# Patient Record
Sex: MALE | Race: WHITE | NOT HISPANIC OR LATINO
[De-identification: names, ages, dates, MRNs, and addresses within clinical notes are randomized per-mention and may not be internally consistent; named-entity substitution may affect disease eponyms.]

---

## 2017-08-11 ENCOUNTER — APPOINTMENT (OUTPATIENT)
Dept: UROLOGY | Facility: CLINIC | Age: 82
End: 2017-08-11
Payer: MEDICARE

## 2017-08-11 VITALS
WEIGHT: 174 LBS | TEMPERATURE: 97.9 F | DIASTOLIC BLOOD PRESSURE: 83 MMHG | BODY MASS INDEX: 25.77 KG/M2 | SYSTOLIC BLOOD PRESSURE: 148 MMHG | HEART RATE: 106 BPM | HEIGHT: 69 IN | RESPIRATION RATE: 17 BRPM

## 2017-08-11 DIAGNOSIS — Z87.448 PERSONAL HISTORY OF OTHER DISEASES OF URINARY SYSTEM: ICD-10-CM

## 2017-08-11 DIAGNOSIS — M86.9 OSTEOMYELITIS, UNSPECIFIED: ICD-10-CM

## 2017-08-11 PROCEDURE — 99203 OFFICE O/P NEW LOW 30 MIN: CPT

## 2017-08-11 RX ORDER — DICLOFENAC SODIUM 10 MG/G
1 GEL TOPICAL
Qty: 1 | Refills: 0 | Status: ACTIVE | COMMUNITY
Start: 2017-08-11 | End: 1900-01-01

## 2017-08-21 ENCOUNTER — MEDICATION RENEWAL (OUTPATIENT)
Age: 82
End: 2017-08-21

## 2017-08-21 RX ORDER — METHYLPREDNISOLONE 4 MG/1
4 TABLET ORAL
Qty: 1 | Refills: 0 | Status: ACTIVE | COMMUNITY
Start: 2017-08-21 | End: 1900-01-01

## 2017-09-05 ENCOUNTER — INPATIENT (INPATIENT)
Facility: HOSPITAL | Age: 82
LOS: 7 days | Discharge: HOME HEALTH | End: 2017-09-13
Attending: HOSPITALIST | Admitting: HOSPITALIST
Payer: MEDICARE

## 2017-09-05 VITALS
DIASTOLIC BLOOD PRESSURE: 74 MMHG | HEART RATE: 101 BPM | WEIGHT: 169.98 LBS | OXYGEN SATURATION: 100 % | SYSTOLIC BLOOD PRESSURE: 128 MMHG | TEMPERATURE: 98 F | HEIGHT: 68 IN | RESPIRATION RATE: 16 BRPM

## 2017-09-05 DIAGNOSIS — R10.30 LOWER ABDOMINAL PAIN, UNSPECIFIED: ICD-10-CM

## 2017-09-05 DIAGNOSIS — K31.89 OTHER DISEASES OF STOMACH AND DUODENUM: Chronic | ICD-10-CM

## 2017-09-05 DIAGNOSIS — R63.8 OTHER SYMPTOMS AND SIGNS CONCERNING FOOD AND FLUID INTAKE: ICD-10-CM

## 2017-09-05 DIAGNOSIS — Z29.9 ENCOUNTER FOR PROPHYLACTIC MEASURES, UNSPECIFIED: ICD-10-CM

## 2017-09-05 DIAGNOSIS — N18.3 CHRONIC KIDNEY DISEASE, STAGE 3 (MODERATE): ICD-10-CM

## 2017-09-05 DIAGNOSIS — N39.0 URINARY TRACT INFECTION, SITE NOT SPECIFIED: ICD-10-CM

## 2017-09-05 DIAGNOSIS — I10 ESSENTIAL (PRIMARY) HYPERTENSION: ICD-10-CM

## 2017-09-05 DIAGNOSIS — K44.9 DIAPHRAGMATIC HERNIA WITHOUT OBSTRUCTION OR GANGRENE: Chronic | ICD-10-CM

## 2017-09-05 LAB
ALBUMIN SERPL ELPH-MCNC: 2.9 G/DL — LOW (ref 3.3–5)
ALP SERPL-CCNC: 73 U/L — SIGNIFICANT CHANGE UP (ref 40–120)
ALT FLD-CCNC: 7 U/L — SIGNIFICANT CHANGE UP (ref 4–41)
APPEARANCE UR: SIGNIFICANT CHANGE UP
AST SERPL-CCNC: 12 U/L — SIGNIFICANT CHANGE UP (ref 4–40)
BACTERIA # UR AUTO: HIGH
BASOPHILS # BLD AUTO: 0.03 K/UL — SIGNIFICANT CHANGE UP (ref 0–0.2)
BASOPHILS NFR BLD AUTO: 0.3 % — SIGNIFICANT CHANGE UP (ref 0–2)
BILIRUB SERPL-MCNC: 0.2 MG/DL — SIGNIFICANT CHANGE UP (ref 0.2–1.2)
BILIRUB UR-MCNC: NEGATIVE — SIGNIFICANT CHANGE UP
BLOOD UR QL VISUAL: NEGATIVE — SIGNIFICANT CHANGE UP
BUN SERPL-MCNC: 27 MG/DL — HIGH (ref 7–23)
CALCIUM SERPL-MCNC: 8.5 MG/DL — SIGNIFICANT CHANGE UP (ref 8.4–10.5)
CHLORIDE SERPL-SCNC: 111 MMOL/L — HIGH (ref 98–107)
CO2 SERPL-SCNC: 18 MMOL/L — LOW (ref 22–31)
COLOR SPEC: YELLOW — SIGNIFICANT CHANGE UP
CREAT SERPL-MCNC: 1.67 MG/DL — HIGH (ref 0.5–1.3)
EOSINOPHIL # BLD AUTO: 0.08 K/UL — SIGNIFICANT CHANGE UP (ref 0–0.5)
EOSINOPHIL NFR BLD AUTO: 0.9 % — SIGNIFICANT CHANGE UP (ref 0–6)
GLUCOSE SERPL-MCNC: 104 MG/DL — HIGH (ref 70–99)
GLUCOSE UR-MCNC: NEGATIVE — SIGNIFICANT CHANGE UP
HCT VFR BLD CALC: 28.6 % — LOW (ref 39–50)
HGB BLD-MCNC: 8.6 G/DL — LOW (ref 13–17)
IMM GRANULOCYTES # BLD AUTO: 0.09 # — SIGNIFICANT CHANGE UP
IMM GRANULOCYTES NFR BLD AUTO: 1 % — SIGNIFICANT CHANGE UP (ref 0–1.5)
KETONES UR-MCNC: NEGATIVE — SIGNIFICANT CHANGE UP
LEUKOCYTE ESTERASE UR-ACNC: HIGH
LYMPHOCYTES # BLD AUTO: 1.8 K/UL — SIGNIFICANT CHANGE UP (ref 1–3.3)
LYMPHOCYTES # BLD AUTO: 20.3 % — SIGNIFICANT CHANGE UP (ref 13–44)
MCHC RBC-ENTMCNC: 24.5 PG — LOW (ref 27–34)
MCHC RBC-ENTMCNC: 30.1 % — LOW (ref 32–36)
MCV RBC AUTO: 81.5 FL — SIGNIFICANT CHANGE UP (ref 80–100)
MONOCYTES # BLD AUTO: 0.37 K/UL — SIGNIFICANT CHANGE UP (ref 0–0.9)
MONOCYTES NFR BLD AUTO: 4.2 % — SIGNIFICANT CHANGE UP (ref 2–14)
MUCOUS THREADS # UR AUTO: SIGNIFICANT CHANGE UP
NEUTROPHILS # BLD AUTO: 6.48 K/UL — SIGNIFICANT CHANGE UP (ref 1.8–7.4)
NEUTROPHILS NFR BLD AUTO: 73.3 % — SIGNIFICANT CHANGE UP (ref 43–77)
NITRITE UR-MCNC: NEGATIVE — SIGNIFICANT CHANGE UP
NRBC # FLD: 0 — SIGNIFICANT CHANGE UP
PH UR: 6 — SIGNIFICANT CHANGE UP (ref 4.6–8)
PLATELET # BLD AUTO: 358 K/UL — SIGNIFICANT CHANGE UP (ref 150–400)
PMV BLD: 9 FL — SIGNIFICANT CHANGE UP (ref 7–13)
POTASSIUM SERPL-MCNC: 4.3 MMOL/L — SIGNIFICANT CHANGE UP (ref 3.5–5.3)
POTASSIUM SERPL-SCNC: 4.3 MMOL/L — SIGNIFICANT CHANGE UP (ref 3.5–5.3)
PROT SERPL-MCNC: 6.5 G/DL — SIGNIFICANT CHANGE UP (ref 6–8.3)
PROT UR-MCNC: 100 — SIGNIFICANT CHANGE UP
RBC # BLD: 3.51 M/UL — LOW (ref 4.2–5.8)
RBC # FLD: 17.9 % — HIGH (ref 10.3–14.5)
RBC CASTS # UR COMP ASSIST: HIGH (ref 0–?)
SODIUM SERPL-SCNC: 143 MMOL/L — SIGNIFICANT CHANGE UP (ref 135–145)
SP GR SPEC: 1.01 — SIGNIFICANT CHANGE UP (ref 1–1.03)
SQUAMOUS # UR AUTO: SIGNIFICANT CHANGE UP
UROBILINOGEN FLD QL: NORMAL E.U. — SIGNIFICANT CHANGE UP (ref 0.1–0.2)
WBC # BLD: 8.85 K/UL — SIGNIFICANT CHANGE UP (ref 3.8–10.5)
WBC # FLD AUTO: 8.85 K/UL — SIGNIFICANT CHANGE UP (ref 3.8–10.5)
WBC CLUMPS #/AREA URNS HPF: PRESENT — HIGH (ref 0–?)
WBC UR QL: >50 — HIGH (ref 0–?)

## 2017-09-05 PROCEDURE — 99223 1ST HOSP IP/OBS HIGH 75: CPT | Mod: GC

## 2017-09-05 RX ORDER — HEPARIN SODIUM 5000 [USP'U]/ML
5000 INJECTION INTRAVENOUS; SUBCUTANEOUS EVERY 8 HOURS
Qty: 0 | Refills: 0 | Status: DISCONTINUED | OUTPATIENT
Start: 2017-09-05 | End: 2017-09-09

## 2017-09-05 RX ORDER — METOPROLOL TARTRATE 50 MG
50 TABLET ORAL DAILY
Qty: 0 | Refills: 0 | Status: DISCONTINUED | OUTPATIENT
Start: 2017-09-05 | End: 2017-09-13

## 2017-09-05 RX ORDER — ACETAMINOPHEN 500 MG
650 TABLET ORAL EVERY 6 HOURS
Qty: 0 | Refills: 0 | Status: DISCONTINUED | OUTPATIENT
Start: 2017-09-05 | End: 2017-09-09

## 2017-09-05 RX ORDER — FINASTERIDE 5 MG/1
5 TABLET, FILM COATED ORAL DAILY
Qty: 0 | Refills: 0 | Status: DISCONTINUED | OUTPATIENT
Start: 2017-09-05 | End: 2017-09-13

## 2017-09-05 RX ORDER — CEFTRIAXONE 500 MG/1
1 INJECTION, POWDER, FOR SOLUTION INTRAMUSCULAR; INTRAVENOUS EVERY 24 HOURS
Qty: 0 | Refills: 0 | Status: DISCONTINUED | OUTPATIENT
Start: 2017-09-06 | End: 2017-09-07

## 2017-09-05 RX ORDER — PANTOPRAZOLE SODIUM 20 MG/1
40 TABLET, DELAYED RELEASE ORAL
Qty: 0 | Refills: 0 | Status: DISCONTINUED | OUTPATIENT
Start: 2017-09-05 | End: 2017-09-07

## 2017-09-05 RX ORDER — CEFTRIAXONE 500 MG/1
1 INJECTION, POWDER, FOR SOLUTION INTRAMUSCULAR; INTRAVENOUS ONCE
Qty: 0 | Refills: 0 | Status: COMPLETED | OUTPATIENT
Start: 2017-09-05 | End: 2017-09-05

## 2017-09-05 RX ORDER — NIFEDIPINE 30 MG
60 TABLET, EXTENDED RELEASE 24 HR ORAL DAILY
Qty: 0 | Refills: 0 | Status: DISCONTINUED | OUTPATIENT
Start: 2017-09-05 | End: 2017-09-13

## 2017-09-05 RX ORDER — ACETAMINOPHEN 500 MG
1000 TABLET ORAL ONCE
Qty: 0 | Refills: 0 | Status: COMPLETED | OUTPATIENT
Start: 2017-09-05 | End: 2017-09-05

## 2017-09-05 RX ORDER — MORPHINE SULFATE 50 MG/1
2 CAPSULE, EXTENDED RELEASE ORAL ONCE
Qty: 0 | Refills: 0 | Status: DISCONTINUED | OUTPATIENT
Start: 2017-09-05 | End: 2017-09-05

## 2017-09-05 RX ADMIN — Medication 650 MILLIGRAM(S): at 20:30

## 2017-09-05 RX ADMIN — HEPARIN SODIUM 5000 UNIT(S): 5000 INJECTION INTRAVENOUS; SUBCUTANEOUS at 22:49

## 2017-09-05 RX ADMIN — Medication 650 MILLIGRAM(S): at 19:45

## 2017-09-05 RX ADMIN — CEFTRIAXONE 100 GRAM(S): 500 INJECTION, POWDER, FOR SOLUTION INTRAMUSCULAR; INTRAVENOUS at 13:57

## 2017-09-05 RX ADMIN — Medication 400 MILLIGRAM(S): at 11:42

## 2017-09-05 RX ADMIN — Medication 1000 MILLIGRAM(S): at 13:42

## 2017-09-05 RX ADMIN — MORPHINE SULFATE 2 MILLIGRAM(S): 50 CAPSULE, EXTENDED RELEASE ORAL at 14:19

## 2017-09-05 NOTE — ED ADULT NURSE NOTE - OBJECTIVE STATEMENT
A+OX3, amb, self care, skin intact.  Pt had cystoscopy for strictures and perforated bladder with recurrent UTI's in June and has been having intermittent zhou groin pain since then unrelieved by tylenol, motrin, and oxycodone.  Pain occurs when sitting or walking.  Instructed to collect urine specimen when able.  Pt is incontinent.  Urinal placed to obtain.  MD informed of pt status

## 2017-09-05 NOTE — H&P ADULT - ATTENDING COMMENTS
Pt seen and examined  d/w Dr. Hdez  chronic groin pain, was diagnosed w/ osteitis pubis, s/p steroid injection and medrol dose pack, without relief of pain.   Pt pain not well controlled w/ Tylenol, but does not want anything stronger.   Ortho (Dr. Lucas) unable to be reached, ?on vacation - reattempt in AM, or call house Ortho.  MRI pelvis ordered

## 2017-09-05 NOTE — H&P ADULT - PMH
CKD (chronic kidney disease)    GERD (gastroesophageal reflux disease)    Hiatal hernia    HTN - Hypertension

## 2017-09-05 NOTE — H&P ADULT - NSHPLABSRESULTS_GEN_ALL_CORE
8.6    8.85  )-----------( 358      ( 05 Sep 2017 10:57 )             28.6           143  |  111<H>  |  27<H>  ----------------------------<  104<H>  4.3   |  18<L>  |  1.67<H>    Ca    8.5      05 Sep 2017 10:57    TPro  6.5  /  Alb  2.9<L>  /  TBili  0.2  /  DBili  x   /  AST  12  /  ALT  7   /  AlkPhos  73                Urinalysis Basic - ( 05 Sep 2017 10:57 )    Color: YELLOW / Appearance: HAZY / S.015 / pH: 6.0  Gluc: NEGATIVE / Ketone: NEGATIVE  / Bili: NEGATIVE / Urobili: NORMAL E.U.   Blood: NEGATIVE / Protein: 100 / Nitrite: NEGATIVE   Leuk Esterase: LARGE / RBC: 5-10 / WBC >50   Sq Epi: OCC / Non Sq Epi: x / Bacteria: MANY 8.6    8.85  )-----------( 358      ( 05 Sep 2017 10:57 )             28.6           143  |  111<H>  |  27<H>  ----------------------------<  104<H>  4.3   |  18<L>  |  1.67<H>    Ca    8.5      05 Sep 2017 10:57    TPro  6.5  /  Alb  2.9<L>  /  TBili  0.2  /  DBili  x   /  AST  12  /  ALT  7   /  AlkPhos  73            Urinalysis Basic - ( 05 Sep 2017 10:57 )    Color: YELLOW / Appearance: HAZY / S.015 / pH: 6.0  Gluc: NEGATIVE / Ketone: NEGATIVE  / Bili: NEGATIVE / Urobili: NORMAL E.U.   Blood: NEGATIVE / Protein: 100 / Nitrite: NEGATIVE   Leuk Esterase: LARGE / RBC: 5-10 / WBC >50   Sq Epi: OCC / Non Sq Epi: x / Bacteria: MANY

## 2017-09-05 NOTE — H&P ADULT - NSHPREVIEWOFSYSTEMS_GEN_ALL_CORE
Review of Systems:   CONSTITUTIONAL: No fever, weight changes, fatigue, appetite changes  EYES: No eye pain, visual disturbances, or discharge  ENMT:  No difficulty hearing, tinnitus, vertigo; No sinus or throat pain  NECK: No pain or stiffness  RESPIRATORY: No cough, wheezing, chills or hemoptysis; No shortness of breath  CARDIOVASCULAR: No chest pain, palpitations, dizziness, or leg swelling  GASTROINTESTINAL: No abdominal or epigastric pain. No nausea, vomiting, or hematemesis; No diarrhea or constipation. No melena or hematochezia.  GENITOURINARY: No dysuria, frequency, hematuria, + incontinence and bilateral groin pain   NEUROLOGICAL: No headaches, memory loss, loss of strength, numbness, or tremors  SKIN: No itching, burning, rashes, or lesions   ENDOCRINE: No heat or cold intolerance; No hair loss  MUSCULOSKELETAL: No joint pain or swelling; No muscle, back, or extremity pain  PSYCHIATRIC: No depression, anxiety, mood swings, or difficulty sleeping  HEME/LYMPH: No easy bruising, or bleeding gums  ALLERY AND IMMUNOLOGIC: No hives or eczema

## 2017-09-05 NOTE — ED ADULT TRIAGE NOTE - CHIEF COMPLAINT QUOTE
Pt. c/o b/l groin pain with radiation down left leg x 2 months. Cytoscopy performed in June 2017. No relief with motrin. Appears comfortable in triage. Denies any dysuria, hematuria, fever/chills.

## 2017-09-05 NOTE — H&P ADULT - PROBLEM SELECTOR PLAN 2
- hx of recurrent UTIs   - has positive UA, suprapubic tenderness and the worsening groin pain  - plan for 7 days of abx for complicated UTI

## 2017-09-05 NOTE — PATIENT PROFILE ADULT. - ABILITY TO HEAR (WITH HEARING AID OR HEARING APPLIANCE IF NORMALLY USED):
Mildly to Moderately Impaired: difficulty hearing in some environments or speaker may need to increase volume or speak distinctly/[place]

## 2017-09-05 NOTE — ED PROVIDER NOTE - PHYSICAL EXAMINATION
gu- no erythema or swelling of tesicles, no tenderness or erythema in testicles or penis gu- no erythema or swelling of tesicles, no tenderness or erythema in testicles or penis    Attending/Mackenzie: Well-appearing, NAD; PERRL/EOMI, non-icterus, supple, no STEFANI, no JVD, RRR, CTAB; Abd-soft, NT/ND, +PT bialteral femoral canals, no hernias noted, no HSM; no LE edema, A&Ox3, nonfocal; Skin-warm/dry

## 2017-09-05 NOTE — H&P ADULT - PSH
Acute gastric volvulus  s/p laparoscopic reduction, PEG  Hernia, paraesophageal    Prostate  Brachytherapy 30y ago at [facility]  S/P appendectomy  performed at [facility] 10 y ago

## 2017-09-05 NOTE — ED PROVIDER NOTE - MEDICAL DECISION MAKING DETAILS
84 yo male with bilateral groin pain; chronic issue has had extensive work up with ctap and filling studies; patient has bone scan and mri pending; will check cbc cmp ua culture pain control --> re eval

## 2017-09-05 NOTE — H&P ADULT - ASSESSMENT
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

## 2017-09-05 NOTE — PATIENT PROFILE ADULT. - VISION (WITH CORRECTIVE LENSES IF THE PATIENT USUALLY WEARS THEM):
Partially impaired: cannot see medication labels or newsprint, but can see obstacles in path, and the surrounding layout; can count fingers at arm's length/reading glasses - present on admission

## 2017-09-05 NOTE — H&P ADULT - NSHPSOCIALHISTORY_GEN_ALL_CORE
Social History:    Marital Status:  (   )    (   ) Single    (   )    (  )   Occupation:   Lives with: (  ) alone  (  ) children   (  ) spouse   (  ) parents  (  ) other    Substance Use (street drugs): (  ) never used  (  ) other:  Tobacco Usage:  (   ) never smoked   ( X  ) former smoker- quit about 30 years ago, about 25 pack years   Alcohol Usage: Denies

## 2017-09-05 NOTE — H&P ADULT - PROBLEM SELECTOR PLAN 1
- bilateral inguinal pain starting in June 2017 after bladder perforation, but now pain has been worsening  - patient was planned to have a bone scan and MRI as next step in workup so will check MRI of pelvis to evaluate

## 2017-09-05 NOTE — H&P ADULT - NSHPPHYSICALEXAM_GEN_ALL_CORE
Vital Signs Last 24 Hrs  T(C): 36.5 (05 Sep 2017 15:20), Max: 36.7 (05 Sep 2017 11:04)  T(F): 97.7 (05 Sep 2017 15:20), Max: 98 (05 Sep 2017 11:04)  HR: 83 (05 Sep 2017 15:20) (76 - 101)  BP: 130/76 (05 Sep 2017 15:20) (128/74 - 162/84)  BP(mean): --  RR: 16 (05 Sep 2017 15:20) (16 - 18)  SpO2: 97% (05 Sep 2017 15:20) (97% - 100%)    PHYSICAL EXAM:    GENERAL: Comfortable, no acute distress   HEAD:  Normocephalic, atraumatic  EYES: EOMI, PERRLA  HEENT: Moist mucous membranes  NECK: Supple, No JVD  NERVOUS SYSTEM:  Alert & Oriented X3, Motor Strength 5/5 B/L upper and lower extremities  CHEST/LUNG: Clear to auscultation bilaterally  HEART: Regular rate and rhythm, no murmur   ABDOMEN: Soft, Nontender, Nondistended, Bowel sounds present  : + suprapubic pain and bilateral inguinal groin pain, No lesions, rashes or palpable masses  EXTREMITIES:   No clubbing, cyanosis, or edema  MUSCULOSKELETAL: No muscle tenderness, no joint tenderness  SKIN:  warm and dry, no rash

## 2017-09-05 NOTE — ED PROVIDER NOTE - OBJECTIVE STATEMENT
86 yo male presenting with bilateral inguinal groin pain that has been going on since june.  patient had bladder rupture from cystoscopy which had subsequently healed on repeat imaging in early july.  patient has been seeing Dr. Robles for his symptoms.  pain described as achy ripping 10/10 pain with no radiation.  worse when moving from laying down to sitting and worse with walking.  took tylenol with minimal relief of symptoms this morning.  finished medrol dosepak last week and got steroid injection early august which temporarily helped.  no hematuria fevers chills n/v/d. 86 yo male presenting with bilateral inguinal groin pain that has been going on since june.  patient had bladder rupture from cystoscopy which had subsequently healed on repeat imaging in early july.  patient has been seeing Dr. Robles for his symptoms.  pain described as achy ripping 10/10 pain with no radiation.  worse when moving from laying down to sitting and worse with walking.  took tylenol with minimal relief of symptoms this morning.  finished medrol dosepak last week and got steroid injection early august which temporarily helped.  no hematuria fevers chills n/v/d.    Attending/Mackenzie: 86 yo M w/ a h/o esophageal rupture secondary to EGD, this past June had urinary bladder rupture secondary to cystoscopy which was performed due to recurrent UTIs. Since the surgery patient has complained of worsening bilateral groin pain. He has started to receive an OP work up including CT, referral to ortho and to receive MRI and bone scan. Pt has had urinary incont but no change in bowel habits, fever/chills, abd pain, n/v. Pain has ot been well controlled with OP meds.

## 2017-09-05 NOTE — PATIENT PROFILE ADULT. - TEACHING/LEARNING LEARNING PREFERENCES
skill demonstration/written material/video/audio/group instruction/computer/internet/pictorial/individual instruction/verbal instruction

## 2017-09-06 LAB
BUN SERPL-MCNC: 28 MG/DL — HIGH (ref 7–23)
CALCIUM SERPL-MCNC: 8.9 MG/DL — SIGNIFICANT CHANGE UP (ref 8.4–10.5)
CHLORIDE SERPL-SCNC: 110 MMOL/L — HIGH (ref 98–107)
CO2 SERPL-SCNC: 16 MMOL/L — LOW (ref 22–31)
CREAT SERPL-MCNC: 1.53 MG/DL — HIGH (ref 0.5–1.3)
GLUCOSE SERPL-MCNC: 117 MG/DL — HIGH (ref 70–99)
HCT VFR BLD CALC: 30.3 % — LOW (ref 39–50)
HGB BLD-MCNC: 8.8 G/DL — LOW (ref 13–17)
MAGNESIUM SERPL-MCNC: 1.8 MG/DL — SIGNIFICANT CHANGE UP (ref 1.6–2.6)
MCHC RBC-ENTMCNC: 24.1 PG — LOW (ref 27–34)
MCHC RBC-ENTMCNC: 29 % — LOW (ref 32–36)
MCV RBC AUTO: 83 FL — SIGNIFICANT CHANGE UP (ref 80–100)
NRBC # FLD: 0 — SIGNIFICANT CHANGE UP
PHOSPHATE SERPL-MCNC: 3.2 MG/DL — SIGNIFICANT CHANGE UP (ref 2.5–4.5)
PLATELET # BLD AUTO: 368 K/UL — SIGNIFICANT CHANGE UP (ref 150–400)
PMV BLD: 9.3 FL — SIGNIFICANT CHANGE UP (ref 7–13)
POTASSIUM SERPL-MCNC: 4.5 MMOL/L — SIGNIFICANT CHANGE UP (ref 3.5–5.3)
POTASSIUM SERPL-SCNC: 4.5 MMOL/L — SIGNIFICANT CHANGE UP (ref 3.5–5.3)
RBC # BLD: 3.65 M/UL — LOW (ref 4.2–5.8)
RBC # FLD: 18 % — HIGH (ref 10.3–14.5)
SODIUM SERPL-SCNC: 144 MMOL/L — SIGNIFICANT CHANGE UP (ref 135–145)
SPECIMEN SOURCE: SIGNIFICANT CHANGE UP
WBC # BLD: 8.88 K/UL — SIGNIFICANT CHANGE UP (ref 3.8–10.5)
WBC # FLD AUTO: 8.88 K/UL — SIGNIFICANT CHANGE UP (ref 3.8–10.5)

## 2017-09-06 PROCEDURE — 99233 SBSQ HOSP IP/OBS HIGH 50: CPT

## 2017-09-06 PROCEDURE — 72195 MRI PELVIS W/O DYE: CPT | Mod: 26

## 2017-09-06 RX ADMIN — Medication 50 MILLIGRAM(S): at 06:52

## 2017-09-06 RX ADMIN — PANTOPRAZOLE SODIUM 40 MILLIGRAM(S): 20 TABLET, DELAYED RELEASE ORAL at 06:53

## 2017-09-06 RX ADMIN — Medication 60 MILLIGRAM(S): at 06:52

## 2017-09-06 RX ADMIN — HEPARIN SODIUM 5000 UNIT(S): 5000 INJECTION INTRAVENOUS; SUBCUTANEOUS at 13:44

## 2017-09-06 RX ADMIN — HEPARIN SODIUM 5000 UNIT(S): 5000 INJECTION INTRAVENOUS; SUBCUTANEOUS at 06:53

## 2017-09-06 RX ADMIN — FINASTERIDE 5 MILLIGRAM(S): 5 TABLET, FILM COATED ORAL at 13:38

## 2017-09-06 RX ADMIN — HEPARIN SODIUM 5000 UNIT(S): 5000 INJECTION INTRAVENOUS; SUBCUTANEOUS at 22:14

## 2017-09-06 RX ADMIN — Medication 650 MILLIGRAM(S): at 04:00

## 2017-09-06 RX ADMIN — Medication 650 MILLIGRAM(S): at 19:17

## 2017-09-06 RX ADMIN — Medication 650 MILLIGRAM(S): at 20:10

## 2017-09-06 RX ADMIN — CEFTRIAXONE 100 GRAM(S): 500 INJECTION, POWDER, FOR SOLUTION INTRAMUSCULAR; INTRAVENOUS at 13:43

## 2017-09-06 RX ADMIN — Medication 650 MILLIGRAM(S): at 03:27

## 2017-09-06 NOTE — PROGRESS NOTE ADULT - PROBLEM SELECTOR PLAN 1
- bilateral inguinal pain starting in June 2017 after bladder perforation, but now pain has been worsening  - concern for osteitis pubis after pelvic procedure - awaiting MRI results  - outpt ortho recommended to do bone scan as per family - pt has a remote history of prostate ca which was treated with radiation seeds and has resolved as per daughter.  will reach out to Dr. Robison regarding this test.

## 2017-09-06 NOTE — PROGRESS NOTE ADULT - PROBLEM SELECTOR PLAN 2
- hx of recurrent UTIs - family denies resistant organisms   - follow up cultures  - continue with ceftriaxone

## 2017-09-06 NOTE — PROGRESS NOTE ADULT - ASSESSMENT
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

## 2017-09-06 NOTE — PROGRESS NOTE ADULT - SUBJECTIVE AND OBJECTIVE BOX
Patient is a 85y old  Male who presents with a chief complaint of "groin pain x 2 months" (05 Sep 2017 15:32)      SUBJECTIVE / OVERNIGHT EVENTS:  pt states his pubic/groin pain has significantly improved since he has been in the hospital.  Pain is still present - rates it 5/10, down from 9/10 on admission.  The groin pain is in the b/l inguinal folds which worsens with standing.      MEDICATIONS  (STANDING):  finasteride 5 milliGRAM(s) Oral daily  metoprolol succinate ER 50 milliGRAM(s) Oral daily  NIFEdipine XL 60 milliGRAM(s) Oral daily  pantoprazole    Tablet 40 milliGRAM(s) Oral before breakfast  heparin  Injectable 5000 Unit(s) SubCutaneous every 8 hours  cefTRIAXone   IVPB 1 Gram(s) IV Intermittent every 24 hours    MEDICATIONS  (PRN):  acetaminophen   Tablet. 650 milliGRAM(s) Oral every 6 hours PRN mild and moderate and severe pain      Vital Signs Last 24 Hrs  T(C): 36.9 (06 Sep 2017 06:44), Max: 36.9 (06 Sep 2017 06:44)  T(F): 98.4 (06 Sep 2017 06:44), Max: 98.4 (06 Sep 2017 06:44)  HR: 92 (06 Sep 2017 06:44) (83 - 92)  BP: 126/75 (06 Sep 2017 06:44) (124/75 - 151/75)  BP(mean): --  RR: 18 (06 Sep 2017 06:44) (16 - 18)  SpO2: 99% (06 Sep 2017 06:44) (97% - 100%)  CAPILLARY BLOOD GLUCOSE    PHYSICAL EXAM:  GENERAL: NAD, well-developed  HEAD:  Atraumatic, Normocephalic  EYES: EOMI, PERRLA, conjunctiva and sclera clear  NECK: Supple, No JVD  CHEST/LUNG: Clear to auscultation bilaterally; No wheeze  HEART: Regular rate and rhythm; No murmurs  ABDOMEN: Soft, Nontender, Nondistended; Bowel sounds present  EXTREMITIES:  2+ Peripheral Pulses, No clubbing, cyanosis, or edema; b/l inguinal fold pain which is mildly worse with palpation   PSYCH: AAOx3  NEUROLOGY: non-focal  SKIN: No rashes or lesions    LABS:                        8.8    8.88  )-----------( 368      ( 06 Sep 2017 10:30 )             30.3         144  |  110<H>  |  28<H>  ----------------------------<  117<H>  4.5   |  16<L>  |  1.53<H>    Ca    8.9      06 Sep 2017 10:30  Phos  3.2       Mg     1.8         TPro  6.5  /  Alb  2.9<L>  /  TBili  0.2  /  DBili  x   /  AST  12  /  ALT  7   /  AlkPhos  73            Urinalysis Basic - ( 05 Sep 2017 10:57 )    Color: YELLOW / Appearance: HAZY / S.015 / pH: 6.0  Gluc: NEGATIVE / Ketone: NEGATIVE  / Bili: NEGATIVE / Urobili: NORMAL E.U.   Blood: NEGATIVE / Protein: 100 / Nitrite: NEGATIVE   Leuk Esterase: LARGE / RBC: 5-10 / WBC >50   Sq Epi: OCC / Non Sq Epi: x / Bacteria: MANY    Ucx: E coli > 100,000 CFU

## 2017-09-07 DIAGNOSIS — K92.2 GASTROINTESTINAL HEMORRHAGE, UNSPECIFIED: ICD-10-CM

## 2017-09-07 LAB
-  AMIKACIN: SIGNIFICANT CHANGE UP
-  AMIKACIN: SIGNIFICANT CHANGE UP
-  AMPICILLIN/SULBACTAM: SIGNIFICANT CHANGE UP
-  AMPICILLIN/SULBACTAM: SIGNIFICANT CHANGE UP
-  AMPICILLIN: SIGNIFICANT CHANGE UP
-  AMPICILLIN: SIGNIFICANT CHANGE UP
-  AZTREONAM: SIGNIFICANT CHANGE UP
-  AZTREONAM: SIGNIFICANT CHANGE UP
-  CEFAZOLIN: SIGNIFICANT CHANGE UP
-  CEFAZOLIN: SIGNIFICANT CHANGE UP
-  CEFEPIME: SIGNIFICANT CHANGE UP
-  CEFEPIME: SIGNIFICANT CHANGE UP
-  CEFOXITIN: SIGNIFICANT CHANGE UP
-  CEFOXITIN: SIGNIFICANT CHANGE UP
-  CEFTAZIDIME: SIGNIFICANT CHANGE UP
-  CEFTAZIDIME: SIGNIFICANT CHANGE UP
-  CEFTRIAXONE: SIGNIFICANT CHANGE UP
-  CEFTRIAXONE: SIGNIFICANT CHANGE UP
-  CIPROFLOXACIN: SIGNIFICANT CHANGE UP
-  CIPROFLOXACIN: SIGNIFICANT CHANGE UP
-  ERTAPENEM: SIGNIFICANT CHANGE UP
-  ERTAPENEM: SIGNIFICANT CHANGE UP
-  GENTAMICIN: SIGNIFICANT CHANGE UP
-  GENTAMICIN: SIGNIFICANT CHANGE UP
-  IMIPENEM: SIGNIFICANT CHANGE UP
-  IMIPENEM: SIGNIFICANT CHANGE UP
-  LEVOFLOXACIN: SIGNIFICANT CHANGE UP
-  LEVOFLOXACIN: SIGNIFICANT CHANGE UP
-  MEROPENEM: SIGNIFICANT CHANGE UP
-  MEROPENEM: SIGNIFICANT CHANGE UP
-  NITROFURANTOIN: SIGNIFICANT CHANGE UP
-  NITROFURANTOIN: SIGNIFICANT CHANGE UP
-  PIPERACILLIN/TAZOBACTAM: SIGNIFICANT CHANGE UP
-  PIPERACILLIN/TAZOBACTAM: SIGNIFICANT CHANGE UP
-  TIGECYCLINE: SIGNIFICANT CHANGE UP
-  TIGECYCLINE: SIGNIFICANT CHANGE UP
-  TOBRAMYCIN: SIGNIFICANT CHANGE UP
-  TOBRAMYCIN: SIGNIFICANT CHANGE UP
-  TRIMETHOPRIM/SULFAMETHOXAZOLE: SIGNIFICANT CHANGE UP
-  TRIMETHOPRIM/SULFAMETHOXAZOLE: SIGNIFICANT CHANGE UP
BACTERIA UR CULT: SIGNIFICANT CHANGE UP
BLD GP AB SCN SERPL QL: NEGATIVE — SIGNIFICANT CHANGE UP
BUN SERPL-MCNC: 44 MG/DL — HIGH (ref 7–23)
CALCIUM SERPL-MCNC: 8.6 MG/DL — SIGNIFICANT CHANGE UP (ref 8.4–10.5)
CHLORIDE SERPL-SCNC: 109 MMOL/L — HIGH (ref 98–107)
CO2 SERPL-SCNC: 19 MMOL/L — LOW (ref 22–31)
CREAT SERPL-MCNC: 1.63 MG/DL — HIGH (ref 0.5–1.3)
GLUCOSE SERPL-MCNC: 89 MG/DL — SIGNIFICANT CHANGE UP (ref 70–99)
HCT VFR BLD CALC: 23.9 % — LOW (ref 39–50)
HCT VFR BLD CALC: 25 % — LOW (ref 39–50)
HGB BLD-MCNC: 7 G/DL — CRITICAL LOW (ref 13–17)
HGB BLD-MCNC: 7.5 G/DL — LOW (ref 13–17)
MCHC RBC-ENTMCNC: 24 PG — LOW (ref 27–34)
MCHC RBC-ENTMCNC: 24.7 PG — LOW (ref 27–34)
MCHC RBC-ENTMCNC: 29.3 % — LOW (ref 32–36)
MCHC RBC-ENTMCNC: 30 % — LOW (ref 32–36)
MCV RBC AUTO: 81.8 FL — SIGNIFICANT CHANGE UP (ref 80–100)
MCV RBC AUTO: 82.2 FL — SIGNIFICANT CHANGE UP (ref 80–100)
METHOD TYPE: SIGNIFICANT CHANGE UP
METHOD TYPE: SIGNIFICANT CHANGE UP
NRBC # FLD: 0 — SIGNIFICANT CHANGE UP
NRBC # FLD: 0 — SIGNIFICANT CHANGE UP
ORGANISM # SPEC MICROSCOPIC CNT: SIGNIFICANT CHANGE UP
PLATELET # BLD AUTO: 377 K/UL — SIGNIFICANT CHANGE UP (ref 150–400)
PLATELET # BLD AUTO: 390 K/UL — SIGNIFICANT CHANGE UP (ref 150–400)
PMV BLD: 9.5 FL — SIGNIFICANT CHANGE UP (ref 7–13)
PMV BLD: 9.7 FL — SIGNIFICANT CHANGE UP (ref 7–13)
POTASSIUM SERPL-MCNC: 4.4 MMOL/L — SIGNIFICANT CHANGE UP (ref 3.5–5.3)
POTASSIUM SERPL-SCNC: 4.4 MMOL/L — SIGNIFICANT CHANGE UP (ref 3.5–5.3)
RBC # BLD: 2.92 M/UL — LOW (ref 4.2–5.8)
RBC # BLD: 3.04 M/UL — LOW (ref 4.2–5.8)
RBC # FLD: 17.8 % — HIGH (ref 10.3–14.5)
RBC # FLD: 17.8 % — HIGH (ref 10.3–14.5)
RH IG SCN BLD-IMP: POSITIVE — SIGNIFICANT CHANGE UP
SODIUM SERPL-SCNC: 144 MMOL/L — SIGNIFICANT CHANGE UP (ref 135–145)
WBC # BLD: 10.66 K/UL — HIGH (ref 3.8–10.5)
WBC # BLD: 9.54 K/UL — SIGNIFICANT CHANGE UP (ref 3.8–10.5)
WBC # FLD AUTO: 10.66 K/UL — HIGH (ref 3.8–10.5)
WBC # FLD AUTO: 9.54 K/UL — SIGNIFICANT CHANGE UP (ref 3.8–10.5)

## 2017-09-07 PROCEDURE — 99222 1ST HOSP IP/OBS MODERATE 55: CPT | Mod: GC

## 2017-09-07 PROCEDURE — 99233 SBSQ HOSP IP/OBS HIGH 50: CPT

## 2017-09-07 RX ORDER — ERTAPENEM SODIUM 1 G/1
1000 INJECTION, POWDER, LYOPHILIZED, FOR SOLUTION INTRAMUSCULAR; INTRAVENOUS ONCE
Qty: 0 | Refills: 0 | Status: COMPLETED | OUTPATIENT
Start: 2017-09-07 | End: 2017-09-07

## 2017-09-07 RX ORDER — ERTAPENEM SODIUM 1 G/1
1000 INJECTION, POWDER, LYOPHILIZED, FOR SOLUTION INTRAMUSCULAR; INTRAVENOUS EVERY 24 HOURS
Qty: 0 | Refills: 0 | Status: DISCONTINUED | OUTPATIENT
Start: 2017-09-08 | End: 2017-09-08

## 2017-09-07 RX ORDER — ERTAPENEM SODIUM 1 G/1
INJECTION, POWDER, LYOPHILIZED, FOR SOLUTION INTRAMUSCULAR; INTRAVENOUS
Qty: 0 | Refills: 0 | Status: DISCONTINUED | OUTPATIENT
Start: 2017-09-07 | End: 2017-09-08

## 2017-09-07 RX ORDER — PANTOPRAZOLE SODIUM 20 MG/1
8 TABLET, DELAYED RELEASE ORAL
Qty: 80 | Refills: 0 | Status: DISCONTINUED | OUTPATIENT
Start: 2017-09-07 | End: 2017-09-10

## 2017-09-07 RX ORDER — PANTOPRAZOLE SODIUM 20 MG/1
40 TABLET, DELAYED RELEASE ORAL EVERY 12 HOURS
Qty: 0 | Refills: 0 | Status: DISCONTINUED | OUTPATIENT
Start: 2017-09-07 | End: 2017-09-07

## 2017-09-07 RX ADMIN — HEPARIN SODIUM 5000 UNIT(S): 5000 INJECTION INTRAVENOUS; SUBCUTANEOUS at 22:46

## 2017-09-07 RX ADMIN — FINASTERIDE 5 MILLIGRAM(S): 5 TABLET, FILM COATED ORAL at 13:39

## 2017-09-07 RX ADMIN — ERTAPENEM SODIUM 120 MILLIGRAM(S): 1 INJECTION, POWDER, LYOPHILIZED, FOR SOLUTION INTRAMUSCULAR; INTRAVENOUS at 16:23

## 2017-09-07 RX ADMIN — Medication 60 MILLIGRAM(S): at 06:44

## 2017-09-07 RX ADMIN — PANTOPRAZOLE SODIUM 40 MILLIGRAM(S): 20 TABLET, DELAYED RELEASE ORAL at 06:44

## 2017-09-07 RX ADMIN — Medication 650 MILLIGRAM(S): at 02:45

## 2017-09-07 RX ADMIN — PANTOPRAZOLE SODIUM 10 MG/HR: 20 TABLET, DELAYED RELEASE ORAL at 20:57

## 2017-09-07 RX ADMIN — PANTOPRAZOLE SODIUM 40 MILLIGRAM(S): 20 TABLET, DELAYED RELEASE ORAL at 18:46

## 2017-09-07 RX ADMIN — HEPARIN SODIUM 5000 UNIT(S): 5000 INJECTION INTRAVENOUS; SUBCUTANEOUS at 13:39

## 2017-09-07 RX ADMIN — Medication 650 MILLIGRAM(S): at 03:30

## 2017-09-07 RX ADMIN — Medication 50 MILLIGRAM(S): at 06:44

## 2017-09-07 RX ADMIN — HEPARIN SODIUM 5000 UNIT(S): 5000 INJECTION INTRAVENOUS; SUBCUTANEOUS at 06:43

## 2017-09-07 NOTE — CONSULT NOTE ADULT - ATTENDING COMMENTS
Hx/PE as above- pt. seen/examined. Extensive NSAID use x 1 month for MSK pain. Now c/o coffee ground emesis and found to have melena. No abd pain. Abd- soft/NT/ND. Labs as above.    IMP: UGI bleed- NSAID ulcer likely.    REC:  -EGD discussed and pt. adamantly refuses.  -Serial H/H.  -No NSAID's.  -PPI drip.  -Maintain Hb > 7.

## 2017-09-07 NOTE — PROGRESS NOTE ADULT - PROBLEM SELECTOR PLAN 2
- hx of recurrent UTIs   - cultures show ESBL e. coli and kleb  - will switch to ertapenem   - ID consult - bilateral inguinal pain starting in June 2017 after bladder perforation, but now pain has been worsening  - MRI consistent with likely stress fracture in pubic bone - will obtain ortho eval but unlikely any surgical intervention  - PT eval when WB status clarified with ortho

## 2017-09-07 NOTE — PROGRESS NOTE ADULT - PROBLEM SELECTOR PLAN 1
- bilateral inguinal pain starting in June 2017 after bladder perforation, but now pain has been worsening  - MRI consistent with likely stress fracture in pubic bone - will obtain ortho eval but unlikely any surgical intervention  - PT eval when WB status clarified with ortho - drop in h/h   - cbc bid  - gi eval  - clear liquid diet and PPI

## 2017-09-07 NOTE — PROGRESS NOTE ADULT - PROBLEM SELECTOR PLAN 4
- c/w home BP meds- Toprol XL and nifedipine - Cr close to baseline  - will continue to monitor  - avoid nephrotoxic agents

## 2017-09-07 NOTE — PROGRESS NOTE ADULT - SUBJECTIVE AND OBJECTIVE BOX
Patient is a 85y old  Male who presents with a chief complaint of "groin pain x 2 months" (05 Sep 2017 15:32)      SUBJECTIVE / OVERNIGHT EVENTS:  pt with improvement in pain in the groin.  earlier in the am he had approximately 250cc of coffee ground emesis.  denies abd pain.     MEDICATIONS  (STANDING):  finasteride 5 milliGRAM(s) Oral daily  metoprolol succinate ER 50 milliGRAM(s) Oral daily  NIFEdipine XL 60 milliGRAM(s) Oral daily  heparin  Injectable 5000 Unit(s) SubCutaneous every 8 hours  cefTRIAXone   IVPB 1 Gram(s) IV Intermittent every 24 hours  pantoprazole  Injectable 40 milliGRAM(s) IV Push every 12 hours    MEDICATIONS  (PRN):  acetaminophen   Tablet. 650 milliGRAM(s) Oral every 6 hours PRN mild and moderate and severe pain      Vital Signs Last 24 Hrs  T(C): 37.1 (07 Sep 2017 05:10), Max: 37.2 (06 Sep 2017 21:04)  T(F): 98.7 (07 Sep 2017 05:10), Max: 98.9 (06 Sep 2017 21:04)  HR: 101 (07 Sep 2017 05:10) (75 - 111)  BP: 121/79 (07 Sep 2017 05:10) (121/79 - 135/75)  BP(mean): --  RR: 18 (07 Sep 2017 05:10) (18 - 18)  SpO2: 99% (07 Sep 2017 05:10) (99% - 100%)  CAPILLARY BLOOD GLUCOSE    PHYSICAL EXAM:  GENERAL: NAD, well-developed  HEAD:  Atraumatic, Normocephalic  EYES: EOMI, PERRLA, conjunctiva and sclera clear  NECK: Supple, No JVD  CHEST/LUNG: Clear to auscultation bilaterally; No wheeze  HEART: Regular rate and rhythm; No murmurs  ABDOMEN: Soft, Nontender, Nondistended; Bowel sounds present  EXTREMITIES:  2+ Peripheral Pulses, No clubbing, cyanosis, or edema; b/l inguinal fold pain which is mildly worse with palpation   PSYCH: AAOx3  NEUROLOGY: non-focal  SKIN: No rashes or lesions    LABS:                        7.5    9.54  )-----------( 377      ( 07 Sep 2017 06:30 )             25.0     09-07    144  |  109<H>  |  44<H>  ----------------------------<  89  4.4   |  19<L>  |  1.63<H>    Ca    8.6      07 Sep 2017 06:30  Phos  3.2     09-06  Mg     1.8     09-06    MRI pelvis: reviewed   urine culture: ESBL e. coli and kleb

## 2017-09-07 NOTE — PROGRESS NOTE ADULT - ASSESSMENT
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

## 2017-09-07 NOTE — CONSULT NOTE ADULT - ATTENDING COMMENTS
ESBL E coli UTI in 86 yo man h/o recurrent UTIs, s/p bladder rupture 6/2017. CKD.  MRI w/ pelvic fx.  Agree with Ertapenem 1 gm daily x 7 days.

## 2017-09-07 NOTE — CONSULT NOTE ADULT - SUBJECTIVE AND OBJECTIVE BOX
HPI:  84 y/o M w/ PMHx of HTN, CKD stage 3, esophageal rupture secondary to EGD, urinary bladder rupture (6/2017) secondary to cystoscopy which was performed due to recurrent UTIs p/w worsening chronic groin pain.   Patient has hx of bladder CA s/p resection over 25 years ago and has had urinary incontinence since then. Patient reports having chronic groin pain since his bladder rupture in June this year. Patient has been following with urology as an outpatient for workup of it. Pt has had a CT scan that showed some inguinal inflammation per the family but no hernias.   Patient was diagnosed with osteitis pubis and was given a steroid injection which relieved the pain for about a week until it returned. Patient was also given a Methyl dose pack which improved his pain about 80% until it finished a little over a week ago. The pain then returned and has been progressively worsening.   The groin pain feels like "fists" on both sides of his inguinal region radiating down to the top of his testicles. The pain is better when lying down (5/10 in intensity) and worse with sitting up, standing and walking (9/10 in intensity).   Denies any F/C, CP, SOB, N/V, dysuria, constipation or diarrhea.    In ED,   Vitals- T 97.9, , /74, RR 16, SpO2 100 on RA  Given 1gr Ceftriaxone, 1gr tylenol IV and 2mg morphine (05 Sep 2017 14:42)      PAST MEDICAL & SURGICAL HISTORY:  GERD (gastroesophageal reflux disease)  Hiatal hernia  CKD (chronic kidney disease)  HTN - Hypertension  Hernia, paraesophageal  Acute gastric volvulus: s/p laparoscopic reduction, PEG  Prostate: Brachytherapy 30y ago at [facility]  S/P appendectomy: performed at [facility] 10 y ago      Allergies  No Known Allergies        ANTIMICROBIALS:  ertapenem  IVPB        OTHER MEDS: MEDICATIONS  (STANDING):  acetaminophen   Tablet. 650 every 6 hours PRN  finasteride 5 daily  metoprolol succinate ER 50 daily  NIFEdipine XL 60 daily  heparin  Injectable 5000 every 8 hours  pantoprazole  Injectable 40 every 12 hours      SOCIAL HISTORY:  [ ] etoh [ ] tobacco [ ] former smoker [ ] IVDU    FAMILY HISTORY:  Family history of cancer (Father, Mother)      REVIEW OF SYSTEMS  [  ] ROS unobtainable because:    [  ] All other systems negative except as noted below:	    Constitutional:  [ ] fever [ ] weight loss  Skin:  [ ] rash [ ] phlebitis	  Eyes: [ ] icterus [ ] inflammation	  ENMT: [ ] discharge [ ] thrush [ ] ulcers [ ] exudates  Respiratory: [ ] dyspnea [ ] hemoptysis [ ] cough [ ] sputum	  Cardiovascular:  [ ] chest pain [ ] palpitations [ ] edema	  Gastrointestinal:  [ ] nausea [ ] vomiting [ ] diarrhea [ ] constipation [ ] pain	  Genitourinary:  [ ] dysuria [ ] frequency [ ] hematuria [ ] discharge [ ] flank pain  Musculoskeletal:  [ ] myalgias [ ] arthralgias [ ] arthritis	  Neurological:  [ ] headache [ ] seizures	  Psychiatric:  [ ] anxiety [ ] depression	  Hematology/Lymphatics:  [ ] lymphadenopathy  Endocrine:  [ ] adrenal [ ] thyroid  Allergic/Immunologic:	 [ ] transplant [ ] seasonal    Vital Signs Last 24 Hrs  T(F): 98.7 (09-07-17 @ 05:10), Max: 98.9 (09-06-17 @ 21:04)    Vital Signs Last 24 Hrs  HR: 101 (09-07-17 @ 05:10) (75 - 111)  BP: 121/79 (09-07-17 @ 05:10) (121/79 - 135/75)  RR: 18 (09-07-17 @ 05:10)  SpO2: 99% (09-07-17 @ 05:10) (99% - 100%)  Wt(kg): --    PHYSICAL EXAM:  General: non-toxic  HEAD/EYES: anicteric, PERRL  ENT:  supple  Cardiovascular:   S1, S2  Respiratory:  clear bilaterally  GI:  soft, non-tender, normal bowel sounds  :  no CVA tenderness   Musculoskeletal:  no synovitis  Neurologic:  grossly non-focal  Skin:  no rash  Lymph: no lymphadenopathy  Psychiatric:  appropriate affect  Vascular:  no phlebitis                                7.5    9.54  )-----------( 377      ( 07 Sep 2017 06:30 )             25.0       09-07    144  |  109<H>  |  44<H>  ----------------------------<  89  4.4   |  19<L>  |  1.63<H>    Ca    8.6      07 Sep 2017 06:30  Phos  3.2     09-06  Mg     1.8     09-06            MICROBIOLOGY:      Culture - Urine (09.05.17 @ 11:37)    -  Ampicillin/Sulbactam: R <=8/4 KT    -  Ceftazidime: R >16 KT    -  Ceftazidime: S <=1 KT    -  Ceftriaxone: S <=1 KT    -  Ciprofloxacin: R >2 KT    -  Ertapenem: S <=1 KT    -  Ertapenem: S <=1 KT    -  Levofloxacin: S <=2 KT    Culture - Urine:   EC^Escherichia coli  COLONY COUNT: > = 100,000 CFU/ML  GNRID^Gram Neg Ángel To Be Identified  COLONY COUNT: > = 100,000 CFU/ML    -  Amikacin: S <=16 KT    -  Amikacin: S <=16 KT    -  Aztreonam: R >16 KT    -  Aztreonam: S <=4 KT    -  Cefazolin: R >16 KT    -  Cefoxitin: S <=8 KT    -  Cefoxitin: S <=8 KT    -  Ceftriaxone: R    -  Ciprofloxacin: S <=1 KT    -  Levofloxacin: R >4 KT    -  Nitrofurantoin: R >64 KT    -  Piperacillin/Tazobactam: S <=16 KT    -  Trimethoprim/Sulfamethoxazole: R >2/38 KT    Culture - Urine:   RESULT CALLED TO: JASON DE ANDA RN./  DATE / TIME CALLED: 09/07/17 1331  CALLED BY: SYBIL AQUINO    -  Cefepime: R >16 KT    -  Cefepime: S <=4 KT    -  Gentamicin: S <=4 KT    -  Gentamicin: S <=4 KT    -  Imipenem: S <=1 KT    -  Imipenem: S <=1 KT    -  Nitrofurantoin: S <=32 KT    -  Tigecycline: S <=2 KT    -  Tigecycline: S <=2 KT    -  Ampicillin: R >16 KT    -  Ampicillin: R >16 KT    -  Ampicillin/Sulbactam: I 16/8 KT    -  Cefazolin: S <=8 KT    -  Meropenem: S <=1 KT    -  Meropenem: S <=1 KT    -  Piperacillin/Tazobactam: R <=16 KT    -  Tobramycin: S <=4 KT    -  Tobramycin: S <=4 KT    -  Trimethoprim/Sulfamethoxazole: S <=2/38 KT    Specimen Source: URINE MIDSTREAM    Organism Identification: E.COLI ESBL POSITIVE  Klebsiella pneumoniae    Organism: Klebsiella pneumoniae  COLONY COUNT: > = 100,000 CFU/ML    Organism: E.COLI ESBL POSITIVE  COLONY COUNT: > = 100,000 CFU/ML    Method Type: MICROSCAN NEG URINE COMBO 61    Method Type: MICROSCAN NEG URINE COMBO 61                RADIOLOGY: HPI:  84 y/o M w/ PMHx of HTN, CKD stage 3, esophageal rupture secondary to EGD, urinary bladder rupture (6/2017) secondary to cystoscopy which was performed due to recurrent UTIs p/w worsening chronic groin pain.   Patient reports having chronic groin pain since his bladder rupture in June this year. Patient has been following with urology as an outpatient for workup of it. Pt has had a CT scan that showed some inguinal inflammation per the family but no hernias. Reports history of prostate cancer with seeding 30 years ago.  Patient was diagnosed with osteitis pubis and was given a steroid injection which relieved the pain for about a week until it returned. Patient was also given a Methyl dose pack which improved his pain about 80% until it finished a little over a week ago. The pain then returned and has been progressively worsening.   The groin pain feels like "fists" on both sides of his inguinal region radiating down to the top of his testicles. The pain is better when lying down (5/10 in intensity) and worse with sitting up, standing and walking (9/10 in intensity).   Denies any F/C, CP, SOB, N/V, dysuria, constipation or diarrhea.    In ED,   Vitals- T 97.9, , /74, RR 16, SpO2 100 on RA  Given 1gr Ceftriaxone, 1gr tylenol IV and 2mg morphine (05 Sep 2017 14:42)      PAST MEDICAL & SURGICAL HISTORY:  GERD (gastroesophageal reflux disease)  Hiatal hernia  CKD (chronic kidney disease)  HTN - Hypertension  Hernia, paraesophageal  Acute gastric volvulus: s/p laparoscopic reduction, PEG  Prostate: Brachytherapy [facility]y ago at [facility]  S/P appendectomy: performed at [facility] y ago      Allergies  No Known Allergies        ANTIMICROBIALS:  ertapenem  IVPB        OTHER MEDS: MEDICATIONS  (STANDING):  acetaminophen   Tablet. 650 every 6 hours PRN  finasteride 5 daily  metoprolol succinate ER 50 daily  NIFEdipine XL 60 daily  heparin  Injectable 5000 every 8 hours  pantoprazole  Injectable 40 every 12 hours      SOCIAL HISTORY:  [ ] etoh [ ] tobacco [ ] former smoker [ ] IVDU    FAMILY HISTORY:  Family history of cancer (Father, Mother)      REVIEW OF SYSTEMS  [  ] ROS unobtainable because:    [x  ] All other systems negative except as noted below:	    Constitutional:  [ ] fever [ ] weight loss  Skin:  [ ] rash [ ] phlebitis	  Eyes: [ ] icterus [ ] inflammation	  ENMT: [ ] discharge [ ] thrush [ ] ulcers [ ] exudates  Respiratory: [ ] dyspnea [ ] hemoptysis [ ] cough [ ] sputum	  Cardiovascular:  [ ] chest pain [ ] palpitations [ ] edema	  Gastrointestinal:  [ ] nausea [ ] vomiting [ ] diarrhea [ ] constipation [ ] pain	  Genitourinary:  [ ] dysuria [ ] frequency [ ] hematuria [ ] discharge [ ] flank pain groin pain  Musculoskeletal:  [ ] myalgias [ ] arthralgias [ ] arthritis	  Neurological:  [ ] headache [ ] seizures	  Psychiatric:  [ ] anxiety [ ] depression	  Hematology/Lymphatics:  [ ] lymphadenopathy  Endocrine:  [ ] adrenal [ ] thyroid  Allergic/Immunologic:	 [ ] transplant [ ] seasonal    Vital Signs Last 24 Hrs  T(F): 98.7 (09-07-17 @ 05:10), Max: 98.9 (09-06-17 @ 21:04)    Vital Signs Last 24 Hrs  HR: 101 (09-07-17 @ 05:10) (75 - 111)  BP: 121/79 (09-07-17 @ 05:10) (121/79 - 135/75)  RR: 18 (09-07-17 @ 05:10)  SpO2: 99% (09-07-17 @ 05:10) (99% - 100%)  Wt(kg): --    PHYSICAL EXAM:  General: non-toxic  HEAD/EYES: anicteric, PERRL  ENT:  supple  Cardiovascular:   S1, S2  Respiratory:  clear bilaterally  GI:  soft, suprapubic tenderness, normal bowel sounds  :  no CVA tenderness   Musculoskeletal:  no synovitis  Neurologic:  grossly non-focal  Skin:  no rash  Lymph: no lymphadenopathy  Psychiatric:  appropriate affect  Vascular:  no phlebitis                                7.5    9.54  )-----------( 377      ( 07 Sep 2017 06:30 )             25.0       09-07    144  |  109<H>  |  44<H>  ----------------------------<  89  4.4   |  19<L>  |  1.63<H>    Ca    8.6      07 Sep 2017 06:30  Phos  3.2     09-06  Mg     1.8     09-06            MICROBIOLOGY:      Culture - Urine (09.05.17 @ 11:37)    -  Ampicillin/Sulbactam: R <=8/4 KT    -  Ceftazidime: R >16 KT    -  Ceftazidime: S <=1 KT    -  Ceftriaxone: S <=1 KT    -  Ciprofloxacin: R >2 KT    -  Ertapenem: S <=1 KT    -  Ertapenem: S <=1 KT    -  Levofloxacin: S <=2 KT    Culture - Urine:   EC^Escherichia coli  COLONY COUNT: > = 100,000 CFU/ML  GNRID^Gram Neg Ángel To Be Identified  COLONY COUNT: > = 100,000 CFU/ML    -  Amikacin: S <=16 KT    -  Amikacin: S <=16 KT    -  Aztreonam: R >16 KT    -  Aztreonam: S <=4 KT    -  Cefazolin: R >16 KT    -  Cefoxitin: S <=8 KT    -  Cefoxitin: S <=8 KT    -  Ceftriaxone: R    -  Ciprofloxacin: S <=1 KT    -  Levofloxacin: R >4 KT    -  Nitrofurantoin: R >64 KT    -  Piperacillin/Tazobactam: S <=16 KT    -  Trimethoprim/Sulfamethoxazole: R >2/38 KT    Culture - Urine:   RESULT CALLED TO: JASON DE ANDA RN./  DATE / TIME CALLED: 09/07/17 1331  CALLED BY: SYBIL AQUINO    -  Cefepime: R >16 KT    -  Cefepime: S <=4 KT    -  Gentamicin: S <=4 KT    -  Gentamicin: S <=4 KT    -  Imipenem: S <=1 KT    -  Imipenem: S <=1 KT    -  Nitrofurantoin: S <=32 KT    -  Tigecycline: S <=2 KT    -  Tigecycline: S <=2 KT    -  Ampicillin: R >16 KT    -  Ampicillin: R >16 KT    -  Ampicillin/Sulbactam: I 16/8 KT    -  Cefazolin: S <=8 KT    -  Meropenem: S <=1 KT    -  Meropenem: S <=1 KT    -  Piperacillin/Tazobactam: R <=16 KT    -  Tobramycin: S <=4 KT    -  Tobramycin: S <=4 KT    -  Trimethoprim/Sulfamethoxazole: S <=2/38 KT    Specimen Source: URINE MIDSTREAM    Organism Identification: E.COLI ESBL POSITIVE  Klebsiella pneumoniae    Organism: Klebsiella pneumoniae  COLONY COUNT: > = 100,000 CFU/ML    Organism: E.COLI ESBL POSITIVE  COLONY COUNT: > = 100,000 CFU/ML    Method Type: MICROSCAN NEG URINE COMBO 61    Method Type: MICROSCAN NEG URINE COMBO 61                RADIOLOGY:  < from: MRI Pelvis w/o Cont (09.06.17 @ 09:37) >  IMPRESSION:  Left parasymphyseal marrow signal abnormality with underlying thin linear   hypointensity favoring stress fracture rather than metastatic disease   although pathologic etiology cannot be entirely excluded. (series 4 image   16 and series 5 image 19).    Feathery edema in the proximal left adductor and to lesser extent right   adductor muscles suggesting strain versus other nonspecific inflammatory   reaction.    Mild increased T2 signal however mildly over the right greater trochanter   may reflect a mild degree of trochanter bursitis.    Focal artifactual marrow signal alteration in proximal right femoral   diaphysis/subtrochanteric region.    < end of copied text >

## 2017-09-07 NOTE — PROGRESS NOTE ADULT - PROBLEM SELECTOR PLAN 3
- Cr close to baseline  - will continue to monitor  - avoid nephrotoxic agents - hx of recurrent UTIs   - cultures show ESBL e. coli and kleb  - will switch to ertapenem   - ID consult

## 2017-09-07 NOTE — CONSULT NOTE ADULT - SUBJECTIVE AND OBJECTIVE BOX
Chief Complaint:  Patient is a 85y old  Male who presents with a chief complaint of "groin pain x 2 months" (05 Sep 2017 15:32)      HPI: This is an 85 year old man who is hospitalized for groin pain due to pubis fracture. The patient started having coffee ground emesis X1 this am. The patient  also notes multiple episodes of BM today. The patient denies abdominal pain or chest pain. He has been taking ibuprofen TID for 1 month for his groin pain. He has a history of long segment BE and endoscopic therapy that was complicated by esophageal perforation. He also has history of gastric voluvulus c/b     Allergies:  No Known Allergies      Home Medications:    Hospital Medications:  acetaminophen   Tablet. 650 milliGRAM(s) Oral every 6 hours PRN  finasteride 5 milliGRAM(s) Oral daily  metoprolol succinate ER 50 milliGRAM(s) Oral daily  NIFEdipine XL 60 milliGRAM(s) Oral daily  heparin  Injectable 5000 Unit(s) SubCutaneous every 8 hours  pantoprazole  Injectable 40 milliGRAM(s) IV Push every 12 hours  ertapenem  IVPB   IV Intermittent   ertapenem  IVPB 1000 milliGRAM(s) IV Intermittent once      PMHX/PSHX:  GERD (gastroesophageal reflux disease)  Hiatal hernia  CKD (chronic kidney disease)  HTN - Hypertension  Hernia, paraesophageal  Acute gastric volvulus  Prostate  S/P appendectomy      Family history:  Family history of diabetes mellitus  Family history of cancer (Father, Mother)      Social History:     ROS:     General:  No wt loss, fevers, chills, night sweats, fatigue,   Eyes:  Good vision, no reported pain  ENT:  No sore throat, pain, runny nose, dysphagia  CV:  No pain, palpitations, hypo/hypertension  Resp:  No dyspnea, cough, tachypnea, wheezing  GI:  See HPI  :  No pain, bleeding, incontinence, nocturia  Muscle:  No pain, weakness  Neuro:  No weakness, tingling, memory problems  Psych:  No fatigue, insomnia, mood problems, depression  Endocrine:  No polyuria, polydipsia, cold/heat intolerance  Heme:  No petechiae, ecchymosis, easy bruisability  Skin:  No rash, edema      PHYSICAL EXAM:     GENERAL:  Appears stated age, well-groomed, well-nourished, no distress  HEENT:  NC/AT,  conjunctivae clear and pink,  no JVD  CHEST:  Full & symmetric excursion, no increased effort, breath sounds clear  HEART:  Regular rhythm, S1, S2, no murmur/rub/S3/S4, no abdominal bruit, no edema  ABDOMEN:  Soft, non-tender, non-distended, normoactive bowel sounds,  no masses , no hepatosplenomegaly  EXTREMITIES:  no cyanosis,clubbing or edema  SKIN:  No rash/erythema/ecchymoses/petechiae/wounds/abscess/warm/dry  NEURO:  Alert, oriented    Vital Signs:  Vital Signs Last 24 Hrs  T(C): 36.7 (07 Sep 2017 15:04), Max: 37.2 (06 Sep 2017 21:04)  T(F): 98 (07 Sep 2017 15:04), Max: 98.9 (06 Sep 2017 21:04)  HR: 109 (07 Sep 2017 15:04) (101 - 111)  BP: 130/77 (07 Sep 2017 15:04) (121/79 - 130/77)  BP(mean): --  RR: 18 (07 Sep 2017 15:04) (18 - 18)  SpO2: 100% (07 Sep 2017 15:04) (99% - 100%)  Daily     Daily     LABS:                        7.5    9.54  )-----------( 377      ( 07 Sep 2017 06:30 )             25.0     09-07    144  |  109<H>  |  44<H>  ----------------------------<  89  4.4   |  19<L>  |  1.63<H>    Ca    8.6      07 Sep 2017 06:30  Phos  3.2     09-06  Mg     1.8     09-06                Imaging: Chief Complaint:  Patient is a 85y old  Male who presents with a chief complaint of "groin pain x 2 months" (05 Sep 2017 15:32)      HPI: This is an 85 year old man who is hospitalized for groin pain due to pubis fracture. The patient started having coffee ground emesis X1 this am. The patient  also notes multiple episodes of BM today. The patient denies abdominal pain or chest pain. He has been taking ibuprofen TID for 1 month for his groin pain. He has a history of long segment BE and endoscopic therapy that was complicated by esophageal perforation. He also has history of gastric voluvulus c/b perforation s/p endoscopic closure.     Allergies:  No Known Allergies      Home Medications:    Hospital Medications:  acetaminophen   Tablet. 650 milliGRAM(s) Oral every 6 hours PRN  finasteride 5 milliGRAM(s) Oral daily  metoprolol succinate ER 50 milliGRAM(s) Oral daily  NIFEdipine XL 60 milliGRAM(s) Oral daily  heparin  Injectable 5000 Unit(s) SubCutaneous every 8 hours  pantoprazole  Injectable 40 milliGRAM(s) IV Push every 12 hours  ertapenem  IVPB   IV Intermittent   ertapenem  IVPB 1000 milliGRAM(s) IV Intermittent once      PMHX/PSHX:  GERD (gastroesophageal reflux disease)  Hiatal hernia  CKD (chronic kidney disease)  HTN - Hypertension  Hernia, paraesophageal  Acute gastric volvulus  Prostate  S/P appendectomy      Family history:  Family history of diabetes mellitus  Family history of cancer (Father, Mother)      Social History: Retired banker, former smoker, nondrinker    ROS:     General:  No wt loss, fevers, chills, night sweats, fatigue,   Eyes:  Good vision, no reported pain  ENT:  No sore throat, pain, runny nose, dysphagia  CV:  No pain, palpitations, hypo/hypertension  Resp:  No dyspnea, cough, tachypnea, wheezing  GI:  See HPI  :  No pain, bleeding, incontinence, nocturia  Muscle: groin pain  Neuro:  No weakness, tingling, memory problems  Psych:  No fatigue, insomnia, mood problems, depression  Endocrine:  No polyuria, polydipsia, cold/heat intolerance  Heme:  No petechiae, ecchymosis, easy bruisability  Skin:  No rash, edema      PHYSICAL EXAM:     GENERAL:  Appears stated age, well-groomed, well-nourished, no distress  HEENT:  NC/AT,  conjunctivae clear and pink,  no JVD  CHEST:  Full & symmetric excursion, no increased effort, breath sounds clear  HEART:  Regular rhythm, S1, S2, no murmur/rub/S3/S4, no abdominal bruit, no edema  ABDOMEN:  Soft, non-tender, non-distended, normoactive bowel sounds,  no masses , no hepatosplenomegaly  EXTREMITIES:  no cyanosis,clubbing or edema  SKIN:  No rash/erythema/ecchymoses/petechiae/wounds/abscess/warm/dry  NEURO:  Alert, oriented  RECTAL: Melena    Vital Signs:  Vital Signs Last 24 Hrs  T(C): 36.7 (07 Sep 2017 15:04), Max: 37.2 (06 Sep 2017 21:04)  T(F): 98 (07 Sep 2017 15:04), Max: 98.9 (06 Sep 2017 21:04)  HR: 109 (07 Sep 2017 15:04) (101 - 111)  BP: 130/77 (07 Sep 2017 15:04) (121/79 - 130/77)  BP(mean): --  RR: 18 (07 Sep 2017 15:04) (18 - 18)  SpO2: 100% (07 Sep 2017 15:04) (99% - 100%)  Daily     Daily     LABS:                        7.5    9.54  )-----------( 377      ( 07 Sep 2017 06:30 )             25.0     09-07    144  |  109<H>  |  44<H>  ----------------------------<  89  4.4   |  19<L>  |  1.63<H>    Ca    8.6      07 Sep 2017 06:30  Phos  3.2     09-06  Mg     1.8     09-06                Imaging:    < from: MRI Pelvis w/o Cont (09.06.17 @ 09:37) >    EXAM:  MRI PELVIS W O CONTRAST        PROCEDURE DATE:  Sep  6 2017         INTERPRETATION:  CLINICAL INDICATION: chronic groin pain status post   bladder rupture in 6/2017; prostate seeds for cancer; normal PSA level;   pain with movement and weightbearing but subsides with rest; additionally   responsive to direct injection but recurred when injection effects wore   off.    TECHNIQUE:  Multiplanar and multisequence noncontrast pelvis MRI performed. No prior   MRI studies available for comparison. Compared to appearance of the bony   pelvis and hips on abdomen/pelvis CT from 4/29/2015.    FINDINGS:  Focal marrow edema with underlying hypointense linear signal abnormality   in the posterior left parasymphyseal region of the pubic bone suggestive   of a stress fracture. Focal small artifactual marrow signal alteration in   the proximal right femoral diaphysis otherwise no suspicious focal marrow   infiltrative lesions or additional marrow signal abnormalities.    Intact bilateral femoral heads and necks and proximal femoral shafts.    Symmetrically aligned and spaced SI joints and pubic symphysis.    Mild lower lumbar spine degenerative change apparent. Preserved bilateral   hip joint spaces and no joint effusions. No MR evidence for AVN.    Feathery edema in the proximal left adductor muscles and to lesser extent   right could reflect muscle strain versus other nonspecific inflammatory   reaction. Mild edema over the right greater trochanter may reflect a mild   degree of trochanteric bursitis.  Generalized diminished muscle bulk with mild fatty infiltration   compatible with atrophic change.   Intact bilateral distal gluteal, iliopsoas, and proximal hamstring and   rectus femoris tendons.    Sigmoid diverticulosis noted however without evidence for diverticulitis.  No pathologic retroperitoneal, deep pelvic, or inguinal lymphadenopathy.    IMPRESSION:  Left parasymphyseal marrow signal abnormality with underlying thin linear   hypointensity favoring stress fracture rather than metastatic disease   although pathologic etiology cannot be entirely excluded. (series 4 image   16 and series 5 image 19).    Feathery edema in the proximal left adductor and to lesser extent right   adductor muscles suggesting strain versus other nonspecific inflammatory   reaction.    Mild increased T2 signal however mildly over the right greater trochanter   may reflect a mild degree of trochanter bursitis.    Focal artifactual marrow signal alteration in proximal right femoral   diaphysis/subtrochanteric region.                  LEATHA ARANDA M.D., ATTENDING RADIOLOGIST  This document has been electronically signed. Sep  7 2017 10:54AM                  < end of copied text >

## 2017-09-07 NOTE — CONSULT NOTE ADULT - ASSESSMENT
Impression:   1. Upper GI bleeding: Patient is hemodynamically stable. Suspect NSAID induced ulcer, cannot r/o angioectasia, or esophageal cancer given hx of Schilling's esophagus. D/w patient and his daughter. He adamantly does not want an endoscopy despite my explaining that upper GI bleed can lead to death.    Recommendation:  -conservative management with IV pantoprazole 8mg/h  -check CBC, goal Hb 7  -

## 2017-09-07 NOTE — CONSULT NOTE ADULT - ASSESSMENT
84 y/o M w/ PMHx of HTN, CKD stage 3, esophageal rupture secondary to EGD, urinary bladder rupture (6/2017) secondary to cystoscopy which was performed due to recurrent UTIs p/w worsening chronic groin pain underwent MRI of pelvis and found to  have fracture. Also found to have ESBL E.coli UTI and Klebsiella. Afebrile with no leukocytosis. Was on ceftriaxone for UTI-changed to Ertapenem 1 g q24h (Crcl greater than 30). Check blood cultures.

## 2017-09-08 LAB
BASOPHILS # BLD AUTO: 0.06 K/UL — SIGNIFICANT CHANGE UP (ref 0–0.2)
BASOPHILS NFR BLD AUTO: 0.5 % — SIGNIFICANT CHANGE UP (ref 0–2)
BUN SERPL-MCNC: 77 MG/DL — HIGH (ref 7–23)
CALCIUM SERPL-MCNC: 8.3 MG/DL — LOW (ref 8.4–10.5)
CHLORIDE SERPL-SCNC: 111 MMOL/L — HIGH (ref 98–107)
CO2 SERPL-SCNC: 16 MMOL/L — LOW (ref 22–31)
CREAT SERPL-MCNC: 2.15 MG/DL — HIGH (ref 0.5–1.3)
EOSINOPHIL # BLD AUTO: 0.09 K/UL — SIGNIFICANT CHANGE UP (ref 0–0.5)
EOSINOPHIL NFR BLD AUTO: 0.7 % — SIGNIFICANT CHANGE UP (ref 0–6)
GLUCOSE SERPL-MCNC: 104 MG/DL — HIGH (ref 70–99)
HCT VFR BLD CALC: 25.1 % — LOW (ref 39–50)
HGB BLD-MCNC: 7.8 G/DL — LOW (ref 13–17)
IMM GRANULOCYTES # BLD AUTO: 0.14 # — SIGNIFICANT CHANGE UP
IMM GRANULOCYTES NFR BLD AUTO: 1.1 % — SIGNIFICANT CHANGE UP (ref 0–1.5)
LYMPHOCYTES # BLD AUTO: 19 % — SIGNIFICANT CHANGE UP (ref 13–44)
LYMPHOCYTES # BLD AUTO: 2.52 K/UL — SIGNIFICANT CHANGE UP (ref 1–3.3)
MCHC RBC-ENTMCNC: 25.9 PG — LOW (ref 27–34)
MCHC RBC-ENTMCNC: 31.1 % — LOW (ref 32–36)
MCV RBC AUTO: 83.4 FL — SIGNIFICANT CHANGE UP (ref 80–100)
MONOCYTES # BLD AUTO: 0.54 K/UL — SIGNIFICANT CHANGE UP (ref 0–0.9)
MONOCYTES NFR BLD AUTO: 4.1 % — SIGNIFICANT CHANGE UP (ref 2–14)
NEUTROPHILS # BLD AUTO: 9.9 K/UL — HIGH (ref 1.8–7.4)
NEUTROPHILS NFR BLD AUTO: 74.6 % — SIGNIFICANT CHANGE UP (ref 43–77)
NRBC # FLD: 0 — SIGNIFICANT CHANGE UP
PLATELET # BLD AUTO: 350 K/UL — SIGNIFICANT CHANGE UP (ref 150–400)
PMV BLD: 9.6 FL — SIGNIFICANT CHANGE UP (ref 7–13)
POTASSIUM SERPL-MCNC: 4.3 MMOL/L — SIGNIFICANT CHANGE UP (ref 3.5–5.3)
POTASSIUM SERPL-SCNC: 4.3 MMOL/L — SIGNIFICANT CHANGE UP (ref 3.5–5.3)
RBC # BLD: 3.01 M/UL — LOW (ref 4.2–5.8)
RBC # FLD: 17.4 % — HIGH (ref 10.3–14.5)
SODIUM SERPL-SCNC: 144 MMOL/L — SIGNIFICANT CHANGE UP (ref 135–145)
WBC # BLD: 13.25 K/UL — HIGH (ref 3.8–10.5)
WBC # FLD AUTO: 13.25 K/UL — HIGH (ref 3.8–10.5)

## 2017-09-08 PROCEDURE — 99233 SBSQ HOSP IP/OBS HIGH 50: CPT

## 2017-09-08 PROCEDURE — 99232 SBSQ HOSP IP/OBS MODERATE 35: CPT | Mod: GC

## 2017-09-08 RX ORDER — ERTAPENEM SODIUM 1 G/1
500 INJECTION, POWDER, LYOPHILIZED, FOR SOLUTION INTRAMUSCULAR; INTRAVENOUS EVERY 24 HOURS
Qty: 0 | Refills: 0 | Status: DISCONTINUED | OUTPATIENT
Start: 2017-09-09 | End: 2017-09-13

## 2017-09-08 RX ORDER — SODIUM CHLORIDE 9 MG/ML
1000 INJECTION INTRAMUSCULAR; INTRAVENOUS; SUBCUTANEOUS
Qty: 0 | Refills: 0 | Status: DISCONTINUED | OUTPATIENT
Start: 2017-09-08 | End: 2017-09-09

## 2017-09-08 RX ADMIN — HEPARIN SODIUM 5000 UNIT(S): 5000 INJECTION INTRAVENOUS; SUBCUTANEOUS at 13:07

## 2017-09-08 RX ADMIN — SODIUM CHLORIDE 60 MILLILITER(S): 9 INJECTION INTRAMUSCULAR; INTRAVENOUS; SUBCUTANEOUS at 13:00

## 2017-09-08 RX ADMIN — FINASTERIDE 5 MILLIGRAM(S): 5 TABLET, FILM COATED ORAL at 13:01

## 2017-09-08 RX ADMIN — Medication 60 MILLIGRAM(S): at 05:54

## 2017-09-08 RX ADMIN — PANTOPRAZOLE SODIUM 10 MG/HR: 20 TABLET, DELAYED RELEASE ORAL at 05:22

## 2017-09-08 RX ADMIN — HEPARIN SODIUM 5000 UNIT(S): 5000 INJECTION INTRAVENOUS; SUBCUTANEOUS at 21:05

## 2017-09-08 RX ADMIN — Medication 50 MILLIGRAM(S): at 05:54

## 2017-09-08 NOTE — PROGRESS NOTE ADULT - PROBLEM SELECTOR PLAN 3
- hx of recurrent UTIs   - cultures show ESBL e. coli and kleb  - will switch to ertapenem   - appreciate ID consult

## 2017-09-08 NOTE — PROGRESS NOTE ADULT - PROBLEM SELECTOR PLAN 1
- appreciate GI eval  - s/p 1 unit PRBC  - cbc stable - will check daily  - continue with protonix gtt - consider changing to bid   - may advance diet as tolerated if no further emesis  - holding any GI intervention at this time -

## 2017-09-08 NOTE — PROGRESS NOTE ADULT - ASSESSMENT
Impression:   1. Upper GI bleeding: Patient is hemodynamically stable. Suspect NSAID induced ulcer, cannot r/o angioectasia, or esophageal cancer given hx of Schilling's esophagus. Less likely gastric volvulus as pt has no pain. Pt continues to refuse endoscopy. Fortunately, he has not had another BM since yesterday suggesting that he has stopped bleeding. His H/H responded more or less appropriately to transfusion.     2. Leukocytosis    3. Pubis fracture  Recommendation:  -conservative management with IV pantoprazole 8mg/h  -check CBC q12h, goal Hb 7  -consider IVF bolus vs. addl unit transfusion given worsening GÉNESIS and tachycardia  -if has pain can consider CT A/P to r/o gastric volvulus h/e patient indicates he would not want invasive mgmt of that issue as well.

## 2017-09-08 NOTE — PROGRESS NOTE ADULT - PROBLEM SELECTOR PLAN 2
- bilateral inguinal pain starting in June 2017 after bladder perforation, but now pain has been worsening  - MRI consistent with likely stress fracture in pubic bone - will obtain ortho eval but unlikely any surgical intervention  - PT eval when WB status clarified with ortho - discussed with JOSE Hensley on 9/8 - await eval

## 2017-09-08 NOTE — PROGRESS NOTE ADULT - ASSESSMENT
86 y/o M w/ PMHx of HTN, CKD stage 3 esophageal rupture secondary to EGD, urinary bladder rupture (6/2017) secondary to cystoscopy which was performed due to recurrent UTIs p/w worsening chronic groin pain due to uti and stress fracture developed coffee ground emesis.

## 2017-09-08 NOTE — PROGRESS NOTE ADULT - PROBLEM SELECTOR PLAN 4
- acute on chronic renal failure  - closely monitor electrolytes  - will adjust ertapenem dose   - avoid nephrotoxic agents

## 2017-09-08 NOTE — PROGRESS NOTE ADULT - SUBJECTIVE AND OBJECTIVE BOX
Patient is a 85y old  Male who presents with a chief complaint of "groin pain x 2 months" (05 Sep 2017 15:32)      SUBJECTIVE / OVERNIGHT EVENTS:  no further episodes of n/v/abd pain; s/p 1 unit PRBC yesterday; still with pain in the groin but much better.    MEDICATIONS  (STANDING):  finasteride 5 milliGRAM(s) Oral daily  metoprolol succinate ER 50 milliGRAM(s) Oral daily  NIFEdipine XL 60 milliGRAM(s) Oral daily  heparin  Injectable 5000 Unit(s) SubCutaneous every 8 hours  ertapenem  IVPB   IV Intermittent   ertapenem  IVPB 1000 milliGRAM(s) IV Intermittent every 24 hours  pantoprazole Infusion 8 mG/Hr (10 mL/Hr) IV Continuous <Continuous>    MEDICATIONS  (PRN):  acetaminophen   Tablet. 650 milliGRAM(s) Oral every 6 hours PRN mild and moderate and severe pain      Vital Signs Last 24 Hrs  T(C): 36.8 (08 Sep 2017 05:50), Max: 37.4 (08 Sep 2017 02:29)  T(F): 98.2 (08 Sep 2017 05:50), Max: 99.4 (08 Sep 2017 02:29)  HR: 108 (08 Sep 2017 05:50) (108 - 110)  BP: 126/79 (08 Sep 2017 05:50) (126/79 - 136/77)  BP(mean): --  RR: 18 (08 Sep 2017 05:50) (18 - 18)  SpO2: 100% (08 Sep 2017 05:50) (100% - 100%)  CAPILLARY BLOOD GLUCOSE      PHYSICAL EXAM:  GENERAL: NAD, well-developed  HEAD:  Atraumatic, Normocephalic  EYES: EOMI, PERRLA, conjunctiva and sclera clear  NECK: Supple, No JVD  CHEST/LUNG: Clear to auscultation bilaterally; No wheeze  HEART: Regular rate and rhythm; No murmurs  ABDOMEN: Soft, Nontender, Nondistended; Bowel sounds present  EXTREMITIES:  2+ Peripheral Pulses, No clubbing, cyanosis, or edema; b/l inguinal fold pain which is mildly worse with palpation   PSYCH: AAOx3  NEUROLOGY: non-focal  SKIN: No rashes or lesions    LABS:                        7.8    13.25 )-----------( 350      ( 08 Sep 2017 06:17 )             25.1     09-08    144  |  111<H>  |  77<H>  ----------------------------<  104<H>  4.3   |  16<L>  |  2.15<H>    Ca    8.3<L>      08 Sep 2017 06:17

## 2017-09-08 NOTE — PROGRESS NOTE ADULT - SUBJECTIVE AND OBJECTIVE BOX
Patient is a 85y old  Male who presents with a chief complaint of "groin pain x 2 months" (05 Sep 2017 15:32)      SUBJECTIVE / OVERNIGHT EVENTS: Last BM was yesterday at 2pm. No further emesis.     MEDICATIONS  (STANDING):  finasteride 5 milliGRAM(s) Oral daily  metoprolol succinate ER 50 milliGRAM(s) Oral daily  NIFEdipine XL 60 milliGRAM(s) Oral daily  heparin  Injectable 5000 Unit(s) SubCutaneous every 8 hours  ertapenem  IVPB   IV Intermittent   ertapenem  IVPB 1000 milliGRAM(s) IV Intermittent every 24 hours  pantoprazole Infusion 8 mG/Hr (10 mL/Hr) IV Continuous <Continuous>    MEDICATIONS  (PRN):  acetaminophen   Tablet. 650 milliGRAM(s) Oral every 6 hours PRN mild and moderate and severe pain        CAPILLARY BLOOD GLUCOSE        I&O's Summary      ROS:  General: no fevers chills  Neuro: No headache  MSK: No back pain  Cardiac: No chest pain, palpitations  Pulmonary: No SOB or cough  GI: As per HPI  : No dysuria or hesitancy  Skin: No rash or pruritis      PHYSICAL EXAM:  GENERAL: NAD, well-developed  HEAD:  Atraumatic, Normocephalic  EYES: EOMI, PERRLA, conjunctiva and sclera anicteric  NECK: Supple, No JVD  CHEST/LUNG: Clear to auscultation bilaterally; No wheeze  HEART: tachycardia Regular rate and rhythm; No murmurs, rubs, or gallops  ABDOMEN: Soft, Nontender, Nondistended; Bowel sounds present, no hepatosplenomegaly, no rebound or guarding  EXTREMITIES:  2+ Peripheral Pulses, No clubbing, cyanosis, or edema  PSYCH: AAOx3  NEUROLOGY: non-focal, no asterixis  SKIN: No rashes or lesions, no palmar erythema or Dupuytren's contracture, no gynecomastia    LABS:                        7.8    13.25 )-----------( 350      ( 08 Sep 2017 06:17 )             25.1     09-08    144  |  111<H>  |  77<H>  ----------------------------<  104<H>  4.3   |  16<L>  |  2.15<H>    Ca    8.3<L>      08 Sep 2017 06:17                  RADIOLOGY & ADDITIONAL TESTS:

## 2017-09-08 NOTE — PROGRESS NOTE ADULT - ATTENDING COMMENTS
Pt. seen and examined- s/p episode of UGI bleed (NSAID associated). Stable at current time without indication of active ongoing GI bleed. Refuses EGD. Cont. to monitor H/H. PPI drip x 72 hrs. and then 40mg po bid.

## 2017-09-09 DIAGNOSIS — D50.0 IRON DEFICIENCY ANEMIA SECONDARY TO BLOOD LOSS (CHRONIC): ICD-10-CM

## 2017-09-09 LAB
BASOPHILS # BLD AUTO: 0.03 K/UL — SIGNIFICANT CHANGE UP (ref 0–0.2)
BASOPHILS NFR BLD AUTO: 0.3 % — SIGNIFICANT CHANGE UP (ref 0–2)
BUN SERPL-MCNC: 62 MG/DL — HIGH (ref 7–23)
CALCIUM SERPL-MCNC: 8.3 MG/DL — LOW (ref 8.4–10.5)
CHLORIDE SERPL-SCNC: 115 MMOL/L — HIGH (ref 98–107)
CO2 SERPL-SCNC: 16 MMOL/L — LOW (ref 22–31)
CREAT SERPL-MCNC: 1.87 MG/DL — HIGH (ref 0.5–1.3)
EOSINOPHIL # BLD AUTO: 0.32 K/UL — SIGNIFICANT CHANGE UP (ref 0–0.5)
EOSINOPHIL NFR BLD AUTO: 2.8 % — SIGNIFICANT CHANGE UP (ref 0–6)
GLUCOSE SERPL-MCNC: 85 MG/DL — SIGNIFICANT CHANGE UP (ref 70–99)
HCT VFR BLD CALC: 23.2 % — LOW (ref 39–50)
HGB BLD-MCNC: 7 G/DL — CRITICAL LOW (ref 13–17)
IMM GRANULOCYTES # BLD AUTO: 0.13 # — SIGNIFICANT CHANGE UP
IMM GRANULOCYTES NFR BLD AUTO: 1.1 % — SIGNIFICANT CHANGE UP (ref 0–1.5)
LYMPHOCYTES # BLD AUTO: 1.76 K/UL — SIGNIFICANT CHANGE UP (ref 1–3.3)
LYMPHOCYTES # BLD AUTO: 15.4 % — SIGNIFICANT CHANGE UP (ref 13–44)
MCHC RBC-ENTMCNC: 25.3 PG — LOW (ref 27–34)
MCHC RBC-ENTMCNC: 30.2 % — LOW (ref 32–36)
MCV RBC AUTO: 83.8 FL — SIGNIFICANT CHANGE UP (ref 80–100)
MONOCYTES # BLD AUTO: 0.43 K/UL — SIGNIFICANT CHANGE UP (ref 0–0.9)
MONOCYTES NFR BLD AUTO: 3.8 % — SIGNIFICANT CHANGE UP (ref 2–14)
NEUTROPHILS # BLD AUTO: 8.73 K/UL — HIGH (ref 1.8–7.4)
NEUTROPHILS NFR BLD AUTO: 76.6 % — SIGNIFICANT CHANGE UP (ref 43–77)
NRBC # FLD: 0 — SIGNIFICANT CHANGE UP
PLATELET # BLD AUTO: 349 K/UL — SIGNIFICANT CHANGE UP (ref 150–400)
PMV BLD: 9.3 FL — SIGNIFICANT CHANGE UP (ref 7–13)
POTASSIUM SERPL-MCNC: 4.5 MMOL/L — SIGNIFICANT CHANGE UP (ref 3.5–5.3)
POTASSIUM SERPL-SCNC: 4.5 MMOL/L — SIGNIFICANT CHANGE UP (ref 3.5–5.3)
RBC # BLD: 2.77 M/UL — LOW (ref 4.2–5.8)
RBC # FLD: 17.2 % — HIGH (ref 10.3–14.5)
SODIUM SERPL-SCNC: 148 MMOL/L — HIGH (ref 135–145)
WBC # BLD: 11.4 K/UL — HIGH (ref 3.8–10.5)
WBC # FLD AUTO: 11.4 K/UL — HIGH (ref 3.8–10.5)

## 2017-09-09 PROCEDURE — 99233 SBSQ HOSP IP/OBS HIGH 50: CPT

## 2017-09-09 RX ORDER — ACETAMINOPHEN 500 MG
650 TABLET ORAL EVERY 6 HOURS
Qty: 0 | Refills: 0 | Status: DISCONTINUED | OUTPATIENT
Start: 2017-09-09 | End: 2017-09-13

## 2017-09-09 RX ORDER — OXYCODONE HYDROCHLORIDE 5 MG/1
5 TABLET ORAL EVERY 4 HOURS
Qty: 0 | Refills: 0 | Status: DISCONTINUED | OUTPATIENT
Start: 2017-09-09 | End: 2017-09-13

## 2017-09-09 RX ORDER — SODIUM CHLORIDE 9 MG/ML
1000 INJECTION, SOLUTION INTRAVENOUS
Qty: 0 | Refills: 0 | Status: DISCONTINUED | OUTPATIENT
Start: 2017-09-09 | End: 2017-09-13

## 2017-09-09 RX ADMIN — FINASTERIDE 5 MILLIGRAM(S): 5 TABLET, FILM COATED ORAL at 11:31

## 2017-09-09 RX ADMIN — ERTAPENEM SODIUM 110 MILLIGRAM(S): 1 INJECTION, POWDER, LYOPHILIZED, FOR SOLUTION INTRAMUSCULAR; INTRAVENOUS at 23:16

## 2017-09-09 RX ADMIN — SODIUM CHLORIDE 100 MILLILITER(S): 9 INJECTION, SOLUTION INTRAVENOUS at 18:27

## 2017-09-09 RX ADMIN — Medication 50 MILLIGRAM(S): at 06:00

## 2017-09-09 RX ADMIN — PANTOPRAZOLE SODIUM 10 MG/HR: 20 TABLET, DELAYED RELEASE ORAL at 16:31

## 2017-09-09 RX ADMIN — Medication 60 MILLIGRAM(S): at 06:00

## 2017-09-09 RX ADMIN — PANTOPRAZOLE SODIUM 10 MG/HR: 20 TABLET, DELAYED RELEASE ORAL at 10:45

## 2017-09-09 NOTE — PROGRESS NOTE ADULT - PROBLEM SELECTOR PLAN 4
- acute on chronic renal failure, closely monitor electrolytes, avoid nephrotoxins, c/w IVFs, if not improving --> renal consult  - ertapenem dose decreased - hx of recurrent UTIs -- >cultures show ESBL e. coli and kleb-->  ertapenem   - appreciate ID consult

## 2017-09-09 NOTE — DOWNTIME INTERRUPTION NOTE - WHICH MANUAL FORMS INITIATED?
Due to SCM downtime (9/8/2017 at 2200 to 9/9/2017 at 1100)    See paper chart for clinical documentation recorded during the downtime.

## 2017-09-09 NOTE — PROGRESS NOTE ADULT - PROBLEM SELECTOR PLAN 2
- bilateral inguinal pain starting in June 2017 after bladder perforation, but now pain has been worsening --> MRI consistent with likely stress fracture in pubic bone - WBAT, pain control (no NSAIDs or steroids) - appreciate GI eval, s/p 1 unit PRBC, cbc stable - will check daily  - continue with protonix gtt, on clears for now, pt/family declined GI intervention

## 2017-09-09 NOTE — PROGRESS NOTE ADULT - PROBLEM SELECTOR PLAN 5
- c/w home BP meds- Toprol XL and nifedipine as bp tolerates - acute on chronic renal failure, closely monitor electrolytes, avoid nephrotoxins, c/w IVFs, if not improving --> renal consult  - ertapenem dose decreased

## 2017-09-09 NOTE — PROGRESS NOTE ADULT - SUBJECTIVE AND OBJECTIVE BOX
Patient is a 85y old  Male who presents with a chief complaint of "groin pain x 2 months" (05 Sep 2017 15:32)    SUBJECTIVE / OVERNIGHT EVENTS:  Seen earlier today, doing ok.  C/o pain in groin that is chronic.  No chest pain, SOB, nausea, vomiting.    MEDICATIONS  (STANDING):  finasteride 5 milliGRAM(s) Oral daily  metoprolol succinate ER 50 milliGRAM(s) Oral daily  NIFEdipine XL 60 milliGRAM(s) Oral daily  pantoprazole Infusion 8 mG/Hr (10 mL/Hr) IV Continuous <Continuous>  lactated ringers. 1000 milliLiter(s) (100 mL/Hr) IV Continuous <Continuous>    MEDICATIONS  (PRN):  oxyCODONE    IR 5 milliGRAM(s) Oral every 4 hours PRN Moderate Pain (4 - 6)  acetaminophen   Tablet. 650 milliGRAM(s) Oral every 6 hours PRN Mild Pain (1 - 3)    Vital Signs Last 24 Hrs  T(C): 36.3 (09 Sep 2017 15:45), Max: 36.3 (08 Sep 2017 21:51)  T(F): 97.4 (09 Sep 2017 15:45), Max: 97.4 (09 Sep 2017 15:45)  HR: 98 (09 Sep 2017 15:45) (98 - 100)  BP: 120/69 (09 Sep 2017 15:45) (113/63 - 120/69)  RR: 18 (09 Sep 2017 15:45) (18 - 18)  SpO2: 100% (09 Sep 2017 15:45) (100% - 100%)    PHYSICAL EXAM:  GENERAL: NAD, well-developed  HEAD:  Atraumatic, Normocephalic  EYES: EOMI, PERRLA, conjunctiva and sclera clear  NECK: Supple, No JVD  CHEST/LUNG: Clear to auscultation bilaterally; No wheeze  HEART: Regular rate and rhythm; No murmurs, rubs, or gallops  ABDOMEN: Soft, Nontender, Nondistended; Bowel sounds present  EXTREMITIES:  2+ Peripheral Pulses, No clubbing, cyanosis, or edema  PSYCH: AAOx3  NEUROLOGY: non-focal  SKIN: No rashes or lesions    LABS:             7.0    11.40 )-----------( 349      ( 09 Sep 2017 06:50 )             23.2     148<H>  |  115<H>  |  62<H>  ----------------------------<  85  4.5   |  16<L>  |  1.87<H>    Ca    8.3<L>      09 Sep 2017 06:50    RADIOLOGY & ADDITIONAL TESTS:    Imaging Personally Reviewed:    Consultant(s) Notes Reviewed:      Care Discussed with Consultants/Other Providers:

## 2017-09-09 NOTE — PROGRESS NOTE ADULT - PROBLEM SELECTOR PLAN 1
- appreciate GI eval, s/p 1 unit PRBC, cbc stable - will check daily  - continue with protonix gtt, on clears for now, pt/family declined GI intervention transfusing, keep Hb >7-8, f/u H&H

## 2017-09-09 NOTE — PROGRESS NOTE ADULT - PROBLEM SELECTOR PLAN 3
- hx of recurrent UTIs -- >cultures show ESBL e. coli and kleb-->  ertapenem   - appreciate ID consult - bilateral inguinal pain starting in June 2017 after bladder perforation, but now pain has been worsening --> MRI consistent with likely stress fracture in pubic bone - WBAT, pain control (no NSAIDs or steroids)

## 2017-09-10 LAB
BASOPHILS # BLD AUTO: 0.05 K/UL — SIGNIFICANT CHANGE UP (ref 0–0.2)
BASOPHILS NFR BLD AUTO: 0.6 % — SIGNIFICANT CHANGE UP (ref 0–2)
BUN SERPL-MCNC: 43 MG/DL — HIGH (ref 7–23)
CALCIUM SERPL-MCNC: 8.2 MG/DL — LOW (ref 8.4–10.5)
CHLORIDE SERPL-SCNC: 107 MMOL/L — SIGNIFICANT CHANGE UP (ref 98–107)
CO2 SERPL-SCNC: 16 MMOL/L — LOW (ref 22–31)
CREAT SERPL-MCNC: 1.5 MG/DL — HIGH (ref 0.5–1.3)
EOSINOPHIL # BLD AUTO: 0.26 K/UL — SIGNIFICANT CHANGE UP (ref 0–0.5)
EOSINOPHIL NFR BLD AUTO: 3.2 % — SIGNIFICANT CHANGE UP (ref 0–6)
GLUCOSE SERPL-MCNC: 68 MG/DL — LOW (ref 70–99)
HCT VFR BLD CALC: 26.1 % — LOW (ref 39–50)
HGB BLD-MCNC: 8 G/DL — LOW (ref 13–17)
IMM GRANULOCYTES # BLD AUTO: 0.18 # — SIGNIFICANT CHANGE UP
IMM GRANULOCYTES NFR BLD AUTO: 2.2 % — HIGH (ref 0–1.5)
LYMPHOCYTES # BLD AUTO: 1.9 K/UL — SIGNIFICANT CHANGE UP (ref 1–3.3)
LYMPHOCYTES # BLD AUTO: 23.3 % — SIGNIFICANT CHANGE UP (ref 13–44)
MCHC RBC-ENTMCNC: 26.2 PG — LOW (ref 27–34)
MCHC RBC-ENTMCNC: 30.7 % — LOW (ref 32–36)
MCV RBC AUTO: 85.6 FL — SIGNIFICANT CHANGE UP (ref 80–100)
MONOCYTES # BLD AUTO: 0.32 K/UL — SIGNIFICANT CHANGE UP (ref 0–0.9)
MONOCYTES NFR BLD AUTO: 3.9 % — SIGNIFICANT CHANGE UP (ref 2–14)
NEUTROPHILS # BLD AUTO: 5.44 K/UL — SIGNIFICANT CHANGE UP (ref 1.8–7.4)
NEUTROPHILS NFR BLD AUTO: 66.8 % — SIGNIFICANT CHANGE UP (ref 43–77)
NRBC # FLD: 0 — SIGNIFICANT CHANGE UP
PLATELET # BLD AUTO: 332 K/UL — SIGNIFICANT CHANGE UP (ref 150–400)
PMV BLD: 9.5 FL — SIGNIFICANT CHANGE UP (ref 7–13)
POTASSIUM SERPL-MCNC: 4.7 MMOL/L — SIGNIFICANT CHANGE UP (ref 3.5–5.3)
POTASSIUM SERPL-SCNC: 4.7 MMOL/L — SIGNIFICANT CHANGE UP (ref 3.5–5.3)
RBC # BLD: 3.05 M/UL — LOW (ref 4.2–5.8)
RBC # FLD: 16.5 % — HIGH (ref 10.3–14.5)
SODIUM SERPL-SCNC: 140 MMOL/L — SIGNIFICANT CHANGE UP (ref 135–145)
WBC # BLD: 8.15 K/UL — SIGNIFICANT CHANGE UP (ref 3.8–10.5)
WBC # FLD AUTO: 8.15 K/UL — SIGNIFICANT CHANGE UP (ref 3.8–10.5)

## 2017-09-10 PROCEDURE — 99233 SBSQ HOSP IP/OBS HIGH 50: CPT

## 2017-09-10 RX ORDER — PANTOPRAZOLE SODIUM 20 MG/1
40 TABLET, DELAYED RELEASE ORAL
Qty: 0 | Refills: 0 | Status: DISCONTINUED | OUTPATIENT
Start: 2017-09-10 | End: 2017-09-13

## 2017-09-10 RX ADMIN — Medication 60 MILLIGRAM(S): at 05:24

## 2017-09-10 RX ADMIN — SODIUM CHLORIDE 100 MILLILITER(S): 9 INJECTION, SOLUTION INTRAVENOUS at 17:52

## 2017-09-10 RX ADMIN — PANTOPRAZOLE SODIUM 10 MG/HR: 20 TABLET, DELAYED RELEASE ORAL at 17:51

## 2017-09-10 RX ADMIN — FINASTERIDE 5 MILLIGRAM(S): 5 TABLET, FILM COATED ORAL at 12:16

## 2017-09-10 RX ADMIN — ERTAPENEM SODIUM 110 MILLIGRAM(S): 1 INJECTION, POWDER, LYOPHILIZED, FOR SOLUTION INTRAMUSCULAR; INTRAVENOUS at 22:10

## 2017-09-10 RX ADMIN — Medication 50 MILLIGRAM(S): at 05:24

## 2017-09-10 RX ADMIN — SODIUM CHLORIDE 100 MILLILITER(S): 9 INJECTION, SOLUTION INTRAVENOUS at 08:39

## 2017-09-10 RX ADMIN — PANTOPRAZOLE SODIUM 10 MG/HR: 20 TABLET, DELAYED RELEASE ORAL at 08:39

## 2017-09-10 NOTE — PROGRESS NOTE ADULT - PROBLEM SELECTOR PLAN 3
- bilateral inguinal pain starting in June 2017 after bladder perforation, but now pain has been worsening --> MRI consistent with likely stress fracture in pubic bone - WBAT, pain control (no NSAIDs or steroids)

## 2017-09-10 NOTE — PROGRESS NOTE ADULT - SUBJECTIVE AND OBJECTIVE BOX
Patient is a 85y old  Male who presents with a chief complaint of "groin pain x 2 months" (05 Sep 2017 15:32)    SUBJECTIVE / OVERNIGHT EVENTS:  In good spirits.  Feeling better today.  Feels stronger, less dehydrated.  Pain in groin, worse when move certain ways.  No chest pain, SOB, nausea, vomiting.  Last BM Wed, usually goes one time per week.    MEDICATIONS  (STANDING):  finasteride 5 milliGRAM(s) Oral daily  metoprolol succinate ER 50 milliGRAM(s) Oral daily  NIFEdipine XL 60 milliGRAM(s) Oral daily  pantoprazole Infusion 8 mG/Hr (10 mL/Hr) IV Continuous <Continuous>  ertapenem  IVPB 500 milliGRAM(s) IV Intermittent every 24 hours  lactated ringers. 1000 milliLiter(s) (100 mL/Hr) IV Continuous <Continuous>    MEDICATIONS  (PRN):  oxyCODONE    IR 5 milliGRAM(s) Oral every 4 hours PRN Moderate Pain (4 - 6)  acetaminophen   Tablet. 650 milliGRAM(s) Oral every 6 hours PRN Mild Pain (1 - 3)    Vital Signs Last 24 Hrs  T(C): 36.6 (10 Sep 2017 05:20), Max: 36.6 (09 Sep 2017 21:11)  T(F): 97.9 (10 Sep 2017 05:20), Max: 97.9 (09 Sep 2017 21:11)  HR: 105 (10 Sep 2017 05:20) (97 - 105)  BP: 129/75 (10 Sep 2017 05:20) (120/69 - 129/75)  RR: 18 (10 Sep 2017 05:20) (18 - 18)  SpO2: 99% (10 Sep 2017 05:20) (99% - 100%)    PHYSICAL EXAM:  GENERAL: NAD, well-developed  HEAD:  Atraumatic, Normocephalic  EYES: EOMI, PERRLA, conjunctiva and sclera clear  NECK: Supple, No JVD  CHEST/LUNG: Clear to auscultation bilaterally; No wheeze  HEART: Regular rate and rhythm; No murmurs, rubs, or gallops  ABDOMEN: Soft, Nontender, Nondistended; Bowel sounds present  EXTREMITIES:  2+ Peripheral Pulses, No clubbing, cyanosis, or edema  PSYCH: AAOx3  NEUROLOGY: non-focal  SKIN: No rashes or lesions    LABS:             8.0    8.15  )-----------( 332      ( 10 Sep 2017 06:44 )             26.1     148<H>  |  115<H>  |  62<H> ---> repeat BMP pending  ----------------------------<  85  4.5   |  16<L>  |  1.87<H>    Ca    8.3<L>      09 Sep 2017 06:50    RADIOLOGY & ADDITIONAL TESTS:    Imaging Personally Reviewed:    Consultant(s) Notes Reviewed:      Care Discussed with Consultants/Other Providers:

## 2017-09-10 NOTE — PROGRESS NOTE ADULT - PROBLEM SELECTOR PLAN 5
- acute on chronic renal failure, closely monitor electrolytes, avoid nephrotoxins, c/w IVFs, if not improving --> renal consult  - ertapenem dose decreased

## 2017-09-10 NOTE — PHYSICAL THERAPY INITIAL EVALUATION ADULT - PERTINENT HX OF CURRENT PROBLEM, REHAB EVAL
This is an 84 y/o M w/ PMHx of esophageal rupture secondary to EGD, urinary bladder rupture (6/2017) secondary to cystoscopy which was performed due to recurrent UTIs admitted with worsening chronic groin pain and MRI of Pelvis on 9/6/17 showed L pubic stress fracture

## 2017-09-10 NOTE — PROGRESS NOTE ADULT - PROBLEM SELECTOR PLAN 2
- appreciate GI eval, s/p 1 unit PRBC, cbc stable - will check daily  - continue with protonix gtt, on clears for now, pt/family declined GI intervention --> will f/u with GI re transition to PO PPI/adv diet as roscoe

## 2017-09-10 NOTE — PROGRESS NOTE ADULT - PROBLEM SELECTOR PLAN 4
- hx of recurrent UTIs -- >cultures show ESBL e. coli and kleb-->  ertapenem per 7 days per ID consult, f/u T/WBC/clinically

## 2017-09-10 NOTE — PHYSICAL THERAPY INITIAL EVALUATION ADULT - ADDITIONAL COMMENTS
Patient has a rolling walker at home to use as needed but was independent with ambulation without AD prior to hospitalization.

## 2017-09-11 LAB
BASOPHILS # BLD AUTO: 0.03 K/UL — SIGNIFICANT CHANGE UP (ref 0–0.2)
BASOPHILS NFR BLD AUTO: 0.4 % — SIGNIFICANT CHANGE UP (ref 0–2)
BUN SERPL-MCNC: 31 MG/DL — HIGH (ref 7–23)
CALCIUM SERPL-MCNC: 8.1 MG/DL — LOW (ref 8.4–10.5)
CHLORIDE SERPL-SCNC: 110 MMOL/L — HIGH (ref 98–107)
CO2 SERPL-SCNC: 20 MMOL/L — LOW (ref 22–31)
CREAT SERPL-MCNC: 1.4 MG/DL — HIGH (ref 0.5–1.3)
EOSINOPHIL # BLD AUTO: 0.38 K/UL — SIGNIFICANT CHANGE UP (ref 0–0.5)
EOSINOPHIL NFR BLD AUTO: 5.2 % — SIGNIFICANT CHANGE UP (ref 0–6)
GLUCOSE SERPL-MCNC: 91 MG/DL — SIGNIFICANT CHANGE UP (ref 70–99)
HCT VFR BLD CALC: 25.2 % — LOW (ref 39–50)
HGB BLD-MCNC: 8 G/DL — LOW (ref 13–17)
IMM GRANULOCYTES # BLD AUTO: 0.16 # — SIGNIFICANT CHANGE UP
IMM GRANULOCYTES NFR BLD AUTO: 2.2 % — HIGH (ref 0–1.5)
LYMPHOCYTES # BLD AUTO: 1.6 K/UL — SIGNIFICANT CHANGE UP (ref 1–3.3)
LYMPHOCYTES # BLD AUTO: 21.8 % — SIGNIFICANT CHANGE UP (ref 13–44)
MCHC RBC-ENTMCNC: 26.6 PG — LOW (ref 27–34)
MCHC RBC-ENTMCNC: 31.7 % — LOW (ref 32–36)
MCV RBC AUTO: 83.7 FL — SIGNIFICANT CHANGE UP (ref 80–100)
MONOCYTES # BLD AUTO: 0.48 K/UL — SIGNIFICANT CHANGE UP (ref 0–0.9)
MONOCYTES NFR BLD AUTO: 6.5 % — SIGNIFICANT CHANGE UP (ref 2–14)
NEUTROPHILS # BLD AUTO: 4.68 K/UL — SIGNIFICANT CHANGE UP (ref 1.8–7.4)
NEUTROPHILS NFR BLD AUTO: 63.9 % — SIGNIFICANT CHANGE UP (ref 43–77)
NRBC # FLD: 0 — SIGNIFICANT CHANGE UP
PLATELET # BLD AUTO: 352 K/UL — SIGNIFICANT CHANGE UP (ref 150–400)
PMV BLD: 9.4 FL — SIGNIFICANT CHANGE UP (ref 7–13)
POTASSIUM SERPL-MCNC: 3.8 MMOL/L — SIGNIFICANT CHANGE UP (ref 3.5–5.3)
POTASSIUM SERPL-SCNC: 3.8 MMOL/L — SIGNIFICANT CHANGE UP (ref 3.5–5.3)
RBC # BLD: 3.01 M/UL — LOW (ref 4.2–5.8)
RBC # FLD: 17.1 % — HIGH (ref 10.3–14.5)
SODIUM SERPL-SCNC: 144 MMOL/L — SIGNIFICANT CHANGE UP (ref 135–145)
WBC # BLD: 7.33 K/UL — SIGNIFICANT CHANGE UP (ref 3.8–10.5)
WBC # FLD AUTO: 7.33 K/UL — SIGNIFICANT CHANGE UP (ref 3.8–10.5)

## 2017-09-11 PROCEDURE — 99233 SBSQ HOSP IP/OBS HIGH 50: CPT

## 2017-09-11 RX ADMIN — ERTAPENEM SODIUM 110 MILLIGRAM(S): 1 INJECTION, POWDER, LYOPHILIZED, FOR SOLUTION INTRAMUSCULAR; INTRAVENOUS at 23:12

## 2017-09-11 RX ADMIN — PANTOPRAZOLE SODIUM 40 MILLIGRAM(S): 20 TABLET, DELAYED RELEASE ORAL at 05:24

## 2017-09-11 RX ADMIN — SODIUM CHLORIDE 100 MILLILITER(S): 9 INJECTION, SOLUTION INTRAVENOUS at 23:12

## 2017-09-11 RX ADMIN — FINASTERIDE 5 MILLIGRAM(S): 5 TABLET, FILM COATED ORAL at 13:18

## 2017-09-11 RX ADMIN — PANTOPRAZOLE SODIUM 40 MILLIGRAM(S): 20 TABLET, DELAYED RELEASE ORAL at 17:24

## 2017-09-11 RX ADMIN — SODIUM CHLORIDE 100 MILLILITER(S): 9 INJECTION, SOLUTION INTRAVENOUS at 05:24

## 2017-09-11 RX ADMIN — Medication 60 MILLIGRAM(S): at 05:24

## 2017-09-11 RX ADMIN — Medication 50 MILLIGRAM(S): at 05:24

## 2017-09-11 NOTE — PROGRESS NOTE ADULT - SUBJECTIVE AND OBJECTIVE BOX
Patient is a 85y old  Male who presents with a chief complaint of "groin pain x 2 months" (05 Sep 2017 15:32)      SUBJECTIVE / OVERNIGHT EVENTS: patient seen and examined by bedside, feels better, denies any acute distress       MEDICATIONS  (STANDING):  finasteride 5 milliGRAM(s) Oral daily  metoprolol succinate ER 50 milliGRAM(s) Oral daily  NIFEdipine XL 60 milliGRAM(s) Oral daily  ertapenem  IVPB 500 milliGRAM(s) IV Intermittent every 24 hours  lactated ringers. 1000 milliLiter(s) (100 mL/Hr) IV Continuous <Continuous>  pantoprazole    Tablet 40 milliGRAM(s) Oral two times a day before meals    MEDICATIONS  (PRN):  oxyCODONE    IR 5 milliGRAM(s) Oral every 4 hours PRN Moderate Pain (4 - 6)  acetaminophen   Tablet. 650 milliGRAM(s) Oral every 6 hours PRN Mild Pain (1 - 3)      Vital Signs Last 24 Hrs  T(C): 36.6 (11 Sep 2017 13:59), Max: 36.7 (10 Sep 2017 22:12)  T(F): 97.9 (11 Sep 2017 13:59), Max: 98.1 (10 Sep 2017 22:12)  HR: 98 (11 Sep 2017 13:59) (98 - 104)  BP: 122/74 (11 Sep 2017 13:59) (113/73 - 134/81)  BP(mean): --  RR: 18 (11 Sep 2017 13:59) (17 - 18)  SpO2: 100% (11 Sep 2017 13:59) (98% - 100%)  CAPILLARY BLOOD GLUCOSE        I&O's Summary      PHYSICAL EXAM:  GENERAL: NAD, well-developed  HEAD:  Atraumatic, Normocephalic  EYES: EOMI, PERRLA, conjunctiva and sclera clear  NECK: Supple,  CHEST/LUNG: Clear to auscultation bilaterally; No wheeze  HEART: Regular rate and rhythm;   ABDOMEN: Soft, Nontender, Nondistended; Bowel sounds present  EXTREMITIES:  , No clubbing, cyanosis, or edema  PSYCH: AAOx3  NEUROLOGY: non-focal  SKIN: No rashes or lesions    LABS:                        8.0    7.33  )-----------( 352      ( 11 Sep 2017 07:30 )             25.2     09-11    144  |  110<H>  |  31<H>  ----------------------------<  91  3.8   |  20<L>  |  1.40<H>    Ca    8.1<L>      11 Sep 2017 07:30                RADIOLOGY & ADDITIONAL TESTS:    Imaging Personally Reviewed:    Consultant(s) Notes Reviewed:      Care Discussed with Consultants/Other Providers:

## 2017-09-11 NOTE — PROGRESS NOTE ADULT - PROBLEM SELECTOR PLAN 3
- bilateral inguinal pain starting in June 2017 after bladder perforation, but now pain has been worsening --> MRI consistent with likely stress fracture in pubic bone - WBAT, pain control (no NSAIDs or steroids)  -pain controlled at this time

## 2017-09-11 NOTE — PROGRESS NOTE ADULT - PROBLEM SELECTOR PLAN 5
- acute on chronic renal failure, closely monitor electrolytes, avoid nephrotoxins, -improving   - ertapenem dose decreased

## 2017-09-12 ENCOUNTER — TRANSCRIPTION ENCOUNTER (OUTPATIENT)
Age: 82
End: 2017-09-12

## 2017-09-12 LAB
BUN SERPL-MCNC: 25 MG/DL — HIGH (ref 7–23)
CALCIUM SERPL-MCNC: 8.1 MG/DL — LOW (ref 8.4–10.5)
CHLORIDE SERPL-SCNC: 109 MMOL/L — HIGH (ref 98–107)
CO2 SERPL-SCNC: 20 MMOL/L — LOW (ref 22–31)
CREAT SERPL-MCNC: 1.39 MG/DL — HIGH (ref 0.5–1.3)
GLUCOSE SERPL-MCNC: 89 MG/DL — SIGNIFICANT CHANGE UP (ref 70–99)
HCT VFR BLD CALC: 25.7 % — LOW (ref 39–50)
HGB BLD-MCNC: 7.9 G/DL — LOW (ref 13–17)
MAGNESIUM SERPL-MCNC: 1.7 MG/DL — SIGNIFICANT CHANGE UP (ref 1.6–2.6)
MCHC RBC-ENTMCNC: 25.7 PG — LOW (ref 27–34)
MCHC RBC-ENTMCNC: 30.7 % — LOW (ref 32–36)
MCV RBC AUTO: 83.7 FL — SIGNIFICANT CHANGE UP (ref 80–100)
NRBC # FLD: 0 — SIGNIFICANT CHANGE UP
PHOSPHATE SERPL-MCNC: 2.7 MG/DL — SIGNIFICANT CHANGE UP (ref 2.5–4.5)
PLATELET # BLD AUTO: 367 K/UL — SIGNIFICANT CHANGE UP (ref 150–400)
PMV BLD: 9.6 FL — SIGNIFICANT CHANGE UP (ref 7–13)
POTASSIUM SERPL-MCNC: 3.8 MMOL/L — SIGNIFICANT CHANGE UP (ref 3.5–5.3)
POTASSIUM SERPL-SCNC: 3.8 MMOL/L — SIGNIFICANT CHANGE UP (ref 3.5–5.3)
RBC # BLD: 3.07 M/UL — LOW (ref 4.2–5.8)
RBC # FLD: 17.4 % — HIGH (ref 10.3–14.5)
SODIUM SERPL-SCNC: 144 MMOL/L — SIGNIFICANT CHANGE UP (ref 135–145)
WBC # BLD: 7.37 K/UL — SIGNIFICANT CHANGE UP (ref 3.8–10.5)
WBC # FLD AUTO: 7.37 K/UL — SIGNIFICANT CHANGE UP (ref 3.8–10.5)

## 2017-09-12 PROCEDURE — 99233 SBSQ HOSP IP/OBS HIGH 50: CPT

## 2017-09-12 RX ADMIN — PANTOPRAZOLE SODIUM 40 MILLIGRAM(S): 20 TABLET, DELAYED RELEASE ORAL at 15:27

## 2017-09-12 RX ADMIN — Medication 50 MILLIGRAM(S): at 05:13

## 2017-09-12 RX ADMIN — SODIUM CHLORIDE 100 MILLILITER(S): 9 INJECTION, SOLUTION INTRAVENOUS at 22:17

## 2017-09-12 RX ADMIN — SODIUM CHLORIDE 100 MILLILITER(S): 9 INJECTION, SOLUTION INTRAVENOUS at 05:13

## 2017-09-12 RX ADMIN — FINASTERIDE 5 MILLIGRAM(S): 5 TABLET, FILM COATED ORAL at 15:25

## 2017-09-12 RX ADMIN — PANTOPRAZOLE SODIUM 40 MILLIGRAM(S): 20 TABLET, DELAYED RELEASE ORAL at 05:13

## 2017-09-12 RX ADMIN — Medication 60 MILLIGRAM(S): at 05:13

## 2017-09-12 RX ADMIN — ERTAPENEM SODIUM 110 MILLIGRAM(S): 1 INJECTION, POWDER, LYOPHILIZED, FOR SOLUTION INTRAMUSCULAR; INTRAVENOUS at 22:17

## 2017-09-12 NOTE — DISCHARGE NOTE ADULT - CARE PROVIDERS DIRECT ADDRESSES
[email or web address]

## 2017-09-12 NOTE — DISCHARGE NOTE ADULT - CARE PLAN
Principal Discharge DX:	Inguinal pain, unspecified laterality  Goal:	Remain free from pain  Instructions for follow-up, activity and diet:	MRI consistent with stress fracture in pubic bone. Ortho consulted, no surgical interventions planned, WBAT. PT - Rehab. Please continue pain medications as prescribed. Follow safety / fall precautions. Follow up with PCP as outpatient for further management and treatment.  Secondary Diagnosis:	Urinary tract infection without hematuria, site unspecified  Instructions for follow-up, activity and diet:	You were treated with IV antibiotic Ertapenem in the hospital for 7 days, completed treatment. Follow safety / fall precautions. Follow up with PCP as outpatient for further management and treatment.  Secondary Diagnosis:	Upper GI bleed  Instructions for follow-up, activity and diet:	GI consulted. Clear liquid diet advanced to Reg, patient tolerated well. Continue Omeprozole as prescribed. pt/family declined GI intervention, s/p blood transfusion- 9/6 and 9/7. Follow safety / fall precautions. Follow up with PCP and GI as outpatient for further management and treatment.  Secondary Diagnosis:	Stage 3 chronic kidney disease  Instructions for follow-up, activity and diet:	Creatinine close to baseline, your Cr as of 9/12/17 1.37, continue to monitor labs with PCP. Avoid nephrotoxic agents.  Follow safety / fall precautions. Follow up with PCP, Nephro as outpatient for further management and treatment.  Secondary Diagnosis:	Essential hypertension  Instructions for follow-up, activity and diet:	Continue taking medications as prescribed. Follow safety / fall precautions. Follow up with PCP as outpatient for further management and treatment.  Secondary Diagnosis:	Anemia due to blood loss  Goal:	Stable  Instructions for follow-up, activity and diet:	Follow safety / fall precautions. Follow up with PCP as outpatient for further management and treatment.

## 2017-09-12 NOTE — PROGRESS NOTE ADULT - PROBLEM SELECTOR PLAN 1
transfusing, keep Hb >7-8, f/u H&H --> s/p 1u PRBCs yesterday Hb 7.0--> 8.0 responded appropriately transfusing, keep Hb >7-8, f/u H&H --> s/p 1u PRBCs,  Hb 7.0--> 8.0 responded appropriately

## 2017-09-12 NOTE — DISCHARGE NOTE ADULT - PATIENT PORTAL LINK FT
“You can access the [facility] Patient Portal, offered by [facility], by registering with the following website: [email or web address]”

## 2017-09-12 NOTE — PROGRESS NOTE ADULT - PROBLEM SELECTOR PLAN 4
- hx of recurrent UTIs -- >cultures show ESBL e. coli and kleb-->  ertapenem per 7 days per ID consult, f/u T/WBC/clinically - hx of recurrent UTIs -- >cultures show ESBL e. coli and kleb-->  ertapenem per 7 days per ID consult, f/u T/WBC/clinically, will complete course in AM

## 2017-09-12 NOTE — DISCHARGE NOTE ADULT - PLAN OF CARE
Remain free from pain MRI consistent with stress fracture in pubic bone. Ortho consulted, no surgical interventions planned, WBAT. PT - Rehab. Please continue pain medications as prescribed. Follow safety / fall precautions. Follow up with PCP as outpatient for further management and treatment. You were treated with IV antibiotic Ertapenem in the hospital for 7 days, completed treatment. Follow safety / fall precautions. Follow up with PCP as outpatient for further management and treatment. GI consulted. Clear liquid diet advanced to Reg, patient tolerated well. Continue Omeprozole as prescribed. pt/family declined GI intervention, s/p blood transfusion- 9/6 and 9/7. Follow safety / fall precautions. Follow up with PCP and GI as outpatient for further management and treatment. Creatinine close to baseline, your Cr as of 9/12/17 1.37, continue to monitor labs with PCP. Avoid nephrotoxic agents.  Follow safety / fall precautions. Follow up with PCP, Nephro as outpatient for further management and treatment. Continue taking medications as prescribed. Follow safety / fall precautions. Follow up with PCP as outpatient for further management and treatment. Stable Follow safety / fall precautions. Follow up with PCP as outpatient for further management and treatment.

## 2017-09-12 NOTE — PROGRESS NOTE ADULT - SUBJECTIVE AND OBJECTIVE BOX
Patient is a 85y old  Male who presents with a chief complaint of "groin pain x 2 months" (05 Sep 2017 15:32)      SUBJECTIVE / OVERNIGHT EVENTS:    MEDICATIONS  (STANDING):  finasteride 5 milliGRAM(s) Oral daily  metoprolol succinate ER 50 milliGRAM(s) Oral daily  NIFEdipine XL 60 milliGRAM(s) Oral daily  ertapenem  IVPB 500 milliGRAM(s) IV Intermittent every 24 hours  lactated ringers. 1000 milliLiter(s) (100 mL/Hr) IV Continuous <Continuous>  pantoprazole    Tablet 40 milliGRAM(s) Oral two times a day before meals    MEDICATIONS  (PRN):  oxyCODONE    IR 5 milliGRAM(s) Oral every 4 hours PRN Moderate Pain (4 - 6)  acetaminophen   Tablet. 650 milliGRAM(s) Oral every 6 hours PRN Mild Pain (1 - 3)      Vital Signs Last 24 Hrs  T(C): 36.6 (12 Sep 2017 05:12), Max: 36.7 (11 Sep 2017 21:43)  T(F): 97.9 (12 Sep 2017 05:12), Max: 98 (11 Sep 2017 21:43)  HR: 98 (12 Sep 2017 05:12) (90 - 98)  BP: 149/88 (12 Sep 2017 05:12) (122/74 - 149/88)  BP(mean): --  RR: 17 (12 Sep 2017 05:12) (17 - 18)  SpO2: 100% (12 Sep 2017 05:12) (100% - 100%)  CAPILLARY BLOOD GLUCOSE        I&O's Summary      PHYSICAL EXAM:  GENERAL: NAD, well-developed  HEAD:  Atraumatic, Normocephalic  EYES: EOMI, PERRLA, conjunctiva and sclera clear  NECK: Supple, No JVD  CHEST/LUNG: Clear to auscultation bilaterally; No wheeze  HEART: Regular rate and rhythm; No murmurs, rubs, or gallops  ABDOMEN: Soft, Nontender, Nondistended; Bowel sounds present  EXTREMITIES:  2+ Peripheral Pulses, No clubbing, cyanosis, or edema  PSYCH: AAOx3  NEUROLOGY: non-focal  SKIN: No rashes or lesions    LABS:                        7.9    7.37  )-----------( 367      ( 12 Sep 2017 05:19 )             25.7     09-12    144  |  109<H>  |  25<H>  ----------------------------<  89  3.8   |  20<L>  |  1.39<H>    Ca    8.1<L>      12 Sep 2017 05:19  Phos  2.7     09-12  Mg     1.7     09-12                RADIOLOGY & ADDITIONAL TESTS:    Imaging Personally Reviewed:    Consultant(s) Notes Reviewed:      Care Discussed with Consultants/Other Providers: Patient is a 85y old  Male who presents with a chief complaint of "groin pain x 2 months" (05 Sep 2017 15:32)      SUBJECTIVE / OVERNIGHT EVENTS: patient seen and examined by bedside, no acute events over night        MEDICATIONS  (STANDING):  finasteride 5 milliGRAM(s) Oral daily  metoprolol succinate ER 50 milliGRAM(s) Oral daily  NIFEdipine XL 60 milliGRAM(s) Oral daily  ertapenem  IVPB 500 milliGRAM(s) IV Intermittent every 24 hours  lactated ringers. 1000 milliLiter(s) (100 mL/Hr) IV Continuous <Continuous>  pantoprazole    Tablet 40 milliGRAM(s) Oral two times a day before meals    MEDICATIONS  (PRN):  oxyCODONE    IR 5 milliGRAM(s) Oral every 4 hours PRN Moderate Pain (4 - 6)  acetaminophen   Tablet. 650 milliGRAM(s) Oral every 6 hours PRN Mild Pain (1 - 3)      Vital Signs Last 24 Hrs  T(C): 36.6 (12 Sep 2017 05:12), Max: 36.7 (11 Sep 2017 21:43)  T(F): 97.9 (12 Sep 2017 05:12), Max: 98 (11 Sep 2017 21:43)  HR: 98 (12 Sep 2017 05:12) (90 - 98)  BP: 149/88 (12 Sep 2017 05:12) (122/74 - 149/88)  BP(mean): --  RR: 17 (12 Sep 2017 05:12) (17 - 18)  SpO2: 100% (12 Sep 2017 05:12) (100% - 100%)  CAPILLARY BLOOD GLUCOSE        I&O's Summary    PHYSICAL EXAM:  GENERA  HEAD:  Atraumatic, Normocephalic  EYES: EOMI, PERRLA, conjunctiva and sclera clear  NECK: Supple,  CHEST/LUNG: Clear to auscultation bilaterally; No wheeze  HEART: Regular rate and rhythm;   ABDOMEN: Soft, Nontender, Nondistended; Bowel sounds present  EXTREMITIES:  , No clubbing, cyanosis, or edema  PSYCH: AAOx3  NEUROLOGY: non-focal  SKIN: No rashes or lesions      LABS:                        7.9    7.37  )-----------( 367      ( 12 Sep 2017 05:19 )             25.7     09-12    144  |  109<H>  |  25<H>  ----------------------------<  89  3.8   |  20<L>  |  1.39<H>    Ca    8.1<L>      12 Sep 2017 05:19  Phos  2.7     09-12  Mg     1.7     09-12                RADIOLOGY & ADDITIONAL TESTS:    Imaging Personally Reviewed:    Consultant(s) Notes Reviewed:      Care Discussed with Consultants/Other Providers:

## 2017-09-12 NOTE — DISCHARGE NOTE ADULT - MEDICATION SUMMARY - MEDICATIONS TO TAKE
I will START or STAY ON the medications listed below when I get home from the hospital:    Avodart 0.5 mg oral capsule  -- 1 cap(s) by mouth once a day  -- Indication: For BPH    acetaminophen 325 mg oral tablet  -- 2 tab(s) by mouth every 6 hours, As needed, Mild Pain (1 - 3)  -- Indication: For PAIN    metoprolol succinate 50 mg oral tablet, extended release  -- 1 tab(s) by mouth once a day  -- Indication: For Essential hypertension    NIFEdipine extended release 60 mg oral tablet, extended release  -- 1 tab(s) by mouth once a day  -- Indication: For Essential hypertension    omeprazole 20 mg oral delayed release capsule  -- 1 cap(s) by mouth once a day  -- Indication: For GERD

## 2017-09-12 NOTE — DISCHARGE NOTE ADULT - SECONDARY DIAGNOSIS.
Urinary tract infection without hematuria, site unspecified Upper GI bleed Stage 3 chronic kidney disease Essential hypertension Anemia due to blood loss

## 2017-09-12 NOTE — PROGRESS NOTE ADULT - PROBLEM SELECTOR PLAN 2
- appreciate GI eval, s/p 1 unit PRBC, cbc stable - will check daily  - continue with protonix gtt, on clears for now, pt/family declined GI intervention --> will f/u with GI re transition to PO PPI/adv diet as roscoe - appreciate GI reina, s/p 1 unit PRBC, cbc stable - will check daily  pt/family declined GI intervention    c/w PPI , tolerating regular diet

## 2017-09-12 NOTE — DISCHARGE NOTE ADULT - ABILITY TO HEAR (WITH HEARING AID OR HEARING APPLIANCE IF NORMALLY USED):
[place]/Mildly to Moderately Impaired: difficulty hearing in some environments or speaker may need to increase volume or speak distinctly

## 2017-09-12 NOTE — PROGRESS NOTE ADULT - ATTENDING COMMENTS
PT reina noted Rehab PT reina noted Rehab, case d/w pt and family , does not  want to go rehab, will go home with home care

## 2017-09-12 NOTE — DISCHARGE NOTE ADULT - CARE PROVIDER_API CALL
Yovanny Guillermo), Gastroenterology; Internal Medicine  [address] [place] [id number]  Phone: [phone]  Fax: [phone]    PCP,   Phone: (   )    -  Fax: (   )    -

## 2017-09-13 VITALS
HEART RATE: 94 BPM | TEMPERATURE: 98 F | OXYGEN SATURATION: 99 % | SYSTOLIC BLOOD PRESSURE: 106 MMHG | DIASTOLIC BLOOD PRESSURE: 62 MMHG | RESPIRATION RATE: 19 BRPM

## 2017-09-13 PROCEDURE — 99239 HOSP IP/OBS DSCHRG MGMT >30: CPT

## 2017-09-13 RX ORDER — ACETAMINOPHEN 500 MG
2 TABLET ORAL
Qty: 0 | Refills: 0 | DISCHARGE
Start: 2017-09-13

## 2017-09-13 RX ADMIN — PANTOPRAZOLE SODIUM 40 MILLIGRAM(S): 20 TABLET, DELAYED RELEASE ORAL at 05:31

## 2017-09-13 RX ADMIN — ERTAPENEM SODIUM 110 MILLIGRAM(S): 1 INJECTION, POWDER, LYOPHILIZED, FOR SOLUTION INTRAMUSCULAR; INTRAVENOUS at 13:52

## 2017-09-13 RX ADMIN — Medication 50 MILLIGRAM(S): at 05:31

## 2017-09-13 RX ADMIN — Medication 60 MILLIGRAM(S): at 05:31

## 2017-09-13 RX ADMIN — SODIUM CHLORIDE 100 MILLILITER(S): 9 INJECTION, SOLUTION INTRAVENOUS at 05:31

## 2017-09-13 RX ADMIN — FINASTERIDE 5 MILLIGRAM(S): 5 TABLET, FILM COATED ORAL at 13:52

## 2017-09-13 NOTE — PROGRESS NOTE ADULT - PROVIDER SPECIALTY LIST ADULT
Gastroenterology
Hospitalist

## 2017-09-13 NOTE — PROGRESS NOTE ADULT - SUBJECTIVE AND OBJECTIVE BOX
Patient is a 85y old  Male who presents with a chief complaint of "groin pain x 2 months" (12 Sep 2017 14:13)      SUBJECTIVE / OVERNIGHT EVENTS:    MEDICATIONS  (STANDING):  finasteride 5 milliGRAM(s) Oral daily  metoprolol succinate ER 50 milliGRAM(s) Oral daily  NIFEdipine XL 60 milliGRAM(s) Oral daily  ertapenem  IVPB 500 milliGRAM(s) IV Intermittent every 24 hours  lactated ringers. 1000 milliLiter(s) (100 mL/Hr) IV Continuous <Continuous>  pantoprazole    Tablet 40 milliGRAM(s) Oral two times a day before meals    MEDICATIONS  (PRN):  oxyCODONE    IR 5 milliGRAM(s) Oral every 4 hours PRN Moderate Pain (4 - 6)  acetaminophen   Tablet. 650 milliGRAM(s) Oral every 6 hours PRN Mild Pain (1 - 3)      Vital Signs Last 24 Hrs  T(C): 36.7 (13 Sep 2017 05:30), Max: 36.8 (12 Sep 2017 12:23)  T(F): 98.1 (13 Sep 2017 05:30), Max: 98.2 (12 Sep 2017 12:23)  HR: 95 (13 Sep 2017 05:30) (88 - 95)  BP: 132/83 (13 Sep 2017 05:30) (121/71 - 132/83)  BP(mean): --  RR: 18 (13 Sep 2017 05:30) (17 - 18)  SpO2: 100% (13 Sep 2017 05:30) (99% - 100%)  CAPILLARY BLOOD GLUCOSE        I&O's Summary      PHYSICAL EXAM:  GENERAL: NAD, well-developed  HEAD:  Atraumatic, Normocephalic  EYES: EOMI, PERRLA, conjunctiva and sclera clear  NECK: Supple, No JVD  CHEST/LUNG: Clear to auscultation bilaterally; No wheeze  HEART: Regular rate and rhythm; No murmurs, rubs, or gallops  ABDOMEN: Soft, Nontender, Nondistended; Bowel sounds present  EXTREMITIES:  2+ Peripheral Pulses, No clubbing, cyanosis, or edema  PSYCH: AAOx3  NEUROLOGY: non-focal  SKIN: No rashes or lesions    LABS:                        7.9    7.37  )-----------( 367      ( 12 Sep 2017 05:19 )             25.7     09-12    144  |  109<H>  |  25<H>  ----------------------------<  89  3.8   |  20<L>  |  1.39<H>    Ca    8.1<L>      12 Sep 2017 05:19  Phos  2.7     09-12  Mg     1.7     09-12                RADIOLOGY & ADDITIONAL TESTS:    Imaging Personally Reviewed:    Consultant(s) Notes Reviewed:      Care Discussed with Consultants/Other Providers: Patient is a 85y old  Male who presents with a chief complaint of "groin pain x 2 months" (12 Sep 2017 14:13)      SUBJECTIVE / OVERNIGHT EVENTS: patient seen and examined by bedside, feels better , denies any acute distress at this time       MEDICATIONS  (STANDING):  finasteride 5 milliGRAM(s) Oral daily  metoprolol succinate ER 50 milliGRAM(s) Oral daily  NIFEdipine XL 60 milliGRAM(s) Oral daily  ertapenem  IVPB 500 milliGRAM(s) IV Intermittent every 24 hours  lactated ringers. 1000 milliLiter(s) (100 mL/Hr) IV Continuous <Continuous>  pantoprazole    Tablet 40 milliGRAM(s) Oral two times a day before meals    MEDICATIONS  (PRN):  oxyCODONE    IR 5 milliGRAM(s) Oral every 4 hours PRN Moderate Pain (4 - 6)  acetaminophen   Tablet. 650 milliGRAM(s) Oral every 6 hours PRN Mild Pain (1 - 3)      Vital Signs Last 24 Hrs  T(C): 36.7 (13 Sep 2017 05:30), Max: 36.8 (12 Sep 2017 12:23)  T(F): 98.1 (13 Sep 2017 05:30), Max: 98.2 (12 Sep 2017 12:23)  HR: 95 (13 Sep 2017 05:30) (88 - 95)  BP: 132/83 (13 Sep 2017 05:30) (121/71 - 132/83)  BP(mean): --  RR: 18 (13 Sep 2017 05:30) (17 - 18)  SpO2: 100% (13 Sep 2017 05:30) (99% - 100%)  CAPILLARY BLOOD GLUCOSE        I&O's Summary      PHYSICAL EXAM:  GENERAL: NAD, well-developed  HEAD:  Atraumatic, Normocephalic  EYES: EOMI, PERRLA, conjunctiva and sclera clear  NECK: Supple,  CHEST/LUNG: Clear to auscultation bilaterally; No wheeze  HEART: Regular rate and rhythm;   ABDOMEN: Soft, Nontender, Nondistended; Bowel sounds present  EXTREMITIES:  , No clubbing, cyanosis, or edema  PSYCH: AAOx3  NEUROLOGY: non-focal  SKIN: No rashes or lesions    LABS:                        7.9    7.37  )-----------( 367      ( 12 Sep 2017 05:19 )             25.7     09-12    144  |  109<H>  |  25<H>  ----------------------------<  89  3.8   |  20<L>  |  1.39<H>    Ca    8.1<L>      12 Sep 2017 05:19  Phos  2.7     09-12  Mg     1.7     09-12                RADIOLOGY & ADDITIONAL TESTS:    Imaging Personally Reviewed:    Consultant(s) Notes Reviewed:      Care Discussed with Consultants/Other Providers:

## 2017-09-13 NOTE — PROGRESS NOTE ADULT - PROBLEM SELECTOR PLAN 4
- hx of recurrent UTIs -- >cultures show ESBL e. coli and kleb-->  ertapenem per 7 days per ID consult, f/u T/WBC/clinically, will complete course in AM - hx of recurrent UTIs -- >cultures show ESBL E. coli and kleb-->  ertapenem for 7 days per ID consult,   f/u T/WBC/clinically,   will complete course  today  will give last dose early today so that pt can go home later on today

## 2017-09-13 NOTE — PROGRESS NOTE ADULT - ATTENDING COMMENTS
PT reina noted Rehab, case d/w pt and family , does not  want to go rehab, will go home with home care PT reina noted Rehab, case was  d/w pt and family , does not  want to go rehab, will go home with home care   will DC home today after last dose of Ertapenem give  Pt hemodynamically stable for discharge home

## 2017-09-13 NOTE — PROGRESS NOTE ADULT - PROBLEM SELECTOR PLAN 5
- acute on chronic renal failure, closely monitor electrolytes, avoid nephrotoxins, -improving   - ertapenem dose decreased - acute on chronic renal failure,   closely monitor electrolytes, avoid nephrotoxins,   improved

## 2017-09-13 NOTE — PROGRESS NOTE ADULT - PROBLEM SELECTOR PLAN 2
- appreciate GI reina, s/p 1 unit PRBC, cbc stable - will check daily  pt/family declined GI intervention    c/w PPI , tolerating regular diet - appreciate GI reina, s/p 1 unit PRBC, cbc stable -   pt/family declined GI intervention    c/w PPI , tolerating regular diet

## 2017-09-13 NOTE — PROGRESS NOTE ADULT - ASSESSMENT
84 y/o M w/ PMHx of HTN, CKD stage 3 esophageal rupture secondary to EGD, urinary bladder rupture (6/2017) secondary to cystoscopy which was performed due to recurrent UTIs p/w worsening chronic groin pain due to uti and stress fracture developed coffee ground emesis.

## 2021-10-06 ENCOUNTER — INPATIENT (INPATIENT)
Facility: HOSPITAL | Age: 86
LOS: 6 days | Discharge: HOME HEALTH | End: 2021-10-13
Attending: INTERNAL MEDICINE | Admitting: INTERNAL MEDICINE
Payer: MEDICARE

## 2021-10-06 VITALS
HEIGHT: 68.5 IN | TEMPERATURE: 98 F | DIASTOLIC BLOOD PRESSURE: 75 MMHG | HEART RATE: 99 BPM | SYSTOLIC BLOOD PRESSURE: 135 MMHG | RESPIRATION RATE: 18 BRPM | OXYGEN SATURATION: 100 %

## 2021-10-06 DIAGNOSIS — I10 ESSENTIAL (PRIMARY) HYPERTENSION: ICD-10-CM

## 2021-10-06 DIAGNOSIS — Z29.9 ENCOUNTER FOR PROPHYLACTIC MEASURES, UNSPECIFIED: ICD-10-CM

## 2021-10-06 DIAGNOSIS — N17.9 ACUTE KIDNEY FAILURE, UNSPECIFIED: ICD-10-CM

## 2021-10-06 DIAGNOSIS — N18.5 CHRONIC KIDNEY DISEASE, STAGE 5: ICD-10-CM

## 2021-10-06 DIAGNOSIS — K21.9 GASTRO-ESOPHAGEAL REFLUX DISEASE WITHOUT ESOPHAGITIS: ICD-10-CM

## 2021-10-06 DIAGNOSIS — N19 UNSPECIFIED KIDNEY FAILURE: ICD-10-CM

## 2021-10-06 DIAGNOSIS — K31.89 OTHER DISEASES OF STOMACH AND DUODENUM: Chronic | ICD-10-CM

## 2021-10-06 DIAGNOSIS — K44.9 DIAPHRAGMATIC HERNIA WITHOUT OBSTRUCTION OR GANGRENE: Chronic | ICD-10-CM

## 2021-10-06 DIAGNOSIS — D64.9 ANEMIA, UNSPECIFIED: ICD-10-CM

## 2021-10-06 DIAGNOSIS — E87.5 HYPERKALEMIA: ICD-10-CM

## 2021-10-06 DIAGNOSIS — R19.7 DIARRHEA, UNSPECIFIED: ICD-10-CM

## 2021-10-06 DIAGNOSIS — E87.2 ACIDOSIS: ICD-10-CM

## 2021-10-06 DIAGNOSIS — N40.0 BENIGN PROSTATIC HYPERPLASIA WITHOUT LOWER URINARY TRACT SYMPTOMS: ICD-10-CM

## 2021-10-06 LAB
ALBUMIN SERPL ELPH-MCNC: 3.6 G/DL — SIGNIFICANT CHANGE UP (ref 3.3–5)
ALP SERPL-CCNC: 70 U/L — SIGNIFICANT CHANGE UP (ref 40–120)
ALT FLD-CCNC: <5 U/L — LOW (ref 4–41)
ANION GAP SERPL CALC-SCNC: 19 MMOL/L — HIGH (ref 7–14)
ANION GAP SERPL CALC-SCNC: 22 MMOL/L — HIGH (ref 7–14)
ANION GAP SERPL CALC-SCNC: >19 MMOL/L — HIGH (ref 7–14)
ANION GAP SERPL CALC-SCNC: >23 MMOL/L — HIGH (ref 7–14)
APTT BLD: 25.4 SEC — LOW (ref 27–36.3)
AST SERPL-CCNC: 7 U/L — SIGNIFICANT CHANGE UP (ref 4–40)
BASE EXCESS BLDV CALC-SCNC: -21.5 MMOL/L — LOW (ref -2–3)
BILIRUB SERPL-MCNC: 0.3 MG/DL — SIGNIFICANT CHANGE UP (ref 0.2–1.2)
BLOOD GAS VENOUS COMPREHENSIVE RESULT: SIGNIFICANT CHANGE UP
BLOOD GAS VENOUS COMPREHENSIVE RESULT: SIGNIFICANT CHANGE UP
BUN SERPL-MCNC: 128 MG/DL — HIGH (ref 7–23)
BUN SERPL-MCNC: 132 MG/DL — HIGH (ref 7–23)
BUN SERPL-MCNC: 135 MG/DL — HIGH (ref 7–23)
BUN SERPL-MCNC: 139 MG/DL — HIGH (ref 7–23)
CA-I SERPL-SCNC: 1.13 MMOL/L — LOW (ref 1.15–1.33)
CALCIUM SERPL-MCNC: 6.7 MG/DL — LOW (ref 8.4–10.5)
CALCIUM SERPL-MCNC: 7.2 MG/DL — LOW (ref 8.4–10.5)
CALCIUM SERPL-MCNC: 7.3 MG/DL — LOW (ref 8.4–10.5)
CALCIUM SERPL-MCNC: 7.5 MG/DL — LOW (ref 8.4–10.5)
CHLORIDE BLDV-SCNC: 119 MMOL/L — HIGH (ref 96–108)
CHLORIDE SERPL-SCNC: 110 MMOL/L — HIGH (ref 98–107)
CHLORIDE SERPL-SCNC: 113 MMOL/L — HIGH (ref 98–107)
CHLORIDE SERPL-SCNC: 115 MMOL/L — HIGH (ref 98–107)
CHLORIDE SERPL-SCNC: 118 MMOL/L — HIGH (ref 98–107)
CO2 BLDV-SCNC: 7.7 MMOL/L — LOW (ref 22–26)
CO2 SERPL-SCNC: 11 MMOL/L — LOW (ref 22–31)
CO2 SERPL-SCNC: 11 MMOL/L — LOW (ref 22–31)
CO2 SERPL-SCNC: <7 MMOL/L — CRITICAL LOW (ref 22–31)
CO2 SERPL-SCNC: <7 MMOL/L — CRITICAL LOW (ref 22–31)
CREAT SERPL-MCNC: 11.59 MG/DL — HIGH (ref 0.5–1.3)
CREAT SERPL-MCNC: 11.87 MG/DL — HIGH (ref 0.5–1.3)
CREAT SERPL-MCNC: 12.45 MG/DL — HIGH (ref 0.5–1.3)
CREAT SERPL-MCNC: 12.64 MG/DL — HIGH (ref 0.5–1.3)
FERRITIN SERPL-MCNC: 216 NG/ML — SIGNIFICANT CHANGE UP (ref 30–400)
GAS PNL BLDV: 141 MMOL/L — SIGNIFICANT CHANGE UP (ref 136–145)
GAS PNL BLDV: SIGNIFICANT CHANGE UP
GAS PNL BLDV: SIGNIFICANT CHANGE UP
GLUCOSE BLDV-MCNC: 120 MG/DL — HIGH (ref 70–99)
GLUCOSE SERPL-MCNC: 122 MG/DL — HIGH (ref 70–99)
GLUCOSE SERPL-MCNC: 124 MG/DL — HIGH (ref 70–99)
GLUCOSE SERPL-MCNC: 127 MG/DL — HIGH (ref 70–99)
GLUCOSE SERPL-MCNC: 140 MG/DL — HIGH (ref 70–99)
HCO3 BLDV-SCNC: 7 MMOL/L — CRITICAL LOW (ref 22–29)
HCT VFR BLD CALC: 26.3 % — LOW (ref 39–50)
HCT VFR BLD CALC: 28.3 % — LOW (ref 39–50)
HCT VFR BLDA CALC: 26 % — LOW (ref 39–51)
HGB BLD CALC-MCNC: 8.7 G/DL — LOW (ref 13–17)
HGB BLD-MCNC: 8.5 G/DL — LOW (ref 13–17)
HGB BLD-MCNC: 9.3 G/DL — LOW (ref 13–17)
INR BLD: 1.16 RATIO — SIGNIFICANT CHANGE UP (ref 0.88–1.16)
IRON SATN MFR SERPL: 117 UG/DL — SIGNIFICANT CHANGE UP (ref 45–165)
IRON SATN MFR SERPL: 70 % — HIGH (ref 14–50)
LACTATE BLDV-MCNC: 1 MMOL/L — SIGNIFICANT CHANGE UP (ref 0.5–2)
MAGNESIUM SERPL-MCNC: 1.7 MG/DL — SIGNIFICANT CHANGE UP (ref 1.6–2.6)
MAGNESIUM SERPL-MCNC: 1.8 MG/DL — SIGNIFICANT CHANGE UP (ref 1.6–2.6)
MAGNESIUM SERPL-MCNC: 1.9 MG/DL — SIGNIFICANT CHANGE UP (ref 1.6–2.6)
MCHC RBC-ENTMCNC: 31.1 PG — SIGNIFICANT CHANGE UP (ref 27–34)
MCHC RBC-ENTMCNC: 31.4 PG — SIGNIFICANT CHANGE UP (ref 27–34)
MCHC RBC-ENTMCNC: 32.3 GM/DL — SIGNIFICANT CHANGE UP (ref 32–36)
MCHC RBC-ENTMCNC: 32.9 GM/DL — SIGNIFICANT CHANGE UP (ref 32–36)
MCV RBC AUTO: 95.6 FL — SIGNIFICANT CHANGE UP (ref 80–100)
MCV RBC AUTO: 96.3 FL — SIGNIFICANT CHANGE UP (ref 80–100)
NRBC # BLD: 0 /100 WBCS — SIGNIFICANT CHANGE UP
NRBC # BLD: 0 /100 WBCS — SIGNIFICANT CHANGE UP
NRBC # FLD: 0.02 K/UL — HIGH
NRBC # FLD: 0.02 K/UL — HIGH
OSMOLALITY SERPL: 347 MOSM/KG — HIGH (ref 275–295)
PCO2 BLDV: 24 MMHG — LOW (ref 42–55)
PH BLDV: 7.07 — LOW (ref 7.32–7.43)
PHOSPHATE SERPL-MCNC: 7.1 MG/DL — HIGH (ref 2.5–4.5)
PHOSPHATE SERPL-MCNC: 8 MG/DL — HIGH (ref 2.5–4.5)
PHOSPHATE SERPL-MCNC: 9 MG/DL — HIGH (ref 2.5–4.5)
PLATELET # BLD AUTO: 140 K/UL — LOW (ref 150–400)
PLATELET # BLD AUTO: 140 K/UL — LOW (ref 150–400)
PO2 BLDV: 32 MMHG — SIGNIFICANT CHANGE UP
POTASSIUM BLDV-SCNC: 5.2 MMOL/L — HIGH (ref 3.5–5.1)
POTASSIUM SERPL-MCNC: 3.7 MMOL/L — SIGNIFICANT CHANGE UP (ref 3.5–5.3)
POTASSIUM SERPL-MCNC: 3.9 MMOL/L — SIGNIFICANT CHANGE UP (ref 3.5–5.3)
POTASSIUM SERPL-MCNC: 5 MMOL/L — SIGNIFICANT CHANGE UP (ref 3.5–5.3)
POTASSIUM SERPL-MCNC: 5.4 MMOL/L — HIGH (ref 3.5–5.3)
POTASSIUM SERPL-SCNC: 3.7 MMOL/L — SIGNIFICANT CHANGE UP (ref 3.5–5.3)
POTASSIUM SERPL-SCNC: 3.9 MMOL/L — SIGNIFICANT CHANGE UP (ref 3.5–5.3)
POTASSIUM SERPL-SCNC: 5 MMOL/L — SIGNIFICANT CHANGE UP (ref 3.5–5.3)
POTASSIUM SERPL-SCNC: 5.4 MMOL/L — HIGH (ref 3.5–5.3)
PROT SERPL-MCNC: 6 G/DL — SIGNIFICANT CHANGE UP (ref 6–8.3)
PROTHROM AB SERPL-ACNC: 13.2 SEC — SIGNIFICANT CHANGE UP (ref 10.6–13.6)
RBC # BLD: 2.73 M/UL — LOW (ref 4.2–5.8)
RBC # BLD: 2.96 M/UL — LOW (ref 4.2–5.8)
RBC # FLD: 15.7 % — HIGH (ref 10.3–14.5)
RBC # FLD: 15.9 % — HIGH (ref 10.3–14.5)
SAO2 % BLDV: 68 % — SIGNIFICANT CHANGE UP
SARS-COV-2 RNA SPEC QL NAA+PROBE: SIGNIFICANT CHANGE UP
SODIUM SERPL-SCNC: 143 MMOL/L — SIGNIFICANT CHANGE UP (ref 135–145)
SODIUM SERPL-SCNC: 143 MMOL/L — SIGNIFICANT CHANGE UP (ref 135–145)
SODIUM SERPL-SCNC: 144 MMOL/L — SIGNIFICANT CHANGE UP (ref 135–145)
SODIUM SERPL-SCNC: 145 MMOL/L — SIGNIFICANT CHANGE UP (ref 135–145)
TIBC SERPL-MCNC: 168 UG/DL — LOW (ref 220–430)
UIBC SERPL-MCNC: 51 UG/DL — LOW (ref 110–370)
WBC # BLD: 3.36 K/UL — LOW (ref 3.8–10.5)
WBC # BLD: 4.53 K/UL — SIGNIFICANT CHANGE UP (ref 3.8–10.5)
WBC # FLD AUTO: 3.36 K/UL — LOW (ref 3.8–10.5)
WBC # FLD AUTO: 4.53 K/UL — SIGNIFICANT CHANGE UP (ref 3.8–10.5)

## 2021-10-06 PROCEDURE — 99223 1ST HOSP IP/OBS HIGH 75: CPT | Mod: GC,AI

## 2021-10-06 PROCEDURE — 99223 1ST HOSP IP/OBS HIGH 75: CPT

## 2021-10-06 PROCEDURE — 76770 US EXAM ABDO BACK WALL COMP: CPT | Mod: 26

## 2021-10-06 PROCEDURE — 99291 CRITICAL CARE FIRST HOUR: CPT

## 2021-10-06 PROCEDURE — 71045 X-RAY EXAM CHEST 1 VIEW: CPT | Mod: 26

## 2021-10-06 RX ORDER — SODIUM BICARBONATE 1 MEQ/ML
0.35 SYRINGE (ML) INTRAVENOUS
Qty: 150 | Refills: 0 | Status: DISCONTINUED | OUTPATIENT
Start: 2021-10-06 | End: 2021-10-07

## 2021-10-06 RX ORDER — HEPARIN SODIUM 5000 [USP'U]/ML
5000 INJECTION INTRAVENOUS; SUBCUTANEOUS EVERY 8 HOURS
Refills: 0 | Status: DISCONTINUED | OUTPATIENT
Start: 2021-10-06 | End: 2021-10-13

## 2021-10-06 RX ORDER — INFLUENZA VIRUS VACCINE 15; 15; 15; 15 UG/.5ML; UG/.5ML; UG/.5ML; UG/.5ML
0.5 SUSPENSION INTRAMUSCULAR ONCE
Refills: 0 | Status: DISCONTINUED | OUTPATIENT
Start: 2021-10-06 | End: 2021-10-06

## 2021-10-06 RX ORDER — METOPROLOL TARTRATE 50 MG
25 TABLET ORAL EVERY 12 HOURS
Refills: 0 | Status: DISCONTINUED | OUTPATIENT
Start: 2021-10-06 | End: 2021-10-09

## 2021-10-06 RX ORDER — ACETAMINOPHEN 500 MG
650 TABLET ORAL EVERY 6 HOURS
Refills: 0 | Status: DISCONTINUED | OUTPATIENT
Start: 2021-10-06 | End: 2021-10-13

## 2021-10-06 RX ORDER — INFLUENZA VIRUS VACCINE 15; 15; 15; 15 UG/.5ML; UG/.5ML; UG/.5ML; UG/.5ML
0.7 SUSPENSION INTRAMUSCULAR ONCE
Refills: 0 | Status: DISCONTINUED | OUTPATIENT
Start: 2021-10-06 | End: 2021-10-13

## 2021-10-06 RX ORDER — CALCIUM GLUCONATE 100 MG/ML
2 VIAL (ML) INTRAVENOUS ONCE
Refills: 0 | Status: COMPLETED | OUTPATIENT
Start: 2021-10-06 | End: 2021-10-06

## 2021-10-06 RX ORDER — FINASTERIDE 5 MG/1
5 TABLET, FILM COATED ORAL DAILY
Refills: 0 | Status: DISCONTINUED | OUTPATIENT
Start: 2021-10-06 | End: 2021-10-13

## 2021-10-06 RX ORDER — SODIUM BICARBONATE 1 MEQ/ML
0.12 SYRINGE (ML) INTRAVENOUS
Qty: 50 | Refills: 0 | Status: DISCONTINUED | OUTPATIENT
Start: 2021-10-06 | End: 2021-10-06

## 2021-10-06 RX ADMIN — Medication 150 MEQ/KG/HR: at 09:01

## 2021-10-06 RX ADMIN — Medication 25 MILLIGRAM(S): at 14:48

## 2021-10-06 RX ADMIN — Medication 200 GRAM(S): at 06:06

## 2021-10-06 RX ADMIN — HEPARIN SODIUM 5000 UNIT(S): 5000 INJECTION INTRAVENOUS; SUBCUTANEOUS at 23:12

## 2021-10-06 NOTE — CONSULT NOTE ADULT - PROBLEM SELECTOR RECOMMENDATION 3
In setting of advance renal failure. Initial K was 5.4, improved to 5 with IV fluids. Continue bicarb drip as above. Monitor K. Low K diet.     If any questions, please feel free to contact me     Kellee Johnson  Nephrology Fellow  [facility] Pager: [phone]  [facility] Pager: [phone]

## 2021-10-06 NOTE — H&P ADULT - PROBLEM SELECTOR PLAN 8
FEN/GI: kidney diet  Lines:  PPx: hep sq  Dispo: Admit to Medicine  Code Status: Full, pending discussion.    Plan was discussed with team and Dr. Franco on rounds.  Signed,     Estrellita Dan MD  Internal Medicine PGY 1  [facility]: [id number]  [facility]: [phone] - c/w finasteride

## 2021-10-06 NOTE — ED PROVIDER NOTE - ATTENDING CONTRIBUTION TO CARE
I performed the initial face to face bedside interview with this patient regarding history of present illness, review of symptoms and past medical, social and family history.  I completed an independent physical examination.  I was the initial provider who evaluated this patient.  I have signed out the follow up of any pending tests (i.e. labs, radiological studies) to the resident.  I have discussed the patient’s plan of care and disposition with the resident.     Upon my evaluation, this patient had a high probability of imminent or life-threatening deterioration due to metabolic anion gap acidosis, which required my direct attention, intervention, and personal management.  The patient has a  medical condition that impairs one or more vital organ systems.  Frequent personal assessment and adjustment of medical interventions was performed.      I have personally provided 35 minutes of critical care time exclusive of time spent on separately billable procedures. Time includes review of laboratory data, radiology results, discussion with consultants, patient and family; monitoring for potential decompensation, as well as time spent retrieving data and reviewing the chart and documenting the visit. Interventions were performed as documented above.

## 2021-10-06 NOTE — H&P ADULT - NSHPPHYSICALEXAM_GEN_ALL_CORE
PHYSICAL EXAM:  GENERAL: Sitting comfortable in bed, in no acute distress  HENMT: Atraumatic, mucous membranes dry, no oropharyngeal exudates or vesicles, uvula is midline   EYES: Clear bilaterally, PERRL, EOMs intact b/l  HEART: RRR, S1/S2, no murmur/gallops/rubs  RESPIRATORY: Clear to auscultation bilaterally, no wheezes/rhonchi/rales  ABDOMEN: +BS, soft, nontender, nondistended, no organomegaly. no CVA tenderness  EXTREMITIES: No lower extremity edema, +2 radial pulses b/l  NEURO:  A&O, FCx4, strength 5/5 in upper and lower extremities, no focal motor deficits or sensory deficits. no asterixis  Heme/LYMPH: No ecchymosis or bruising, no anterior/posterior cervical or supraclavicular LAD  SKIN:  Skin normal color for race, warm, dry and intact. No evidence of rash. Vital Signs Last 24 Hrs  T(C): 36.5 (06 Oct 2021 16:04), Max: 36.7 (06 Oct 2021 09:06)  T(F): 97.7 (06 Oct 2021 16:04), Max: 98.1 (06 Oct 2021 09:06)  HR: 84 (06 Oct 2021 16:04) (84 - 111)  BP: 136/94 (06 Oct 2021 16:04) (135/75 - 169/90)  BP(mean): --  RR: 18 (06 Oct 2021 16:04) (16 - 20)  SpO2: 100% (06 Oct 2021 16:04) (100% - 100%)    PHYSICAL EXAM:  GENERAL: Sitting comfortable in bed, in no acute distress  HENMT: Atraumatic, mucous membranes dry, no oropharyngeal exudates or vesicles, uvula is midline   EYES: Clear bilaterally, PERRL, EOMs intact b/l  HEART: RRR, S1/S2, no murmur/gallops/rubs  RESPIRATORY: Clear to auscultation bilaterally, no wheezes/rhonchi/rales  ABDOMEN: +BS, soft, nontender, nondistended, no organomegaly. no CVA tenderness  EXTREMITIES: No lower extremity edema, +2 radial pulses b/l  NEURO:  A&O, FCx4, strength 5/5 in upper and lower extremities, no focal motor deficits or sensory deficits. no asterixis  Heme/LYMPH: No ecchymosis or bruising, no anterior/posterior cervical or supraclavicular LAD  SKIN:  Skin normal color for race, warm, dry and intact. No evidence of rash.

## 2021-10-06 NOTE — H&P ADULT - ASSESSMENT
Ge Weber is a 89M with CKD3b (hasn't seen a doctor in a couple years), HTN, GERD, Hiatal hernia, iron deficiency anemia who presents with anion gap metabolic acidosis, Ge Weber is a 89M with CKD3b (not on dialysis, hasn't seen a doctor in a couple years), HTN, GERD, Hiatal hernia, iron deficiency anemia who presents with anion gap metabolic acidosis, and uremia. Ge Weber is a 89M with CKD3b (not on dialysis, hasn't seen a doctor in a couple years), HTN, GERD, Hiatal hernia, iron deficiency anemia who presents with GÉNESIS on CKD, anion gap metabolic acidosis, and uremia.

## 2021-10-06 NOTE — PATIENT PROFILE ADULT - NSPROGENSOURCEINFO_GEN_A_NUR
Spoke to mom 1/26 afternoon; CXR ok; will monitor closely and recheck if worsens; to ER if signs of respiratory distress   patient

## 2021-10-06 NOTE — ED PROVIDER NOTE - NSICDXPASTSURGICALHX_GEN_ALL_CORE_FT
PAST SURGICAL HISTORY:  Acute gastric volvulus s/p laparoscopic reduction, PEG    Hernia, paraesophageal     Prostate Brachytherapy 30y ago at [facility]    S/P appendectomy performed at [facility] 10 y ago

## 2021-10-06 NOTE — CONSULT NOTE ADULT - ATTENDING COMMENTS
pt is an 89 yo male with hx htn, ckd, who presents with  hx progressive shortness of breath over the last month.  Pt brought by daughter, Alert and awake ,  bp 140/86  rr 20 heent no jvd, lungs few rhonchi heart s1s2 abdomen nontender  ext no edema    labs na 134  k 5.6  bicarb 7 bun  128 cr 12.4    cxr mild vasc congestion    ekg no acute st t changes    A/P89 yo male with hyperkalemia,  elevated bun  cr, with metabolid acidosis.   -pt with metaboic acidosis and hyperkalemia, need renal w/u  -check sono kub, bladder,  -renal evaluation, renal vs post renal , check residual  -check echo, ekg  -monitor urine output  -pt given hyperkalemia cocktai,l, monitor bmp  -dvt prophylaxis  -check tsh, cortisol  pt with ines needs close renal evaluation
Patient seen and evaluated at bedside this morning.  Prior history of prostate cancer.  Hadn't seen a doctor in about 4 years.  He reports that over the last few months he has had diarrhea.  Over the past week this has become much worse.  Each time he eats he has large volume watery diarrhea and he has only been eating once per day.  The lack of severe hyperakalemia with renal failure and acidosis likely speaks to GI losses of potassium.  POCUS performed by me showed A line on lung sonogram.  No hydronephrosis on POCUS full bladder.  Recommend continue intravenous fluids specifically bicarb.  Spoke with family daughter and patient who understand that dialysis may be indicated if there is no improvement.

## 2021-10-06 NOTE — ED PROVIDER NOTE - NSICDXPASTMEDICALHX_GEN_ALL_CORE_FT
PAST MEDICAL HISTORY:  CKD (chronic kidney disease)     GERD (gastroesophageal reflux disease)     Hiatal hernia     HTN - Hypertension

## 2021-10-06 NOTE — ED ADULT TRIAGE NOTE - CHIEF COMPLAINT QUOTE
Pt arrives to ED from home with report of metabolic acidosis s/p labs drawn at home by visiting RN earlier today.  Pt given 1 amp of Sodium Bicarb by EMS to Left AC 20g iv placed by EMS.  Pt c/o of fatigue. Pt reports he became incontinent today.  Pt denies pain.  Hx of HTN, CKD Pt arrives to ED from home with report of metabolic acidosis s/p labs drawn at home by visiting RN earlier today.  Pt given 1 amp of Sodium Bicarb by EMS to Left AC 20g iv placed by EMS.  Pt c/o of fatigue. Pt reports he became incontinent today.  Pt denies pain.  Hx of HTN, CKD.  No report of actual lab values. Pt arrives to ED from home with report of metabolic acidosis s/p labs drawn at home by visiting RN earlier today.  Pt given 1 amp of Sodium Bicarb by EMS to Left AC 20g iv placed by EMS.  Pt c/o of fatigue. Pt reports he became incontinent today.  Pt denies pain.  Hx of HTN, CKD.  No report of actual lab values..  Red Attending requests pt be expedited following ekg. Charge RN aware.

## 2021-10-06 NOTE — H&P ADULT - HISTORY OF PRESENT ILLNESS
Ge Weber is a 89M with CKD3b, HTN, GERD, Hiatal hernia    who presents with lab abnormalities.     In the ED, he was afebrile, hemodynamically stable, RR18, satting well on room air. Labs notable for bicarb <7, , sCr 12.64, anion gap 23. VBG pH 7.06, pCO2 24, HCO3 7. Lactate 1. Received 1 amp bicarb prior to arrival, and started on bicarb gtt.    MICU was consulted, not a MICU candidate for urgent HD. Recommending bicarb gtt Ge Weber is a 89M with CKD3b (hasn't seen a doctor in a couple years), HTN, GERD, Hiatal hernia, iron deficiency anemia who presents with lab abnormalities. Per patient's daughter, patient has been getting progressively worsening shortness of breath on exertion for the past couple of months. But over the last 10 days has been getting much worse. Has belly pain with eating with associated decreased PO intake but able to drink gataorade, dizziness and headaches, and palpitations. Has been having diarrhea for the past 4 months, watery no blood. No swelling, no chest pain or discomfort. Still urinating well. Denies fevers, chills, cough. Per patient's daughter, he became so weak that daughter had an NP come to house to assess patient and obtain CXR, blood draw, COVID swab. Prescribed zithromax and medrol pack (first started Monday). Blood results came back with profoundly low bicarb so he presented to the ED for further evaluation.    In the ED, he was afebrile, hemodynamically stable, RR18, satting well on room air. Labs notable for bicarb <7, , sCr 12.64, anion gap 23. VBG pH 7.06, pCO2 24, HCO3 7. Lactate 1. Received 1 amp bicarb prior to arrival, and started on bicarb gtt. MICU was consulted, not a candidate for urgent HD, recommending bicarb gtt.    He was admitted to Medicine for further evaluation and management of metabolic acidosis. Ge Weber is a 89M with CKD3b (hasn't seen a doctor in a couple years), HTN, GERD, Hiatal hernia, iron deficiency anemia who presents with lab abnormalities. Per patient's daughter, patient has been getting progressively worsening shortness of breath on exertion for the past couple of months. But over the last 10 days has been getting much worse. Has belly pain with eating with associated decreased PO intake but able to drink gataorade, dizziness and headaches, and palpitations. Has been having diarrhea for the past 4 months, watery no blood. No swelling, no chest pain or discomfort. Still urinating well. Denies fevers, chills, cough. Per patient's daughter, he became so weak that daughter had an NP come to house to assess patient and obtain CXR, blood draw, COVID swab. Prescribed zithromax and medrol pack (first started Monday). Blood results came back with profoundly low bicarb so he presented to the ED for further evaluation.    In the ED, he was afebrile, hemodynamically stable, RR18, satting well on room air. Labs notable for bicarb <7, , sCr 12.64, anion gap 23. VBG pH 7.06, pCO2 24, HCO3 7. Lactate 1. Received 1 amp bicarb prior to arrival, and started on bicarb gtt. MICU was consulted, not a candidate for urgent HD, recommending bicarb gtt.    Of note, patient diagnosed with kidney disease around 20 years ago. Not sure what caused it, was seeing a nephrologist for many years but lost to follow up some time ago. Never had electrolyte abnormalities like this, never been on dialysis.    He was admitted to Medicine for further evaluation and management of metabolic acidosis. Ge Weber is a 89M with CKD3b (hasn't seen a doctor in a couple years), HTN, GERD, Hiatal hernia, iron deficiency anemia who presents with lab abnormalities. Per patient's daughter, patient has been getting progressively worsening shortness of breath on exertion for the past couple of months. But over the last 10 days has been getting much worse. Has belly pain with eating with associated decreased PO intake but able to drink Gatorade He denies dizziness and headaches, and palpitations. Has been having diarrhea for the past 4 months, watery no blood. Associated with PO intake, has loose stools no matter what he eats. No LE swelling, no chest pain or discomfort. Still urinating well. Denies fevers, chills, cough. Per patient's daughter, he became so weak that daughter had an NP come to house to assess patient and obtain CXR, blood draw, COVID swab. Prescribed zithromax and medrol pack (first started Monday). Blood results came back with profoundly low bicarb so he presented to the ED for further evaluation.    In the ED, he was afebrile, hemodynamically stable, RR18, satting well on room air. Labs notable for bicarb <7, , sCr 12.64, anion gap 23. VBG pH 7.06, pCO2 24, HCO3 7. Lactate 1. Received 1 amp bicarb prior to arrival, and started on bicarb gtt. MICU was consulted, not a candidate for urgent HD, recommending bicarb gtt.    Of note, patient diagnosed with kidney disease around 20 years ago. Not sure what caused it, was seeing a nephrologist for many years but lost to follow up some time ago. Never had electrolyte abnormalities like this, never been on dialysis.    He was admitted to Medicine for further evaluation and management of metabolic acidosis.

## 2021-10-06 NOTE — H&P ADULT - PROBLEM SELECTOR PLAN 3
- per patient, he is making urine. never been on dialysis. GFR on admit 3  - strict I&O  - daily weights  -   - Renal protective measures: Please renally dose all medications; Avoid nephrotoxic medications (NSAIDS, IV contrast); Avoid ACEi and ARBs in the setting of GÉNESIS; Maintain MAP >65; Low Na, K, phos, and protein diet - potassium 5.4 on admit - potassium 5.4 on admit, no EKG changes  - will continue to monitor on bicarb gtt

## 2021-10-06 NOTE — H&P ADULT - NSHPREVIEWOFSYSTEMS_GEN_ALL_CORE
Constitutional: no fever and no chills  Eyes: no discharge, no irritation, no pain, no visual changes  ENMT: no ear pain or hearing loss, no dysphagia or throat pain  Neck: no pain, no stiffness, no swollen glands  CV: no chest pain, no palpitations, no edema  Resp: no cough, no shortness of breath  Abd: no abdominal pain, no nausea or vomiting, no diarrhea  : no dysuria, no hematuria  MSK: no back pain, no neck pain, no joint pain  Neuro: no LOC, no gait abnormality, no headache, no sensory deficits, no weakness  Skin: no rashes, no lacerations, no lesions Constitutional: no fever and no chills  Eyes: no discharge, no irritation, no pain, no visual changes  ENMT: no ear pain or hearing loss, no dysphagia or throat pain  Neck: no pain, no stiffness, no swollen glands  CV: no chest pain, + palpitations, no edema  Resp: no cough, +shortness of breath  Abd: +abdominal pain, no nausea or vomiting, +diarrhea  : no dysuria, no hematuria  MSK: no back pain, no neck pain, no joint pain  Neuro: no LOC, no gait abnormality, +headache, no sensory deficits, no weakness  Skin: no rashes, no lacerations, no lesions Constitutional: no fever and no chills  Eyes: no discharge, no irritation, no pain, no visual changes  ENMT: no ear pain or hearing loss, no dysphagia or throat pain  Neck: no pain, no stiffness, no swollen glands  CV: no chest pain, + palpitations, no edema  Resp: no cough, +shortness of breath  Abd: +abdominal pain, no nausea or vomiting, +diarrhea  : no dysuria, no hematuria  MSK: no back pain, no neck pain, no joint pain  Neuro: no LOC, no gait abnormality, +headache, no sensory deficits, no weakness  Skin: no rashes, no lacerations, no lesions  HEME: no easy bleeding or bruising   Endo: no polyuria, polydipsia. No diabetes.

## 2021-10-06 NOTE — H&P ADULT - PROBLEM SELECTOR PLAN 2
- , sCr 12.64. with decreased PO intake and energy and weakness over the past couple days. no asterixis on exam. hx of CKD  - - sCr 12.64 on admit, bl~1.3 (2017)  - per patient, he is making urine. never been on dialysis. GFR on admit 3  - strict I&O  - daily weights  -   - Renal protective measures: Please renally dose all medications; Avoid nephrotoxic medications (NSAIDS, IV contrast); Avoid ACEi and ARBs in the setting of GÉNESIS; Maintain MAP >65; Low Na, K, phos, and protein diet - sCr 12.64 on admit, bl~1.3 (2017)  - per patient, he is making urine. never been on dialysis. GFR on admit 3  - Suspect he has some component of hypovolemia, may improve with bicarb gtt as above   - strict I&O  - daily weights  - Renal recs appreciated  - Renal US w/ renal parenchymal disease   - Renal protective measures: Please renally dose all medications; Avoid nephrotoxic medications (NSAIDS, IV contrast); Avoid ACEi and ARBs in the setting of GÉNESIS; Maintain MAP >65; Low Na, K, phos, and protein diet

## 2021-10-06 NOTE — H&P ADULT - PROBLEM SELECTOR PLAN 1
- hx of CKD - hx of CKD, bicarb <7 on admit, VBG pH 7.06, lactate 1. AG 23. likely 2/2 uremia, less likely ingestion. no hx of diabetes, glucose 122  - s/p 1 amp bicarb en route. MICU consulted, not candidate for urgent HD  - bicarb gtt  - tox screen  - neph consult Likely due to GÉNESIS on CKD  - bicarb <7 on admit, VBG pH 7.06, lactate 1. AG 23. likely 2/2 uremia, less likely ingestion. no hx of diabetes, glucose 122  - s/p 1 amp bicarb en route. MICU consulted, not candidate for urgent HD  - bicarb gtt  - tox screen  - neph consult  - Monitor VBG, BMP Likely due to GÉNESIS on CKD  - , sCr 12.64. with decreased PO intake and energy and weakness over the past couple days. no asterixis on exam. hx of CKD  - bicarb <7 on admit, VBG pH 7.06, lactate 1. AG 23. likely 2/2 uremia, less likely ingestion. no hx of diabetes, glucose 122  - s/p 1 amp bicarb en route. MICU consulted, not candidate for urgent HD  - bicarb gtt  - tox screen  - neph consult  - Monitor VBG, BMP

## 2021-10-06 NOTE — H&P ADULT - PROBLEM SELECTOR PLAN 4
- will obtain GI PCR, stool O&P, stool culture  - no leukocytosis or fevers or recent abx use to suggest C diff, will continue to monitor, if worsening will consider sending

## 2021-10-06 NOTE — CONSULT NOTE ADULT - ASSESSMENT
89 yo male with history of CKD not followed often by doctors presenting with is daughter from home after having abnormal lab tests today concerning for acute renal failure. MICU consulted for evaluation of acute renal failure and metabolic acidosis.    RECOMMENDATIONS:  Metabolic acidosis 2/2 Acute renal failure of unknown etiology  - Nephrology consult  - US kidney/bladder  - Agree with bicarb gtt  - Treat hyperkalemia  - Monitor BMP  - No need for urgent/emergent HD at this time. Please reconsult as needed    Jakob Mayer MD PGY-3  Internal Medicine  MICU [id number]

## 2021-10-06 NOTE — ED ADULT NURSE NOTE - OBJECTIVE STATEMENT
Pt A&Ox4, ambulates at baseline. PT in NAD, respirations equal and unlabored. Pt arrives with daughter via ems. Daughter endorses pt being in metabolic acidosis as per NP from labs drawn earlier today. Pt has had increase in SOB especially on exertion and diarrhea for the past few months, no blood noted. Pt endorses occasional burning chest pain after eating. Pt denies; n/v, fever/chills, chest pain at this time. 20G to L wrist by ems, 20G to R ac by myself, labs drawn and sent as per MDS order. Awaiting further orders at this time, will continue to monitor.

## 2021-10-06 NOTE — CONSULT NOTE ADULT - PROBLEM SELECTOR RECOMMENDATION 2
In setting of chronic diarrhea and advance renal failure. Last bicarb of < 7 with pH of 7.07 on VBG. Continue current rate of bicarb drip. Monitor serum CO2 and pH.

## 2021-10-06 NOTE — ED ADULT NURSE REASSESSMENT NOTE - NS ED NURSE REASSESS COMMENT FT1
Lab inform of Abnormal lab value, MD Ignacio notified. Okay for infusion to continue at same rate. Repeat blood work at 4pm

## 2021-10-06 NOTE — H&P ADULT - PROBLEM SELECTOR PLAN 5
- likely 2/2 CKD Likely 2/2 CKD  - Will obtain iron panel, B12/folate r/o other etiologies  - Will continue to monitor CBC

## 2021-10-06 NOTE — H&P ADULT - ATTENDING COMMENTS
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    Patient seen and examined, daughter at bedside. States he gets watery diarrhea for the past 4 months after everything he eats. Has not seen a doctor or had blood work for many years. Daughter is also unsure if he has been taking his home medications. He is urinating normally but is incontinent. He denies LE swelling, chest pain. SOB has been progressive and weakness has been worsening over several days. Daughter had visiting NP do bloodwork and CXR, was sent in for abnormal labs. Seen by MICU in ED and not candidate. Patient w/ stable VSS and well appearing on exam.     Labs notable to Cr of >12, , bicarb 7, pH 7.06. Renal consulted and recs appreciated. On bicarb gtt @ 150cc/hr. Will monitor BMP/ VBG frequently. Renal US w/ renal parenchymal disease, no hydronephrosis. May need HD if no renal recovery.     Discussed with HS 3 Dr. Dan

## 2021-10-06 NOTE — ED PROVIDER NOTE - PHYSICAL EXAMINATION
Gen: Well appearing in NAD  Head: NC/AT  Neck: trachea midline  Resp:  No distress CTAB  CV: RRR  Abd: Soft NT ND  Ext: no deformities no edema  Neuro:  A&O appears non focal  Skin:  Warm and dry as visualized  Psych:  Normal affect and mood

## 2021-10-06 NOTE — ED PROVIDER NOTE - OBJECTIVE STATEMENT
89 yo with history of CKD not followed often by doctors presenting with is daughter from home after having abnormal lab tests today.  Was having increasing SOB and GILES and exercise intolerance over last few weeks.  Had NP order home lab draw to evaluate for possible etiology.  Labs showed bicarb less than 5, Cr of 12 and a BUN of 117. K was only 5.6.  EMS on arrival gave the patient one amp of bicarb (unsure why and who authorized).  Per daughter WOB improved since getting it.  Otherwise the patient has no complaints.

## 2021-10-06 NOTE — CONSULT NOTE ADULT - SUBJECTIVE AND OBJECTIVE BOX
[facility] DIVISION OF KIDNEY DISEASES AND HYPERTENSION -- [phone]  -- INITIAL CONSULT NOTE  --------------------------------------------------------------------------------  HPI: Pt is an 88 y/o M w PMH of CKD3, HTN, BPH, GERD, hiatal hernia presented to [facility] for abnormal labs. As per patient's daughter and patient, he was c/o of chronic diarrhea for past 1 month, also endorse poor appetite for 2 1/2 weeks, change in taste, and lack of sleep for the past month. NP from outpatient gabriel labs yesterday which found that he had Sc of 12.48 and bicarb of < 5, which prompted him to go to ED. Nephrology consulted for GÉNESIS and metabolic acidosis. [facility] patient noted to have Scr of 12.64 and K of 5.7, with bicarb of < 7 at 2 am this morning in ED, was started on bicarb drip at about 9 am, repeat labs at 11 am showed Scr of 12.45 and bicarb of < 7, with K of 5.     Patient was seen and examined at bedside. Endorse having diarrhea. Also endorse that he was SOB before, but improved with bicarb push that he got before. Denies CP, fever, chills, nausea, vomiting, LE edema or dysuria.    PAST HISTORY  --------------------------------------------------------------------------------  PAST MEDICAL & SURGICAL HISTORY:  HTN - Hypertension    CKD (chronic kidney disease)    Hiatal hernia    GERD (gastroesophageal reflux disease)    S/P appendectomy  performed at [facility] 10 y ago    Prostate  Brachytherapy 30y ago at [facility]    Acute gastric volvulus  s/p laparoscopic reduction, PEG    Hernia, paraesophageal    FAMILY HISTORY:  Family history of cancer (Father, Mother)      PAST SOCIAL HISTORY:    ALLERGIES & MEDICATIONS  --------------------------------------------------------------------------------  Allergies    No Known Allergies    Intolerances      Standing Inpatient Medications  metoprolol tartrate 25 milliGRAM(s) Oral every 12 hours  sodium bicarbonate  Infusion 0.346 mEq/kG/Hr IV Continuous <Continuous>    PRN Inpatient Medications    REVIEW OF SYSTEMS  --------------------------------------------------------------------------------  Gen: No fevers/chills  Respiratory: dyspnea improved   CV: No chest pain  GI: + chronic diarrhea, + decrease appetite   : No dysuria, hematuria  MSK: No  edema    All other systems were reviewed and are negative, except as noted.    VITALS/PHYSICAL EXAM  --------------------------------------------------------------------------------  T(C): 36.7 (10-06-21 @ 09:06), Max: 36.7 (10-06-21 @ 09:06)  HR: 89 (10-06-21 @ 13:45) (89 - 111)  BP: 169/90 (10-06-21 @ 13:45) (135/75 - 169/90)  RR: 20 (10-06-21 @ 13:45) (16 - 20)  SpO2: 100% (10-06-21 @ 13:45) (100% - 100%)  Wt(kg): --  Height (cm): 174 (10-06-21 @ 01:12)      Physical Exam:  	Gen: NAD  	HEENT: MMM  	Pulm: CTA B/L, no crackles or wheezing   	CV: S1S2  	Abd: Soft, +BS   	Ext: No LE edema B/L  	Neuro: Awake, tremors noted, but no asterexis   	Skin: Warm and dry      LABS/STUDIES  --------------------------------------------------------------------------------              8.5    4.53  >-----------<  140      [10-06-21 @ 10:49]              26.3     144  |  118  |  139  ----------------------------<  127      [10-06-21 @ 10:49]  5.0   |  <7  |  12.45        Ca     7.5     [10-06-21 @ 10:49]      Mg     1.90     [10-06-21 @ 10:49]      Phos  9.0     [10-06-21 @ 10:49]    TPro  6.0  /  Alb  3.6  /  TBili  0.3  /  DBili  x   /  AST  7   /  ALT  <5  /  AlkPhos  70  [10-06-21 @ 02:47]    PT/INR: PT 13.2 , INR 1.16       [10-06-21 @ 10:49]  PTT: 25.4       [10-06-21 @ 10:49]    Serum Osmolality 347      [10-06-21 @ 02:47]    Creatinine Trend:  SCr 12.45 [10-06 @ 10:49]  SCr 12.64 [10-06 @ 02:47]    Urinalysis - [09-05-17 @ 10:57]      Color YELLOW / Appearance HAZY / SG 1.015 / pH 6.0      Gluc NEGATIVE / Ketone NEGATIVE  / Bili NEGATIVE / Urobili NORMAL       Blood NEGATIVE / Protein 100 / Leuk Est LARGE / Nitrite NEGATIVE      RBC 5-10 / WBC >50 / Hyaline  / Gran  / Sq Epi OCC / Non Sq Epi  / Bacteria MANY      Iron 117, TIBC 168, %sat 70      [10-06-21 @ 10:49]  Ferritin 216      [10-06-21 @ 10:49]       [facility] DIVISION OF KIDNEY DISEASES AND HYPERTENSION -- [phone]  -- INITIAL CONSULT NOTE  --------------------------------------------------------------------------------  HPI: Pt is an 88 y/o M w PMH of CKD3, HTN, BPH, GERD, hiatal hernia presented to [facility] for abnormal labs. As per patient's daughter and patient, he was c/o of chronic diarrhea for past 1 month, also endorse poor appetite for 2 1/2 weeks, change in taste, and lack of sleep for the past month. NP from outpatient gabriel labs yesterday which found that he had Sc of 12.48 and bicarb of < 5, which prompted him to go to ED. Nephrology consulted for GÉNESIS and metabolic acidosis. [facility] patient noted to have Scr of 12.64 and K of 5.7, with bicarb of < 7 at 2 am this morning in ED, was started on bicarb drip at about 9 am, repeat labs at 11 am showed Scr of 12.45 and bicarb of < 7, with K of 5.     Patient was seen and examined at bedside. Endorse having diarrhea. Also endorse that he was SOB before, but improved with bicarb push that he got before. Denies CP, fever, chills, nausea, vomiting, LE edema or dysuria.    PAST HISTORY  --------------------------------------------------------------------------------  PAST MEDICAL & SURGICAL HISTORY:  HTN - Hypertension    CKD (chronic kidney disease)    Hiatal hernia    GERD (gastroesophageal reflux disease)    S/P appendectomy  performed at [facility] 10 y ago    Prostate  Brachytherapy 30y ago at [facility]    Acute gastric volvulus  s/p laparoscopic reduction, PEG    Hernia, paraesophageal    FAMILY HISTORY:  Family history of cancer (Father, Mother)      PAST SOCIAL HISTORY: retired stock broker    ALLERGIES & MEDICATIONS  --------------------------------------------------------------------------------  Allergies    No Known Allergies    Intolerances      Standing Inpatient Medications  metoprolol tartrate 25 milliGRAM(s) Oral every 12 hours  sodium bicarbonate  Infusion 0.346 mEq/kG/Hr IV Continuous <Continuous>    PRN Inpatient Medications    REVIEW OF SYSTEMS  --------------------------------------------------------------------------------  Gen: No fevers/chills  Respiratory: dyspnea improved   CV: No chest pain  GI: + chronic diarrhea, + decrease appetite   : No dysuria, hematuria  MSK: No  edema    All other systems were reviewed and are negative, except as noted.    VITALS/PHYSICAL EXAM  --------------------------------------------------------------------------------  T(C): 36.7 (10-06-21 @ 09:06), Max: 36.7 (10-06-21 @ 09:06)  HR: 89 (10-06-21 @ 13:45) (89 - 111)  BP: 169/90 (10-06-21 @ 13:45) (135/75 - 169/90)  RR: 20 (10-06-21 @ 13:45) (16 - 20)  SpO2: 100% (10-06-21 @ 13:45) (100% - 100%)  Wt(kg): --  Height (cm): 174 (10-06-21 @ 01:12)      Physical Exam:  	Gen: NAD  	HEENT: MMM  	Pulm: CTA B/L, no crackles or wheezing   	CV: S1S2  	Abd: Soft, +BS   	Ext: No LE edema B/L  	Neuro: Awake, tremors noted, but no asterexis   	Skin: Warm and dry      LABS/STUDIES  --------------------------------------------------------------------------------              8.5    4.53  >-----------<  140      [10-06-21 @ 10:49]              26.3     144  |  118  |  139  ----------------------------<  127      [10-06-21 @ 10:49]  5.0   |  <7  |  12.45        Ca     7.5     [10-06-21 @ 10:49]      Mg     1.90     [10-06-21 @ 10:49]      Phos  9.0     [10-06-21 @ 10:49]    TPro  6.0  /  Alb  3.6  /  TBili  0.3  /  DBili  x   /  AST  7   /  ALT  <5  /  AlkPhos  70  [10-06-21 @ 02:47]    PT/INR: PT 13.2 , INR 1.16       [10-06-21 @ 10:49]  PTT: 25.4       [10-06-21 @ 10:49]    Serum Osmolality 347      [10-06-21 @ 02:47]    Creatinine Trend:  SCr 12.45 [10-06 @ 10:49]  SCr 12.64 [10-06 @ 02:47]    Urinalysis - [09-05-17 @ 10:57]      Color YELLOW / Appearance HAZY / SG 1.015 / pH 6.0      Gluc NEGATIVE / Ketone NEGATIVE  / Bili NEGATIVE / Urobili NORMAL       Blood NEGATIVE / Protein 100 / Leuk Est LARGE / Nitrite NEGATIVE      RBC 5-10 / WBC >50 / Hyaline  / Gran  / Sq Epi OCC / Non Sq Epi  / Bacteria MANY      Iron 117, TIBC 168, %sat 70      [10-06-21 @ 10:49]  Ferritin 216      [10-06-21 @ 10:49]

## 2021-10-06 NOTE — ED ADULT NURSE NOTE - NSIMPLEMENTINTERV_GEN_ALL_ED
Implemented All Fall with Harm Risk Interventions:  [place] to call system. Call bell, personal items and telephone within reach. Instruct patient to call for assistance. Room bathroom lighting operational. Non-slip footwear when patient is off stretcher. Physically safe environment: no spills, clutter or unnecessary equipment. Stretcher in lowest position, wheels locked, appropriate side rails in place. Provide visual cue, wrist band, yellow gown, etc. Monitor gait and stability. Monitor for mental status changes and reorient to person, place, and time. Review medications for side effects contributing to fall risk. Reinforce activity limits and safety measures with patient and family. Provide visual clues: red socks.

## 2021-10-06 NOTE — ED PROVIDER NOTE - CLINICAL SUMMARY MEDICAL DECISION MAKING FREE TEXT BOX
83 yo with HTN CKD presenting from home with significant lab abnormalities.  Need to consider metabolic acidosis with these findings.  There is no pH at this point so will need to get as well as repeating all of the other labs.  There is significant uremia but there is no AMS at this time.  BiCarb of 5 and patient is tachypneic (prior to arrival).  Assuming anion gap acidosis uremia is most likely of MUDPILES.  Cr of 12 and BUN in 100s will likely need some form of HD as well.  Will correct lyte abnormalities which will need to be interpreted in the setting of having gotten an amp of bicarb prior to arrival.  Will need admission

## 2021-10-06 NOTE — ED PROVIDER NOTE - NSICDXFAMILYHX_GEN_ALL_CORE_FT
FAMILY HISTORY:  Father  Still living? Unknown  Family history of cancer, Age at diagnosis: Age Unknown    Mother  Still living? Unknown  Family history of cancer, Age at diagnosis: Age Unknown

## 2021-10-06 NOTE — H&P ADULT - NSHPLABSRESULTS_GEN_ALL_CORE
LABS:                         9.3    3.36  )-----------( 140      ( 06 Oct 2021 02:47 )             28.3     10-06    143  |  113<H>  |  128<H>  ----------------------------<  122<H>  5.4<H>   |  <7<LL>  |  12.64<H>    Ca    7.3<L>      06 Oct 2021 02:47    TPro  6.0  /  Alb  3.6  /  TBili  0.3  /  DBili  x   /  AST  7   /  ALT  <5<L>  /  AlkPhos  70  10-06              RADIOLOGY, EKG & ADDITIONAL TESTS: Reviewed. LABS:                         9.3    3.36  )-----------( 140      ( 06 Oct 2021 02:47 )             28.3     10-06    143  |  113<H>  |  128<H>  ----------------------------<  122<H>  5.4<H>   |  <7<LL>  |  12.64<H>    Ca    7.3<L>      06 Oct 2021 02:47    TPro  6.0  /  Alb  3.6  /  TBili  0.3  /  DBili  x   /  AST  7   /  ALT  <5<L>  /  AlkPhos  70  10-06

## 2021-10-06 NOTE — ED ADULT NURSE NOTE - CHIEF COMPLAINT QUOTE
Pt arrives to ED from home with report of metabolic acidosis s/p labs drawn at home by visiting RN earlier today.  Pt given 1 amp of Sodium Bicarb by EMS to Left AC 20g iv placed by EMS.  Pt c/o of fatigue. Pt reports he became incontinent today.  Pt denies pain.  Hx of HTN, CKD.  No report of actual lab values..  Red Attending requests pt be expedited following ekg. Charge RN aware.

## 2021-10-06 NOTE — CONSULT NOTE ADULT - ASSESSMENT
Pt with GÉNESIS on CKD, or could be progression of advance CKD    Urinalysis - [09-05-17 @ 10:57]      Color YELLOW / Appearance HAZY / SG 1.015 / pH 6.0      Gluc NEGATIVE / Ketone NEGATIVE  / Bili NEGATIVE / Urobili NORMAL       Blood NEGATIVE / Protein 100 / Leuk Est LARGE / Nitrite NEGATIVE      RBC 5-10 / WBC >50 / Hyaline  / Gran  / Sq Epi OCC / Non Sq Epi  / Bacteria MANY Pt with GÉNESIS on CKD, or could be progression of advance CKD

## 2021-10-06 NOTE — H&P ADULT - PROBLEM SELECTOR PLAN 9
FEN/GI: kidney diet, soft   Lines:  GTT: bicarb  PPx: hep sq  PT/OT: consult  Dispo: Admit to Medicine  Code Status: Full, pending discussion.    Plan was discussed with team and Dr. Franco on rounds.  Signed,     Estrellita Dan MD  Internal Medicine PGY 1  [facility]: [id number]  [facility]: [phone]

## 2021-10-06 NOTE — H&P ADULT - PROBLEM SELECTOR PLAN 10
- , sCr 12.64. with decreased PO intake and energy and weakness over the past couple days. no asterixis on exam. hx of CKD  -

## 2021-10-06 NOTE — H&P ADULT - NSHPSOCIALHISTORY_GEN_ALL_CORE
Lives at home with wife. former 40 pack year smoker. no alcohol or ilicit drug use. Retired 30 years ago. Lives at home with wife. former 40 pack year smoker. no alcohol or illicit drug use. Retired 30 years ago.

## 2021-10-06 NOTE — H&P ADULT - NSICDXPASTSURGICALHX_GEN_ALL_CORE_FT
PAST SURGICAL HISTORY:  Acute gastric volvulus s/p laparoscopic reduction, PEG    Hernia, paraesophageal     Prostate Brachytherapy 30y ago at [facility]    S/P appendectomy performed at [facility] 10 y ago

## 2021-10-06 NOTE — H&P ADULT - PROBLEM SELECTOR PLAN 6
FEN/GI: ***Diet***  Lines:  PPx:   Dispo:   Code Status:    Plan was discussed with team and Dr. Franco on rounds.  Signed,     Estrellita Dan MD  Internal Medicine PGY 1  [facility]: [id number]  [facility]: [phone] - c/w home nifidipine BP 130s/70s on arrival  - c/w reduced dose of home metoprolol 25mg BID (home dose 50mg BID)  - hold home nifedipine for now given current BPs and severe acidemia  - Will consider restarting home med doses as BP improves

## 2021-10-06 NOTE — ED ADULT NURSE REASSESSMENT NOTE - NS ED NURSE REASSESS COMMENT FT1
Pt a&ox3, calm and cooperative, denies any medical discomfort at the time. Pt refusing indwelling catheter, urology eval pending. Respirations even/unlabored, NAD noted.

## 2021-10-06 NOTE — ED PROVIDER NOTE - CARE PLAN
1 Principal Discharge DX:	Acute kidney failure   Split-Thickness Skin Graft Text: The defect edges were debeveled with a #15 scalpel blade.  Given the location of the defect, shape of the defect and the proximity to free margins a split thickness skin graft was deemed most appropriate.  Using a sterile surgical marker, the primary defect shape was transferred to the donor site. The split thickness graft was then harvested.  The skin graft was then placed in the primary defect and oriented appropriately.

## 2021-10-06 NOTE — CONSULT NOTE ADULT - SUBJECTIVE AND OBJECTIVE BOX
MICU Consult Note    HPI:  89 yo male with history of CKD not followed often by doctors presenting with is daughter from home after having abnormal lab tests today.  Was having increasing SOB and GILES and exercise intolerance over last few weeks, mostly over last 10 days.  Had NP order home lab draw to evaluate for possible etiology.  Labs showed bicarb less than 5, Cr of 12 and a BUN of 117. K was only 5.6.  EMS on arrival gave the patient one amp of bicarb. Per daughter WOB improved since getting it.  Otherwise the patient has no complaints.    In ED, repeat blood work confirmed metabolic acidosis with elevated BUN, Cr and low Bicarb. CXR normal. Patient makes urine. Daughter reports he is incontinent of urine, but has been producing it. She reports he has not been eating much lately, but has been drinking a lot of water.     PAST MEDICAL HISTORY:  CKD (chronic kidney disease)     GERD (gastroesophageal reflux disease)     Hiatal hernia     HTN - Hypertension.     PAST SURGICAL HISTORY:  Acute gastric volvulus s/p laparoscopic reduction, PEG    Hernia, paraesophageal     Prostate Brachytherapy 30y ago at [facility]    S/P appendectomy performed at [facility] 10 y ago.     REVIEW OF SYSTEMS:  CONSTITUTIONAL: +weakness  RESPIRATORY: No cough, wheezing, hemoptysis; + shortness of breath  CARDIOVASCULAR: No chest pain  GASTROINTESTINAL: No abdominal or epigastric pain. No nausea, vomiting; No diarrhea or constipation.  GENITOURINARY: +Urinary incontinence  All other review of systems is negative unless indicated above.    PHYSICAL EXAM:  Vital Signs Last 24 Hrs  T(C): 36.4 (10-06-21 @ 02:45)  T(F): 97.6 (10-06-21 @ 02:45), Max: 97.6 (10-06-21 @ 01:12)  HR: 94 (10-06-21 @ 02:45) (94 - 99)  BP: 153/81 (10-06-21 @ 02:45)  BP(mean): --  RR: 18 (10-06-21 @ 02:45) (18 - 18)  SpO2: 100% (10-06-21 @ 02:45) (100% - 100%)  Wt(kg): --    Constitutional: NAD, awake and alert, elderly man  EYES: EOMI  ENT:  Normal Hearing, dry mucous membranes  Neck: Soft and supple  Respiratory: Breath sounds are clear bilaterally, No wheezing, rales or rhonchi  Cardiovascular: S1 and S2, regular rate and rhythm, no Murmurs, gallops or rubs, no JVD  Gastrointestinal: Bowel Sounds present, soft, nontender, nondistended, no guarding, no rebound  Extremities: No cyanosis or clubbing; warm to touch, no LE edema  Vascular: 2+ peripheral pulses lower ex  Neurological: No focal deficits, moves all extremities equally  Musculoskeletal: 5/5 strength b/l upper and lower extremities; no joint swelling.  Skin: Actinic keratoses, dry skin  Psych: no depression or anhedonia, AAOx3, generally aware of situation  HEME: no bruises, no nose bleeds    LABS:                          9.3    3.36  )-----------( 140      ( 06 Oct 2021 02:47 )             28.3     10-06    143  |  113<H>  |  128<H>  ----------------------------<  122<H>  5.4<H>   |  <7<LL>  |  12.64<H>    Ca    7.3<L>      06 Oct 2021 02:47    TPro  6.0  /  Alb  3.6  /  TBili  0.3  /  DBili  x   /  AST  7   /  ALT  <5<L>  /  AlkPhos  70  10-06    Blood Gas Profile - Venous (10.06.21 @ 02:47)   pH, Venous: 7.06   pCO2, Venous: 24 mmHg   pO2, Venous: 27 mmHg   HCO3, Venous: 7  Base Excess, Venous: -21.9 mmol/L   Oxygen Saturation, Venous: 51.6 %   Total CO2, Venous: 7.5 mmol/L     < from: Xray Chest 1 View- PORTABLE-Urgent (Xray Chest 1 View- PORTABLE-Urgent .) (10.06.21 @ 03:18) >  INTERPRETATION:  no emergent findings.    follow up official report in AM    < end of copied text >

## 2021-10-06 NOTE — ED PROVIDER NOTE - PROGRESS NOTE DETAILS
Spoke to nephro, they will see in am and recommend bicarb drip and calcium gluconate in the meanwhile

## 2021-10-07 DIAGNOSIS — E83.51 HYPOCALCEMIA: ICD-10-CM

## 2021-10-07 LAB
ALBUMIN SERPL ELPH-MCNC: 3 G/DL — LOW (ref 3.3–5)
ALP SERPL-CCNC: 56 U/L — SIGNIFICANT CHANGE UP (ref 40–120)
ALT FLD-CCNC: 5 U/L — SIGNIFICANT CHANGE UP (ref 4–41)
ANION GAP SERPL CALC-SCNC: 21 MMOL/L — HIGH (ref 7–14)
ANION GAP SERPL CALC-SCNC: 22 MMOL/L — HIGH (ref 7–14)
APPEARANCE UR: ABNORMAL
AST SERPL-CCNC: 7 U/L — SIGNIFICANT CHANGE UP (ref 4–40)
BACTERIA # UR AUTO: ABNORMAL
BASE EXCESS BLDV CALC-SCNC: -6.7 MMOL/L — LOW (ref -2–3)
BASOPHILS # BLD AUTO: 0.01 K/UL — SIGNIFICANT CHANGE UP (ref 0–0.2)
BASOPHILS NFR BLD AUTO: 0.2 % — SIGNIFICANT CHANGE UP (ref 0–2)
BILIRUB SERPL-MCNC: 0.3 MG/DL — SIGNIFICANT CHANGE UP (ref 0.2–1.2)
BILIRUB UR-MCNC: NEGATIVE — SIGNIFICANT CHANGE UP
BLOOD GAS VENOUS COMPREHENSIVE RESULT: SIGNIFICANT CHANGE UP
BLOOD GAS VENOUS COMPREHENSIVE RESULT: SIGNIFICANT CHANGE UP
BUN SERPL-MCNC: 125 MG/DL — HIGH (ref 7–23)
BUN SERPL-MCNC: 129 MG/DL — HIGH (ref 7–23)
CALCIUM SERPL-MCNC: 6 MG/DL — CRITICAL LOW (ref 8.4–10.5)
CALCIUM SERPL-MCNC: 6.1 MG/DL — CRITICAL LOW (ref 8.4–10.5)
CHLORIDE BLDV-SCNC: 110 MMOL/L — HIGH (ref 96–108)
CHLORIDE SERPL-SCNC: 107 MMOL/L — SIGNIFICANT CHANGE UP (ref 98–107)
CHLORIDE SERPL-SCNC: 108 MMOL/L — HIGH (ref 98–107)
CO2 BLDV-SCNC: 18.6 MMOL/L — LOW (ref 22–26)
CO2 SERPL-SCNC: 16 MMOL/L — LOW (ref 22–31)
CO2 SERPL-SCNC: 17 MMOL/L — LOW (ref 22–31)
COLOR SPEC: YELLOW — SIGNIFICANT CHANGE UP
COVID-19 SPIKE DOMAIN AB INTERP: POSITIVE
COVID-19 SPIKE DOMAIN ANTIBODY RESULT: 28 U/ML — HIGH
CREAT SERPL-MCNC: 10.39 MG/DL — HIGH (ref 0.5–1.3)
CREAT SERPL-MCNC: 11.28 MG/DL — HIGH (ref 0.5–1.3)
DIFF PNL FLD: ABNORMAL
EOSINOPHIL # BLD AUTO: 0.07 K/UL — SIGNIFICANT CHANGE UP (ref 0–0.5)
EOSINOPHIL NFR BLD AUTO: 1.3 % — SIGNIFICANT CHANGE UP (ref 0–6)
EPI CELLS # UR: 7 /HPF — HIGH (ref 0–5)
FERRITIN SERPL-MCNC: 204 NG/ML — SIGNIFICANT CHANGE UP (ref 30–400)
GAS PNL BLDV: 139 MMOL/L — SIGNIFICANT CHANGE UP (ref 136–145)
GLUCOSE BLDV-MCNC: 100 MG/DL — HIGH (ref 70–99)
GLUCOSE SERPL-MCNC: 109 MG/DL — HIGH (ref 70–99)
GLUCOSE SERPL-MCNC: 125 MG/DL — HIGH (ref 70–99)
GLUCOSE UR QL: NEGATIVE — SIGNIFICANT CHANGE UP
HCO3 BLDV-SCNC: 18 MMOL/L — LOW (ref 22–29)
HCT VFR BLD CALC: 21.6 % — LOW (ref 39–50)
HCT VFR BLDA CALC: 23 % — LOW (ref 39–51)
HGB BLD CALC-MCNC: 7.6 G/DL — LOW (ref 13–17)
HGB BLD-MCNC: 7.4 G/DL — LOW (ref 13–17)
HYALINE CASTS # UR AUTO: 1 /LPF — SIGNIFICANT CHANGE UP (ref 0–7)
IANC: 3.77 K/UL — SIGNIFICANT CHANGE UP (ref 1.5–8.5)
IMM GRANULOCYTES NFR BLD AUTO: 0.8 % — SIGNIFICANT CHANGE UP (ref 0–1.5)
IRON SATN MFR SERPL: 50 % — SIGNIFICANT CHANGE UP (ref 14–50)
IRON SATN MFR SERPL: 67 UG/DL — SIGNIFICANT CHANGE UP (ref 45–165)
KETONES UR-MCNC: NEGATIVE — SIGNIFICANT CHANGE UP
LACTATE BLDV-MCNC: 1 MMOL/L — SIGNIFICANT CHANGE UP (ref 0.5–2)
LEUKOCYTE ESTERASE UR-ACNC: ABNORMAL
LYMPHOCYTES # BLD AUTO: 0.99 K/UL — LOW (ref 1–3.3)
LYMPHOCYTES # BLD AUTO: 19.1 % — SIGNIFICANT CHANGE UP (ref 13–44)
MAGNESIUM SERPL-MCNC: 1.6 MG/DL — SIGNIFICANT CHANGE UP (ref 1.6–2.6)
MAGNESIUM SERPL-MCNC: 1.8 MG/DL — SIGNIFICANT CHANGE UP (ref 1.6–2.6)
MCHC RBC-ENTMCNC: 30.8 PG — SIGNIFICANT CHANGE UP (ref 27–34)
MCHC RBC-ENTMCNC: 34.3 GM/DL — SIGNIFICANT CHANGE UP (ref 32–36)
MCV RBC AUTO: 89.4 FL — SIGNIFICANT CHANGE UP (ref 80–100)
MONOCYTES # BLD AUTO: 0.31 K/UL — SIGNIFICANT CHANGE UP (ref 0–0.9)
MONOCYTES NFR BLD AUTO: 6 % — SIGNIFICANT CHANGE UP (ref 2–14)
NEUTROPHILS # BLD AUTO: 3.77 K/UL — SIGNIFICANT CHANGE UP (ref 1.8–7.4)
NEUTROPHILS NFR BLD AUTO: 72.6 % — SIGNIFICANT CHANGE UP (ref 43–77)
NITRITE UR-MCNC: NEGATIVE — SIGNIFICANT CHANGE UP
NRBC # BLD: 0 /100 WBCS — SIGNIFICANT CHANGE UP
NRBC # FLD: 0 K/UL — SIGNIFICANT CHANGE UP
OSMOLALITY UR: 443 MOSM/KG — SIGNIFICANT CHANGE UP (ref 50–1200)
PCO2 BLDV: 30 MMHG — LOW (ref 42–55)
PH BLDV: 7.38 — SIGNIFICANT CHANGE UP (ref 7.32–7.43)
PH UR: 8.5 — HIGH (ref 5–8)
PHOSPHATE SERPL-MCNC: 6.2 MG/DL — HIGH (ref 2.5–4.5)
PHOSPHATE SERPL-MCNC: 6.4 MG/DL — HIGH (ref 2.5–4.5)
PLATELET # BLD AUTO: 130 K/UL — LOW (ref 150–400)
PO2 BLDV: 95 MMHG — SIGNIFICANT CHANGE UP
POTASSIUM BLDV-SCNC: 3.1 MMOL/L — LOW (ref 3.5–5.1)
POTASSIUM SERPL-MCNC: 3.2 MMOL/L — LOW (ref 3.5–5.3)
POTASSIUM SERPL-MCNC: 3.3 MMOL/L — LOW (ref 3.5–5.3)
POTASSIUM SERPL-SCNC: 3.2 MMOL/L — LOW (ref 3.5–5.3)
POTASSIUM SERPL-SCNC: 3.3 MMOL/L — LOW (ref 3.5–5.3)
PROT SERPL-MCNC: 5.2 G/DL — LOW (ref 6–8.3)
PROT UR-MCNC: ABNORMAL
RBC # BLD: 2.4 M/UL — LOW (ref 4.2–5.8)
RBC # FLD: 14.7 % — HIGH (ref 10.3–14.5)
RBC CASTS # UR COMP ASSIST: 6 /HPF — HIGH (ref 0–4)
SAO2 % BLDV: 98.6 % — SIGNIFICANT CHANGE UP
SARS-COV-2 IGG+IGM SERPL QL IA: 28 U/ML — HIGH
SARS-COV-2 IGG+IGM SERPL QL IA: POSITIVE
SODIUM SERPL-SCNC: 145 MMOL/L — SIGNIFICANT CHANGE UP (ref 135–145)
SODIUM SERPL-SCNC: 146 MMOL/L — HIGH (ref 135–145)
SP GR SPEC: 1.01 — SIGNIFICANT CHANGE UP (ref 1–1.05)
TIBC SERPL-MCNC: 134 UG/DL — LOW (ref 220–430)
TRANSFERRIN SERPL-MCNC: 125 MG/DL — LOW (ref 200–360)
TRI-PHOS CRY UR QL COMP ASSIST: ABNORMAL
UIBC SERPL-MCNC: 67 UG/DL — LOW (ref 110–370)
UROBILINOGEN FLD QL: SIGNIFICANT CHANGE UP
WBC # BLD: 5.19 K/UL — SIGNIFICANT CHANGE UP (ref 3.8–10.5)
WBC # FLD AUTO: 5.19 K/UL — SIGNIFICANT CHANGE UP (ref 3.8–10.5)
WBC UR QL: 56 /HPF — HIGH (ref 0–5)

## 2021-10-07 PROCEDURE — 99233 SBSQ HOSP IP/OBS HIGH 50: CPT | Mod: GC

## 2021-10-07 PROCEDURE — 93010 ELECTROCARDIOGRAM REPORT: CPT

## 2021-10-07 PROCEDURE — 99233 SBSQ HOSP IP/OBS HIGH 50: CPT

## 2021-10-07 RX ORDER — CALCIUM GLUCONATE 100 MG/ML
1 VIAL (ML) INTRAVENOUS ONCE
Refills: 0 | Status: COMPLETED | OUTPATIENT
Start: 2021-10-07 | End: 2021-10-07

## 2021-10-07 RX ORDER — CALCIUM GLUCONATE 100 MG/ML
2 VIAL (ML) INTRAVENOUS ONCE
Refills: 0 | Status: DISCONTINUED | OUTPATIENT
Start: 2021-10-07 | End: 2021-10-07

## 2021-10-07 RX ORDER — CALCIUM GLUCONATE 100 MG/ML
1 VIAL (ML) INTRAVENOUS ONCE
Refills: 0 | Status: DISCONTINUED | OUTPATIENT
Start: 2021-10-07 | End: 2021-10-07

## 2021-10-07 RX ORDER — PANTOPRAZOLE SODIUM 20 MG/1
40 TABLET, DELAYED RELEASE ORAL DAILY
Refills: 0 | Status: DISCONTINUED | OUTPATIENT
Start: 2021-10-07 | End: 2021-10-12

## 2021-10-07 RX ORDER — MAGNESIUM SULFATE 500 MG/ML
1 VIAL (ML) INJECTION ONCE
Refills: 0 | Status: COMPLETED | OUTPATIENT
Start: 2021-10-07 | End: 2021-10-07

## 2021-10-07 RX ORDER — SODIUM CHLORIDE 9 MG/ML
1000 INJECTION, SOLUTION INTRAVENOUS
Refills: 0 | Status: DISCONTINUED | OUTPATIENT
Start: 2021-10-07 | End: 2021-10-08

## 2021-10-07 RX ADMIN — HEPARIN SODIUM 5000 UNIT(S): 5000 INJECTION INTRAVENOUS; SUBCUTANEOUS at 05:39

## 2021-10-07 RX ADMIN — HEPARIN SODIUM 5000 UNIT(S): 5000 INJECTION INTRAVENOUS; SUBCUTANEOUS at 13:38

## 2021-10-07 RX ADMIN — Medication 50 GRAM(S): at 13:36

## 2021-10-07 RX ADMIN — Medication 25 MILLIGRAM(S): at 02:44

## 2021-10-07 RX ADMIN — HEPARIN SODIUM 5000 UNIT(S): 5000 INJECTION INTRAVENOUS; SUBCUTANEOUS at 22:55

## 2021-10-07 RX ADMIN — FINASTERIDE 5 MILLIGRAM(S): 5 TABLET, FILM COATED ORAL at 11:26

## 2021-10-07 RX ADMIN — Medication 100 GRAM(S): at 11:21

## 2021-10-07 RX ADMIN — Medication 50 GRAM(S): at 17:30

## 2021-10-07 RX ADMIN — Medication 25 MILLIGRAM(S): at 13:38

## 2021-10-07 RX ADMIN — SODIUM CHLORIDE 100 MILLILITER(S): 9 INJECTION, SOLUTION INTRAVENOUS at 09:44

## 2021-10-07 RX ADMIN — Medication 50 GRAM(S): at 12:18

## 2021-10-07 NOTE — PROGRESS NOTE ADULT - PROBLEM SELECTOR PLAN 3
Resolved. Last K 3.2. Monitor K. Low K diet.     If any questions, please feel free to contact me     Kellee Johnson  Nephrology Fellow  [facility] Pager: [phone]  [facility] Pager: [phone]

## 2021-10-07 NOTE — PROGRESS NOTE ADULT - PROBLEM SELECTOR PLAN 1
Pt with GÉNESIS on CKD in the setting of poor appetite and diarrhea. Or could be renal failure in setting of advanced CKD. Exact duration of GÉNESIS however unknown. Note to have Scr of 1.4 prior to admission on 9/11/17, and no labs since. NP from outpatient gabriel labs yesterday which found that he had Sc of 12.48 and bicarb of < 5, which prompted him to go to ED. Noted to have Scr of 12.64 and K of 5.7, with bicarb of < 7 at 2 am this morning in ED, was started on bicarb drip at about 9 am, repeat labs at 11 am showed Scr of 12.45 and bicarb of < 7, with K of 5 on 10/6/21. UA with large leuk est, 100 mg/dl protein, 5-10 RBC and > 50 WBC. Kidney u/s with b/l multiple cysts with no hydronephrosis. Currently on 3 amps of bicarb drip at 150 cc/hr. Last Scr improved to 11.28 and K of 3.2, corrected calcium 6.8, bicarb of 16 today.     Would DC bicarb drip. Switch to LR at 100 cc per hour as pt is hypocalcemic and hypokalemic. Obtain urine Na and urine Cl and spot urine TP/CR ratio. No indication for HD for now. Monitor labs and accurate urine output. Avoid NSAIDs, ACEI/ARBS, RCA and nephrotoxins. Dose medications as per eGFR.

## 2021-10-07 NOTE — PROGRESS NOTE ADULT - PROBLEM SELECTOR PLAN 1
Likely due to GÉNESIS on CKD  - , sCr 12.64. with decreased PO intake and energy and weakness over the past couple days. no asterixis on exam. hx of CKD  - bicarb <7 on admit, VBG pH 7.06, lactate 1. AG 23. likely 2/2 uremia, less likely ingestion. no hx of diabetes, glucose 122  - s/p 1 amp bicarb en route. MICU consulted, not candidate for urgent HD  - bicarb gtt  - tox screen  - neph consult  - Monitor VBG, BMP Improving, Likely due to GÉNESIS on CKD  - , sCr 12.64. with decreased PO intake and energy and weakness over the past couple days. no asterixis on exam. hx of CKD  - bicarb <7 on admit, VBG pH 7.06, lactate 1. AG 23. likely 2/2 uremia, less likely ingestion. no hx of diabetes, glucose 122  - s/p 1 amp bicarb en route. MICU consulted, not candidate for urgent HD  - bicarb gtt stopped, LR at 150 cc/h  - tox screen  - neph consult  - Monitor VBG, BMP

## 2021-10-07 NOTE — PROGRESS NOTE ADULT - PROBLEM SELECTOR PLAN 6
BP 130s/70s on arrival  - c/w reduced dose of home metoprolol 25mg BID (home dose 50mg BID)  - hold home nifedipine for now given current BPs and severe acidemia  - Will consider restarting home med doses as BP improves

## 2021-10-07 NOTE — PHYSICAL THERAPY INITIAL EVALUATION ADULT - LEVEL OF INDEPENDENCE: SIT/STAND, REHAB EVAL
pt c/o fatigue s/p 5 minutes seated at EOB. states "I need to rest" and begins to return to semi-supine. Pt assisted. Agreeable to attempt sit-stand transfer next session.

## 2021-10-07 NOTE — PHYSICAL THERAPY INITIAL EVALUATION ADULT - PERTINENT HX OF CURRENT PROBLEM, REHAB EVAL
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

## 2021-10-07 NOTE — PHYSICAL THERAPY INITIAL EVALUATION ADULT - GENERAL OBSERVATIONS, REHAB EVAL
Pt received semi-jennings in bed, NAD. Pt agreeable to PT consultation. Cleared for PT as per JOSEFINA Bowen

## 2021-10-07 NOTE — PROGRESS NOTE ADULT - ATTENDING COMMENTS
Likely CKD.  Unknown where his creatinine will settle but is still doing well and improving with IVF.  Lung pocus today with A lines.  CTA B/L no signs of volume overload.  Recommend continue fluids as above but change to LR.  Continue to monitor BMP.  Acid base status improving.

## 2021-10-07 NOTE — PROGRESS NOTE ADULT - PROBLEM SELECTOR PLAN 5
Likely 2/2 CKD  - Will obtain iron panel, B12/folate r/o other etiologies  - Will continue to monitor CBC

## 2021-10-07 NOTE — PROGRESS NOTE ADULT - SUBJECTIVE AND OBJECTIVE BOX
PROGRESS NOTE:   Authored by Estrellita Dan MD  Internal Medicine  Pager [place] [phone]  Pager [phone]    Patient is a 89y old  Male who presents with a chief complaint of anion gap metabolic acidosis (06 Oct 2021 13:49)      SUBJECTIVE / OVERNIGHT EVENTS:    MEDICATIONS  (STANDING):  finasteride 5 milliGRAM(s) Oral daily  heparin   Injectable 5000 Unit(s) SubCutaneous every 8 hours  influenza  Vaccine (HIGH DOSE) 0.7 milliLiter(s) IntraMuscular once  metoprolol tartrate 25 milliGRAM(s) Oral every 12 hours  sodium bicarbonate  Infusion 0.346 mEq/kG/Hr (150 mL/Hr) IV Continuous <Continuous>    MEDICATIONS  (PRN):  acetaminophen   Tablet .. 650 milliGRAM(s) Oral every 6 hours PRN Mild Pain (1 - 3), Moderate Pain (4 - 6)      CAPILLARY BLOOD GLUCOSE        I&O's Summary      PHYSICAL EXAM:  Vital Signs Last 24 Hrs  T(C): 36.5 (07 Oct 2021 05:36), Max: 36.7 (06 Oct 2021 09:06)  T(F): 97.7 (07 Oct 2021 05:36), Max: 98.1 (06 Oct 2021 09:06)  HR: 84 (07 Oct 2021 05:36) (81 - 111)  BP: 159/78 (07 Oct 2021 05:36) (136/94 - 172/87)  BP(mean): --  RR: 17 (07 Oct 2021 05:36) (17 - 20)  SpO2: 100% (07 Oct 2021 05:36) (98% - 100%)  CONSTITUTIONAL: Well-groomed, in no apparent distress  EYES: No conjunctival or scleral injection, non-icteric;   ENMT: No external nasal lesions; MMM  NECK: Trachea midline without palpable neck mass; thyroid not enlarged and non-tender  RESPIRATORY: Breathing comfortably; no dullness to percussion; lungs CTA without wheeze/rhonchi/rales  CARDIOVASCULAR: +S1S2, RRR, no M/G/R; pedal pulses full and symmetric; no lower extremity edema  GASTROINTESTINAL: No palpable masses or tenderness, +BS throughout, no rebound/guarding; no hepatosplenomegaly; no hernia palpated  LYMPHATIC: No cervical LAD or tenderness  SKIN: No rashes or ulcers noted  NEUROLOGIC: CN II-XII intact; sensation intact in LEs b/l to light touch  PSYCHIATRIC: A+O x 3; mood and affect appropriate; appropriate insight and judgment    LABS:                        8.5    4.53  )-----------( 140      ( 06 Oct 2021 10:49 )             26.3     10-06    143  |  110<H>  |  132<H>  ----------------------------<  140<H>  3.7   |  11<L>  |  11.59<H>    Ca    6.7<L>      06 Oct 2021 22:36  Phos  7.1     10-06  Mg     1.70     10-06    TPro  6.0  /  Alb  3.6  /  TBili  0.3  /  DBili  x   /  AST  7   /  ALT  <5<L>  /  AlkPhos  70  10-06    PT/INR - ( 06 Oct 2021 10:49 )   PT: 13.2 sec;   INR: 1.16 ratio         PTT - ( 06 Oct 2021 10:49 )  PTT:25.4 sec            RADIOLOGY & ADDITIONAL TESTS:  Results Reviewed:   Imaging Personally Reviewed:  Electrocardiogram Personally Reviewed:    COORDINATION OF CARE:  Care Discussed with Consultants/Other Providers [Y/N]:  Prior or Outpatient Records Reviewed [Y/N]:   PROGRESS NOTE:   Authored by Estrellita Dan MD  Internal Medicine  Pager [place] [phone]  Pager [phone]    Patient is a 89y old  Male who presents with a chief complaint of anion gap metabolic acidosis (06 Oct 2021 13:49)      SUBJECTIVE / OVERNIGHT EVENTS: NAEO. patient seen and examined at bedside, without acute complaints. drinking red gatorade. no chest pain, dizziness, headaches, n/v.    MEDICATIONS  (STANDING):  finasteride 5 milliGRAM(s) Oral daily  heparin   Injectable 5000 Unit(s) SubCutaneous every 8 hours  influenza  Vaccine (HIGH DOSE) 0.7 milliLiter(s) IntraMuscular once  metoprolol tartrate 25 milliGRAM(s) Oral every 12 hours  sodium bicarbonate  Infusion 0.346 mEq/kG/Hr (150 mL/Hr) IV Continuous <Continuous>    MEDICATIONS  (PRN):  acetaminophen   Tablet .. 650 milliGRAM(s) Oral every 6 hours PRN Mild Pain (1 - 3), Moderate Pain (4 - 6)      CAPILLARY BLOOD GLUCOSE        I&O's Summary      PHYSICAL EXAM:  Vital Signs Last 24 Hrs  T(C): 36.5 (07 Oct 2021 05:36), Max: 36.7 (06 Oct 2021 09:06)  T(F): 97.7 (07 Oct 2021 05:36), Max: 98.1 (06 Oct 2021 09:06)  HR: 84 (07 Oct 2021 05:36) (81 - 111)  BP: 159/78 (07 Oct 2021 05:36) (136/94 - 172/87)  BP(mean): --  RR: 17 (07 Oct 2021 05:36) (17 - 20)  SpO2: 100% (07 Oct 2021 05:36) (98% - 100%)  CONSTITUTIONAL: Well-groomed, in no apparent distress  EYES: No conjunctival or scleral injection, non-icteric;   ENMT: No external nasal lesions; MMM  RESPIRATORY: Breathing comfortably; no dullness to percussion; lungs CTA without wheeze/rhonchi/rales  CARDIOVASCULAR: +S1S2, RRR, no M/G/R; pedal pulses full and symmetric; no lower extremity edema  GASTROINTESTINAL: No palpable masses or tenderness, +BS throughout, no rebound/guarding; no hepatosplenomegaly; no hernia palpated  LYMPHATIC: No cervical LAD or tenderness  PSYCHIATRIC: A+O x 3; mood and affect appropriate;    LABS:                        8.5    4.53  )-----------( 140      ( 06 Oct 2021 10:49 )             26.3     10-06    143  |  110<H>  |  132<H>  ----------------------------<  140<H>  3.7   |  11<L>  |  11.59<H>    Ca    6.7<L>      06 Oct 2021 22:36  Phos  7.1     10-06  Mg     1.70     10-06    TPro  6.0  /  Alb  3.6  /  TBili  0.3  /  DBili  x   /  AST  7   /  ALT  <5<L>  /  AlkPhos  70  10-06    PT/INR - ( 06 Oct 2021 10:49 )   PT: 13.2 sec;   INR: 1.16 ratio         PTT - ( 06 Oct 2021 10:49 )  PTT:25.4 sec            RADIOLOGY & ADDITIONAL TESTS:  Results Reviewed:   Imaging Personally Reviewed:  Electrocardiogram Personally Reviewed:    COORDINATION OF CARE:  Care Discussed with Consultants/Other Providers [Y/N]:  Prior or Outpatient Records Reviewed [Y/N]:

## 2021-10-07 NOTE — PROGRESS NOTE ADULT - PROBLEM SELECTOR PLAN 9
FEN/GI: kidney diet, soft   Lines:  GTT: bicarb  PPx: hep sq  PT/OT: consult  Dispo: Admit to Medicine  Code Status: Full, pending discussion.    Plan was discussed with team and Dr. Franco on rounds.  Signed,     Estrellita Dan MD  Internal Medicine PGY 1  [facility]: [id number]  [facility]: [phone] FEN/GI: kidney diet, soft   Lines:  GTT:  PPx: hep sq  PT/OT: consult  Dispo: continue routine inpatient care  Code Status: Full, pending discussion.    Plan was discussed with team and Dr. Franco on rounds.  Signed,     Estrellita Dan MD  Internal Medicine PGY 1  [facility]: [id number]  [facility]: [phone]

## 2021-10-07 NOTE — PROGRESS NOTE ADULT - SUBJECTIVE AND OBJECTIVE BOX
[facility] DIVISION OF KIDNEY DISEASES AND HYPERTENSION -- FOLLOW UP NOTE  --------------------------------------------------------------------------------  Pt is an 89 y/o M w PMH of CKD3, HTN, BPH, GERD, hiatal hernia presented to [facility] for abnormal labs. As per patient's daughter and patient, he was c/o of chronic diarrhea for past 1 month, also endorse poor appetite for 2 1/2 weeks, change in taste, and lack of sleep for the past month. NP from outpatient gabriel labs yesterday which found that he had Sc of 12.48 and bicarb of < 5, which prompted him to go to ED. Nephrology consulted for GÉNESIS and metabolic acidosis. [facility], patient noted to have Scr of 12.64 and K of 5.7, with bicarb of < 7 at 2 am this morning in ED, was started on bicarb drip at about 9 am, repeat labs at 11 am showed Scr of 12.45 and bicarb of < 7, with K of 5 on 10/7/21. Last Scr improved to 11.28 and K of 3.2, corrected calcium 6.8, bicarb of 16.      Patient was seen and examined at bedside. Endorse feeling better and that his SOB improved significantly. Denies CP, fever, chills, nausea, vomiting, LE edema or dysuria.    PAST HISTORY  --------------------------------------------------------------------------------  No significant changes to PMH, PSH, FHx, SHx, unless otherwise noted    ALLERGIES & MEDICATIONS  --------------------------------------------------------------------------------  Allergies    No Known Allergies    Intolerances    Standing Inpatient Medications  finasteride 5 milliGRAM(s) Oral daily  heparin   Injectable 5000 Unit(s) SubCutaneous every 8 hours  influenza  Vaccine (HIGH DOSE) 0.7 milliLiter(s) IntraMuscular once  lactated ringers. 1000 milliLiter(s) IV Continuous <Continuous>  metoprolol tartrate 25 milliGRAM(s) Oral every 12 hours    PRN Inpatient Medications  acetaminophen   Tablet .. 650 milliGRAM(s) Oral every 6 hours PRN      REVIEW OF SYSTEMS  --------------------------------------------------------------------------------  Gen: No fevers/chills  Respiratory: No dyspnea, cough  CV: No chest pain  GI: No abdominal pain, diarrhea  : No dysuria, hematuria  MSK: No  edema    All other systems were reviewed and are negative, except as noted.    VITALS/PHYSICAL EXAM  --------------------------------------------------------------------------------  T(C): 36.5 (10-07-21 @ 05:36), Max: 36.7 (10-06-21 @ 19:16)  HR: 84 (10-07-21 @ 05:36) (81 - 89)  BP: 159/78 (10-07-21 @ 05:36) (136/94 - 172/87)  RR: 17 (10-07-21 @ 05:36) (17 - 20)  SpO2: 100% (10-07-21 @ 05:36) (98% - 100%)  Wt(kg): --  Height (cm): 177.8 (10-06-21 @ 19:16)  Weight (kg): 71.8 (10-06-21 @ 19:16)  BMI (kg/m2): 22.7 (10-06-21 @ 19:16)  BSA (m2): 1.89 (10-06-21 @ 19:16)    Physical Exam:  	Gen: NAD  	HEENT: MMM  	Pulm: CTA B/L, no crackles   	CV: S1S2  	Abd: Soft, +BS   	Ext: No LE edema B/L  	Neuro: Awake  	Skin: Warm and dry      LABS/STUDIES  --------------------------------------------------------------------------------              7.4    5.19  >-----------<  130      [10-07-21 @ 07:27]              21.6     146  |  108  |  125  ----------------------------<  125      [10-07-21 @ 07:27]  3.2   |  16  |  11.28        Ca     6.0     [10-07-21 @ 07:27]      Mg     1.60     [10-07-21 @ 07:27]      Phos  6.4     [10-07-21 @ 07:27]    TPro  5.2  /  Alb  3.0  /  TBili  0.3  /  DBili  x   /  AST  7   /  ALT  5   /  AlkPhos  56  [10-07-21 @ 07:27]    PT/INR: PT 13.2 , INR 1.16       [10-06-21 @ 10:49]  PTT: 25.4       [10-06-21 @ 10:49]    Serum Osmolality 347      [10-06-21 @ 02:47]    Creatinine Trend:  SCr 11.28 [10-07 @ 07:27]  SCr 11.59 [10-06 @ 22:36]  SCr 11.87 [10-06 @ 16:10]  SCr 12.45 [10-06 @ 10:49]  SCr 12.64 [10-06 @ 02:47]    Urinalysis - [10-07-21 @ 06:45]      Color Yellow / Appearance Turbid / SG 1.013 / pH 8.5      Gluc Negative / Ketone Negative  / Bili Negative / Urobili <2 mg/dL       Blood Trace / Protein 300 mg/dL / Leuk Est Large / Nitrite Negative      RBC 6 / WBC 56 / Hyaline 1 / Gran  / Sq Epi  / Non Sq Epi 7 / Bacteria Many    Urine Osmolality 443      [10-07-21 @ 06:45]    Iron 67, TIBC 134, %sat 50      [10-07-21 @ 07:27]  Ferritin 204      [10-07-21 @ 07:27]

## 2021-10-07 NOTE — PROGRESS NOTE ADULT - PROBLEM SELECTOR PLAN 2
In setting of chronic diarrhea and advance renal failure. Last bicarb improved to 16 and pH of 7.34 on VBG. DC bicarb drip and switch to LR as above. Monitor serum CO2 and pH.

## 2021-10-07 NOTE — PROGRESS NOTE ADULT - PROBLEM SELECTOR PLAN 2
- sCr 12.64 on admit, bl~1.3 (2017)  - per patient, he is making urine. never been on dialysis. GFR on admit 3  - Suspect he has some component of hypovolemia, may improve with bicarb gtt as above   - strict I&O  - daily weights  - Renal recs appreciated  - Renal US w/ renal parenchymal disease   - Renal protective measures: Please renally dose all medications; Avoid nephrotoxic medications (NSAIDS, IV contrast); Avoid ACEi and ARBs in the setting of GÉNESIS; Maintain MAP >65; Low Na, K, phos, and protein diet

## 2021-10-07 NOTE — PROGRESS NOTE ADULT - PROBLEM SELECTOR PLAN 3
- potassium 5.4 on admit, no EKG changes  - will continue to monitor on bicarb gtt corrected calcium 6.9  - 2 g calcium gluconate with 1 g Mg  - repeat BMP at 2pm

## 2021-10-07 NOTE — PROGRESS NOTE ADULT - ATTENDING COMMENTS
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    Patient seen and examined, states he feels very weak today and overall not well. Denies abd pain, chest pain or SOB.     #GÉNESIS on CKD  #High anion gap metabolic acidosis  - Discussed with renal Dr. Fall, recommends change from bicarb gtt to LR. Cr with slight improved, Bicarb improved today.   - Hypocalcemia noted, given 2g IVPB and 1g magnesium   - Condom cath placed for Is/Os   - Acidemia improved, pH now normalized   - Iron studies consistent with anemia of chronic disease, suspect from renal failure --> will follow-up renal if patient needs Epo    Discussed with HS 3 Dr. Dan

## 2021-10-07 NOTE — PHYSICAL THERAPY INITIAL EVALUATION ADULT - FOLLOWS COMMANDS/ANSWERS QUESTIONS, REHAB EVAL
**HARD OF HEARING -- right ear with better hearing**/100% of the time/able to follow multistep instructions

## 2021-10-07 NOTE — PHYSICAL THERAPY INITIAL EVALUATION ADULT - ADDITIONAL COMMENTS
Pt lives in senior housing with wife. +flight of steps to negotiate. Pt was independent in functional activities prior to admission without use of assistive device. states he does not own any assistive device either.     Pt left in bed with head of bed elevated, NAD. RN present and aware of session. +call bell.

## 2021-10-08 LAB
ALBUMIN SERPL ELPH-MCNC: 3 G/DL — LOW (ref 3.3–5)
ALBUMIN SERPL ELPH-MCNC: 3.1 G/DL — LOW (ref 3.3–5)
ALP SERPL-CCNC: 59 U/L — SIGNIFICANT CHANGE UP (ref 40–120)
ALP SERPL-CCNC: 60 U/L — SIGNIFICANT CHANGE UP (ref 40–120)
ALT FLD-CCNC: 5 U/L — SIGNIFICANT CHANGE UP (ref 4–41)
ALT FLD-CCNC: 8 U/L — SIGNIFICANT CHANGE UP (ref 4–41)
ANION GAP SERPL CALC-SCNC: 20 MMOL/L — HIGH (ref 7–14)
ANION GAP SERPL CALC-SCNC: 22 MMOL/L — HIGH (ref 7–14)
AST SERPL-CCNC: 12 U/L — SIGNIFICANT CHANGE UP (ref 4–40)
AST SERPL-CCNC: 14 U/L — SIGNIFICANT CHANGE UP (ref 4–40)
BASE EXCESS BLDV CALC-SCNC: -7.4 MMOL/L — LOW (ref -2–3)
BILIRUB SERPL-MCNC: 0.5 MG/DL — SIGNIFICANT CHANGE UP (ref 0.2–1.2)
BILIRUB SERPL-MCNC: 0.5 MG/DL — SIGNIFICANT CHANGE UP (ref 0.2–1.2)
BUN SERPL-MCNC: 113 MG/DL — HIGH (ref 7–23)
BUN SERPL-MCNC: 116 MG/DL — HIGH (ref 7–23)
CA-I SERPL-SCNC: 0.77 MMOL/L — LOW (ref 1.15–1.33)
CALCIUM SERPL-MCNC: 5.5 MG/DL — CRITICAL LOW (ref 8.4–10.5)
CALCIUM SERPL-MCNC: 6.1 MG/DL — CRITICAL LOW (ref 8.4–10.5)
CHLORIDE BLDV-SCNC: 108 MMOL/L — SIGNIFICANT CHANGE UP (ref 96–108)
CHLORIDE SERPL-SCNC: 105 MMOL/L — SIGNIFICANT CHANGE UP (ref 98–107)
CHLORIDE SERPL-SCNC: 106 MMOL/L — SIGNIFICANT CHANGE UP (ref 98–107)
CHLORIDE UR-SCNC: 91 MMOL/L — SIGNIFICANT CHANGE UP
CO2 BLDV-SCNC: 19.1 MMOL/L — LOW (ref 22–26)
CO2 SERPL-SCNC: 16 MMOL/L — LOW (ref 22–31)
CO2 SERPL-SCNC: 17 MMOL/L — LOW (ref 22–31)
CREAT ?TM UR-MCNC: 46 MG/DL — SIGNIFICANT CHANGE UP
CREAT SERPL-MCNC: 9.4 MG/DL — HIGH (ref 0.5–1.3)
CREAT SERPL-MCNC: 9.69 MG/DL — HIGH (ref 0.5–1.3)
GAS PNL BLDV: 140 MMOL/L — SIGNIFICANT CHANGE UP (ref 136–145)
GAS PNL BLDV: SIGNIFICANT CHANGE UP
GAS PNL BLDV: SIGNIFICANT CHANGE UP
GLUCOSE BLDV-MCNC: 102 MG/DL — HIGH (ref 70–99)
GLUCOSE SERPL-MCNC: 105 MG/DL — HIGH (ref 70–99)
GLUCOSE SERPL-MCNC: 108 MG/DL — HIGH (ref 70–99)
HCO3 BLDV-SCNC: 18 MMOL/L — LOW (ref 22–29)
HCT VFR BLD CALC: 22.6 % — LOW (ref 39–50)
HCT VFR BLDA CALC: 24 % — LOW (ref 39–51)
HGB BLD CALC-MCNC: 7.9 G/DL — LOW (ref 13–17)
HGB BLD-MCNC: 7.8 G/DL — LOW (ref 13–17)
LACTATE BLDV-MCNC: 1.2 MMOL/L — SIGNIFICANT CHANGE UP (ref 0.5–2)
MAGNESIUM SERPL-MCNC: 1.6 MG/DL — SIGNIFICANT CHANGE UP (ref 1.6–2.6)
MAGNESIUM SERPL-MCNC: 2.1 MG/DL — SIGNIFICANT CHANGE UP (ref 1.6–2.6)
MCHC RBC-ENTMCNC: 31.3 PG — SIGNIFICANT CHANGE UP (ref 27–34)
MCHC RBC-ENTMCNC: 34.5 GM/DL — SIGNIFICANT CHANGE UP (ref 32–36)
MCV RBC AUTO: 90.8 FL — SIGNIFICANT CHANGE UP (ref 80–100)
NRBC # BLD: 0 /100 WBCS — SIGNIFICANT CHANGE UP
NRBC # FLD: 0 K/UL — SIGNIFICANT CHANGE UP
PCO2 BLDV: 35 MMHG — LOW (ref 42–55)
PH BLDV: 7.32 — SIGNIFICANT CHANGE UP (ref 7.32–7.43)
PHOSPHATE SERPL-MCNC: 5.9 MG/DL — HIGH (ref 2.5–4.5)
PHOSPHATE SERPL-MCNC: 6.2 MG/DL — HIGH (ref 2.5–4.5)
PLATELET # BLD AUTO: 98 K/UL — LOW (ref 150–400)
PO2 BLDV: 66 MMHG — SIGNIFICANT CHANGE UP
POTASSIUM BLDV-SCNC: 3.3 MMOL/L — LOW (ref 3.5–5.1)
POTASSIUM SERPL-MCNC: 3.4 MMOL/L — LOW (ref 3.5–5.3)
POTASSIUM SERPL-MCNC: 3.5 MMOL/L — SIGNIFICANT CHANGE UP (ref 3.5–5.3)
POTASSIUM SERPL-SCNC: 3.4 MMOL/L — LOW (ref 3.5–5.3)
POTASSIUM SERPL-SCNC: 3.5 MMOL/L — SIGNIFICANT CHANGE UP (ref 3.5–5.3)
PROT ?TM UR-MCNC: 128 MG/DL — SIGNIFICANT CHANGE UP
PROT ?TM UR-MCNC: 128 MG/DL — SIGNIFICANT CHANGE UP
PROT SERPL-MCNC: 5.1 G/DL — LOW (ref 6–8.3)
PROT SERPL-MCNC: 5.2 G/DL — LOW (ref 6–8.3)
PROT/CREAT UR-RTO: 2.8 RATIO — HIGH (ref 0–0.2)
PTH-INTACT FLD-MCNC: 730 PG/ML — HIGH (ref 15–65)
RBC # BLD: 2.49 M/UL — LOW (ref 4.2–5.8)
RBC # FLD: 14.6 % — HIGH (ref 10.3–14.5)
SAO2 % BLDV: 94.1 % — SIGNIFICANT CHANGE UP
SODIUM SERPL-SCNC: 143 MMOL/L — SIGNIFICANT CHANGE UP (ref 135–145)
SODIUM SERPL-SCNC: 143 MMOL/L — SIGNIFICANT CHANGE UP (ref 135–145)
SODIUM UR-SCNC: 103 MMOL/L — SIGNIFICANT CHANGE UP
UUN UR-MCNC: 334 MG/DL — SIGNIFICANT CHANGE UP
WBC # BLD: 5.24 K/UL — SIGNIFICANT CHANGE UP (ref 3.8–10.5)
WBC # FLD AUTO: 5.24 K/UL — SIGNIFICANT CHANGE UP (ref 3.8–10.5)

## 2021-10-08 PROCEDURE — 99233 SBSQ HOSP IP/OBS HIGH 50: CPT | Mod: GC

## 2021-10-08 PROCEDURE — 99233 SBSQ HOSP IP/OBS HIGH 50: CPT

## 2021-10-08 RX ORDER — LANOLIN ALCOHOL/MO/W.PET/CERES
3 CREAM (GRAM) TOPICAL
Refills: 0 | Status: DISCONTINUED | OUTPATIENT
Start: 2021-10-08 | End: 2021-10-13

## 2021-10-08 RX ORDER — CALCIUM GLUCONATE 100 MG/ML
1 VIAL (ML) INTRAVENOUS ONCE
Refills: 0 | Status: DISCONTINUED | OUTPATIENT
Start: 2021-10-08 | End: 2021-10-09

## 2021-10-08 RX ORDER — LANOLIN ALCOHOL/MO/W.PET/CERES
3 CREAM (GRAM) TOPICAL AT BEDTIME
Refills: 0 | Status: DISCONTINUED | OUTPATIENT
Start: 2021-10-08 | End: 2021-10-08

## 2021-10-08 RX ORDER — NIFEDIPINE 30 MG
60 TABLET, EXTENDED RELEASE 24 HR ORAL DAILY
Refills: 0 | Status: DISCONTINUED | OUTPATIENT
Start: 2021-10-08 | End: 2021-10-10

## 2021-10-08 RX ORDER — CALCIUM GLUCONATE 100 MG/ML
1 VIAL (ML) INTRAVENOUS ONCE
Refills: 0 | Status: COMPLETED | OUTPATIENT
Start: 2021-10-08 | End: 2021-10-08

## 2021-10-08 RX ORDER — SODIUM CHLORIDE 9 MG/ML
1000 INJECTION, SOLUTION INTRAVENOUS
Refills: 0 | Status: DISCONTINUED | OUTPATIENT
Start: 2021-10-08 | End: 2021-10-09

## 2021-10-08 RX ORDER — CALCITRIOL 0.5 UG/1
0.5 CAPSULE ORAL DAILY
Refills: 0 | Status: DISCONTINUED | OUTPATIENT
Start: 2021-10-08 | End: 2021-10-12

## 2021-10-08 RX ORDER — NIFEDIPINE 30 MG
60 TABLET, EXTENDED RELEASE 24 HR ORAL DAILY
Refills: 0 | Status: DISCONTINUED | OUTPATIENT
Start: 2021-10-08 | End: 2021-10-08

## 2021-10-08 RX ORDER — CALCIUM ACETATE 667 MG
667 TABLET ORAL
Refills: 0 | Status: DISCONTINUED | OUTPATIENT
Start: 2021-10-08 | End: 2021-10-11

## 2021-10-08 RX ORDER — MAGNESIUM SULFATE 500 MG/ML
2 VIAL (ML) INJECTION ONCE
Refills: 0 | Status: COMPLETED | OUTPATIENT
Start: 2021-10-08 | End: 2021-10-08

## 2021-10-08 RX ADMIN — Medication 50 GRAM(S): at 15:15

## 2021-10-08 RX ADMIN — Medication 25 MILLIGRAM(S): at 13:55

## 2021-10-08 RX ADMIN — CALCITRIOL 0.5 MICROGRAM(S): 0.5 CAPSULE ORAL at 13:55

## 2021-10-08 RX ADMIN — HEPARIN SODIUM 5000 UNIT(S): 5000 INJECTION INTRAVENOUS; SUBCUTANEOUS at 21:32

## 2021-10-08 RX ADMIN — FINASTERIDE 5 MILLIGRAM(S): 5 TABLET, FILM COATED ORAL at 11:57

## 2021-10-08 RX ADMIN — Medication 50 GRAM(S): at 20:24

## 2021-10-08 RX ADMIN — HEPARIN SODIUM 5000 UNIT(S): 5000 INJECTION INTRAVENOUS; SUBCUTANEOUS at 06:18

## 2021-10-08 RX ADMIN — Medication 3 MILLIGRAM(S): at 21:31

## 2021-10-08 RX ADMIN — Medication 667 MILLIGRAM(S): at 11:57

## 2021-10-08 RX ADMIN — Medication 25 MILLIGRAM(S): at 02:34

## 2021-10-08 RX ADMIN — Medication 60 MILLIGRAM(S): at 17:22

## 2021-10-08 RX ADMIN — PANTOPRAZOLE SODIUM 40 MILLIGRAM(S): 20 TABLET, DELAYED RELEASE ORAL at 11:57

## 2021-10-08 RX ADMIN — Medication 667 MILLIGRAM(S): at 17:22

## 2021-10-08 RX ADMIN — Medication 50 GRAM(S): at 12:06

## 2021-10-08 RX ADMIN — Medication 50 GRAM(S): at 13:56

## 2021-10-08 NOTE — PROGRESS NOTE ADULT - SUBJECTIVE AND OBJECTIVE BOX
PROGRESS NOTE:   Authored by Estrellita Dan MD  Internal Medicine  Pager [place] [phone]  Pager [phone]    Patient is a 89y old  Male who presents with a chief complaint of anion gap metabolic acidosis (07 Oct 2021 09:55)      SUBJECTIVE / OVERNIGHT EVENTS: BP elevated to 180s systolic, down to 160s after metoprlol.     MEDICATIONS  (STANDING):  finasteride 5 milliGRAM(s) Oral daily  heparin   Injectable 5000 Unit(s) SubCutaneous every 8 hours  influenza  Vaccine (HIGH DOSE) 0.7 milliLiter(s) IntraMuscular once  lactated ringers. 1000 milliLiter(s) (100 mL/Hr) IV Continuous <Continuous>  metoprolol tartrate 25 milliGRAM(s) Oral every 12 hours  pantoprazole    Tablet 40 milliGRAM(s) Oral daily    MEDICATIONS  (PRN):  acetaminophen   Tablet .. 650 milliGRAM(s) Oral every 6 hours PRN Mild Pain (1 - 3), Moderate Pain (4 - 6)  melatonin 3 milliGRAM(s) Oral at bedtime PRN Insomnia      CAPILLARY BLOOD GLUCOSE        I&O's Summary    07 Oct 2021 07:01  -  08 Oct 2021 07:00  --------------------------------------------------------  IN: 950 mL / OUT: 0 mL / NET: 950 mL        PHYSICAL EXAM:  Vital Signs Last 24 Hrs  T(C): 36.6 (08 Oct 2021 05:11), Max: 36.6 (07 Oct 2021 23:46)  T(F): 97.8 (08 Oct 2021 05:11), Max: 97.8 (07 Oct 2021 23:46)  HR: 69 (08 Oct 2021 05:11) (67 - 96)  BP: 166/99 (08 Oct 2021 05:11) (166/99 - 186/95)  BP(mean): --  RR: 16 (08 Oct 2021 05:11) (16 - 18)  SpO2: 98% (08 Oct 2021 05:11) (96% - 98%)  CONSTITUTIONAL: Well-groomed, in no apparent distress  EYES: No conjunctival or scleral injection, non-icteric;   ENMT: No external nasal lesions; MMM  NECK: Trachea midline without palpable neck mass; thyroid not enlarged and non-tender  RESPIRATORY: Breathing comfortably; no dullness to percussion; lungs CTA without wheeze/rhonchi/rales  CARDIOVASCULAR: +S1S2, RRR, no M/G/R; pedal pulses full and symmetric; no lower extremity edema  GASTROINTESTINAL: No palpable masses or tenderness, +BS throughout, no rebound/guarding; no hepatosplenomegaly; no hernia palpated  LYMPHATIC: No cervical LAD or tenderness  SKIN: No rashes or ulcers noted  NEUROLOGIC: CN II-XII intact; sensation intact in LEs b/l to light touch  PSYCHIATRIC: A+O x 3; mood and affect appropriate; appropriate insight and judgment    LABS:                        7.4    5.19  )-----------( 130      ( 07 Oct 2021 07:27 )             21.6     10-    145  |  107  |  129<H>  ----------------------------<  109<H>  3.3<L>   |  17<L>  |  10.39<H>    Ca    6.1<LL>      07 Oct 2021 15:21  Phos  6.2     10-  Mg     1.80     10-    TPro  5.2<L>  /  Alb  3.0<L>  /  TBili  0.3  /  DBili  x   /  AST  7   /  ALT  5   /  AlkPhos  56  10-07    PT/INR - ( 06 Oct 2021 10:49 )   PT: 13.2 sec;   INR: 1.16 ratio         PTT - ( 06 Oct 2021 10:49 )  PTT:25.4 sec      Urinalysis Basic - ( 07 Oct 2021 06:45 )    Color: Yellow / Appearance: Turbid / S.013 / pH: x  Gluc: x / Ketone: Negative  / Bili: Negative / Urobili: <2 mg/dL   Blood: x / Protein: 300 mg/dL / Nitrite: Negative   Leuk Esterase: Large / RBC: 6 /HPF / WBC 56 /HPF   Sq Epi: x / Non Sq Epi: 7 /HPF / Bacteria: Many          RADIOLOGY & ADDITIONAL TESTS:  Results Reviewed:   Imaging Personally Reviewed:  Electrocardiogram Personally Reviewed:    COORDINATION OF CARE:  Care Discussed with Consultants/Other Providers [Y/N]:  Prior or Outpatient Records Reviewed [Y/N]:   PROGRESS NOTE:   Authored by Estrellita Dan MD  Internal Medicine  Pager [place] [phone]  Pager [phone]    Patient is a 89y old  Male who presents with a chief complaint of anion gap metabolic acidosis (07 Oct 2021 09:55)      SUBJECTIVE / OVERNIGHT EVENTS: BP elevated to 180s systolic, down to 160s after metoprlol. Patient seen and examined at bedside. reports that he feels tired. did have some dry heaves and nausea this morning, feels better with smelling alcohol wipes    MEDICATIONS  (STANDING):  finasteride 5 milliGRAM(s) Oral daily  heparin   Injectable 5000 Unit(s) SubCutaneous every 8 hours  influenza  Vaccine (HIGH DOSE) 0.7 milliLiter(s) IntraMuscular once  lactated ringers. 1000 milliLiter(s) (100 mL/Hr) IV Continuous <Continuous>  metoprolol tartrate 25 milliGRAM(s) Oral every 12 hours  pantoprazole    Tablet 40 milliGRAM(s) Oral daily    MEDICATIONS  (PRN):  acetaminophen   Tablet .. 650 milliGRAM(s) Oral every 6 hours PRN Mild Pain (1 - 3), Moderate Pain (4 - 6)  melatonin 3 milliGRAM(s) Oral at bedtime PRN Insomnia      CAPILLARY BLOOD GLUCOSE        I&O's Summary    07 Oct 2021 07:01  -  08 Oct 2021 07:00  --------------------------------------------------------  IN: 950 mL / OUT: 0 mL / NET: 950 mL        PHYSICAL EXAM:  Vital Signs Last 24 Hrs  T(C): 36.6 (08 Oct 2021 05:11), Max: 36.6 (07 Oct 2021 23:46)  T(F): 97.8 (08 Oct 2021 05:11), Max: 97.8 (07 Oct 2021 23:46)  HR: 69 (08 Oct 2021 05:11) (67 - 96)  BP: 166/99 (08 Oct 2021 05:11) (166/99 - 186/95)  BP(mean): --  RR: 16 (08 Oct 2021 05:11) (16 - 18)  SpO2: 98% (08 Oct 2021 05:11) (96% - 98%)  CONSTITUTIONAL: Well-groomed, in no apparent distress  EYES: No conjunctival or scleral injection, non-icteric;   ENMT: No external nasal lesions; MMM  NECK: Trachea midline without palpable neck mass; thyroid not enlarged and non-tender  RESPIRATORY: Breathing comfortably; lungs CTA without wheeze/rhonchi/rales  CARDIOVASCULAR: +S1S2, RRR, no M/G/R; pedal pulses full and symmetric; no lower extremity edema  GASTROINTESTINAL: No palpable masses or tenderness, +BS throughout, no rebound/guarding; no hepatosplenomegaly; no hernia palpated  PSYCHIATRIC: A+O x 3; mood and affect appropriate    LABS:                        7.4    5.19  )-----------( 130      ( 07 Oct 2021 07:27 )             21.6     10    145  |  107  |  129<H>  ----------------------------<  109<H>  3.3<L>   |  17<L>  |  10.39<H>    Ca    6.1<LL>      07 Oct 2021 15:21  Phos  6.2     10-  Mg     1.80     10    TPro  5.2<L>  /  Alb  3.0<L>  /  TBili  0.3  /  DBili  x   /  AST  7   /  ALT  5   /  AlkPhos  56  10-07    PT/INR - ( 06 Oct 2021 10:49 )   PT: 13.2 sec;   INR: 1.16 ratio         PTT - ( 06 Oct 2021 10:49 )  PTT:25.4 sec      Urinalysis Basic - ( 07 Oct 2021 06:45 )    Color: Yellow / Appearance: Turbid / S.013 / pH: x  Gluc: x / Ketone: Negative  / Bili: Negative / Urobili: <2 mg/dL   Blood: x / Protein: 300 mg/dL / Nitrite: Negative   Leuk Esterase: Large / RBC: 6 /HPF / WBC 56 /HPF   Sq Epi: x / Non Sq Epi: 7 /HPF / Bacteria: Many          RADIOLOGY & ADDITIONAL TESTS:  Results Reviewed:   Imaging Personally Reviewed:  Electrocardiogram Personally Reviewed:    COORDINATION OF CARE:  Care Discussed with Consultants/Other Providers [Y/N]:  Prior or Outpatient Records Reviewed [Y/N]:

## 2021-10-08 NOTE — PROGRESS NOTE ADULT - PROBLEM SELECTOR PLAN 3
corrected calcium 6.9  - 2 g calcium gluconate with 1 g Mg  - repeat BMP at 2pm corrected calcium 6.2, iCal .77  - 2 g calcium gluconate with 1 g Mg on 10/7  - calcitriol and calcium acetate, calcium gluconate with 1 g Mg 10/8  - vit D 25-oh, vit D 1,25-OH , PTH  - calcitriol 0.5 mcg daily PO  - repeat BMP at 4pm

## 2021-10-08 NOTE — PROGRESS NOTE ADULT - ATTENDING COMMENTS
POCUS showing A lines no evidence of pulmonary edema.  Not SOB tolerating fluids well.  REcommend continue IVF for now.  negative chvostek sign on examination.  start rocaltrol for hypocalcemia and monitor QTC.

## 2021-10-08 NOTE — PROGRESS NOTE ADULT - ATTENDING COMMENTS
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    Patient seen and examined, states he feels very weak today and is having some dry heaves earlier, but feels better now. Denies nausea/vomiting. Denies chest pain or SOB.     #GÉNESIS on CKD  #High anion gap metabolic acidosis  #Hypocalcemia  - Renal following recs appreciated. BUN/Cr continues to improve slowly w/ IVF hydration. S/p bicarb gtt w/ improvement in bicarb. No indication for HD currently   - Hypocalcemia noted, will replete w/ 2g Ca IVPB and 2g magnesium. Monitor QTC   - Condom cath placed for Is/Os   - Acidemia improved, pH now normalized   - Iron studies consistent with anemia of chronic disease, suspect from renal failure --> will follow-up renal if patient needs Epo    Discussed with HS 3 Dr. Dan  Discussed with patient's daughter at bedside and updated

## 2021-10-08 NOTE — PROGRESS NOTE ADULT - PROBLEM SELECTOR PLAN 4
- will obtain GI PCR, stool O&P, stool culture  - no leukocytosis or fevers or recent abx use to suggest C diff, will continue to monitor, if worsening will consider sending - resolved. will obtain GI PCR, stool O&P, stool culture if symptoms resume  - no leukocytosis or fevers or recent abx use to suggest C diff, will continue to monitor, if worsening will consider sending

## 2021-10-08 NOTE — PHARMACOTHERAPY INTERVENTION NOTE - COMMENTS
[facility] pharmacy on file last filled maintenance meds in 2019. Confirmed medication list with [facility] ([phone]) and Dr. Jeanie Arriaza's office ([phone]). Patient was last seen in her office in February.     Patient last filled dutasteride, metoprolol, nifedipine for 90d supply on 2/12/2021.  Patient last filled omeprazole for 90d supply on 5/20/2021.

## 2021-10-08 NOTE — PROGRESS NOTE ADULT - SUBJECTIVE AND OBJECTIVE BOX
[facility] DIVISION OF KIDNEY DISEASES AND HYPERTENSION -- FOLLOW UP NOTE  --------------------------------------------------------------------------------  Pt is an 89 y/o M w PMH of CKD3, HTN, BPH, GERD, hiatal hernia presented to [facility] for abnormal labs. As per patient's daughter and patient, he was c/o of chronic diarrhea for past 1 month, also endorse poor appetite for 2 1/2 weeks, change in taste, and lack of sleep for the past month. NP from outpatient gabriel labs yesterday which found that he had Sc of 12.48 and bicarb of < 5, which prompted him to go to ED. Nephrology consulted for GÉNESIS and metabolic acidosis. On review of labs on [facility] HIE/[place], patient noted to have Scr of 12.64 and K of 5.7, with bicarb of < 7 at 2 am this morning in ED, was started on bicarb drip at about 9 am, repeat labs at 11 am showed Scr of 12.45 and bicarb of < 7, with K of 5 on 10/7/21. Bicarb drip was discontinued and now patient on LR. Last Scr improved to 9.69, bicarb of 16, however calcium low 5.5 and ionized calcium 0.77.     Patient was seen and examined at bedside. Endorse feeling better and that his SOB improved significantly. Was nauseas this morning. Denies CP, fever, chills, nausea, vomiting, LE edema or dysuria.          PAST HISTORY  --------------------------------------------------------------------------------  No significant changes to PMH, PSH, FHx, SHx, unless otherwise noted    ALLERGIES & MEDICATIONS  --------------------------------------------------------------------------------  Allergies    No Known Allergies    Intolerances      Standing Inpatient Medications  calcium acetate 667 milliGRAM(s) Oral three times a day with meals  calcium gluconate IVPB 1 Gram(s) IV Intermittent once  calcium gluconate IVPB 1 Gram(s) IV Intermittent once  finasteride 5 milliGRAM(s) Oral daily  heparin   Injectable 5000 Unit(s) SubCutaneous every 8 hours  influenza  Vaccine (HIGH DOSE) 0.7 milliLiter(s) IntraMuscular once  lactated ringers. 1000 milliLiter(s) IV Continuous <Continuous>  magnesium sulfate  IVPB 2 Gram(s) IV Intermittent once  melatonin 3 milliGRAM(s) Oral <User Schedule>  metoprolol tartrate 25 milliGRAM(s) Oral every 12 hours  NIFEdipine XL 60 milliGRAM(s) Oral daily  pantoprazole    Tablet 40 milliGRAM(s) Oral daily    PRN Inpatient Medications  acetaminophen   Tablet .. 650 milliGRAM(s) Oral every 6 hours PRN      REVIEW OF SYSTEMS  --------------------------------------------------------------------------------  Gen: No fevers/chills  Skin: No rashes  Respiratory: No dyspnea, cough  CV: No chest pain  GI: No abdominal pain, diarrhea, + nausea   : No dysuria, hematuria  MSK: No  edema    All other systems were reviewed and are negative, except as noted.    VITALS/PHYSICAL EXAM  --------------------------------------------------------------------------------  T(C): 36.6 (10-08-21 @ 05:11), Max: 36.6 (10-07-21 @ 23:46)  HR: 69 (10-08-21 @ 05:11) (67 - 96)  BP: 166/99 (10-08-21 @ 05:11) (166/99 - 186/95)  RR: 16 (10-08-21 @ 05:11) (16 - 18)  SpO2: 98% (10-08-21 @ 05:11) (96% - 98%)  Wt(kg): --  Height (cm): 177.8 (10-06-21 @ 19:16)  Weight (kg): 71.8 (10-06-21 @ 19:16)  BMI (kg/m2): 22.7 (10-06-21 @ 19:16)  BSA (m2): 1.89 (10-06-21 @ 19:16)      10-07-21 @ 07:01  -  10-08-21 @ 07:00  --------------------------------------------------------  IN: 950 mL / OUT: 0 mL / NET: 950 mL    Physical Exam:  	Gen: NAD  	HEENT: dry oral mucosa   	Pulm: CTA B/L  	CV: S1S2  	Abd: Soft, +BS   	Ext: No LE edema B/L  	Neuro: Awake  	Skin: Warm and dry              POCUS: no b lines     LABS/STUDIES  --------------------------------------------------------------------------------              7.4    5.19  >-----------<  130      [10-07-21 @ 07:27]              21.6     143  |  105  |  113  ----------------------------<  105      [10-08-21 @ 08:05]  3.5   |  16  |  9.69        Ca     5.5     [10-08-21 @ 08:05]      Mg     1.60     [10-08-21 @ 08:05]      Phos  6.2     [10-08-21 @ 08:05]    TPro  5.1  /  Alb  3.1  /  TBili  0.5  /  DBili  x   /  AST  14  /  ALT  8   /  AlkPhos  59  [10-08-21 @ 08:05]          Creatinine Trend:  SCr 9.69 [10-08 @ 08:05]  SCr 10.39 [10-07 @ 15:21]  SCr 11.28 [10-07 @ 07:27]  SCr 11.59 [10-06 @ 22:36]  SCr 11.87 [10-06 @ 16:10]    Urinalysis - [10-07-21 @ 06:45]      Color Yellow / Appearance Turbid / SG 1.013 / pH 8.5      Gluc Negative / Ketone Negative  / Bili Negative / Urobili <2 mg/dL       Blood Trace / Protein 300 mg/dL / Leuk Est Large / Nitrite Negative      RBC 6 / WBC 56 / Hyaline 1 / Gran  / Sq Epi  / Non Sq Epi 7 / Bacteria Many    Urine Osmolality 443      [10-07-21 @ 06:45]    Iron 67, TIBC 134, %sat 50      [10-07-21 @ 07:27]  Ferritin 204      [10-07-21 @ 07:27]

## 2021-10-08 NOTE — PROGRESS NOTE ADULT - PROBLEM SELECTOR PLAN 1
Pt with GÉNESIS on CKD in the setting of poor appetite and diarrhea. Or could be renal failure in setting of advanced CKD. Exact duration of GÉNESIS however unknown. Note to have Scr of 1.4 prior to admission on 9/11/17, and no labs since. NP from outpatient gabriel labs yesterday which found that he had Sc of 12.48 and bicarb of < 5, which prompted him to go to ED. Noted to have Scr of 12.64 and K of 5.7, with bicarb of < 7 at 2 am in ED, was started on bicarb drip. Bicarb drip was discontinued b/c of hypokalemia and hypocalcemia. UA with large leuk est, 100 mg/dl protein, 5-10 RBC and > 50 WBC. Kidney u/s with b/l multiple cysts with no hydronephrosis. Now patient on LR. Last Scr improved to 9.69, bicarb of 16, however calcium low 5.5 and ionized calcium 0.77.     Continue LR at 100 cc per hour as pt is hypocalcemic and hypokalemic. Obtain urine Na and urine Cl and spot urine TP/CR ratio. No indication for HD for now. Monitor labs and accurate urine output. Avoid NSAIDs, ACEI/ARBS, RCA and nephrotoxins. Dose medications as per eGFR.

## 2021-10-08 NOTE — PROGRESS NOTE ADULT - PROBLEM SELECTOR PLAN 1
Improving, Likely due to GÉNESIS on CKD  - , sCr 12.64. with decreased PO intake and energy and weakness over the past couple days. no asterixis on exam. hx of CKD  - bicarb <7 on admit, VBG pH 7.06, lactate 1. AG 23. likely 2/2 uremia, less likely ingestion. no hx of diabetes, glucose 122  - s/p 1 amp bicarb en route. MICU consulted, not candidate for urgent HD  - bicarb gtt stopped, LR at 150 cc/h  - tox screen  - neph consult  - Monitor VBG, BMP Improving, Likely due to GÉNESIS on CKD  - , sCr 12.64. with decreased PO intake and energy and weakness over the past couple days. no asterixis on exam. hx of CKD  - bicarb <7 on admit, VBG pH 7.06, lactate 1. AG 23. likely 2/2 uremia, less likely ingestion. no hx of diabetes, glucose 122  - s/p 1 amp bicarb en route. MICU consulted, not candidate for urgent HD  - bicarb gtt stopped, LR at 150 cc/h  - tox screen  - neph consult, appreciate assistance  - Monitor CBC, BMP

## 2021-10-08 NOTE — PROGRESS NOTE ADULT - PROBLEM SELECTOR PLAN 6
BP 130s/70s on arrival  - c/w reduced dose of home metoprolol 25mg BID (home dose 50mg BID)  - hold home nifedipine for now given current BPs and severe acidemia  - Will consider restarting home med doses as BP improves BP above goal   - c/w reduced dose of home metoprolol 25mg BID (home dose 50mg BID)  - restart home nifedipine for elevated BPs

## 2021-10-08 NOTE — PROGRESS NOTE ADULT - PROBLEM SELECTOR PLAN 2
In setting of chronic diarrhea and advance renal failure. Last bicarb improved to 16 and pH of 7.32. LR as above. Monitor serum CO2 and pH.

## 2021-10-08 NOTE — PROGRESS NOTE ADULT - PROBLEM SELECTOR PLAN 9
FEN/GI: kidney diet, soft   Lines:  GTT:  PPx: hep sq  PT/OT: consult  Dispo: continue routine inpatient care  Code Status: Full, pending discussion.    Plan was discussed with team and Dr. Franco on rounds.  Signed,     Estrellita Dan MD  Internal Medicine PGY 1  [facility]: [id number]  [facility]: [phone]

## 2021-10-08 NOTE — PROGRESS NOTE ADULT - PROBLEM SELECTOR PLAN 4
Persistently hypocalcemia. Last Calcium 5.5 this morning. Would give another dose of 2g of calcium gluconate. Start patient on Calcitriol 0.5 mcg PO daily. Send vit D and PTH level. Monitor Calcium.     If any questions, please feel free to contact me     Kellee Johnson  Nephrology Fellow  [facility] Pager: [phone]  [facility] Pager: [phone] Persistently hypocalcemic. Last Calcium 5.5 this morning. Would give another dose of 2g of calcium gluconate. Start patient on Calcitriol 0.5 mcg PO daily. Send vit D and PTH level. Monitor Calcium.   Monitor QTC.    If any questions, please feel free to contact me     Kellee Johnson  Nephrology Fellow  [facility] Pager: [phone]  [facility] Pager: [phone]

## 2021-10-09 LAB
24R-OH-CALCIDIOL SERPL-MCNC: 5 NG/ML — LOW (ref 30–80)
ALBUMIN SERPL ELPH-MCNC: 3.1 G/DL — LOW (ref 3.3–5)
ALP SERPL-CCNC: 63 U/L — SIGNIFICANT CHANGE UP (ref 40–120)
ALT FLD-CCNC: 6 U/L — SIGNIFICANT CHANGE UP (ref 4–41)
ANION GAP SERPL CALC-SCNC: 20 MMOL/L — HIGH (ref 7–14)
ANION GAP SERPL CALC-SCNC: 22 MMOL/L — HIGH (ref 7–14)
ANION GAP SERPL CALC-SCNC: 25 MMOL/L — HIGH (ref 7–14)
AST SERPL-CCNC: 14 U/L — SIGNIFICANT CHANGE UP (ref 4–40)
BASOPHILS # BLD AUTO: 0.02 K/UL — SIGNIFICANT CHANGE UP (ref 0–0.2)
BASOPHILS NFR BLD AUTO: 0.3 % — SIGNIFICANT CHANGE UP (ref 0–2)
BILIRUB SERPL-MCNC: 0.5 MG/DL — SIGNIFICANT CHANGE UP (ref 0.2–1.2)
BUN SERPL-MCNC: 107 MG/DL — HIGH (ref 7–23)
BUN SERPL-MCNC: 111 MG/DL — HIGH (ref 7–23)
BUN SERPL-MCNC: 111 MG/DL — HIGH (ref 7–23)
CA-I BLD-SCNC: 0.92 MMOL/L — LOW (ref 1.15–1.29)
CALCIUM SERPL-MCNC: 6.5 MG/DL — CRITICAL LOW (ref 8.4–10.5)
CALCIUM SERPL-MCNC: 6.6 MG/DL — LOW (ref 8.4–10.5)
CALCIUM SERPL-MCNC: 7.1 MG/DL — LOW (ref 8.4–10.5)
CHLORIDE SERPL-SCNC: 100 MMOL/L — SIGNIFICANT CHANGE UP (ref 98–107)
CHLORIDE SERPL-SCNC: 102 MMOL/L — SIGNIFICANT CHANGE UP (ref 98–107)
CHLORIDE SERPL-SCNC: 103 MMOL/L — SIGNIFICANT CHANGE UP (ref 98–107)
CO2 SERPL-SCNC: 15 MMOL/L — LOW (ref 22–31)
CO2 SERPL-SCNC: 15 MMOL/L — LOW (ref 22–31)
CO2 SERPL-SCNC: 17 MMOL/L — LOW (ref 22–31)
CREAT SERPL-MCNC: 8.5 MG/DL — HIGH (ref 0.5–1.3)
CREAT SERPL-MCNC: 8.98 MG/DL — HIGH (ref 0.5–1.3)
CREAT SERPL-MCNC: 9.03 MG/DL — HIGH (ref 0.5–1.3)
EOSINOPHIL # BLD AUTO: 0.07 K/UL — SIGNIFICANT CHANGE UP (ref 0–0.5)
EOSINOPHIL NFR BLD AUTO: 1.2 % — SIGNIFICANT CHANGE UP (ref 0–6)
GLUCOSE SERPL-MCNC: 105 MG/DL — HIGH (ref 70–99)
GLUCOSE SERPL-MCNC: 132 MG/DL — HIGH (ref 70–99)
GLUCOSE SERPL-MCNC: 91 MG/DL — SIGNIFICANT CHANGE UP (ref 70–99)
HCT VFR BLD CALC: 23.5 % — LOW (ref 39–50)
HGB BLD-MCNC: 7.9 G/DL — LOW (ref 13–17)
IANC: 4.05 K/UL — SIGNIFICANT CHANGE UP (ref 1.5–8.5)
IMM GRANULOCYTES NFR BLD AUTO: 0.7 % — SIGNIFICANT CHANGE UP (ref 0–1.5)
LYMPHOCYTES # BLD AUTO: 1.12 K/UL — SIGNIFICANT CHANGE UP (ref 1–3.3)
LYMPHOCYTES # BLD AUTO: 19.5 % — SIGNIFICANT CHANGE UP (ref 13–44)
MAGNESIUM SERPL-MCNC: 1.9 MG/DL — SIGNIFICANT CHANGE UP (ref 1.6–2.6)
MAGNESIUM SERPL-MCNC: 2 MG/DL — SIGNIFICANT CHANGE UP (ref 1.6–2.6)
MAGNESIUM SERPL-MCNC: 2 MG/DL — SIGNIFICANT CHANGE UP (ref 1.6–2.6)
MCHC RBC-ENTMCNC: 30.5 PG — SIGNIFICANT CHANGE UP (ref 27–34)
MCHC RBC-ENTMCNC: 33.6 GM/DL — SIGNIFICANT CHANGE UP (ref 32–36)
MCV RBC AUTO: 90.7 FL — SIGNIFICANT CHANGE UP (ref 80–100)
MONOCYTES # BLD AUTO: 0.44 K/UL — SIGNIFICANT CHANGE UP (ref 0–0.9)
MONOCYTES NFR BLD AUTO: 7.7 % — SIGNIFICANT CHANGE UP (ref 2–14)
NEUTROPHILS # BLD AUTO: 4.05 K/UL — SIGNIFICANT CHANGE UP (ref 1.8–7.4)
NEUTROPHILS NFR BLD AUTO: 70.6 % — SIGNIFICANT CHANGE UP (ref 43–77)
NRBC # BLD: 0 /100 WBCS — SIGNIFICANT CHANGE UP
NRBC # FLD: 0 K/UL — SIGNIFICANT CHANGE UP
PHOSPHATE SERPL-MCNC: 5.6 MG/DL — HIGH (ref 2.5–4.5)
PHOSPHATE SERPL-MCNC: 5.8 MG/DL — HIGH (ref 2.5–4.5)
PHOSPHATE SERPL-MCNC: 5.9 MG/DL — HIGH (ref 2.5–4.5)
PLATELET # BLD AUTO: 99 K/UL — LOW (ref 150–400)
POTASSIUM SERPL-MCNC: 3.2 MMOL/L — LOW (ref 3.5–5.3)
POTASSIUM SERPL-MCNC: 3.3 MMOL/L — LOW (ref 3.5–5.3)
POTASSIUM SERPL-MCNC: 3.3 MMOL/L — LOW (ref 3.5–5.3)
POTASSIUM SERPL-SCNC: 3.2 MMOL/L — LOW (ref 3.5–5.3)
POTASSIUM SERPL-SCNC: 3.3 MMOL/L — LOW (ref 3.5–5.3)
POTASSIUM SERPL-SCNC: 3.3 MMOL/L — LOW (ref 3.5–5.3)
PROT SERPL-MCNC: 5.4 G/DL — LOW (ref 6–8.3)
RBC # BLD: 2.59 M/UL — LOW (ref 4.2–5.8)
RBC # FLD: 14.5 % — SIGNIFICANT CHANGE UP (ref 10.3–14.5)
SODIUM SERPL-SCNC: 139 MMOL/L — SIGNIFICANT CHANGE UP (ref 135–145)
SODIUM SERPL-SCNC: 140 MMOL/L — SIGNIFICANT CHANGE UP (ref 135–145)
SODIUM SERPL-SCNC: 140 MMOL/L — SIGNIFICANT CHANGE UP (ref 135–145)
VIT D25+D1,25 OH+D1,25 PNL SERPL-MCNC: 12.8 PG/ML — LOW (ref 19.9–79.3)
WBC # BLD: 5.74 K/UL — SIGNIFICANT CHANGE UP (ref 3.8–10.5)
WBC # FLD AUTO: 5.74 K/UL — SIGNIFICANT CHANGE UP (ref 3.8–10.5)

## 2021-10-09 PROCEDURE — 99233 SBSQ HOSP IP/OBS HIGH 50: CPT | Mod: GC

## 2021-10-09 PROCEDURE — 93010 ELECTROCARDIOGRAM REPORT: CPT

## 2021-10-09 RX ORDER — SODIUM CHLORIDE 9 MG/ML
1000 INJECTION, SOLUTION INTRAVENOUS
Refills: 0 | Status: DISCONTINUED | OUTPATIENT
Start: 2021-10-09 | End: 2021-10-10

## 2021-10-09 RX ORDER — METOPROLOL TARTRATE 50 MG
50 TABLET ORAL
Refills: 0 | Status: DISCONTINUED | OUTPATIENT
Start: 2021-10-09 | End: 2021-10-09

## 2021-10-09 RX ORDER — ERGOCALCIFEROL 1.25 MG/1
50000 CAPSULE ORAL ONCE
Refills: 0 | Status: COMPLETED | OUTPATIENT
Start: 2021-10-09 | End: 2021-10-09

## 2021-10-09 RX ORDER — METOPROLOL TARTRATE 50 MG
50 TABLET ORAL
Refills: 0 | Status: DISCONTINUED | OUTPATIENT
Start: 2021-10-09 | End: 2021-10-13

## 2021-10-09 RX ORDER — CALCIUM GLUCONATE 100 MG/ML
2 VIAL (ML) INTRAVENOUS ONCE
Refills: 0 | Status: COMPLETED | OUTPATIENT
Start: 2021-10-09 | End: 2021-10-09

## 2021-10-09 RX ORDER — CALCIUM GLUCONATE 100 MG/ML
1 VIAL (ML) INTRAVENOUS ONCE
Refills: 0 | Status: COMPLETED | OUTPATIENT
Start: 2021-10-09 | End: 2021-10-09

## 2021-10-09 RX ORDER — POTASSIUM CHLORIDE 20 MEQ
20 PACKET (EA) ORAL ONCE
Refills: 0 | Status: COMPLETED | OUTPATIENT
Start: 2021-10-09 | End: 2021-10-09

## 2021-10-09 RX ORDER — CALCIUM GLUCONATE 100 MG/ML
1 VIAL (ML) INTRAVENOUS ONCE
Refills: 0 | Status: DISCONTINUED | OUTPATIENT
Start: 2021-10-09 | End: 2021-10-09

## 2021-10-09 RX ADMIN — FINASTERIDE 5 MILLIGRAM(S): 5 TABLET, FILM COATED ORAL at 13:04

## 2021-10-09 RX ADMIN — Medication 20 MILLIEQUIVALENT(S): at 21:41

## 2021-10-09 RX ADMIN — Medication 667 MILLIGRAM(S): at 13:04

## 2021-10-09 RX ADMIN — Medication 3 MILLIGRAM(S): at 21:41

## 2021-10-09 RX ADMIN — Medication 667 MILLIGRAM(S): at 08:37

## 2021-10-09 RX ADMIN — Medication 60 MILLIGRAM(S): at 06:02

## 2021-10-09 RX ADMIN — SODIUM CHLORIDE 100 MILLILITER(S): 9 INJECTION, SOLUTION INTRAVENOUS at 10:48

## 2021-10-09 RX ADMIN — Medication 20 MILLIEQUIVALENT(S): at 17:09

## 2021-10-09 RX ADMIN — Medication 667 MILLIGRAM(S): at 17:09

## 2021-10-09 RX ADMIN — Medication 50 GRAM(S): at 10:48

## 2021-10-09 RX ADMIN — HEPARIN SODIUM 5000 UNIT(S): 5000 INJECTION INTRAVENOUS; SUBCUTANEOUS at 06:02

## 2021-10-09 RX ADMIN — PANTOPRAZOLE SODIUM 40 MILLIGRAM(S): 20 TABLET, DELAYED RELEASE ORAL at 13:04

## 2021-10-09 RX ADMIN — HEPARIN SODIUM 5000 UNIT(S): 5000 INJECTION INTRAVENOUS; SUBCUTANEOUS at 21:44

## 2021-10-09 RX ADMIN — Medication 25 MILLIGRAM(S): at 06:02

## 2021-10-09 RX ADMIN — Medication 200 GRAM(S): at 21:41

## 2021-10-09 RX ADMIN — ERGOCALCIFEROL 50000 UNIT(S): 1.25 CAPSULE ORAL at 13:04

## 2021-10-09 RX ADMIN — CALCITRIOL 0.5 MICROGRAM(S): 0.5 CAPSULE ORAL at 13:04

## 2021-10-09 RX ADMIN — HEPARIN SODIUM 5000 UNIT(S): 5000 INJECTION INTRAVENOUS; SUBCUTANEOUS at 13:04

## 2021-10-09 RX ADMIN — Medication 200 GRAM(S): at 03:44

## 2021-10-09 NOTE — PROGRESS NOTE ADULT - ATTENDING COMMENTS
Patient with GÉNESIS on CKD-3 which may be volume related given protracted diarrhea.    1. GÉNESIS - some improvement with hydration.  Baseline unclear.  Continue to monitor.  2. Hypocalcemia - improving with supplementation.  Continue with current regimen  3. Hypokalemia - replete.  4. Vitamin D deficiency - ergocalciferol and monitor  5. Secondary PTH elevation - monitor on above regimen.  6. Metabolic acidosis - monitor on lactated ringers

## 2021-10-09 NOTE — PROGRESS NOTE ADULT - PROBLEM SELECTOR PLAN 9
FEN/GI: kidney diet, soft   Lines:  GTT:  PPx: hep sq  PT/OT: consult  Dispo: continue routine inpatient care  Code Status: Full, pending discussion.    Plan was discussed with team and Dr. Franco on rounds.  Signed,     Estrellita Dan MD  Internal Medicine PGY 1  [facility]: [id number]  [facility]: [phone] FEN/GI: kidney diet, soft   Lines:  GTT:  PPx: hep sq  PT/OT: consult  Dispo: continue routine inpatient care  Code Status: Full, pending discussion

## 2021-10-09 NOTE — PROGRESS NOTE ADULT - PROBLEM SELECTOR PLAN 2
- sCr 12.64 on admit, bl~1.3 (2017)  - per patient, he is making urine. never been on dialysis. GFR on admit 3  - Suspect he has some component of hypovolemia, may improve with bicarb gtt as above   - strict I&O  - daily weights  - Renal recs appreciated  - Renal US w/ renal parenchymal disease   - Renal protective measures: Please renally dose all medications; Avoid nephrotoxic medications (NSAIDS, IV contrast); Avoid ACEi and ARBs in the setting of GÉNESIS; Maintain MAP >65; Low Na, K, phos, and protein diet - sCr 12.64 on admit, bl~1.3 (2017)  - per patient, he is making urine. never been on dialysis. GFR on admit 3  - strict I&O  - daily weights  - Renal recs appreciated  - Renal US w/ renal parenchymal disease   - Renal protective measures: Please renally dose all medications; Avoid nephrotoxic medications (NSAIDS, IV contrast); Avoid ACEi and ARBs in the setting of GÉNESIS; Maintain MAP >65; Low Na, K, phos, and protein diet  - Orthostatic this AM, will c/w maintenance IVF

## 2021-10-09 NOTE — PROGRESS NOTE ADULT - PROBLEM SELECTOR PLAN 1
Improving, Likely due to GÉNESIS on CKD  - , sCr 12.64. with decreased PO intake and energy and weakness over the past couple days. no asterixis on exam. hx of CKD  - bicarb <7 on admit, VBG pH 7.06, lactate 1. AG 23. likely 2/2 uremia, less likely ingestion. no hx of diabetes, glucose 122  - s/p 1 amp bicarb en route. MICU consulted, not candidate for urgent HD  - bicarb gtt stopped, LR at 150 cc/h  - tox screen  - neph consult, appreciate assistance  - Monitor CBC, BMP Improving, Likely due to GÉNESIS on CKD  - , sCr 12.64. with decreased PO intake and energy and weakness over the past couple days. no asterixis on exam. hx of CKD  - bicarb <7 on admit, VBG pH 7.06, lactate 1. AG 23. likely 2/2 uremia, less likely ingestion. no hx of diabetes, glucose 122  - s/p 1 amp bicarb en route. MICU consulted, not candidate for urgent HD  - bicarb gtt stopped, LR at 150 cc/h  - tox screen  - neph consult, appreciate assistance  - Monitor BMP

## 2021-10-09 NOTE — PROGRESS NOTE ADULT - PROBLEM SELECTOR PLAN 4
- resolved. will obtain GI PCR, stool O&P, stool culture if symptoms resume  - no leukocytosis or fevers or recent abx use to suggest C diff, will continue to monitor, if worsening will consider sending

## 2021-10-09 NOTE — PROGRESS NOTE ADULT - SUBJECTIVE AND OBJECTIVE BOX
Author:   Caroline Collins MD  Internal Medicine, PGY3  [phone]    Patient:  JUAN SPICER  [id number]    Progress Note    Interval events: No acute events.  Pertinent ROS (if any):      Administered:  metoprolol tartrate: 25 milliGRAM(s) Oral (10-09 @ 06:02)  heparin   Injectable: 5000 Unit(s) SubCutaneous (10-09 @ 06:02)  NIFEdipine XL: 60 milliGRAM(s) Oral (10-09 @ 06:02)  calcium gluconate IVPB: 200 mL/Hr IV Intermittent (10-09 @ 03:44)  heparin   Injectable: 5000 Unit(s) SubCutaneous (10-08 @ 21:32)  melatonin: 3 milliGRAM(s) Oral (10-08 @ 21:31)  calcium gluconate IVPB: 50 mL/Hr IV Intermittent (10-08 @ 20:24)        OBJECTIVE:    10-08 @ 07:01  -  10-09 @ 07:00  --------------------------------------------------------  IN: 150 mL / OUT: 100 mL / NET: 50 mL      CAPILLARY BLOOD GLUCOSE            VITALS:  T(F): 98.1 (10-09-21 @ 05:51), Max: 98.8 (10-08-21 @ 21:27)  HR: 81 (10-09-21 @ 05:51) (66 - 81)  BP: 160/80 (10-09-21 @ 05:51) (128/71 - 185/100)  BP(mean): --  ABP: --  ABP(mean): --  RR: 16 (10-09-21 @ 05:51) (16 - 18)  SpO2: 98% (10-09-21 @ 05:51) (97% - 100%)    PHYSICAL EXAM:  CONSTITUTIONAL: Well-groomed, in no apparent distress  EYES: No conjunctival or scleral injection, non-icteric;   ENMT: No external nasal lesions; MMM  NECK: Trachea midline without palpable neck mass; thyroid not enlarged and non-tender  RESPIRATORY: Breathing comfortably; lungs CTA without wheeze/rhonchi/rales  CARDIOVASCULAR: +S1S2, RRR, no M/G/R; pedal pulses full and symmetric; no lower extremity edema  GASTROINTESTINAL: No palpable masses or tenderness, +BS throughout, no rebound/guarding; no hepatosplenomegaly; no hernia palpated  PSYCHIATRIC: A+O x 3; mood and affect appropriate    HOSPITAL MEDICATIONS:  Standing Meds:  calcitriol   Capsule 0.5 MICROGram(s) Oral daily  calcium acetate 667 milliGRAM(s) Oral three times a day with meals  finasteride 5 milliGRAM(s) Oral daily  heparin   Injectable 5000 Unit(s) SubCutaneous every 8 hours  influenza  Vaccine (HIGH DOSE) 0.7 milliLiter(s) IntraMuscular once  lactated ringers. 1000 milliLiter(s) IV Continuous <Continuous>  melatonin 3 milliGRAM(s) Oral <User Schedule>  metoprolol tartrate 25 milliGRAM(s) Oral every 12 hours  NIFEdipine XL 60 milliGRAM(s) Oral daily  pantoprazole    Tablet 40 milliGRAM(s) Oral daily      PRN Meds:  acetaminophen   Tablet .. 650 milliGRAM(s) Oral every 6 hours PRN      LABS:  CBC 10-08-21 @ 17:11                        7.8    5.24  )-----------( 98                    22.6       Hgb trend: 7.8 <-- , 7.4 <-- , 8.5 <--   WBC trend: 5.24 <-- , 5.19 <-- , 4.53 <--       CMP 10-09-21 @ 00:52    140  |  100  |  111<H>  ----------------------------<  105<H>  3.3<L>   |  15<L>  |  9.03<H>    Ca    6.6<L>      10-09-21 @ 00:52  Phos  5.8     10-09  Mg     2.00     10-09    TPro  5.2<L>  /  Alb  3.0<L>  /  TBili  0.5  /  DBili  x   /  AST  12  /  ALT  5   /  AlkPhos  60  10-08      Serum Cr trend: 9.03 <-- , 9.40 <-- , 9.69 <-- , 10.39 <-- , 11.28 <-- , 11.59 <-- , 11.87 <-- , 12.45 <--         ABG Trend:     VBG Trend:   10-08-21 @ 08:05 - pH: 7.32  | pCO2: 35    | pO2: 66    | Lactate: 1.2        MICROBIOLOGY:       RADIOLOGY:  [ ] Reviewed and interpreted by me    EKG:   Author:   Caroline Collins MD  Internal Medicine, PGY3  [phone]    Patient:  JUAN SPICER  [id number]    Progress Note    Interval events: No acute events. Received 2g Ca gluconate o/n. Orthostatic this AM.   Pertinent ROS (if any):      Administered:  metoprolol tartrate: 25 milliGRAM(s) Oral (10-09 @ 06:02)  heparin   Injectable: 5000 Unit(s) SubCutaneous (10-09 @ 06:02)  NIFEdipine XL: 60 milliGRAM(s) Oral (10-09 @ 06:02)  calcium gluconate IVPB: 200 mL/Hr IV Intermittent (10-09 @ 03:44)  heparin   Injectable: 5000 Unit(s) SubCutaneous (10-08 @ 21:32)  melatonin: 3 milliGRAM(s) Oral (10-08 @ 21:31)  calcium gluconate IVPB: 50 mL/Hr IV Intermittent (10-08 @ 20:24)        OBJECTIVE:    10-08 @ 07:01  -  10-09 @ 07:00  --------------------------------------------------------  IN: 150 mL / OUT: 100 mL / NET: 50 mL      CAPILLARY BLOOD GLUCOSE            VITALS:  T(F): 98.1 (10-09-21 @ 05:51), Max: 98.8 (10-08-21 @ 21:27)  HR: 81 (10-09-21 @ 05:51) (66 - 81)  BP: 160/80 (10-09-21 @ 05:51) (128/71 - 185/100)  BP(mean): --  ABP: --  ABP(mean): --  RR: 16 (10-09-21 @ 05:51) (16 - 18)  SpO2: 98% (10-09-21 @ 05:51) (97% - 100%)    PHYSICAL EXAM:  CONSTITUTIONAL: Well-groomed, in no apparent distress  EYES: No conjunctival or scleral injection, non-icteric;   ENMT: No external nasal lesions; MMM  NECK: Trachea midline without palpable neck mass; thyroid not enlarged and non-tender  RESPIRATORY: Breathing comfortably; lungs CTA without wheeze/rhonchi/rales  CARDIOVASCULAR: +S1S2, RRR, no M/G/R; pedal pulses full and symmetric; no lower extremity edema  GASTROINTESTINAL: No palpable masses or tenderness, +BS throughout, no rebound/guarding; no hepatosplenomegaly; no hernia palpated  PSYCHIATRIC: A+O x 3; mood and affect appropriate    HOSPITAL MEDICATIONS:  Standing Meds:  calcitriol   Capsule 0.5 MICROGram(s) Oral daily  calcium acetate 667 milliGRAM(s) Oral three times a day with meals  finasteride 5 milliGRAM(s) Oral daily  heparin   Injectable 5000 Unit(s) SubCutaneous every 8 hours  influenza  Vaccine (HIGH DOSE) 0.7 milliLiter(s) IntraMuscular once  lactated ringers. 1000 milliLiter(s) IV Continuous <Continuous>  melatonin 3 milliGRAM(s) Oral <User Schedule>  metoprolol tartrate 25 milliGRAM(s) Oral every 12 hours  NIFEdipine XL 60 milliGRAM(s) Oral daily  pantoprazole    Tablet 40 milliGRAM(s) Oral daily      PRN Meds:  acetaminophen   Tablet .. 650 milliGRAM(s) Oral every 6 hours PRN      LABS:  CBC 10-08-21 @ 17:11                        7.8    5.24  )-----------( 98                    22.6       Hgb trend: 7.8 <-- , 7.4 <-- , 8.5 <--   WBC trend: 5.24 <-- , 5.19 <-- , 4.53 <--       CMP 10-09-21 @ 00:52    140  |  100  |  111<H>  ----------------------------<  105<H>  3.3<L>   |  15<L>  |  9.03<H>    Ca    6.6<L>      10-09-21 @ 00:52  Phos  5.8     10-09  Mg     2.00     10-09    TPro  5.2<L>  /  Alb  3.0<L>  /  TBili  0.5  /  DBili  x   /  AST  12  /  ALT  5   /  AlkPhos  60  10-08      Serum Cr trend: 9.03 <-- , 9.40 <-- , 9.69 <-- , 10.39 <-- , 11.28 <-- , 11.59 <-- , 11.87 <-- , 12.45 <--         ABG Trend:     VBG Trend:   10-08-21 @ 08:05 - pH: 7.32  | pCO2: 35    | pO2: 66    | Lactate: 1.2        MICROBIOLOGY:       RADIOLOGY:  [ ] Reviewed and interpreted by me    EKG:

## 2021-10-09 NOTE — PROGRESS NOTE ADULT - PROBLEM SELECTOR PLAN 3
Persistently hypocalcemia. Serum Calcium 5.5 yesterday and pt. received IV calcium gluconate. Continue calcitriol and calcium acetate. Given low Vitamin D ( total and 1,25) would given ergocalciferol 50,000 units Q weekly with 1st dose today. iPTH level significantly elevated. Likely signifies ongoing CKD and now severe hypocalcemia stimulating PTH release. Monitor Calcium. Monitor QTC.    If you have any questions, please feel free to contact me  Johnny Ch  Nephrology Fellow  [phone]  (After 5pm or on weekends please page the on-call fellow)

## 2021-10-09 NOTE — PROGRESS NOTE ADULT - ATTENDING COMMENTS
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    Patient seen and examined, states he feels better. He is incontinent but making urine. Also states his SOB is better and that he was able to eat some eggs and ice cream. He states his diarrhea has completely resolved.     #GÉNESIS on CKD  #High anion gap metabolic acidosis  #Hypocalcemia  - Renal following recs appreciated. BUN/Cr continues to improve slowly w/ IVF hydration. S/p bicarb gtt w/ improvement in bicarb. No indication for HD currently   - C/w LR   - Replete K w/ 20mg PO x1 as persistently low   - Hypocalcemia noted, will replete as needed. Monitor QTC   - Condom cath placed for Is/Os   - Acidemia improved, pH now normalized   - Iron studies consistent with anemia of chronic disease, suspect from renal failure --> will follow-up renal if patient needs Epo    Discussed with HS 3 Dr. Collins  Discussed with patient's son Ge at bedside and updated

## 2021-10-09 NOTE — PROGRESS NOTE ADULT - PROBLEM SELECTOR PLAN 3
corrected calcium 6.2, iCal .77  - 2 g calcium gluconate with 1 g Mg on 10/7  - calcitriol and calcium acetate, calcium gluconate with 1 g Mg 10/8  - vit D 25-oh, vit D 1,25-OH , PTH  - calcitriol 0.5 mcg daily PO corrected calcium 6.2, iCal .77  - 2 g calcium gluconate with 1 g Mg on 10/7  - calcitriol and calcium acetate, calcium gluconate with 1 g Mg 10/8  - vit D 25-oh, vit D 1,25-OH , PTH  - calcitriol 0.5 mcg daily PO  - 10/9: Ca improving, will give additional 1g today and recheck

## 2021-10-09 NOTE — PROGRESS NOTE ADULT - PROBLEM SELECTOR PLAN 6
BP above goal   - c/w reduced dose of home metoprolol 25mg BID (home dose 50mg BID)  - restart home nifedipine for elevated BPs BP above goal   - Metop to home dose 50mg BID  - restart home nifedipine for elevated BPs

## 2021-10-09 NOTE — PROGRESS NOTE ADULT - PROBLEM SELECTOR PLAN 1
Pt with GÉNESIS on CKD in the setting of poor appetite and diarrhea. Exact duration of GÉNESIS however unknown. Noted to have Scr of 1.4 prior to admission on 9/11/17, and no labs since. Outpatient labs with Scr of 12.48 and bicarb of less than 5. Scr of 12.64 with serum potassium of of 5.7, and bicarb of < 7 in ER. Pt. initiated on bicarb drip. Bicarb drip was discontinued given hypokalemia and hypocalcemia. UA with large leuk est, 100 mg/dl protein, 5-10 RBC and > 50 WBC. Spot urine TP/Cr elevated at 2.7. Kidney u/s with b/l multiple cysts with no hydronephrosis. Pt. currently non-oliguric and on maintenance IVF with LR. Last Scr improved to 8.98 with bicarb of 15. Continue LR given ongoing hypocalcemia and hypokalemia. Monitor labs and accurate urine output. Will need to repeat spot urine TP/Cr once GÉNESIS resolves. Avoid NSAIDs, ACEI/ARBS, RCA and nephrotoxins. Dose medications as per eGFR.

## 2021-10-09 NOTE — PROGRESS NOTE ADULT - PROBLEM SELECTOR PLAN 2
In setting of chronic diarrhea and advance renal failure. Last bicarb improved to 15. LR as above. Will avoid oral or IV bicarb supplementation until other electrolytes are repleted. Monitor serum CO2 and pH.

## 2021-10-10 DIAGNOSIS — I95.9 HYPOTENSION, UNSPECIFIED: ICD-10-CM

## 2021-10-10 LAB
ALBUMIN SERPL ELPH-MCNC: 2.8 G/DL — LOW (ref 3.3–5)
ALP SERPL-CCNC: 64 U/L — SIGNIFICANT CHANGE UP (ref 40–120)
ALT FLD-CCNC: 7 U/L — SIGNIFICANT CHANGE UP (ref 4–41)
ANION GAP SERPL CALC-SCNC: 17 MMOL/L — HIGH (ref 7–14)
ANION GAP SERPL CALC-SCNC: 17 MMOL/L — HIGH (ref 7–14)
ANION GAP SERPL CALC-SCNC: 19 MMOL/L — HIGH (ref 7–14)
AST SERPL-CCNC: 14 U/L — SIGNIFICANT CHANGE UP (ref 4–40)
BASOPHILS # BLD AUTO: 0.01 K/UL — SIGNIFICANT CHANGE UP (ref 0–0.2)
BASOPHILS NFR BLD AUTO: 0.1 % — SIGNIFICANT CHANGE UP (ref 0–2)
BILIRUB SERPL-MCNC: 0.4 MG/DL — SIGNIFICANT CHANGE UP (ref 0.2–1.2)
BLOOD GAS VENOUS COMPREHENSIVE RESULT: SIGNIFICANT CHANGE UP
BUN SERPL-MCNC: 102 MG/DL — HIGH (ref 7–23)
BUN SERPL-MCNC: 108 MG/DL — HIGH (ref 7–23)
BUN SERPL-MCNC: 109 MG/DL — HIGH (ref 7–23)
CA-I BLD-SCNC: 0.89 MMOL/L — LOW (ref 1.15–1.29)
CALCIUM SERPL-MCNC: 6.6 MG/DL — LOW (ref 8.4–10.5)
CALCIUM SERPL-MCNC: 6.7 MG/DL — LOW (ref 8.4–10.5)
CALCIUM SERPL-MCNC: 7 MG/DL — LOW (ref 8.4–10.5)
CHLORIDE SERPL-SCNC: 105 MMOL/L — SIGNIFICANT CHANGE UP (ref 98–107)
CHLORIDE SERPL-SCNC: 106 MMOL/L — SIGNIFICANT CHANGE UP (ref 98–107)
CHLORIDE SERPL-SCNC: 106 MMOL/L — SIGNIFICANT CHANGE UP (ref 98–107)
CO2 SERPL-SCNC: 13 MMOL/L — LOW (ref 22–31)
CO2 SERPL-SCNC: 16 MMOL/L — LOW (ref 22–31)
CO2 SERPL-SCNC: 16 MMOL/L — LOW (ref 22–31)
CREAT SERPL-MCNC: 7.66 MG/DL — HIGH (ref 0.5–1.3)
CREAT SERPL-MCNC: 7.67 MG/DL — HIGH (ref 0.5–1.3)
CREAT SERPL-MCNC: 8.55 MG/DL — HIGH (ref 0.5–1.3)
EOSINOPHIL # BLD AUTO: 0.17 K/UL — SIGNIFICANT CHANGE UP (ref 0–0.5)
EOSINOPHIL NFR BLD AUTO: 2.2 % — SIGNIFICANT CHANGE UP (ref 0–6)
GLUCOSE SERPL-MCNC: 105 MG/DL — HIGH (ref 70–99)
GLUCOSE SERPL-MCNC: 117 MG/DL — HIGH (ref 70–99)
GLUCOSE SERPL-MCNC: 125 MG/DL — HIGH (ref 70–99)
HCT VFR BLD CALC: 22.7 % — LOW (ref 39–50)
HGB BLD-MCNC: 7.7 G/DL — LOW (ref 13–17)
IANC: 6 K/UL — SIGNIFICANT CHANGE UP (ref 1.5–8.5)
IMM GRANULOCYTES NFR BLD AUTO: 0.6 % — SIGNIFICANT CHANGE UP (ref 0–1.5)
LYMPHOCYTES # BLD AUTO: 0.91 K/UL — LOW (ref 1–3.3)
LYMPHOCYTES # BLD AUTO: 11.8 % — LOW (ref 13–44)
MAGNESIUM SERPL-MCNC: 1.9 MG/DL — SIGNIFICANT CHANGE UP (ref 1.6–2.6)
MAGNESIUM SERPL-MCNC: 1.9 MG/DL — SIGNIFICANT CHANGE UP (ref 1.6–2.6)
MAGNESIUM SERPL-MCNC: 2 MG/DL — SIGNIFICANT CHANGE UP (ref 1.6–2.6)
MCHC RBC-ENTMCNC: 31 PG — SIGNIFICANT CHANGE UP (ref 27–34)
MCHC RBC-ENTMCNC: 33.9 GM/DL — SIGNIFICANT CHANGE UP (ref 32–36)
MCV RBC AUTO: 91.5 FL — SIGNIFICANT CHANGE UP (ref 80–100)
MONOCYTES # BLD AUTO: 0.57 K/UL — SIGNIFICANT CHANGE UP (ref 0–0.9)
MONOCYTES NFR BLD AUTO: 7.4 % — SIGNIFICANT CHANGE UP (ref 2–14)
NEUTROPHILS # BLD AUTO: 6 K/UL — SIGNIFICANT CHANGE UP (ref 1.8–7.4)
NEUTROPHILS NFR BLD AUTO: 77.9 % — HIGH (ref 43–77)
NRBC # BLD: 0 /100 WBCS — SIGNIFICANT CHANGE UP
NRBC # FLD: 0 K/UL — SIGNIFICANT CHANGE UP
PHOSPHATE SERPL-MCNC: 4.4 MG/DL — SIGNIFICANT CHANGE UP (ref 2.5–4.5)
PHOSPHATE SERPL-MCNC: 4.6 MG/DL — HIGH (ref 2.5–4.5)
PHOSPHATE SERPL-MCNC: 5.2 MG/DL — HIGH (ref 2.5–4.5)
PLATELET # BLD AUTO: 99 K/UL — LOW (ref 150–400)
POTASSIUM SERPL-MCNC: 3.6 MMOL/L — SIGNIFICANT CHANGE UP (ref 3.5–5.3)
POTASSIUM SERPL-MCNC: 3.9 MMOL/L — SIGNIFICANT CHANGE UP (ref 3.5–5.3)
POTASSIUM SERPL-MCNC: 4.1 MMOL/L — SIGNIFICANT CHANGE UP (ref 3.5–5.3)
POTASSIUM SERPL-SCNC: 3.6 MMOL/L — SIGNIFICANT CHANGE UP (ref 3.5–5.3)
POTASSIUM SERPL-SCNC: 3.9 MMOL/L — SIGNIFICANT CHANGE UP (ref 3.5–5.3)
POTASSIUM SERPL-SCNC: 4.1 MMOL/L — SIGNIFICANT CHANGE UP (ref 3.5–5.3)
PROT SERPL-MCNC: 4.9 G/DL — LOW (ref 6–8.3)
RBC # BLD: 2.48 M/UL — LOW (ref 4.2–5.8)
RBC # FLD: 14.4 % — SIGNIFICANT CHANGE UP (ref 10.3–14.5)
SODIUM SERPL-SCNC: 138 MMOL/L — SIGNIFICANT CHANGE UP (ref 135–145)
SODIUM SERPL-SCNC: 138 MMOL/L — SIGNIFICANT CHANGE UP (ref 135–145)
SODIUM SERPL-SCNC: 139 MMOL/L — SIGNIFICANT CHANGE UP (ref 135–145)
WBC # BLD: 7.71 K/UL — SIGNIFICANT CHANGE UP (ref 3.8–10.5)
WBC # FLD AUTO: 7.71 K/UL — SIGNIFICANT CHANGE UP (ref 3.8–10.5)

## 2021-10-10 PROCEDURE — 99232 SBSQ HOSP IP/OBS MODERATE 35: CPT | Mod: GC

## 2021-10-10 PROCEDURE — 99233 SBSQ HOSP IP/OBS HIGH 50: CPT | Mod: GC

## 2021-10-10 RX ORDER — CALCIUM GLUCONATE 100 MG/ML
1 VIAL (ML) INTRAVENOUS ONCE
Refills: 0 | Status: COMPLETED | OUTPATIENT
Start: 2021-10-10 | End: 2021-10-10

## 2021-10-10 RX ORDER — MAGNESIUM SULFATE 500 MG/ML
1 VIAL (ML) INJECTION ONCE
Refills: 0 | Status: COMPLETED | OUTPATIENT
Start: 2021-10-10 | End: 2021-10-10

## 2021-10-10 RX ORDER — LANOLIN ALCOHOL/MO/W.PET/CERES
3 CREAM (GRAM) TOPICAL ONCE
Refills: 0 | Status: COMPLETED | OUTPATIENT
Start: 2021-10-10 | End: 2021-10-10

## 2021-10-10 RX ORDER — SODIUM CHLORIDE 9 MG/ML
1000 INJECTION, SOLUTION INTRAVENOUS
Refills: 0 | Status: DISCONTINUED | OUTPATIENT
Start: 2021-10-10 | End: 2021-10-11

## 2021-10-10 RX ADMIN — CALCITRIOL 0.5 MICROGRAM(S): 0.5 CAPSULE ORAL at 13:35

## 2021-10-10 RX ADMIN — Medication 50 GRAM(S): at 11:49

## 2021-10-10 RX ADMIN — SODIUM CHLORIDE 100 MILLILITER(S): 9 INJECTION, SOLUTION INTRAVENOUS at 16:28

## 2021-10-10 RX ADMIN — FINASTERIDE 5 MILLIGRAM(S): 5 TABLET, FILM COATED ORAL at 13:35

## 2021-10-10 RX ADMIN — Medication 3 MILLIGRAM(S): at 02:10

## 2021-10-10 RX ADMIN — Medication 667 MILLIGRAM(S): at 13:35

## 2021-10-10 RX ADMIN — Medication 667 MILLIGRAM(S): at 17:06

## 2021-10-10 RX ADMIN — Medication 100 GRAM(S): at 10:00

## 2021-10-10 RX ADMIN — Medication 3 MILLIGRAM(S): at 21:44

## 2021-10-10 RX ADMIN — PANTOPRAZOLE SODIUM 40 MILLIGRAM(S): 20 TABLET, DELAYED RELEASE ORAL at 13:35

## 2021-10-10 RX ADMIN — SODIUM CHLORIDE 100 MILLILITER(S): 9 INJECTION, SOLUTION INTRAVENOUS at 10:04

## 2021-10-10 RX ADMIN — HEPARIN SODIUM 5000 UNIT(S): 5000 INJECTION INTRAVENOUS; SUBCUTANEOUS at 21:44

## 2021-10-10 RX ADMIN — HEPARIN SODIUM 5000 UNIT(S): 5000 INJECTION INTRAVENOUS; SUBCUTANEOUS at 13:36

## 2021-10-10 RX ADMIN — HEPARIN SODIUM 5000 UNIT(S): 5000 INJECTION INTRAVENOUS; SUBCUTANEOUS at 05:39

## 2021-10-10 RX ADMIN — Medication 50 GRAM(S): at 17:07

## 2021-10-10 RX ADMIN — Medication 50 MILLIGRAM(S): at 17:07

## 2021-10-10 NOTE — PROVIDER CONTACT NOTE (OTHER) - SITUATION
bp 180/81
Patient /95
bp 185/95
pt's bp is 105/50, HR 70
pt's bp is 174/78, HR 66
Patient /100
Patient /84
Patient /82
straight catheterization attempted per MD order; unsuccessful by 2RNs.

## 2021-10-10 NOTE — PROGRESS NOTE ADULT - PROBLEM SELECTOR PLAN 2
- sCr 12.64 on admit, bl~1.3 (2017)  - per patient, he is making urine. never been on dialysis. GFR on admit 3  - strict I&O  - daily weights  - Renal recs appreciated  - Renal US w/ renal parenchymal disease   - Renal protective measures: Please renally dose all medications; Avoid nephrotoxic medications (NSAIDS, IV contrast); Avoid ACEi and ARBs in the setting of GÉNESIS; Maintain MAP >65; Low Na, K, phos, and protein diet  - Orthostatic this AM, will c/w maintenance IVF

## 2021-10-10 NOTE — PROVIDER CONTACT NOTE (OTHER) - ACTION/TREATMENT ORDERED:
no recommendation at this time. will continue monitoring.
MD made aware. Administer Metoprolol.
MD made aware, no further action at this time. Patient care continued.
MD made aware. Ordered to continue to monitor BP.
MD made aware. Administer Nifedipine. Nursing care continued.
MD notified. No new orders at this time. MD to consult urology. Will continue to monitor.
MD made aware. Administer metoprolol. Nursing care continued.
will repeat bp and continue monitoring.
MD made aware, no further action at this time. Patient given standing metoprolol PO. Patient care continued.

## 2021-10-10 NOTE — PROGRESS NOTE ADULT - PROBLEM SELECTOR PLAN 10
FEN/GI: kidney diet, soft   Lines:  GTT:  PPx: hep sq  PT/OT: consult  Dispo: continue routine inpatient care  Code Status: Full, pending discussion

## 2021-10-10 NOTE — PROGRESS NOTE ADULT - ATTENDING COMMENTS
1. GÉNESIS on CKD - renal function as noted without improvement comparative to yesterday.  Patient without appetite.  No evidence of pericardial rub and lungs currently clear.  Awake and alert.  Spoke to the patient regarding his renal status and the potential for dialysis if not improving.  He understood and will contemplate.  Imaging suggesting chronic renal disease though baseline is unclear.  2. Hypocalcemia - continue with ordered regimen and monitor for improvement.  3. Vitamin D deficiency - see above orders.  Received ergocalciferol 50,000 units yesterday.  4. Metabolic acidosis - monitor of LR

## 2021-10-10 NOTE — PROGRESS NOTE ADULT - PROBLEM SELECTOR PLAN 6
BP above goal   - Metop to home dose 50mg BID  - restart home nifedipine for elevated BPs Likely 2/2 CKD  - Will obtain iron panel, B12/folate r/o other etiologies  - Will continue to monitor CBC

## 2021-10-10 NOTE — PROGRESS NOTE ADULT - SUBJECTIVE AND OBJECTIVE BOX
[facility] Division of Kidney Diseases & Hypertension  FOLLOW UP NOTE  [phone]--------------------------------------------------------------------------------    HPI : 89 year old male with CKD3, HTN, BPH, hiatal hernia presented to [facility] for abnormal labs. Pt. with chronic diarrhea for past 1 month, Outpatient lab work with Scr of 12.48 and bicarb of < 5, which prompted ER referral. Nephrology following for GÉNESIS and metabolic acidosis. [facility], patient noted to have Scr of 12.64 and serum potassium of 5.7, with bicarb of < 7. Pt. initiated on bicarb gtt on 10/7/21. Bicarb drip was discontinued and currently patient on LR. Scr improved to 8.55 with bicarb of 16.     Patient was seen and examined at bedside. Pt. states he feels week and has minimal appetite. Pt. however feels his SOB has improved.    PAST HISTORY  --------------------------------------------------------------------------------  No significant changes to PMH, PSH, FHx, SHx, unless otherwise noted    ALLERGIES & MEDICATIONS  --------------------------------------------------------------------------------  Allergies    No Known Allergies    Intolerances    Standing Inpatient Medications  calcitriol   Capsule 0.5 MICROGram(s) Oral daily  calcium acetate 667 milliGRAM(s) Oral three times a day with meals  calcium gluconate IVPB 1 Gram(s) IV Intermittent once  finasteride 5 milliGRAM(s) Oral daily  heparin   Injectable 5000 Unit(s) SubCutaneous every 8 hours  influenza  Vaccine (HIGH DOSE) 0.7 milliLiter(s) IntraMuscular once  lactated ringers. 1000 milliLiter(s) IV Continuous <Continuous>  melatonin 3 milliGRAM(s) Oral <User Schedule>  metoprolol tartrate 50 milliGRAM(s) Oral two times a day  NIFEdipine XL 60 milliGRAM(s) Oral daily  pantoprazole    Tablet 40 milliGRAM(s) Oral daily    VITALS/PHYSICAL EXAM  --------------------------------------------------------------------------------  T(C): 36.8 (10-10-21 @ 05:35), Max: 36.8 (10-10-21 @ 05:35)  HR: 88 (10-10-21 @ 05:35) (68 - 88)  BP: 107/57 (10-10-21 @ 05:35) (105/50 - 115/64)  RR: 17 (10-10-21 @ 05:35) (17 - 18)  SpO2: 98% (10-10-21 @ 05:35) (97% - 100%)  Wt(kg): --    10-09-21 @ 07:01  -  10-10-21 @ 07:00  --------------------------------------------------------  IN: 1560 mL / OUT: 0 mL / NET: 1560 mL    Physical Exam:              Gen: NAD  	HEENT: Anicteric  	Pulm: CTA B/L  	CV: S1S2  	Abd: Soft, +BS   	Ext: No LE edema B/L  	Neuro: Awake  	Skin: Warm and dry    LABS/STUDIES  --------------------------------------------------------------------------------              7.7    7.71  >-----------<  99       [10-10-21 @ 07:38]              22.7     138  |  105  |  108  ----------------------------<  125      [10-10-21 @ 07:38]  3.6   |  16  |  8.55        Ca     7.0     [10-10-21 @ 07:38]      iCa    0.89     [10-10 @ 07:38]      Mg     1.90     [10-10-21 @ 07:38]      Phos  5.2     [10-10-21 @ 07:38]    TPro  4.9  /  Alb  2.8  /  TBili  0.4  /  DBili  x   /  AST  14  /  ALT  7   /  AlkPhos  64  [10-10-21 @ 07:38]    Creatinine Trend:  SCr 8.55 [10-10 @ 07:38]  SCr 8.50 [10-09 @ 18:35]  SCr 8.98 [10-09 @ 07:17]  SCr 9.03 [10-09 @ 00:52]  SCr 9.40 [10-08 @ 17:11]    Urinalysis - [10-07-21 @ 06:45]      Color Yellow / Appearance Turbid / SG 1.013 / pH 8.5      Gluc Negative / Ketone Negative  / Bili Negative / Urobili <2 mg/dL       Blood Trace / Protein 300 mg/dL / Leuk Est Large / Nitrite Negative      RBC 6 / WBC 56 / Hyaline 1 / Gran  / Sq Epi  / Non Sq Epi 7 / Bacteria Many    Urine Creatinine 46      [10-08-21 @ 17:11]  Urine Protein 128      [10-08-21 @ 17:11]  Urine Sodium 103      [10-08-21 @ 17:11]  Urine Urea Nitrogen 334.0      [10-08-21 @ 17:11]  Urine Chloride 91      [10-08-21 @ 17:11]  Urine Osmolality 443      [10-07-21 @ 06:45]    Iron 67, TIBC 134, %sat 50      [10-07-21 @ 07:27]  Ferritin 204      [10-07-21 @ 07:27]  PTH -- (Ca --)      [10-08-21 @ 17:11]   730  Vitamin D (25OH) 5.0      [10-08-21 @ 22:39]

## 2021-10-10 NOTE — PROGRESS NOTE ADULT - PROBLEM SELECTOR PLAN 4
- resolved. will obtain GI PCR, stool O&P, stool culture if symptoms resume  - no leukocytosis or fevers or recent abx use to suggest C diff, will continue to monitor, if worsening will consider sending improving. corrected calcium 6.2 on admit, now 8, iCal .77, now 0.87  - 2 g calcium gluconate with 1 g Mg on 10/7  - calcitriol and calcium acetate, calcium gluconate with 1 g Mg 10/8  - vit D 25-oh, vit D 1,25-OH , , in response to hypocalcemia  - calcitriol 0.5 mcg daily PO  - 10/9: Ca improving, will give additional 1g today and recheck  - EKG, monitor QTC

## 2021-10-10 NOTE — PROGRESS NOTE ADULT - PROBLEM SELECTOR PLAN 9
FEN/GI: kidney diet, soft   Lines:  GTT:  PPx: hep sq  PT/OT: consult  Dispo: continue routine inpatient care  Code Status: Full, pending discussion - c/w finasteride

## 2021-10-10 NOTE — PROGRESS NOTE ADULT - PROBLEM SELECTOR PLAN 2
In setting of chronic diarrhea and advance renal failure. Last bicarb improved to 16. LR as above. Will avoid oral or IV bicarb supplementation until other electrolytes are replete. Monitor serum CO2 and pH.

## 2021-10-10 NOTE — PROGRESS NOTE ADULT - SUBJECTIVE AND OBJECTIVE BOX
PROGRESS NOTE:   Authored by Estrellita Dan MD  Internal Medicine  Pager [place] [phone]  Pager [phone]    Patient is a 89y old  Male who presents with a chief complaint of Anion gap metabolic acidosis (09 Oct 2021 11:37)      SUBJECTIVE / OVERNIGHT EVENTS: got 2 g calcium. K 3.3 got 20 meq K repleted    MEDICATIONS  (STANDING):  calcitriol   Capsule 0.5 MICROGram(s) Oral daily  calcium acetate 667 milliGRAM(s) Oral three times a day with meals  finasteride 5 milliGRAM(s) Oral daily  heparin   Injectable 5000 Unit(s) SubCutaneous every 8 hours  influenza  Vaccine (HIGH DOSE) 0.7 milliLiter(s) IntraMuscular once  lactated ringers. 1000 milliLiter(s) (100 mL/Hr) IV Continuous <Continuous>  melatonin 3 milliGRAM(s) Oral <User Schedule>  metoprolol tartrate 50 milliGRAM(s) Oral two times a day  NIFEdipine XL 60 milliGRAM(s) Oral daily  pantoprazole    Tablet 40 milliGRAM(s) Oral daily    MEDICATIONS  (PRN):  acetaminophen   Tablet .. 650 milliGRAM(s) Oral every 6 hours PRN Mild Pain (1 - 3), Moderate Pain (4 - 6)      CAPILLARY BLOOD GLUCOSE        I&O's Summary    08 Oct 2021 07:01  -  09 Oct 2021 07:00  --------------------------------------------------------  IN: 750 mL / OUT: 100 mL / NET: 650 mL    09 Oct 2021 07:01  -  10 Oct 2021 06:49  --------------------------------------------------------  IN: 1560 mL / OUT: 0 mL / NET: 1560 mL        PHYSICAL EXAM:  Vital Signs Last 24 Hrs  T(C): 36.8 (10 Oct 2021 05:35), Max: 36.8 (10 Oct 2021 05:35)  T(F): 98.2 (10 Oct 2021 05:35), Max: 98.2 (10 Oct 2021 05:35)  HR: 88 (10 Oct 2021 05:35) (68 - 88)  BP: 107/57 (10 Oct 2021 05:35) (105/50 - 115/64)  BP(mean): --  RR: 17 (10 Oct 2021 05:35) (17 - 18)  SpO2: 98% (10 Oct 2021 05:35) (97% - 100%)  CONSTITUTIONAL: Well-groomed, in no apparent distress  EYES: No conjunctival or scleral injection, non-icteric;   ENMT: No external nasal lesions; MMM  NECK: Trachea midline without palpable neck mass; thyroid not enlarged and non-tender  RESPIRATORY: Breathing comfortably; no dullness to percussion; lungs CTA without wheeze/rhonchi/rales  CARDIOVASCULAR: +S1S2, RRR, no M/G/R; pedal pulses full and symmetric; no lower extremity edema  GASTROINTESTINAL: No palpable masses or tenderness, +BS throughout, no rebound/guarding; no hepatosplenomegaly; no hernia palpated  LYMPHATIC: No cervical LAD or tenderness  SKIN: No rashes or ulcers noted  NEUROLOGIC: CN II-XII intact; sensation intact in LEs b/l to light touch  PSYCHIATRIC: A+O x 3; mood and affect appropriate; appropriate insight and judgment    LABS:                        7.9    5.74  )-----------( 99       ( 09 Oct 2021 07:17 )             23.5     10-09    139  |  102  |  107<H>  ----------------------------<  132<H>  3.3<L>   |  17<L>  |  8.50<H>    Ca    6.5<LL>      09 Oct 2021 18:35  Phos  5.6     10-09  Mg     1.90     10-09    TPro  5.4<L>  /  Alb  3.1<L>  /  TBili  0.5  /  DBili  x   /  AST  14  /  ALT  6   /  AlkPhos  63  10-09                RADIOLOGY & ADDITIONAL TESTS:  Results Reviewed:   Imaging Personally Reviewed:  Electrocardiogram Personally Reviewed:    COORDINATION OF CARE:  Care Discussed with Consultants/Other Providers [Y/N]:  Prior or Outpatient Records Reviewed [Y/N]:   PROGRESS NOTE:   Authored by Estrellita Dan MD  Internal Medicine  Pager [place] [phone]  Pager [phone]    Patient is a 89y old  Male who presents with a chief complaint of Anion gap metabolic acidosis (09 Oct 2021 11:37)      SUBJECTIVE / OVERNIGHT EVENTS: got 2 g calcium. K 3.3 got 20 meq K repleted. patient seen and examined at bedside. feels tired, has been feeling tired. now hungry and wants help to eat breakfast. no pain, incontinent but peeing okay, pooping okay. no sob. no other complaints, really just wants to eat breakfast please.    MEDICATIONS  (STANDING):  calcitriol   Capsule 0.5 MICROGram(s) Oral daily  calcium acetate 667 milliGRAM(s) Oral three times a day with meals  finasteride 5 milliGRAM(s) Oral daily  heparin   Injectable 5000 Unit(s) SubCutaneous every 8 hours  influenza  Vaccine (HIGH DOSE) 0.7 milliLiter(s) IntraMuscular once  lactated ringers. 1000 milliLiter(s) (100 mL/Hr) IV Continuous <Continuous>  melatonin 3 milliGRAM(s) Oral <User Schedule>  metoprolol tartrate 50 milliGRAM(s) Oral two times a day  NIFEdipine XL 60 milliGRAM(s) Oral daily  pantoprazole    Tablet 40 milliGRAM(s) Oral daily    MEDICATIONS  (PRN):  acetaminophen   Tablet .. 650 milliGRAM(s) Oral every 6 hours PRN Mild Pain (1 - 3), Moderate Pain (4 - 6)      CAPILLARY BLOOD GLUCOSE        I&O's Summary    08 Oct 2021 07:01  -  09 Oct 2021 07:00  --------------------------------------------------------  IN: 750 mL / OUT: 100 mL / NET: 650 mL    09 Oct 2021 07:01  -  10 Oct 2021 06:49  --------------------------------------------------------  IN: 1560 mL / OUT: 0 mL / NET: 1560 mL        PHYSICAL EXAM:  Vital Signs Last 24 Hrs  T(C): 36.8 (10 Oct 2021 05:35), Max: 36.8 (10 Oct 2021 05:35)  T(F): 98.2 (10 Oct 2021 05:35), Max: 98.2 (10 Oct 2021 05:35)  HR: 88 (10 Oct 2021 05:35) (68 - 88)  BP: 107/57 (10 Oct 2021 05:35) (105/50 - 115/64)  BP(mean): --  RR: 17 (10 Oct 2021 05:35) (17 - 18)  SpO2: 98% (10 Oct 2021 05:35) (97% - 100%)  CONSTITUTIONAL: NAD, alert, hard of hearing  EYES: No conjunctival or scleral injection, non-icteric;   ENMT: No external nasal lesions; MMM  RESPIRATORY: Breathing comfortably; no dullness to percussion; lungs CTA without wheeze/rhonchi/rales  CARDIOVASCULAR: +S1S2, RRR, no M/G/R; pedal pulses full and symmetric; no lower extremity edema  GASTROINTESTINAL: No palpable masses or tenderness, +BS throughout, no rebound/guarding; no hepatosplenomegaly; no hernia palpated  LYMPHATIC: No cervical LAD or tenderness  SKIN: No rashes or ulcers noted  NEUROLOGIC: CN II-XII intact; sensation intact in LEs b/l to light touch  PSYCHIATRIC: A+O x 3; mood and affect appropriate; appropriate insight and judgment    LABS:                        7.9    5.74  )-----------( 99       ( 09 Oct 2021 07:17 )             23.5     10-09    139  |  102  |  107<H>  ----------------------------<  132<H>  3.3<L>   |  17<L>  |  8.50<H>    Ca    6.5<LL>      09 Oct 2021 18:35  Phos  5.6     10-09  Mg     1.90     10-09    TPro  5.4<L>  /  Alb  3.1<L>  /  TBili  0.5  /  DBili  x   /  AST  14  /  ALT  6   /  AlkPhos  63  10-09                RADIOLOGY & ADDITIONAL TESTS:  Results Reviewed:   Imaging Personally Reviewed:  Electrocardiogram Personally Reviewed:    COORDINATION OF CARE:  Care Discussed with Consultants/Other Providers [Y/N]:  Prior or Outpatient Records Reviewed [Y/N]:   PROGRESS NOTE:   Authored by Estrellita Dan MD  Internal Medicine  Pager [place] [phone]  Pager [phone]    Patient is a 89y old  Male who presents with a chief complaint of Anion gap metabolic acidosis (09 Oct 2021 11:37)      SUBJECTIVE / OVERNIGHT EVENTS: got 2 g calcium. K 3.3 got 20 meq K repleted. patient seen and examined at bedside. feels tired, has been feeling tired. now hungry and wants help to eat breakfast. no pain, incontinent but peeing okay, pooping okay. no sob. no other complaints, really just wants to eat breakfast please.    MEDICATIONS  (STANDING):  calcitriol   Capsule 0.5 MICROGram(s) Oral daily  calcium acetate 667 milliGRAM(s) Oral three times a day with meals  finasteride 5 milliGRAM(s) Oral daily  heparin   Injectable 5000 Unit(s) SubCutaneous every 8 hours  influenza  Vaccine (HIGH DOSE) 0.7 milliLiter(s) IntraMuscular once  lactated ringers. 1000 milliLiter(s) (100 mL/Hr) IV Continuous <Continuous>  melatonin 3 milliGRAM(s) Oral <User Schedule>  metoprolol tartrate 50 milliGRAM(s) Oral two times a day  NIFEdipine XL 60 milliGRAM(s) Oral daily  pantoprazole    Tablet 40 milliGRAM(s) Oral daily    MEDICATIONS  (PRN):  acetaminophen   Tablet .. 650 milliGRAM(s) Oral every 6 hours PRN Mild Pain (1 - 3), Moderate Pain (4 - 6)      CAPILLARY BLOOD GLUCOSE        I&O's Summary    08 Oct 2021 07:01  -  09 Oct 2021 07:00  --------------------------------------------------------  IN: 750 mL / OUT: 100 mL / NET: 650 mL    09 Oct 2021 07:01  -  10 Oct 2021 06:49  --------------------------------------------------------  IN: 1560 mL / OUT: 0 mL / NET: 1560 mL        PHYSICAL EXAM:  Vital Signs Last 24 Hrs  T(C): 36.8 (10 Oct 2021 05:35), Max: 36.8 (10 Oct 2021 05:35)  T(F): 98.2 (10 Oct 2021 05:35), Max: 98.2 (10 Oct 2021 05:35)  HR: 88 (10 Oct 2021 05:35) (68 - 88)  BP: 107/57 (10 Oct 2021 05:35) (105/50 - 115/64)  BP(mean): --  RR: 17 (10 Oct 2021 05:35) (17 - 18)  SpO2: 98% (10 Oct 2021 05:35) (97% - 100%)  CONSTITUTIONAL: NAD, alert, hard of hearing  EYES: No conjunctival or scleral injection, non-icteric;   ENMT: No external nasal lesions; MMM  RESPIRATORY: Breathing comfortably lungs CTA without wheeze/rhonchi/rales  CARDIOVASCULAR: +S1S2, RRR, no M/G/R; pedal pulses full and symmetric; no lower extremity edema  GASTROINTESTINAL: No palpable masses or tenderness, +BS throughout, no rebound/guarding; no hepatosplenomegaly; no hernia palpated  LYMPHATIC: No cervical LAD or tenderness  SKIN: No rashes or ulcers noted  PSYCHIATRIC: A+O x 3; mood and affect appropriate    LABS:                        7.9    5.74  )-----------( 99       ( 09 Oct 2021 07:17 )             23.5     10-09    139  |  102  |  107<H>  ----------------------------<  132<H>  3.3<L>   |  17<L>  |  8.50<H>    Ca    6.5<LL>      09 Oct 2021 18:35  Phos  5.6     10-09  Mg     1.90     10-09    TPro  5.4<L>  /  Alb  3.1<L>  /  TBili  0.5  /  DBili  x   /  AST  14  /  ALT  6   /  AlkPhos  63  10-09                RADIOLOGY & ADDITIONAL TESTS:  Results Reviewed:   Imaging Personally Reviewed:  Electrocardiogram Personally Reviewed:    COORDINATION OF CARE:  Care Discussed with Consultants/Other Providers [Y/N]:  Prior or Outpatient Records Reviewed [Y/N]:

## 2021-10-10 NOTE — PROGRESS NOTE ADULT - PROBLEM SELECTOR PLAN 1
Improving, Likely due to GÉNESIS on CKD  - , sCr 12.64. with decreased PO intake and energy and weakness over the past couple days. no asterixis on exam. hx of CKD  - bicarb <7 on admit, VBG pH 7.06, lactate 1. AG 23. likely 2/2 uremia, less likely ingestion. no hx of diabetes, glucose 122  - s/p 1 amp bicarb en route. MICU consulted, not candidate for urgent HD  - bicarb gtt stopped, LR at 150 cc/h  - tox screen  - neph consult, appreciate assistance  - Monitor BMP

## 2021-10-10 NOTE — PROVIDER CONTACT NOTE (OTHER) - ASSESSMENT
Patient /100. Patient asymptomatic. Denies headache, dizziness, or vision changes.
Patient /84. Patient asymptomatic. Denies headache, dizziness, or vision changes.
pt is alert and oriented. no distress noted.
bp 185/95, patient asymptomatic and in no acute distress
pt is alert and oriented. no distress noted and denies any discomfort.
Patient /82. Patient asymptomatic. Denies headache, dizziness, or vision changes.
Patient /95. Patient asymptomatic. Denies headache, dizziness, or vision changes.
pt is alert and oriented. no distress noted and denies any discomfort. straight catheterization attempted per MD order; unsuccessful by 2RNs.
bp 180/81, patient asymptomatic and in no acute distress

## 2021-10-10 NOTE — PROGRESS NOTE ADULT - PROBLEM SELECTOR PLAN 5
Likely 2/2 CKD  - Will obtain iron panel, B12/folate r/o other etiologies  - Will continue to monitor CBC - resolved. will obtain GI PCR, stool O&P, stool culture if symptoms resume  - no leukocytosis or fevers or recent abx use to suggest C diff, will continue to monitor, if worsening will consider sending

## 2021-10-10 NOTE — PROVIDER CONTACT NOTE (OTHER) - BACKGROUND
Patient admitted for acute renal failure
pt is admitted with acute renal failure
Patient admitted for acute renal failure
pt is admitted with acute renal failure
Patient admitted for acute renal failure
Patient admitted for acute renal failure
pt is admitted with acute renal failure

## 2021-10-10 NOTE — PROVIDER CONTACT NOTE (OTHER) - DATE AND TIME:
07-Oct-2021 12:49
09-Oct-2021 23:48
07-Oct-2021 13:38
08-Oct-2021 17:22
08-Oct-2021 00:00
10-Oct-2021 13:00
08-Oct-2021 13:55
08-Oct-2021 23:48
08-Oct-2021 00:00

## 2021-10-10 NOTE — PROVIDER CONTACT NOTE (OTHER) - NAME OF MD/NP/PA/DO NOTIFIED:
Estrellita Dan MD
Estrellita Dan MD
 [id number]
Noelle Seaman MD
Estrellita Dan MD
MD Estrellita Dan
 [id number]
Noelle Seaman MD
Estrellita Dan MD

## 2021-10-10 NOTE — PROGRESS NOTE ADULT - PROBLEM SELECTOR PLAN 3
corrected calcium 6.2, iCal .77  - 2 g calcium gluconate with 1 g Mg on 10/7  - calcitriol and calcium acetate, calcium gluconate with 1 g Mg 10/8  - vit D 25-oh, vit D 1,25-OH , PTH  - calcitriol 0.5 mcg daily PO  - 10/9: Ca improving, will give additional 1g today and recheck improving. corrected calcium 6.2 on admit, now 8, iCal .77, now 0.87  - 2 g calcium gluconate with 1 g Mg on 10/7  - calcitriol and calcium acetate, calcium gluconate with 1 g Mg 10/8  - vit D 25-oh, vit D 1,25-OH , , in response to hypocalcemia  - calcitriol 0.5 mcg daily PO  - 10/9: Ca improving, will give additional 1g today and recheck  - EKG, monitor QTC - patient with decreasing blood pressure 107/57  - WBC uptrending, otherwise afebrile and asymptomatic  - Ddx with sepsis 2/2 UTI vs resuming blood pressure meds at home dose  - u/a with culture  - hold nifedipine in the setting of sepsis

## 2021-10-10 NOTE — PROGRESS NOTE ADULT - ATTENDING COMMENTS
Retinal exam findings communicated to Physician managing diabetes. 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    Patient seen and examined, still endorsing feeling weak. He is incontinent but making urine. He was able to eat some ice cream. He states his diarrhea has resolved.     #GÉNESIS on CKD  #High anion gap metabolic acidosis  #Hypocalcemia  - Renal following recs appreciated. BUN/Cr continues to improve slowly w/ IVF hydration however Cr has now stabilized. S/p bicarb gtt w/ improvement in bicarb. No indication for HD currently but if continues to endorse weakness may be related to uremia --> will need to continue to monitor and f/u renal recs in regards to RRT    - C/w LR for now    - Hypocalcemia noted, will replete as needed, likely due to CKD. Monitor QTC   - Will repeat UA and Ucx today   - BP low normal, will hold off Nifedipine   - Acidemia improved, pH now normalized   - Iron studies consistent with anemia of chronic disease, suspect from renal failure --> will follow-up renal if patient needs Epo    Discussed with HS 3 Dr. Dan

## 2021-10-10 NOTE — PROVIDER CONTACT NOTE (OTHER) - RECOMMENDATIONS
notify MD. Administer metoprolol. Continue to monitor BP. Nursing care continued.
Notify MD
Notify MD. Administer Metoprolol. Nursing care continued.
notify MD
Notify MD
notify MD. Administer Nifedipine. Continue to monitor BP. Nursing care continued.
Notify MD. Continue to monitor BP. Nursing care continued.
notify MD
notify MD

## 2021-10-10 NOTE — PROGRESS NOTE ADULT - PROBLEM SELECTOR PLAN 1
Pt with GÉNESIS on CKD in the setting of poor appetite and diarrhea. Exact duration of GÉNESIS however unknown. Noted to have Scr of 1.4 prior to admission on 9/11/17, and no labs since. Outpatient labs with Scr of 12.48 and bicarb of less than 5. Scr of 12.64 with serum potassium of of 5.7, and bicarb of < 7 in ER. Pt. initiated on bicarb drip. Bicarb drip was discontinued given hypokalemia and hypocalcemia. UA with large leuk est, 100 mg/dl protein, 5-10 RBC and > 50 WBC. Spot urine TP/Cr elevated at 2.7. Kidney u/s with b/l multiple cysts with no hydronephrosis. Also reviewed prior renal imaging from 2015 and 2017 consistent with renal parenchymal disease. Pt. currently non-oliguric and on maintenance IVF with LR. Scr improved to 8.55 with bicarb of 16.     Continue LR and encourage oral intake. Poor appetite and weakness might be secondary to BUN. Unclear recent Scr trend. Will continue to monitor for renal recovery. However if BUN remains elevated with uremic complaints, will need to establish GOC in regards to HD.    Monitor labs and accurate urine output. Recommend repeating UA and Urien Culture today. Avoid NSAIDs, ACEI/ARBS, RCA and nephrotoxins. Dose medications as per eGFR.

## 2021-10-11 DIAGNOSIS — N18.9 CHRONIC KIDNEY DISEASE, UNSPECIFIED: ICD-10-CM

## 2021-10-11 LAB
ANION GAP SERPL CALC-SCNC: 18 MMOL/L — HIGH (ref 7–14)
APPEARANCE UR: CLEAR — SIGNIFICANT CHANGE UP
BACTERIA # UR AUTO: ABNORMAL
BASOPHILS # BLD AUTO: 0.01 K/UL — SIGNIFICANT CHANGE UP (ref 0–0.2)
BASOPHILS # BLD AUTO: 0.01 K/UL — SIGNIFICANT CHANGE UP (ref 0–0.2)
BASOPHILS NFR BLD AUTO: 0.2 % — SIGNIFICANT CHANGE UP (ref 0–2)
BASOPHILS NFR BLD AUTO: 0.2 % — SIGNIFICANT CHANGE UP (ref 0–2)
BILIRUB UR-MCNC: NEGATIVE — SIGNIFICANT CHANGE UP
BLD GP AB SCN SERPL QL: NEGATIVE — SIGNIFICANT CHANGE UP
BLOOD GAS VENOUS COMPREHENSIVE RESULT: SIGNIFICANT CHANGE UP
BUN SERPL-MCNC: 106 MG/DL — HIGH (ref 7–23)
CALCIUM SERPL-MCNC: 6.8 MG/DL — LOW (ref 8.4–10.5)
CHLORIDE SERPL-SCNC: 104 MMOL/L — SIGNIFICANT CHANGE UP (ref 98–107)
CO2 SERPL-SCNC: 16 MMOL/L — LOW (ref 22–31)
COLOR SPEC: SIGNIFICANT CHANGE UP
CREAT SERPL-MCNC: 7.83 MG/DL — HIGH (ref 0.5–1.3)
DIFF PNL FLD: ABNORMAL
EOSINOPHIL # BLD AUTO: 0.36 K/UL — SIGNIFICANT CHANGE UP (ref 0–0.5)
EOSINOPHIL # BLD AUTO: 0.36 K/UL — SIGNIFICANT CHANGE UP (ref 0–0.5)
EOSINOPHIL NFR BLD AUTO: 5.6 % — SIGNIFICANT CHANGE UP (ref 0–6)
EOSINOPHIL NFR BLD AUTO: 5.7 % — SIGNIFICANT CHANGE UP (ref 0–6)
EPI CELLS # UR: 0 /HPF — SIGNIFICANT CHANGE UP (ref 0–5)
GLUCOSE SERPL-MCNC: 90 MG/DL — SIGNIFICANT CHANGE UP (ref 70–99)
GLUCOSE UR QL: ABNORMAL
HCT VFR BLD CALC: 20.7 % — CRITICAL LOW (ref 39–50)
HCT VFR BLD CALC: 21.1 % — LOW (ref 39–50)
HCT VFR BLD CALC: 22.6 % — LOW (ref 39–50)
HGB BLD-MCNC: 6.9 G/DL — CRITICAL LOW (ref 13–17)
HGB BLD-MCNC: 7.1 G/DL — LOW (ref 13–17)
HGB BLD-MCNC: 7.8 G/DL — LOW (ref 13–17)
HYALINE CASTS # UR AUTO: 1 /LPF — SIGNIFICANT CHANGE UP (ref 0–7)
IANC: 4.27 K/UL — SIGNIFICANT CHANGE UP (ref 1.5–8.5)
IANC: 4.43 K/UL — SIGNIFICANT CHANGE UP (ref 1.5–8.5)
IMM GRANULOCYTES NFR BLD AUTO: 0.8 % — SIGNIFICANT CHANGE UP (ref 0–1.5)
IMM GRANULOCYTES NFR BLD AUTO: 0.9 % — SIGNIFICANT CHANGE UP (ref 0–1.5)
KETONES UR-MCNC: NEGATIVE — SIGNIFICANT CHANGE UP
LEUKOCYTE ESTERASE UR-ACNC: ABNORMAL
LYMPHOCYTES # BLD AUTO: 1.04 K/UL — SIGNIFICANT CHANGE UP (ref 1–3.3)
LYMPHOCYTES # BLD AUTO: 1.28 K/UL — SIGNIFICANT CHANGE UP (ref 1–3.3)
LYMPHOCYTES # BLD AUTO: 16.4 % — SIGNIFICANT CHANGE UP (ref 13–44)
LYMPHOCYTES # BLD AUTO: 19.8 % — SIGNIFICANT CHANGE UP (ref 13–44)
MAGNESIUM SERPL-MCNC: 1.9 MG/DL — SIGNIFICANT CHANGE UP (ref 1.6–2.6)
MCHC RBC-ENTMCNC: 30.9 PG — SIGNIFICANT CHANGE UP (ref 27–34)
MCHC RBC-ENTMCNC: 31.1 PG — SIGNIFICANT CHANGE UP (ref 27–34)
MCHC RBC-ENTMCNC: 31.2 PG — SIGNIFICANT CHANGE UP (ref 27–34)
MCHC RBC-ENTMCNC: 33.3 GM/DL — SIGNIFICANT CHANGE UP (ref 32–36)
MCHC RBC-ENTMCNC: 33.6 GM/DL — SIGNIFICANT CHANGE UP (ref 32–36)
MCHC RBC-ENTMCNC: 34.5 GM/DL — SIGNIFICANT CHANGE UP (ref 32–36)
MCV RBC AUTO: 90.4 FL — SIGNIFICANT CHANGE UP (ref 80–100)
MCV RBC AUTO: 92.5 FL — SIGNIFICANT CHANGE UP (ref 80–100)
MCV RBC AUTO: 92.8 FL — SIGNIFICANT CHANGE UP (ref 80–100)
MONOCYTES # BLD AUTO: 0.47 K/UL — SIGNIFICANT CHANGE UP (ref 0–0.9)
MONOCYTES # BLD AUTO: 0.47 K/UL — SIGNIFICANT CHANGE UP (ref 0–0.9)
MONOCYTES NFR BLD AUTO: 7.3 % — SIGNIFICANT CHANGE UP (ref 2–14)
MONOCYTES NFR BLD AUTO: 7.4 % — SIGNIFICANT CHANGE UP (ref 2–14)
NEUTROPHILS # BLD AUTO: 4.27 K/UL — SIGNIFICANT CHANGE UP (ref 1.8–7.4)
NEUTROPHILS # BLD AUTO: 4.43 K/UL — SIGNIFICANT CHANGE UP (ref 1.8–7.4)
NEUTROPHILS NFR BLD AUTO: 66.2 % — SIGNIFICANT CHANGE UP (ref 43–77)
NEUTROPHILS NFR BLD AUTO: 69.5 % — SIGNIFICANT CHANGE UP (ref 43–77)
NITRITE UR-MCNC: NEGATIVE — SIGNIFICANT CHANGE UP
NRBC # BLD: 0 /100 WBCS — SIGNIFICANT CHANGE UP
NRBC # FLD: 0 K/UL — SIGNIFICANT CHANGE UP
PH UR: 7.5 — SIGNIFICANT CHANGE UP (ref 5–8)
PHOSPHATE SERPL-MCNC: 4.4 MG/DL — SIGNIFICANT CHANGE UP (ref 2.5–4.5)
PLATELET # BLD AUTO: 91 K/UL — LOW (ref 150–400)
PLATELET # BLD AUTO: 95 K/UL — LOW (ref 150–400)
PLATELET # BLD AUTO: 96 K/UL — LOW (ref 150–400)
POTASSIUM SERPL-MCNC: 3.6 MMOL/L — SIGNIFICANT CHANGE UP (ref 3.5–5.3)
POTASSIUM SERPL-SCNC: 3.6 MMOL/L — SIGNIFICANT CHANGE UP (ref 3.5–5.3)
PROT UR-MCNC: ABNORMAL
RBC # BLD: 2.23 M/UL — LOW (ref 4.2–5.8)
RBC # BLD: 2.28 M/UL — LOW (ref 4.2–5.8)
RBC # BLD: 2.5 M/UL — LOW (ref 4.2–5.8)
RBC # FLD: 14.6 % — HIGH (ref 10.3–14.5)
RBC # FLD: 14.6 % — HIGH (ref 10.3–14.5)
RBC # FLD: 14.8 % — HIGH (ref 10.3–14.5)
RBC CASTS # UR COMP ASSIST: 11 /HPF — HIGH (ref 0–4)
RH IG SCN BLD-IMP: POSITIVE — SIGNIFICANT CHANGE UP
SODIUM SERPL-SCNC: 138 MMOL/L — SIGNIFICANT CHANGE UP (ref 135–145)
SP GR SPEC: 1.01 — SIGNIFICANT CHANGE UP (ref 1–1.05)
UROBILINOGEN FLD QL: SIGNIFICANT CHANGE UP
WBC # BLD: 6.36 K/UL — SIGNIFICANT CHANGE UP (ref 3.8–10.5)
WBC # BLD: 6.45 K/UL — SIGNIFICANT CHANGE UP (ref 3.8–10.5)
WBC # BLD: 6.68 K/UL — SIGNIFICANT CHANGE UP (ref 3.8–10.5)
WBC # FLD AUTO: 6.36 K/UL — SIGNIFICANT CHANGE UP (ref 3.8–10.5)
WBC # FLD AUTO: 6.45 K/UL — SIGNIFICANT CHANGE UP (ref 3.8–10.5)
WBC # FLD AUTO: 6.68 K/UL — SIGNIFICANT CHANGE UP (ref 3.8–10.5)
WBC UR QL: 22 /HPF — HIGH (ref 0–5)

## 2021-10-11 PROCEDURE — 99233 SBSQ HOSP IP/OBS HIGH 50: CPT | Mod: GC

## 2021-10-11 PROCEDURE — 99233 SBSQ HOSP IP/OBS HIGH 50: CPT

## 2021-10-11 RX ORDER — ERYTHROPOIETIN 10000 [IU]/ML
10000 INJECTION, SOLUTION INTRAVENOUS; SUBCUTANEOUS ONCE
Refills: 0 | Status: DISCONTINUED | OUTPATIENT
Start: 2021-10-11 | End: 2021-10-11

## 2021-10-11 RX ORDER — MAGNESIUM SULFATE 500 MG/ML
1 VIAL (ML) INJECTION ONCE
Refills: 0 | Status: COMPLETED | OUTPATIENT
Start: 2021-10-11 | End: 2021-10-11

## 2021-10-11 RX ORDER — CALCIUM GLUCONATE 100 MG/ML
1 VIAL (ML) INTRAVENOUS ONCE
Refills: 0 | Status: COMPLETED | OUTPATIENT
Start: 2021-10-11 | End: 2021-10-11

## 2021-10-11 RX ORDER — CALCIUM CARBONATE 500(1250)
1 TABLET ORAL THREE TIMES A DAY
Refills: 0 | Status: DISCONTINUED | OUTPATIENT
Start: 2021-10-11 | End: 2021-10-13

## 2021-10-11 RX ORDER — ERYTHROPOIETIN 10000 [IU]/ML
4000 INJECTION, SOLUTION INTRAVENOUS; SUBCUTANEOUS ONCE
Refills: 0 | Status: COMPLETED | OUTPATIENT
Start: 2021-10-11 | End: 2021-10-11

## 2021-10-11 RX ORDER — CEFTRIAXONE 500 MG/1
1000 INJECTION, POWDER, FOR SOLUTION INTRAMUSCULAR; INTRAVENOUS EVERY 24 HOURS
Refills: 0 | Status: DISCONTINUED | OUTPATIENT
Start: 2021-10-12 | End: 2021-10-12

## 2021-10-11 RX ORDER — CEFTRIAXONE 500 MG/1
1000 INJECTION, POWDER, FOR SOLUTION INTRAMUSCULAR; INTRAVENOUS ONCE
Refills: 0 | Status: COMPLETED | OUTPATIENT
Start: 2021-10-11 | End: 2021-10-11

## 2021-10-11 RX ORDER — SODIUM CHLORIDE 9 MG/ML
1000 INJECTION, SOLUTION INTRAVENOUS
Refills: 0 | Status: DISCONTINUED | OUTPATIENT
Start: 2021-10-11 | End: 2021-10-12

## 2021-10-11 RX ORDER — CEFTRIAXONE 500 MG/1
INJECTION, POWDER, FOR SOLUTION INTRAMUSCULAR; INTRAVENOUS
Refills: 0 | Status: DISCONTINUED | OUTPATIENT
Start: 2021-10-11 | End: 2021-10-12

## 2021-10-11 RX ADMIN — SODIUM CHLORIDE 100 MILLILITER(S): 9 INJECTION, SOLUTION INTRAVENOUS at 23:05

## 2021-10-11 RX ADMIN — Medication 100 GRAM(S): at 14:26

## 2021-10-11 RX ADMIN — ERYTHROPOIETIN 4000 UNIT(S): 10000 INJECTION, SOLUTION INTRAVENOUS; SUBCUTANEOUS at 16:39

## 2021-10-11 RX ADMIN — CEFTRIAXONE 100 MILLIGRAM(S): 500 INJECTION, POWDER, FOR SOLUTION INTRAMUSCULAR; INTRAVENOUS at 15:34

## 2021-10-11 RX ADMIN — Medication 50 MILLIGRAM(S): at 17:40

## 2021-10-11 RX ADMIN — Medication 50 MILLIGRAM(S): at 05:52

## 2021-10-11 RX ADMIN — Medication 1 TABLET(S): at 22:01

## 2021-10-11 RX ADMIN — HEPARIN SODIUM 5000 UNIT(S): 5000 INJECTION INTRAVENOUS; SUBCUTANEOUS at 14:32

## 2021-10-11 RX ADMIN — HEPARIN SODIUM 5000 UNIT(S): 5000 INJECTION INTRAVENOUS; SUBCUTANEOUS at 22:01

## 2021-10-11 RX ADMIN — Medication 50 GRAM(S): at 14:29

## 2021-10-11 RX ADMIN — PANTOPRAZOLE SODIUM 40 MILLIGRAM(S): 20 TABLET, DELAYED RELEASE ORAL at 14:32

## 2021-10-11 RX ADMIN — Medication 50 GRAM(S): at 16:39

## 2021-10-11 RX ADMIN — Medication 3 MILLIGRAM(S): at 22:01

## 2021-10-11 RX ADMIN — HEPARIN SODIUM 5000 UNIT(S): 5000 INJECTION INTRAVENOUS; SUBCUTANEOUS at 05:53

## 2021-10-11 RX ADMIN — CALCITRIOL 0.5 MICROGRAM(S): 0.5 CAPSULE ORAL at 14:31

## 2021-10-11 RX ADMIN — FINASTERIDE 5 MILLIGRAM(S): 5 TABLET, FILM COATED ORAL at 14:29

## 2021-10-11 NOTE — PROGRESS NOTE ADULT - PROBLEM SELECTOR PLAN 3
Persistently hypocalcemia. Serum. Continue calcitriol and calcium acetate. Given low Vitamin D ( total and 1,25), gave one dose of ergocalciferol 50,000 units Q weekly on 10/9. iPTH level significantly elevated. Likely signifies ongoing CKD and now severe hypocalcemia stimulating PTH release. Monitor Calcium. Monitor QTC.

## 2021-10-11 NOTE — PROVIDER CONTACT NOTE (CRITICAL VALUE NOTIFICATION) - BACKGROUND
Pt on heparin drip for DVT
Patient admitted for acute renal failure
89 yo male admitted for acute renal failure.
pt is admitted with acute renal failure

## 2021-10-11 NOTE — PROGRESS NOTE ADULT - PROBLEM SELECTOR PLAN 4
improving. corrected calcium 6.2 on admit, now 8, iCal .77, now 0.87  - 2 g calcium gluconate with 1 g Mg on 10/7  - calcitriol and calcium acetate, calcium gluconate with 1 g Mg 10/8  - vit D 25-oh, vit D 1,25-OH , , in response to hypocalcemia  - calcitriol 0.5 mcg daily PO  - 10/9: Ca improving, will give additional 1g today and recheck  - EKG, monitor QTC - patient with decreasing blood pressure 107/57, now improving  - WBC uptrending, otherwise afebrile and asymptomatic  - Ddx with sepsis 2/2 UTI vs resuming blood pressure meds at home dose  - u/a with culture: c/f uti  - hold nifedipine in the setting of sepsis

## 2021-10-11 NOTE — PROVIDER CONTACT NOTE (CRITICAL VALUE NOTIFICATION) - ASSESSMENT
Calcium 6.1. patient alert and stable. no acute distress noted.
Patient calcium level 6.0. No acute distress noted. Nursing care continued.
Patient alert and oriented; no distress noted. hgb 6.9 / hct 20.7.
Patient calcium level 6.1. No acute distress noted. Nursing care continued.
Pt without signs or symptoms of bleeding
pt is alert and oriented. no distress noted.

## 2021-10-11 NOTE — PROGRESS NOTE ADULT - ATTENDING COMMENTS
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    Patient seen and examined. States he feels the best he has in a while today and feels like he "could run a marathon" today.     #GÉNESIS on CKD  #High anion gap metabolic acidosis  #Hypocalcemia  #Anemia of Chronic Kidney disease   - Renal following recs appreciated. BUN/Cr continues to improve slowly w/ IVF hydration however Cr has now stabilized, suspect he is likely close to his new baseline  - S/p bicarb gtt w/ improvement in bicarb. No indication for HD currently   - C/w LR for now    - Hypocalcemia noted, will replete as needed, likely due to CKD. Monitor QTC   - Acidemia improved, pH now normalized   - Hgb 7.1 today --> will give 1U of PRBCs and renal to give dose of Epo as well today. Monitor CBC  - UA clean catch this am w/ +LE and +WBC/bacteria. Will treat for UTI w/ Ceftriaxone. F/u urine culture     Discussed with patient's daughter at bedside   Discussed with HS 3 Dr. Dan

## 2021-10-11 NOTE — PROVIDER CONTACT NOTE (CRITICAL VALUE NOTIFICATION) - NAME OF MD/NP/PA/DO NOTIFIED:
Estrellita Dan MD
Dr. MARLA Davis
Estrellita Dan MD
kathe Ignacio MD
Estrellita Dan MD
Dr.Vicente [id number]

## 2021-10-11 NOTE — PROGRESS NOTE ADULT - PROBLEM SELECTOR PLAN 3
- patient with decreasing blood pressure 107/57  - WBC uptrending, otherwise afebrile and asymptomatic  - Ddx with sepsis 2/2 UTI vs resuming blood pressure meds at home dose  - u/a with culture  - hold nifedipine in the setting of sepsis - clean catch U/A on 10/11 with large LE, mod blood, many bacteria, pyruia  - asymptomatic  - CTX x3 days

## 2021-10-11 NOTE — PROGRESS NOTE ADULT - ATTENDING COMMENTS
kidney function seems to have plateaued at creatinine of 7.  given cr 1.4 four years ago with atrophic kidneys on ultrasound suspect that the patient has significant advanced underlying CKD.  Clinically he still appears hypovolemic today with dry skin, dry tongue, clear lungs, no JVD.  recommend continue fluids and treat anemia as above.

## 2021-10-11 NOTE — PROGRESS NOTE ADULT - PROBLEM SELECTOR PLAN 7
BP above goal   - Metop to home dose 50mg BID  - restart home nifedipine for elevated BPs Likely 2/2 CKD  - Fe studies wnl  - 1u pRBC and 4000u EPO on 10/11,   - Will continue to monitor CBC

## 2021-10-11 NOTE — PROGRESS NOTE ADULT - PROBLEM SELECTOR PLAN 1
Pt with GÉNESIS on CKD in the setting of poor appetite and diarrhea. Exact duration of GÉNESIS however unknown. Noted to have Scr of 1.4 prior to admission on 9/11/17, and no labs since. Outpatient labs with Scr of 12.48 and bicarb of less than 5. Scr of 12.64 with serum potassium of of 5.7, and bicarb of < 7 in ER. Pt. initiated on bicarb drip. Bicarb drip was discontinued given hypokalemia and hypocalcemia. UA with large leuk est, 100 mg/dl protein, 5-10 RBC and > 50 WBC. Spot urine TP/Cr elevated at 2.7. Kidney u/s with b/l multiple cysts with no hydronephrosis. Also reviewed prior renal imaging from 2015 and 2017 consistent with renal parenchymal disease. Pt. currently non-oliguric and on maintenance IVF with LR. Last Scr elevated/stable at 7.83, might be his baseline.    Continue LR and encourage oral intake. Spoke with daughter, she said that she would want to hold off HD for now, and would monitor Scr and manage his symptoms accordingly. Monitor labs and accurate urine output. Avoid NSAIDs, ACEI/ARBS, RCA and nephrotoxins. Dose medications as per eGFR.

## 2021-10-11 NOTE — PROGRESS NOTE ADULT - SUBJECTIVE AND OBJECTIVE BOX
PROGRESS NOTE:   Authored by Estrellita Dan MD  Internal Medicine  Pager [phone]  Pager [phone]    Patient is a 89y old  Male who presents with a chief complaint of Anion gap metabolic acidosis (10 Oct 2021 12:02)      SUBJECTIVE / OVERNIGHT EVENTS: repeat bicarb 16, VBG pH 7.38. Patient seen and examined at bedside.    MEDICATIONS  (STANDING):  calcitriol   Capsule 0.5 MICROGram(s) Oral daily  calcium acetate 667 milliGRAM(s) Oral three times a day with meals  finasteride 5 milliGRAM(s) Oral daily  heparin   Injectable 5000 Unit(s) SubCutaneous every 8 hours  influenza  Vaccine (HIGH DOSE) 0.7 milliLiter(s) IntraMuscular once  lactated ringers. 1000 milliLiter(s) (100 mL/Hr) IV Continuous <Continuous>  melatonin 3 milliGRAM(s) Oral <User Schedule>  metoprolol tartrate 50 milliGRAM(s) Oral two times a day  pantoprazole    Tablet 40 milliGRAM(s) Oral daily    MEDICATIONS  (PRN):  acetaminophen   Tablet .. 650 milliGRAM(s) Oral every 6 hours PRN Mild Pain (1 - 3), Moderate Pain (4 - 6)      CAPILLARY BLOOD GLUCOSE        I&O's Summary    09 Oct 2021 07:01  -  10 Oct 2021 07:00  --------------------------------------------------------  IN: 1560 mL / OUT: 0 mL / NET: 1560 mL    10 Oct 2021 07:01  -  11 Oct 2021 06:56  --------------------------------------------------------  IN: 320 mL / OUT: 0 mL / NET: 320 mL        PHYSICAL EXAM:  Vital Signs Last 24 Hrs  T(C): 36.6 (11 Oct 2021 05:50), Max: 36.7 (10 Oct 2021 12:55)  T(F): 97.9 (11 Oct 2021 05:50), Max: 98 (10 Oct 2021 12:55)  HR: 80 (11 Oct 2021 05:50) (78 - 91)  BP: 148/71 (11 Oct 2021 05:50) (118/74 - 148/71)  BP(mean): --  RR: 18 (11 Oct 2021 05:50) (18 - 18)  SpO2: 97% (11 Oct 2021 05:50) (97% - 100%)  CONSTITUTIONAL: Well-groomed, in no apparent distress  EYES: No conjunctival or scleral injection, non-icteric;   ENMT: No external nasal lesions; MMM  NECK: Trachea midline without palpable neck mass; thyroid not enlarged and non-tender  RESPIRATORY: Breathing comfortably; no dullness to percussion; lungs CTA without wheeze/rhonchi/rales  CARDIOVASCULAR: +S1S2, RRR, no M/G/R; pedal pulses full and symmetric; no lower extremity edema  GASTROINTESTINAL: No palpable masses or tenderness, +BS throughout, no rebound/guarding; no hepatosplenomegaly; no hernia palpated  LYMPHATIC: No cervical LAD or tenderness  SKIN: No rashes or ulcers noted  NEUROLOGIC: CN II-XII intact; sensation intact in LEs b/l to light touch  PSYCHIATRIC: A+O x 3; mood and affect appropriate; appropriate insight and judgment    LABS:                        7.7    7.71  )-----------( 99       ( 10 Oct 2021 07:38 )             22.7     10-10    139  |  106  |  109<H>  ----------------------------<  105<H>  3.9   |  16<L>  |  7.66<H>    Ca    6.7<L>      10 Oct 2021 21:38  Phos  4.4     10-10  Mg     1.90     10-10    TPro  4.9<L>  /  Alb  2.8<L>  /  TBili  0.4  /  DBili  x   /  AST  14  /  ALT  7   /  AlkPhos  64  10-10                RADIOLOGY & ADDITIONAL TESTS:  Results Reviewed:   Imaging Personally Reviewed:  Electrocardiogram Personally Reviewed:    COORDINATION OF CARE:  Care Discussed with Consultants/Other Providers [Y/N]:  Prior or Outpatient Records Reviewed [Y/N]:   PROGRESS NOTE:   Authored by Estrellita Dan MD  Internal Medicine  Pager [phone]  Pager [phone]    Patient is a 89y old  Male who presents with a chief complaint of Anion gap metabolic acidosis (10 Oct 2021 12:02)      SUBJECTIVE / OVERNIGHT EVENTS: repeat bicarb 16, VBG pH 7.38. Hb low, getting 1u prbc Patient seen and examined at bedside. without acute complaints. no pain. no more diarrhea. no n/v    MEDICATIONS  (STANDING):  calcitriol   Capsule 0.5 MICROGram(s) Oral daily  calcium acetate 667 milliGRAM(s) Oral three times a day with meals  finasteride 5 milliGRAM(s) Oral daily  heparin   Injectable 5000 Unit(s) SubCutaneous every 8 hours  influenza  Vaccine (HIGH DOSE) 0.7 milliLiter(s) IntraMuscular once  lactated ringers. 1000 milliLiter(s) (100 mL/Hr) IV Continuous <Continuous>  melatonin 3 milliGRAM(s) Oral <User Schedule>  metoprolol tartrate 50 milliGRAM(s) Oral two times a day  pantoprazole    Tablet 40 milliGRAM(s) Oral daily    MEDICATIONS  (PRN):  acetaminophen   Tablet .. 650 milliGRAM(s) Oral every 6 hours PRN Mild Pain (1 - 3), Moderate Pain (4 - 6)      CAPILLARY BLOOD GLUCOSE        I&O's Summary    09 Oct 2021 07:01  -  10 Oct 2021 07:00  --------------------------------------------------------  IN: 1560 mL / OUT: 0 mL / NET: 1560 mL    10 Oct 2021 07:01  -  11 Oct 2021 06:56  --------------------------------------------------------  IN: 320 mL / OUT: 0 mL / NET: 320 mL        PHYSICAL EXAM:  Vital Signs Last 24 Hrs  T(C): 36.6 (11 Oct 2021 05:50), Max: 36.7 (10 Oct 2021 12:55)  T(F): 97.9 (11 Oct 2021 05:50), Max: 98 (10 Oct 2021 12:55)  HR: 80 (11 Oct 2021 05:50) (78 - 91)  BP: 148/71 (11 Oct 2021 05:50) (118/74 - 148/71)  BP(mean): --  RR: 18 (11 Oct 2021 05:50) (18 - 18)  SpO2: 97% (11 Oct 2021 05:50) (97% - 100%)  CONSTITUTIONAL: comfortable. hard of hearing, in no apparent distress  EYES: No conjunctival or scleral injection, non-icteric;   ENMT: No external nasal lesions; MMM  RESPIRATORY: Breathing comfortably; no increased wob      LABS:                        7.7    7.71  )-----------( 99       ( 10 Oct 2021 07:38 )             22.7     10-10    139  |  106  |  109<H>  ----------------------------<  105<H>  3.9   |  16<L>  |  7.66<H>    Ca    6.7<L>      10 Oct 2021 21:38  Phos  4.4     10-10  Mg     1.90     10-10    TPro  4.9<L>  /  Alb  2.8<L>  /  TBili  0.4  /  DBili  x   /  AST  14  /  ALT  7   /  AlkPhos  64  10-10                RADIOLOGY & ADDITIONAL TESTS:  Results Reviewed:   Imaging Personally Reviewed:  Electrocardiogram Personally Reviewed:    COORDINATION OF CARE:  Care Discussed with Consultants/Other Providers [Y/N]:  Prior or Outpatient Records Reviewed [Y/N]:

## 2021-10-11 NOTE — PROGRESS NOTE ADULT - PROBLEM SELECTOR PLAN 2
In setting of chronic diarrhea and advance renal failure. Last bicarb improved to 16. LR as above. Monitor serum CO2 and pH.

## 2021-10-11 NOTE — PROGRESS NOTE ADULT - PROBLEM SELECTOR PLAN 1
Improving, Likely due to GÉNESIS on CKD  - , sCr 12.64. with decreased PO intake and energy and weakness over the past couple days. no asterixis on exam. hx of CKD  - bicarb <7 on admit, VBG pH 7.06, lactate 1. AG 23. likely 2/2 uremia, less likely ingestion. no hx of diabetes, glucose 122  - s/p 1 amp bicarb en route. MICU consulted, not candidate for urgent HD  - bicarb gtt stopped, LR at 150 cc/h  - tox screen  - neph consult, appreciate assistance  - Monitor BMP Improving, Likely due to GÉNESIS on CKD and likely at new baseline  - , sCr 12.64. with decreased PO intake and energy and weakness over the past couple days. no asterixis on exam. hx of CKD  - bicarb <7 on admit, VBG pH 7.06, lactate 1. AG 23. likely 2/2 uremia, less likely ingestion. no hx of diabetes, glucose 122  - s/p 1 amp bicarb en route. MICU consulted, not candidate for urgent HD  - bicarb gtt stopped, LR at 100 cc/h  - tox screen  - neph consult, appreciate assistance  - Monitor BMP

## 2021-10-11 NOTE — PROVIDER CONTACT NOTE (CRITICAL VALUE NOTIFICATION) - SITUATION
Calcium 6.1
PTT > 200
Patient calcium level 6.0
Patient calcium level 6.1
hgb 6.9 / hct 20.7
serum calcium is 6.5

## 2021-10-11 NOTE — PROVIDER CONTACT NOTE (CRITICAL VALUE NOTIFICATION) - RECOMMENDATIONS
MD notified.
Notify MD. Nursing care continued.
hold heparin for one hour and restart at rate as per heparin protocol
Notify MD. Reorder calcium gluconate and repeat BMP . Nursing care continued.
Notify MD. Nursing care continued.
notify MD

## 2021-10-11 NOTE — PROGRESS NOTE ADULT - SUBJECTIVE AND OBJECTIVE BOX
[facility] DIVISION OF KIDNEY DISEASES AND HYPERTENSION -- FOLLOW UP NOTE  --------------------------------------------------------------------------------  89 year old male with CKD3, HTN, BPH, hiatal hernia presented to [facility] for abnormal labs. Pt. with chronic diarrhea for past 1 month, Outpatient lab work with Scr of 12.48 and bicarb of < 5, which prompted ER referral. Nephrology following for GÉNESIS and metabolic acidosis. On review of labs on [facility], patient noted to have Scr of 12.64 and serum potassium of 5.7, with bicarb of < 7. Pt. initiated on bicarb gtt on 10/7/21. Bicarb drip was discontinued and currently patient on LR. Last Scr elevated/stable 7.83 mg/dl today.     Patient was seen and examined at bedside. Patient was seen and examined at bedside. Reported feeling well. Denies CP, SOB, fever, chills, nausea, vomiting, diarrhea, LE edema or dysuria.    PAST HISTORY  --------------------------------------------------------------------------------  No significant changes to PMH, PSH, FHx, SHx, unless otherwise noted    ALLERGIES & MEDICATIONS  --------------------------------------------------------------------------------  Allergies    No Known Allergies    Intolerances    Standing Inpatient Medications  calcitriol   Capsule 0.5 MICROGram(s) Oral daily  calcium acetate 667 milliGRAM(s) Oral three times a day with meals  cefTRIAXone   IVPB 1000 milliGRAM(s) IV Intermittent once  cefTRIAXone   IVPB      epoetin jono-epbx (RETACRIT) Injectable 89664 Unit(s) IV Push once  finasteride 5 milliGRAM(s) Oral daily  heparin   Injectable 5000 Unit(s) SubCutaneous every 8 hours  influenza  Vaccine (HIGH DOSE) 0.7 milliLiter(s) IntraMuscular once  lactated ringers. 1000 milliLiter(s) IV Continuous <Continuous>  melatonin 3 milliGRAM(s) Oral <User Schedule>  metoprolol tartrate 50 milliGRAM(s) Oral two times a day  pantoprazole    Tablet 40 milliGRAM(s) Oral daily    PRN Inpatient Medications  acetaminophen   Tablet .. 650 milliGRAM(s) Oral every 6 hours PRN    REVIEW OF SYSTEMS  --------------------------------------------------------------------------------  Gen: No fevers/chills  Respiratory: No dyspnea, cough  CV: No chest pain  GI: No abdominal pain, diarrhea  : No dysuria, hematuria  MSK: No  edema    All other systems were reviewed and are negative, except as noted.    VITALS/PHYSICAL EXAM  --------------------------------------------------------------------------------  T(C): 36.6 (10-11-21 @ 05:50), Max: 36.7 (10-10-21 @ 12:55)  HR: 80 (10-11-21 @ 05:50) (78 - 91)  BP: 148/71 (10-11-21 @ 05:50) (118/74 - 148/71)  RR: 18 (10-11-21 @ 05:50) (18 - 18)  SpO2: 97% (10-11-21 @ 05:50) (97% - 100%)  Wt(kg): --    10-10-21 @ 07:01  -  10-11-21 @ 07:00  --------------------------------------------------------  IN: 320 mL / OUT: 0 mL / NET: 320 mL    Physical Exam:  	Gen: NAD  	HEENT: MMM  	Pulm: CTA B/L, no crackles   	CV: S1S2  	Abd: Soft, +BS   	Ext: No LE edema B/L  	Neuro: Awake  	Skin: Warm and dry    LABS/STUDIES  --------------------------------------------------------------------------------              7.1    6.68  >-----------<  96       [10-11-21 @ 10:08]              21.1     138  |  104  |  106  ----------------------------<  90      [10-11-21 @ 07:56]  3.6   |  16  |  7.83        Ca     6.8     [10-11-21 @ 07:56]      iCa    0.89     [10-10 @ 07:38]      Mg     1.90     [10-11-21 @ 07:56]      Phos  4.4     [10-11-21 @ 07:56]    TPro  4.9  /  Alb  2.8  /  TBili  0.4  /  DBili  x   /  AST  14  /  ALT  7   /  AlkPhos  64  [10-10-21 @ 07:38]      Creatinine Trend:  SCr 7.83 [10-11 @ 07:56]  SCr 7.66 [10-10 @ 21:38]  SCr 7.67 [10-10 @ 15:29]  SCr 8.55 [10-10 @ 07:38]  SCr 8.50 [10-09 @ 18:35]    Urinalysis - [10-11-21 @ 08:00]      Color Light Yellow / Appearance Clear / SG 1.010 / pH 7.5      Gluc Trace / Ketone Negative  / Bili Negative / Urobili <2 mg/dL       Blood Moderate / Protein 100 mg/dL / Leuk Est Large / Nitrite Negative      RBC 11 / WBC 22 / Hyaline 1 / Gran  / Sq Epi  / Non Sq Epi 0 / Bacteria Many    Urine Creatinine 46      [10-08-21 @ 17:11]  Urine Protein 128      [10-08-21 @ 17:11]  Urine Sodium 103      [10-08-21 @ 17:11]  Urine Urea Nitrogen 334.0      [10-08-21 @ 17:11]  Urine Chloride 91      [10-08-21 @ 17:11]  Urine Osmolality 443      [10-07-21 @ 06:45]    Iron 67, TIBC 134, %sat 50      [10-07-21 @ 07:27]  Ferritin 204      [10-07-21 @ 07:27]  PTH -- (Ca --)      [10-08-21 @ 17:11]   730  Vitamin D (25OH) 5.0      [10-08-21 @ 22:39]

## 2021-10-11 NOTE — PROVIDER CONTACT NOTE (CRITICAL VALUE NOTIFICATION) - ACTION/TREATMENT ORDERED:
will follow MD's recommendation and continue monitoring.
MD made aware. Calcium gluconate reordered and BMP reordered for midnight.
MD made aware. No interventions ordered at this time.
MD made aware. No interventions ordered at this time.
MD notified. Repeat CBC. Will continue to monitor.
heparin on hold one hour and restart as per heparin protocol

## 2021-10-11 NOTE — PROGRESS NOTE ADULT - PROBLEM SELECTOR PLAN 5
- resolved. will obtain GI PCR, stool O&P, stool culture if symptoms resume  - no leukocytosis or fevers or recent abx use to suggest C diff, will continue to monitor, if worsening will consider sending improved from admit but now stably low. corrected calcium 6.2 on admit, now 8, iCal .77, now 0.87  - 2 g calcium gluconate with 1 g Mg on 10/7  - calcitriol and calcium acetate, calcium gluconate with 1 g Mg 10/8  - 2g nannette gluconate with 1g Mg on 10/12  - vit D 25-oh, vit D 1,25-OH , , in response to hypocalcemia  - calcitriol 0.5 mcg daily PO  - 10/9: Ca improving, will give additional 1g today and recheck  - EKG, monitor QTC

## 2021-10-11 NOTE — PROGRESS NOTE ADULT - PROBLEM SELECTOR PLAN 4
Last Hb 7.1. Iron work up with no iron deficiency anemia. Suggest to give 1 unit of pRBC and will give 1 dose of Epo 34479 unit today. Monitor Hb.     If any questions, please feel free to contact me     Kellee Johnson  Nephrology Fellow  [facility] Pager: [phone]  [place] Pager: [phone] Last Hb 7.1. Iron work up with no iron deficiency anemia. Suggest to give 1 unit of pRBC and will give 1 dose of Epo 4,000 unit today. Monitor Hb.     If any questions, please feel free to contact me     Kellee Johnson  Nephrology Fellow  [facility] Pager: [phone]  [facility] Pager: [phone]

## 2021-10-11 NOTE — PROGRESS NOTE ADULT - PROBLEM SELECTOR PLAN 10
FEN/GI: kidney diet, soft   Lines:  GTT:  PPx: hep sq  PT/OT: consult  Dispo: continue routine inpatient care  Code Status: Full, pending discussion FEN/GI: kidney diet, soft   Lines:  GTT:  PPx: hep sq  PT/OT: consult  Dispo: Family would like patient to go home w/ home PT   Code Status: Full, pending discussion - c/w finasteride

## 2021-10-12 DIAGNOSIS — N39.0 URINARY TRACT INFECTION, SITE NOT SPECIFIED: ICD-10-CM

## 2021-10-12 DIAGNOSIS — D69.6 THROMBOCYTOPENIA, UNSPECIFIED: ICD-10-CM

## 2021-10-12 LAB
ALBUMIN SERPL ELPH-MCNC: 2.5 G/DL — LOW (ref 3.3–5)
ALBUMIN SERPL ELPH-MCNC: 2.5 G/DL — LOW (ref 3.3–5)
ALP SERPL-CCNC: 62 U/L — SIGNIFICANT CHANGE UP (ref 40–120)
ALP SERPL-CCNC: 65 U/L — SIGNIFICANT CHANGE UP (ref 40–120)
ALT FLD-CCNC: 5 U/L — SIGNIFICANT CHANGE UP (ref 4–41)
ALT FLD-CCNC: 6 U/L — SIGNIFICANT CHANGE UP (ref 4–41)
ANION GAP SERPL CALC-SCNC: 17 MMOL/L — HIGH (ref 7–14)
ANION GAP SERPL CALC-SCNC: 17 MMOL/L — HIGH (ref 7–14)
AST SERPL-CCNC: 12 U/L — SIGNIFICANT CHANGE UP (ref 4–40)
AST SERPL-CCNC: 13 U/L — SIGNIFICANT CHANGE UP (ref 4–40)
BASOPHILS # BLD AUTO: 0.03 K/UL — SIGNIFICANT CHANGE UP (ref 0–0.2)
BASOPHILS NFR BLD AUTO: 0.4 % — SIGNIFICANT CHANGE UP (ref 0–2)
BILIRUB SERPL-MCNC: 0.4 MG/DL — SIGNIFICANT CHANGE UP (ref 0.2–1.2)
BILIRUB SERPL-MCNC: 0.6 MG/DL — SIGNIFICANT CHANGE UP (ref 0.2–1.2)
BUN SERPL-MCNC: 101 MG/DL — HIGH (ref 7–23)
BUN SERPL-MCNC: 101 MG/DL — HIGH (ref 7–23)
CALCIUM SERPL-MCNC: 6.7 MG/DL — LOW (ref 8.4–10.5)
CALCIUM SERPL-MCNC: 6.9 MG/DL — LOW (ref 8.4–10.5)
CHLORIDE SERPL-SCNC: 104 MMOL/L — SIGNIFICANT CHANGE UP (ref 98–107)
CHLORIDE SERPL-SCNC: 106 MMOL/L — SIGNIFICANT CHANGE UP (ref 98–107)
CO2 SERPL-SCNC: 15 MMOL/L — LOW (ref 22–31)
CO2 SERPL-SCNC: 16 MMOL/L — LOW (ref 22–31)
CREAT SERPL-MCNC: 7.34 MG/DL — HIGH (ref 0.5–1.3)
CREAT SERPL-MCNC: 7.55 MG/DL — HIGH (ref 0.5–1.3)
CULTURE RESULTS: SIGNIFICANT CHANGE UP
EOSINOPHIL # BLD AUTO: 0.38 K/UL — SIGNIFICANT CHANGE UP (ref 0–0.5)
EOSINOPHIL NFR BLD AUTO: 5.5 % — SIGNIFICANT CHANGE UP (ref 0–6)
GLUCOSE SERPL-MCNC: 90 MG/DL — SIGNIFICANT CHANGE UP (ref 70–99)
GLUCOSE SERPL-MCNC: 95 MG/DL — SIGNIFICANT CHANGE UP (ref 70–99)
HCT VFR BLD CALC: 23.3 % — LOW (ref 39–50)
HGB BLD-MCNC: 7.9 G/DL — LOW (ref 13–17)
IANC: 4.77 K/UL — SIGNIFICANT CHANGE UP (ref 1.5–8.5)
IMM GRANULOCYTES NFR BLD AUTO: 1.4 % — SIGNIFICANT CHANGE UP (ref 0–1.5)
LYMPHOCYTES # BLD AUTO: 1.15 K/UL — SIGNIFICANT CHANGE UP (ref 1–3.3)
LYMPHOCYTES # BLD AUTO: 16.5 % — SIGNIFICANT CHANGE UP (ref 13–44)
MAGNESIUM SERPL-MCNC: 2 MG/DL — SIGNIFICANT CHANGE UP (ref 1.6–2.6)
MAGNESIUM SERPL-MCNC: 2.1 MG/DL — SIGNIFICANT CHANGE UP (ref 1.6–2.6)
MCHC RBC-ENTMCNC: 31.5 PG — SIGNIFICANT CHANGE UP (ref 27–34)
MCHC RBC-ENTMCNC: 33.9 GM/DL — SIGNIFICANT CHANGE UP (ref 32–36)
MCV RBC AUTO: 92.8 FL — SIGNIFICANT CHANGE UP (ref 80–100)
MONOCYTES # BLD AUTO: 0.52 K/UL — SIGNIFICANT CHANGE UP (ref 0–0.9)
MONOCYTES NFR BLD AUTO: 7.5 % — SIGNIFICANT CHANGE UP (ref 2–14)
NEUTROPHILS # BLD AUTO: 4.77 K/UL — SIGNIFICANT CHANGE UP (ref 1.8–7.4)
NEUTROPHILS NFR BLD AUTO: 68.7 % — SIGNIFICANT CHANGE UP (ref 43–77)
NRBC # BLD: 0 /100 WBCS — SIGNIFICANT CHANGE UP
NRBC # FLD: 0 K/UL — SIGNIFICANT CHANGE UP
PHOSPHATE SERPL-MCNC: 4.5 MG/DL — SIGNIFICANT CHANGE UP (ref 2.5–4.5)
PHOSPHATE SERPL-MCNC: 4.6 MG/DL — HIGH (ref 2.5–4.5)
PLATELET # BLD AUTO: 85 K/UL — LOW (ref 150–400)
POTASSIUM SERPL-MCNC: 3.9 MMOL/L — SIGNIFICANT CHANGE UP (ref 3.5–5.3)
POTASSIUM SERPL-MCNC: 3.9 MMOL/L — SIGNIFICANT CHANGE UP (ref 3.5–5.3)
POTASSIUM SERPL-SCNC: 3.9 MMOL/L — SIGNIFICANT CHANGE UP (ref 3.5–5.3)
POTASSIUM SERPL-SCNC: 3.9 MMOL/L — SIGNIFICANT CHANGE UP (ref 3.5–5.3)
PROT SERPL-MCNC: 4.5 G/DL — LOW (ref 6–8.3)
PROT SERPL-MCNC: 4.7 G/DL — LOW (ref 6–8.3)
RBC # BLD: 2.51 M/UL — LOW (ref 4.2–5.8)
RBC # FLD: 15.3 % — HIGH (ref 10.3–14.5)
SODIUM SERPL-SCNC: 137 MMOL/L — SIGNIFICANT CHANGE UP (ref 135–145)
SODIUM SERPL-SCNC: 138 MMOL/L — SIGNIFICANT CHANGE UP (ref 135–145)
SPECIMEN SOURCE: SIGNIFICANT CHANGE UP
WBC # BLD: 6.95 K/UL — SIGNIFICANT CHANGE UP (ref 3.8–10.5)
WBC # FLD AUTO: 6.95 K/UL — SIGNIFICANT CHANGE UP (ref 3.8–10.5)

## 2021-10-12 PROCEDURE — 99233 SBSQ HOSP IP/OBS HIGH 50: CPT

## 2021-10-12 PROCEDURE — 99233 SBSQ HOSP IP/OBS HIGH 50: CPT | Mod: GC

## 2021-10-12 RX ORDER — CALCITRIOL 0.5 UG/1
1 CAPSULE ORAL DAILY
Refills: 0 | Status: DISCONTINUED | OUTPATIENT
Start: 2021-10-12 | End: 2021-10-13

## 2021-10-12 RX ORDER — NIFEDIPINE 30 MG
30 TABLET, EXTENDED RELEASE 24 HR ORAL DAILY
Refills: 0 | Status: DISCONTINUED | OUTPATIENT
Start: 2021-10-12 | End: 2021-10-13

## 2021-10-12 RX ORDER — SODIUM BICARBONATE 1 MEQ/ML
650 SYRINGE (ML) INTRAVENOUS
Refills: 0 | Status: DISCONTINUED | OUTPATIENT
Start: 2021-10-12 | End: 2021-10-13

## 2021-10-12 RX ORDER — CALCIUM GLUCONATE 100 MG/ML
1 VIAL (ML) INTRAVENOUS ONCE
Refills: 0 | Status: COMPLETED | OUTPATIENT
Start: 2021-10-12 | End: 2021-10-12

## 2021-10-12 RX ORDER — CEFTRIAXONE 500 MG/1
1000 INJECTION, POWDER, FOR SOLUTION INTRAMUSCULAR; INTRAVENOUS EVERY 24 HOURS
Refills: 0 | Status: DISCONTINUED | OUTPATIENT
Start: 2021-10-13 | End: 2021-10-13

## 2021-10-12 RX ADMIN — Medication 50 GRAM(S): at 12:04

## 2021-10-12 RX ADMIN — PANTOPRAZOLE SODIUM 40 MILLIGRAM(S): 20 TABLET, DELAYED RELEASE ORAL at 12:06

## 2021-10-12 RX ADMIN — FINASTERIDE 5 MILLIGRAM(S): 5 TABLET, FILM COATED ORAL at 12:06

## 2021-10-12 RX ADMIN — HEPARIN SODIUM 5000 UNIT(S): 5000 INJECTION INTRAVENOUS; SUBCUTANEOUS at 21:45

## 2021-10-12 RX ADMIN — Medication 50 MILLIGRAM(S): at 17:47

## 2021-10-12 RX ADMIN — Medication 50 MILLIGRAM(S): at 05:55

## 2021-10-12 RX ADMIN — Medication 1 TABLET(S): at 05:55

## 2021-10-12 RX ADMIN — Medication 3 MILLIGRAM(S): at 21:43

## 2021-10-12 RX ADMIN — CEFTRIAXONE 100 MILLIGRAM(S): 500 INJECTION, POWDER, FOR SOLUTION INTRAMUSCULAR; INTRAVENOUS at 10:24

## 2021-10-12 RX ADMIN — CALCITRIOL 1 MICROGRAM(S): 0.5 CAPSULE ORAL at 12:06

## 2021-10-12 RX ADMIN — Medication 50 GRAM(S): at 02:09

## 2021-10-12 RX ADMIN — HEPARIN SODIUM 5000 UNIT(S): 5000 INJECTION INTRAVENOUS; SUBCUTANEOUS at 13:33

## 2021-10-12 RX ADMIN — Medication 50 GRAM(S): at 03:24

## 2021-10-12 RX ADMIN — HEPARIN SODIUM 5000 UNIT(S): 5000 INJECTION INTRAVENOUS; SUBCUTANEOUS at 05:58

## 2021-10-12 RX ADMIN — Medication 1 TABLET(S): at 13:33

## 2021-10-12 RX ADMIN — Medication 1 TABLET(S): at 21:43

## 2021-10-12 RX ADMIN — Medication 650 MILLIGRAM(S): at 17:47

## 2021-10-12 RX ADMIN — Medication 30 MILLIGRAM(S): at 12:15

## 2021-10-12 NOTE — PROGRESS NOTE ADULT - PROBLEM SELECTOR PLAN 6
- resolved. will obtain GI PCR, stool O&P, stool culture if symptoms resume  - no leukocytosis or fevers or recent abx use to suggest C diff, will continue to monitor, if worsening will consider sending - HDS, asymptomatic without s/s bleeding  - plts 140s on admit, now stable in 80s-90s  - will draw CBC in blue top to prevent clumping

## 2021-10-12 NOTE — PROGRESS NOTE ADULT - PROBLEM SELECTOR PLAN 4
- patient with decreasing blood pressure 107/57, now improving  - WBC uptrending, otherwise afebrile and asymptomatic  - Ddx with sepsis 2/2 UTI vs resuming blood pressure meds at home dose  - u/a with culture: c/f uti  - hold nifedipine in the setting of sepsis BP above goal   - Metop to home dose 50mg BID  - increased nifedipine for elevated BPs

## 2021-10-12 NOTE — PROGRESS NOTE ADULT - PROBLEM SELECTOR PLAN 2
In setting of chronic diarrhea and advance renal failure. Last bicarb is 15. Start on Sodium bicarbonate 650 mg PO BID. Monitor serum CO2 and pH.

## 2021-10-12 NOTE — PROGRESS NOTE ADULT - PROBLEM SELECTOR PLAN 4
Last Hb 7.9. Iron work up with no iron deficiency anemia. Got 1 unit of pRBC and 1 dose of Epo 4,000 unit on 10/11. Monitor Hb.     If any questions, please feel free to contact me     Kellee Johnson  Nephrology Fellow  [facility] Pager: [phone]  [facility] Pager: [phone] Last Hb 7.9. Iron work up with no iron deficiency anemia. Got 1 unit of pRBC and s/p 1 dose of Epo 4,000 unit on 10/11. Monitor Hb.     If any questions, please feel free to contact me     Kellee Johnson  Nephrology Fellow  [facility] Pager: [phone]  [place] Pager: [phone]

## 2021-10-12 NOTE — PROGRESS NOTE ADULT - SUBJECTIVE AND OBJECTIVE BOX
PROGRESS NOTE:   Authored by Estrellita Dan MD  Internal Medicine  Pager [place] [phone]  Pager [phone]    Patient is a 89y old  Male who presents with a chief complaint of anion gap metabolic acidosis (11 Oct 2021 10:47)      SUBJECTIVE / OVERNIGHT EVENTS:    MEDICATIONS  (STANDING):  calcitriol   Capsule 0.5 MICROGram(s) Oral daily  calcium carbonate    500 mG (Tums) Chewable 1 Tablet(s) Chew three times a day  cefTRIAXone   IVPB 1000 milliGRAM(s) IV Intermittent every 24 hours  cefTRIAXone   IVPB      finasteride 5 milliGRAM(s) Oral daily  heparin   Injectable 5000 Unit(s) SubCutaneous every 8 hours  influenza  Vaccine (HIGH DOSE) 0.7 milliLiter(s) IntraMuscular once  lactated ringers. 1000 milliLiter(s) (100 mL/Hr) IV Continuous <Continuous>  melatonin 3 milliGRAM(s) Oral <User Schedule>  metoprolol tartrate 50 milliGRAM(s) Oral two times a day  pantoprazole    Tablet 40 milliGRAM(s) Oral daily    MEDICATIONS  (PRN):  acetaminophen   Tablet .. 650 milliGRAM(s) Oral every 6 hours PRN Mild Pain (1 - 3), Moderate Pain (4 - 6)      CAPILLARY BLOOD GLUCOSE        I&O's Summary    10 Oct 2021 07:01  -  11 Oct 2021 07:00  --------------------------------------------------------  IN: 320 mL / OUT: 0 mL / NET: 320 mL    11 Oct 2021 07:01  -  12 Oct 2021 06:43  --------------------------------------------------------  IN: 400 mL / OUT: 0 mL / NET: 400 mL        PHYSICAL EXAM:  Vital Signs Last 24 Hrs  T(C): 36.7 (12 Oct 2021 05:45), Max: 36.7 (11 Oct 2021 15:30)  T(F): 98 (12 Oct 2021 05:45), Max: 98 (11 Oct 2021 15:30)  HR: 74 (12 Oct 2021 05:45) (67 - 88)  BP: 168/86 (12 Oct 2021 05:45) (146/70 - 169/81)  BP(mean): --  RR: 16 (12 Oct 2021 05:45) (16 - 18)  SpO2: 98% (12 Oct 2021 05:45) (97% - 99%)  CONSTITUTIONAL: Well-groomed, in no apparent distress  EYES: No conjunctival or scleral injection, non-icteric;   ENMT: No external nasal lesions; MMM  NECK: Trachea midline without palpable neck mass; thyroid not enlarged and non-tender  RESPIRATORY: Breathing comfortably; no dullness to percussion; lungs CTA without wheeze/rhonchi/rales  CARDIOVASCULAR: +S1S2, RRR, no M/G/R; pedal pulses full and symmetric; no lower extremity edema  GASTROINTESTINAL: No palpable masses or tenderness, +BS throughout, no rebound/guarding; no hepatosplenomegaly; no hernia palpated  LYMPHATIC: No cervical LAD or tenderness  SKIN: No rashes or ulcers noted  NEUROLOGIC: CN II-XII intact; sensation intact in LEs b/l to light touch  PSYCHIATRIC: A+O x 3; mood and affect appropriate; appropriate insight and judgment    LABS:                        7.8    6.36  )-----------( 91       ( 11 Oct 2021 23:36 )             22.6     10-11    137  |  104  |  101<H>  ----------------------------<  95  3.9   |  16<L>  |  7.55<H>    Ca    6.7<L>      11 Oct 2021 23:37  Phos  4.5     10-11  Mg     2.10     10-11    TPro  4.5<L>  /  Alb  2.5<L>  /  TBili  0.6  /  DBili  x   /  AST  12  /  ALT  6   /  AlkPhos  62  10-11          Urinalysis Basic - ( 11 Oct 2021 08:00 )    Color: Light Yellow / Appearance: Clear / S.010 / pH: x  Gluc: x / Ketone: Negative  / Bili: Negative / Urobili: <2 mg/dL   Blood: x / Protein: 100 mg/dL / Nitrite: Negative   Leuk Esterase: Large / RBC: 11 /HPF / WBC 22 /HPF   Sq Epi: x / Non Sq Epi: 0 /HPF / Bacteria: Many          RADIOLOGY & ADDITIONAL TESTS:  Results Reviewed:   Imaging Personally Reviewed:  Electrocardiogram Personally Reviewed:    COORDINATION OF CARE:  Care Discussed with Consultants/Other Providers [Y/N]:  Prior or Outpatient Records Reviewed [Y/N]:   PROGRESS NOTE:   Authored by Estrellita Dan MD  Internal Medicine  Pager [place] [phone]  Pager [phone]    Patient is a 89y old  Male who presents with a chief complaint of anion gap metabolic acidosis (11 Oct 2021 10:47)      SUBJECTIVE / OVERNIGHT EVENTS: NAEO. patient seen and examined at bedside without acute complaints. no belly pain, no chest pain, no n/v. diarrhea resolved. just feels tired today.    MEDICATIONS  (STANDING):  calcitriol   Capsule 0.5 MICROGram(s) Oral daily  calcium carbonate    500 mG (Tums) Chewable 1 Tablet(s) Chew three times a day  cefTRIAXone   IVPB 1000 milliGRAM(s) IV Intermittent every 24 hours  cefTRIAXone   IVPB      finasteride 5 milliGRAM(s) Oral daily  heparin   Injectable 5000 Unit(s) SubCutaneous every 8 hours  influenza  Vaccine (HIGH DOSE) 0.7 milliLiter(s) IntraMuscular once  lactated ringers. 1000 milliLiter(s) (100 mL/Hr) IV Continuous <Continuous>  melatonin 3 milliGRAM(s) Oral <User Schedule>  metoprolol tartrate 50 milliGRAM(s) Oral two times a day  pantoprazole    Tablet 40 milliGRAM(s) Oral daily    MEDICATIONS  (PRN):  acetaminophen   Tablet .. 650 milliGRAM(s) Oral every 6 hours PRN Mild Pain (1 - 3), Moderate Pain (4 - 6)      CAPILLARY BLOOD GLUCOSE        I&O's Summary    10 Oct 2021 07:01  -  11 Oct 2021 07:00  --------------------------------------------------------  IN: 320 mL / OUT: 0 mL / NET: 320 mL    11 Oct 2021 07:01  -  12 Oct 2021 06:43  --------------------------------------------------------  IN: 400 mL / OUT: 0 mL / NET: 400 mL        PHYSICAL EXAM:  Vital Signs Last 24 Hrs  T(C): 36.7 (12 Oct 2021 05:45), Max: 36.7 (11 Oct 2021 15:30)  T(F): 98 (12 Oct 2021 05:45), Max: 98 (11 Oct 2021 15:30)  HR: 74 (12 Oct 2021 05:45) (67 - 88)  BP: 168/86 (12 Oct 2021 05:45) (146/70 - 169/81)  BP(mean): --  RR: 16 (12 Oct 2021 05:45) (16 - 18)  SpO2: 98% (12 Oct 2021 05:45) (97% - 99%)  CONSTITUTIONAL: Well-groomed, in no apparent distress  EYES: No conjunctival or scleral injection, non-icteric;   ENMT: No external nasal lesions; MMM  NECK: Trachea midline without palpable neck mass; thyroid not enlarged and non-tender  RESPIRATORY: Breathing comfortably; no dullness to percussion; lungs CTA without wheeze/rhonchi/rales  CARDIOVASCULAR: +S1S2, RRR, no M/G/R; pedal pulses full and symmetric; no lower extremity edema  GASTROINTESTINAL: No palpable masses or tenderness, +BS throughout, no rebound/guarding; no hepatosplenomegaly; no hernia palpated  LYMPHATIC: No cervical LAD or tenderness  SKIN: No rashes or ulcers noted  NEUROLOGIC: CN II-XII intact; sensation intact in LEs b/l to light touch  PSYCHIATRIC: A+O x 3; mood and affect appropriate; appropriate insight and judgment    LABS:                        7.8    6.36  )-----------( 91       ( 11 Oct 2021 23:36 )             22.6     10-11    137  |  104  |  101<H>  ----------------------------<  95  3.9   |  16<L>  |  7.55<H>    Ca    6.7<L>      11 Oct 2021 23:37  Phos  4.5     10-11  Mg     2.10     10-11    TPro  4.5<L>  /  Alb  2.5<L>  /  TBili  0.6  /  DBili  x   /  AST  12  /  ALT  6   /  AlkPhos  62  10-11          Urinalysis Basic - ( 11 Oct 2021 08:00 )    Color: Light Yellow / Appearance: Clear / S.010 / pH: x  Gluc: x / Ketone: Negative  / Bili: Negative / Urobili: <2 mg/dL   Blood: x / Protein: 100 mg/dL / Nitrite: Negative   Leuk Esterase: Large / RBC: 11 /HPF / WBC 22 /HPF   Sq Epi: x / Non Sq Epi: 0 /HPF / Bacteria: Many          RADIOLOGY & ADDITIONAL TESTS:  Results Reviewed:   Imaging Personally Reviewed:  Electrocardiogram Personally Reviewed:    COORDINATION OF CARE:  Care Discussed with Consultants/Other Providers [Y/N]:  Prior or Outpatient Records Reviewed [Y/N]:   PROGRESS NOTE:   Authored by Estrellita Dan MD  Internal Medicine  Pager [place] [phone]  Pager [phone]    Patient is a 89y old  Male who presents with a chief complaint of anion gap metabolic acidosis (11 Oct 2021 10:47)      SUBJECTIVE / OVERNIGHT EVENTS: NAEO. patient seen and examined at bedside without acute complaints. no belly pain, no chest pain, no n/v. diarrhea resolved. just feels tired today.    MEDICATIONS  (STANDING):  calcitriol   Capsule 0.5 MICROGram(s) Oral daily  calcium carbonate    500 mG (Tums) Chewable 1 Tablet(s) Chew three times a day  cefTRIAXone   IVPB 1000 milliGRAM(s) IV Intermittent every 24 hours  cefTRIAXone   IVPB      finasteride 5 milliGRAM(s) Oral daily  heparin   Injectable 5000 Unit(s) SubCutaneous every 8 hours  influenza  Vaccine (HIGH DOSE) 0.7 milliLiter(s) IntraMuscular once  lactated ringers. 1000 milliLiter(s) (100 mL/Hr) IV Continuous <Continuous>  melatonin 3 milliGRAM(s) Oral <User Schedule>  metoprolol tartrate 50 milliGRAM(s) Oral two times a day  pantoprazole    Tablet 40 milliGRAM(s) Oral daily    MEDICATIONS  (PRN):  acetaminophen   Tablet .. 650 milliGRAM(s) Oral every 6 hours PRN Mild Pain (1 - 3), Moderate Pain (4 - 6)      CAPILLARY BLOOD GLUCOSE        I&O's Summary    10 Oct 2021 07:01  -  11 Oct 2021 07:00  --------------------------------------------------------  IN: 320 mL / OUT: 0 mL / NET: 320 mL    11 Oct 2021 07:01  -  12 Oct 2021 06:43  --------------------------------------------------------  IN: 400 mL / OUT: 0 mL / NET: 400 mL        PHYSICAL EXAM:  Vital Signs Last 24 Hrs  T(C): 36.7 (12 Oct 2021 05:45), Max: 36.7 (11 Oct 2021 15:30)  T(F): 98 (12 Oct 2021 05:45), Max: 98 (11 Oct 2021 15:30)  HR: 74 (12 Oct 2021 05:45) (67 - 88)  BP: 168/86 (12 Oct 2021 05:45) (146/70 - 169/81)  BP(mean): --  RR: 16 (12 Oct 2021 05:45) (16 - 18)  SpO2: 98% (12 Oct 2021 05:45) (97% - 99%)  CONSTITUTIONAL: comfortable, in no apparent distress  EYES: No conjunctival or scleral injection, non-icteric;   ENMT: No external nasal lesions; MMM  NECK: Trachea midline without palpable neck mass; thyroid not enlarged and non-tender  RESPIRATORY: Breathing comfortably; no dullness to percussion; lungs CTA without wheeze/rhonchi/rales  CARDIOVASCULAR: +S1S2, RRR, no M/G/R; pedal pulses full and symmetric; no lower extremity edema  GASTROINTESTINAL: No palpable masses or tenderness, +BS throughout, no rebound/guarding; no hepatosplenomegaly; no hernia palpated  LYMPHATIC: No cervical LAD or tenderness  SKIN: No rashes or ulcers noted  NEUROLOGIC: CN II-XII intact; sensation intact in LEs b/l to light touch  PSYCHIATRIC: A+O x 3; mood and affect appropriate;     LABS:                        7.8    6.36  )-----------( 91       ( 11 Oct 2021 23:36 )             22.6     10-11    137  |  104  |  101<H>  ----------------------------<  95  3.9   |  16<L>  |  7.55<H>    Ca    6.7<L>      11 Oct 2021 23:37  Phos  4.5     10-11  Mg     2.10     10-11    TPro  4.5<L>  /  Alb  2.5<L>  /  TBili  0.6  /  DBili  x   /  AST  12  /  ALT  6   /  AlkPhos  62  10-11          Urinalysis Basic - ( 11 Oct 2021 08:00 )    Color: Light Yellow / Appearance: Clear / S.010 / pH: x  Gluc: x / Ketone: Negative  / Bili: Negative / Urobili: <2 mg/dL   Blood: x / Protein: 100 mg/dL / Nitrite: Negative   Leuk Esterase: Large / RBC: 11 /HPF / WBC 22 /HPF   Sq Epi: x / Non Sq Epi: 0 /HPF / Bacteria: Many          RADIOLOGY & ADDITIONAL TESTS:  Results Reviewed:   Imaging Personally Reviewed:  Electrocardiogram Personally Reviewed:    COORDINATION OF CARE:  Care Discussed with Consultants/Other Providers [Y/N]:  Prior or Outpatient Records Reviewed [Y/N]:   PROGRESS NOTE:   Authored by Estrellita Dan MD  Internal Medicine  Pager [place] [phone]  Pager [phone]    Patient is a 89y old  Male who presents with a chief complaint of anion gap metabolic acidosis (11 Oct 2021 10:47)      SUBJECTIVE / OVERNIGHT EVENTS: NAEO. patient seen and examined at bedside without acute complaints. no belly pain, no chest pain, no n/v. diarrhea resolved. just feels tired today.    MEDICATIONS  (STANDING):  calcitriol   Capsule 0.5 MICROGram(s) Oral daily  calcium carbonate    500 mG (Tums) Chewable 1 Tablet(s) Chew three times a day  cefTRIAXone   IVPB 1000 milliGRAM(s) IV Intermittent every 24 hours  cefTRIAXone   IVPB      finasteride 5 milliGRAM(s) Oral daily  heparin   Injectable 5000 Unit(s) SubCutaneous every 8 hours  influenza  Vaccine (HIGH DOSE) 0.7 milliLiter(s) IntraMuscular once  lactated ringers. 1000 milliLiter(s) (100 mL/Hr) IV Continuous <Continuous>  melatonin 3 milliGRAM(s) Oral <User Schedule>  metoprolol tartrate 50 milliGRAM(s) Oral two times a day  pantoprazole    Tablet 40 milliGRAM(s) Oral daily    MEDICATIONS  (PRN):  acetaminophen   Tablet .. 650 milliGRAM(s) Oral every 6 hours PRN Mild Pain (1 - 3), Moderate Pain (4 - 6)      CAPILLARY BLOOD GLUCOSE        I&O's Summary    10 Oct 2021 07:01  -  11 Oct 2021 07:00  --------------------------------------------------------  IN: 320 mL / OUT: 0 mL / NET: 320 mL    11 Oct 2021 07:01  -  12 Oct 2021 06:43  --------------------------------------------------------  IN: 400 mL / OUT: 0 mL / NET: 400 mL        PHYSICAL EXAM:  Vital Signs Last 24 Hrs  T(C): 36.7 (12 Oct 2021 05:45), Max: 36.7 (11 Oct 2021 15:30)  T(F): 98 (12 Oct 2021 05:45), Max: 98 (11 Oct 2021 15:30)  HR: 74 (12 Oct 2021 05:45) (67 - 88)  BP: 168/86 (12 Oct 2021 05:45) (146/70 - 169/81)  BP(mean): --  RR: 16 (12 Oct 2021 05:45) (16 - 18)  SpO2: 98% (12 Oct 2021 05:45) (97% - 99%)  CONSTITUTIONAL: comfortable, in no apparent distress  EYES: No conjunctival or scleral injection, non-icteric;   ENMT: No external nasal lesions; MMM  RESPIRATORY: Breathing comfortably;  lungs CTA without wheeze/rhonchi/rales  CARDIOVASCULAR: +S1S2, RRR, no M/G/R; pedal pulses full and symmetric; no lower extremity edema  GASTROINTESTINAL: No palpable masses or tenderness, +BS throughout, no rebound/guarding; no hepatosplenomegaly; no hernia palpated  LYMPHATIC: No cervical LAD or tenderness  SKIN: No rashes or ulcers noted  PSYCHIATRIC: A+O x 3; mood and affect appropriate;     LABS:                        7.8    6.36  )-----------( 91       ( 11 Oct 2021 23:36 )             22.6     10-11    137  |  104  |  101<H>  ----------------------------<  95  3.9   |  16<L>  |  7.55<H>    Ca    6.7<L>      11 Oct 2021 23:37  Phos  4.5     10-11  Mg     2.10     10-11    TPro  4.5<L>  /  Alb  2.5<L>  /  TBili  0.6  /  DBili  x   /  AST  12  /  ALT  6   /  AlkPhos  62  10-11          Urinalysis Basic - ( 11 Oct 2021 08:00 )    Color: Light Yellow / Appearance: Clear / S.010 / pH: x  Gluc: x / Ketone: Negative  / Bili: Negative / Urobili: <2 mg/dL   Blood: x / Protein: 100 mg/dL / Nitrite: Negative   Leuk Esterase: Large / RBC: 11 /HPF / WBC 22 /HPF   Sq Epi: x / Non Sq Epi: 0 /HPF / Bacteria: Many          RADIOLOGY & ADDITIONAL TESTS:  Results Reviewed:   Imaging Personally Reviewed:  Electrocardiogram Personally Reviewed:    COORDINATION OF CARE:  Care Discussed with Consultants/Other Providers [Y/N]:  Prior or Outpatient Records Reviewed [Y/N]:

## 2021-10-12 NOTE — PROGRESS NOTE ADULT - PROBLEM SELECTOR PLAN 1
Pt with GÉNESIS on CKD in the setting of poor appetite and diarrhea. Exact duration of GÉNESIS however unknown. Noted to have Scr of 1.4 prior to admission on 9/11/17, and no labs since. Outpatient labs with Scr of 12.48 and bicarb of less than 5. Scr of 12.64 with serum potassium of of 5.7, and bicarb of < 7 in ER. Pt. initiated on bicarb drip. Bicarb drip was discontinued given hypokalemia and hypocalcemia. UA with large leuk est, 100 mg/dl protein, 5-10 RBC and > 50 WBC. Spot urine TP/Cr elevated at 2.7. Kidney u/s with b/l multiple cysts with no hydronephrosis. Also reviewed prior renal imaging from 2015 and 2017 consistent with renal parenchymal disease. Pt. currently non-oliguric and on maintenance IVF with LR. Last Scr elevated/stable at 7.34 today, might be his new baseline.    D/C IV fluids today. Spoke with daughter, she said that she would want to hold off HD for now, and would monitor Scr and manage his symptoms accordingly. Monitor labs and accurate urine output. Avoid NSAIDs, ACEI/ARBS, RCA and nephrotoxins. Dose medications as per eGFR. Pt with GÉNESIS on CKD in the setting of poor appetite and diarrhea. Exact duration of GÉNESIS however unknown. Noted to have Scr of 1.4 prior to admission on 9/11/17, and no labs since. Outpatient labs with Scr of 12.48 and bicarb of less than 5. Scr of 12.64 with serum potassium of of 5.7, and bicarb of < 7 in ER. Pt. initiated on bicarb drip. Bicarb drip was discontinued given hypokalemia and hypocalcemia. Spot urine TP/Cr elevated at 2.7. Kidney u/s with b/l multiple cysts with no hydronephrosis. Also reviewed prior renal imaging from 2015 and 2017 consistent with renal parenchymal disease. Pt. currently non-oliguric and on maintenance IVF with LR. Last Scr elevated/stable at 7.34 today, might be his new baseline.    D/C IV fluids today. Spoke with daughter, she said that she would want to hold off HD for now, and would monitor Scr and manage his symptoms accordingly. Monitor labs and accurate urine output. Avoid NSAIDs, ACEI/ARBS, RCA and nephrotoxins. Dose medications as per eGFR.

## 2021-10-12 NOTE — PROGRESS NOTE ADULT - ATTENDING COMMENTS
Patient seen and evaluated this morning POCUS now showing scattered B lines was A line previously.  D/c IVF.  Spoke to patient today about the state of his kidney numbers.  I started a discussion about dialysis vs conservative management and the patient is leaning more towards conservative management.  will continue discussion.

## 2021-10-12 NOTE — PROGRESS NOTE ADULT - ATTENDING COMMENTS
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    Patient seen and examined. States he feels better today and was able to walk half way down the hallway. Per daughter, he appears much better and ate some ice cream today.     #GÉNESIS on CKD  #High anion gap metabolic acidosis  #Hypocalcemia  #Anemia of Chronic Kidney disease   - Renal following recs appreciated. BUN/Cr continues to improve slowly w/ IVF hydration however Cr has now stabilized, suspect he is likely close to his new baseline  - S/p bicarb gtt w/ improvement in bicarb. No indication for HD currently   - S/p LR, now off as becoming overloaded per renal POCUS    - Persistent hypocalcemia will replete as needed, likely due to CKD. Monitor QTC. Increased calcitriol to 1mcg today   - Acidemia improved, pH now normalized   - 10/12 Hgb 7.1 s/p 1U of PRBCs w/ appropriate response   - Started on bicarb per renal   - UA clean catch this am w/ +LE and +WBC/bacteria. Will treat for UTI w/ Ceftriaxone. F/u urine culture     Discussed with patient's daughter and wife at bedside   Discussed with [facility] 3 Dr. Dan 89M with CKD3b (not on dialysis, hasn't seen a doctor in a couple years), HTN, GERD, Hiatal hernia, iron deficiency anemia admitted with GÉNESIS on CKD, anion gap metabolic acidosis, and uremia.    Patient seen and examined. States he feels better today and was able to walk half way down the hallway. Per daughter, he appears much better and ate some ice cream today.     #GÉNESIS on CKD  #High anion gap metabolic acidosis  #Hypocalcemia  #Anemia of Chronic Kidney disease   - Renal following recs appreciated. BUN/Cr continues to improve slowly w/ IVF hydration however Cr has now stabilized, suspect he is likely close to his new baseline  - S/p bicarb gtt w/ improvement in bicarb. No indication for HD currently   - S/p LR, now off as becoming overloaded per renal POCUS    - Persistent hypocalcemia will replete as needed, likely due to CKD. Monitor QTC. Increased calcitriol to 1mcg today   - Acidemia improved, pH now normalized   - 10/12 Hgb 7.1 s/p 1U of PRBCs w/ appropriate response   - Started on bicarb per renal   - UA clean catch this am w/ +LE and +WBC/bacteria. Will treat for UTI w/ Ceftriaxone. F/u urine culture     #Thrombocytopenia  - Plts 140s on admission, now 90s  - Unclear etiology, will check blue top to r/o clumping   - If plts continue to downtrend will investigate further but given stability, will likely need outpatient follow-up     Discussed with patient's daughter and wife at bedside   Discussed with [facility] 3 Dr. Dan

## 2021-10-12 NOTE — PROGRESS NOTE ADULT - PROBLEM SELECTOR PLAN 3
Persistently hypocalcemia. Last corrected calcium was 7.7. Can give 1 more dose of IV calcium gluconate. Increase calcitriol to 1 mcg PO daily. Continue calcium acetate. Given low Vitamin D ( total and 1,25), gave one dose of ergocalciferol 50,000 units Q weekly on 10/9. iPTH level significantly elevated. Likely signifies ongoing CKD and now severe hypocalcemia stimulating PTH release. Monitor Calcium. Monitor QTC.

## 2021-10-12 NOTE — PROGRESS NOTE ADULT - PROBLEM SELECTOR PLAN 5
improved from admit but now stably low. corrected calcium 6.2 on admit, now 8, iCal .77, now 0.87  - 2 g calcium gluconate with 1 g Mg on 10/7  - calcitriol and calcium acetate, calcium gluconate with 1 g Mg 10/8  - 2g nannette gluconate with 1g Mg on 10/12  - vit D 25-oh, vit D 1,25-OH , , in response to hypocalcemia  - calcitriol 0.5 mcg daily PO  - 10/9: Ca improving, will give additional 1g today and recheck  - EKG, monitor QTC improved from admit but now stably low. corrected calcium 6.2 on admit, now 8, iCal .77, now 0.87  - replete w/ IV Calcium as needed. Replete Mg to 2   - vit D 25-oh, vit D 1,25-OH , , in response to hypocalcemia  - calcitriol 1 mcg daily PO  - 10/9: Ca improving, will give additional 1g today and recheck  - EKG, monitor QTC improved from admit. corrected calcium 6.2 on admit, now 8, iCal .77, now 0.87  - replete w/ IV Calcium as needed. Replete Mg to 2   - vit D 25-oh, vit D 1,25-OH , , in response to hypocalcemia  - calcitriol 1 mcg daily PO  - 10/9: Ca improving, will give additional 1g today and recheck  - EKG, monitor QTC

## 2021-10-12 NOTE — PROGRESS NOTE ADULT - PROBLEM SELECTOR PLAN 3
- clean catch U/A on 10/11 with large LE, mod blood, many bacteria, pyruia  - asymptomatic  - CTX x3 days - clean catch U/A on 10/11 with large LE, mod blood, many bacteria, pyruia  - Follow-up urine culture   - asymptomatic  - CTX x3 days

## 2021-10-12 NOTE — PROGRESS NOTE ADULT - SUBJECTIVE AND OBJECTIVE BOX
[facility] DIVISION OF KIDNEY DISEASES AND HYPERTENSION -- FOLLOW UP NOTE  --------------------------------------------------------------------------------  89 year old male with CKD3, HTN, BPH, hiatal hernia presented to [facility] for abnormal labs. Pt. with chronic diarrhea for past 1 month, Outpatient lab work with Scr of 12.48 and bicarb of < 5, which prompted ER referral. Nephrology following for GÉNESIS and metabolic acidosis. On review of labs on [facility], patient noted to have Scr of 12.64 and serum potassium of 5.7, with bicarb of < 7. Pt. initiated on bicarb gtt on 10/7/21. Bicarb drip was discontinued and currently patient on LR. Last Scr elevated/stable 7.34 mg/dl today.     Patient was seen and examined at bedside. Patient was seen and examined at bedside. Reported feeling well. Denies CP, SOB, fever, chills, nausea, vomiting, diarrhea, LE edema or dysuria.    PAST HISTORY  --------------------------------------------------------------------------------  No significant changes to PMH, PSH, FHx, SHx, unless otherwise noted    ALLERGIES & MEDICATIONS  --------------------------------------------------------------------------------  Allergies    No Known Allergies    Intolerances      Standing Inpatient Medications  calcitriol   Capsule 0.5 MICROGram(s) Oral daily  calcium carbonate    500 mG (Tums) Chewable 1 Tablet(s) Chew three times a day  cefTRIAXone   IVPB      cefTRIAXone   IVPB 1000 milliGRAM(s) IV Intermittent every 24 hours  finasteride 5 milliGRAM(s) Oral daily  heparin   Injectable 5000 Unit(s) SubCutaneous every 8 hours  influenza  Vaccine (HIGH DOSE) 0.7 milliLiter(s) IntraMuscular once  melatonin 3 milliGRAM(s) Oral <User Schedule>  metoprolol tartrate 50 milliGRAM(s) Oral two times a day  pantoprazole    Tablet 40 milliGRAM(s) Oral daily    PRN Inpatient Medications  acetaminophen   Tablet .. 650 milliGRAM(s) Oral every 6 hours PRN    REVIEW OF SYSTEMS  --------------------------------------------------------------------------------  Gen: No fevers/chills  Respiratory: No dyspnea, cough  CV: No chest pain  GI: No abdominal pain, diarrhea  : No dysuria, hematuria  MSK: No  edema    All other systems were reviewed and are negative, except as noted.    VITALS/PHYSICAL EXAM  --------------------------------------------------------------------------------  T(C): 36.7 (10-12-21 @ 05:45), Max: 36.7 (10-11-21 @ 15:30)  HR: 74 (10-12-21 @ 05:45) (67 - 88)  BP: 168/86 (10-12-21 @ 05:45) (146/70 - 169/81)  RR: 16 (10-12-21 @ 05:45) (16 - 18)  SpO2: 98% (10-12-21 @ 05:45) (97% - 99%)  Wt(kg): --      10-11-21 @ 07:01  -  10-12-21 @ 07:00  --------------------------------------------------------  IN: 400 mL / OUT: 0 mL / NET: 400 mL    Physical Exam:  	Gen: NAD  	HEENT: MMM  	Pulm: CTA B/L, no crackles   	CV: S1S2  	Abd: Soft, +BS   	Ext: No LE edema B/L  	Neuro: Awake  	Skin: Warm and dry              POCUS: diffuse b-lines     LABS/STUDIES  --------------------------------------------------------------------------------              7.9    6.95  >-----------<  85       [10-12-21 @ 07:56]              23.3     138  |  106  |  101  ----------------------------<  90      [10-12-21 @ 07:56]  3.9   |  15  |  7.34        Ca     6.9     [10-12-21 @ 07:56]      Mg     2.00     [10-12-21 @ 07:56]      Phos  4.6     [10-12-21 @ 07:56]    TPro  4.7  /  Alb  2.5  /  TBili  0.4  /  DBili  x   /  AST  13  /  ALT  5   /  AlkPhos  65  [10-12-21 @ 07:56]          Creatinine Trend:  SCr 7.34 [10-12 @ 07:56]  SCr 7.55 [10-11 @ 23:37]  SCr 7.83 [10-11 @ 07:56]  SCr 7.66 [10-10 @ 21:38]  SCr 7.67 [10-10 @ 15:29]    Urinalysis - [10-11-21 @ 08:00]      Color Light Yellow / Appearance Clear / SG 1.010 / pH 7.5      Gluc Trace / Ketone Negative  / Bili Negative / Urobili <2 mg/dL       Blood Moderate / Protein 100 mg/dL / Leuk Est Large / Nitrite Negative      RBC 11 / WBC 22 / Hyaline 1 / Gran  / Sq Epi  / Non Sq Epi 0 / Bacteria Many    Urine Creatinine 46      [10-08-21 @ 17:11]  Urine Protein 128      [10-08-21 @ 17:11]  Urine Sodium 103      [10-08-21 @ 17:11]  Urine Urea Nitrogen 334.0      [10-08-21 @ 17:11]  Urine Chloride 91      [10-08-21 @ 17:11]  Urine Osmolality 443      [10-07-21 @ 06:45]    Iron 67, TIBC 134, %sat 50      [10-07-21 @ 07:27]  Ferritin 204      [10-07-21 @ 07:27]  PTH -- (Ca --)      [10-08-21 @ 17:11]   730  Vitamin D (25OH) 5.0      [10-08-21 @ 22:39]

## 2021-10-12 NOTE — PROGRESS NOTE ADULT - PROBLEM SELECTOR PLAN 7
Likely 2/2 CKD  - Fe studies wnl  - 1u pRBC and 4000u EPO on 10/11,   - Will continue to monitor CBC - resolved. will obtain GI PCR, stool O&P, stool culture if symptoms resume  - no leukocytosis or fevers or recent abx use to suggest C diff, will continue to monitor, if worsening will consider sending

## 2021-10-12 NOTE — PROGRESS NOTE ADULT - PROBLEM SELECTOR PLAN 1
Improving, Likely due to GÉNESIS on CKD and likely at new baseline  - , sCr 12.64. with decreased PO intake and energy and weakness over the past couple days. no asterixis on exam. hx of CKD  - bicarb <7 on admit, VBG pH 7.06, lactate 1. AG 23. likely 2/2 uremia, less likely ingestion. no hx of diabetes, glucose 122  - s/p 1 amp bicarb en route. MICU consulted, not candidate for urgent HD  - bicarb gtt stopped, LR at 100 cc/h  - tox screen  - neph consult, appreciate assistance  - Monitor BMP Improving, Likely due to GÉNESIS on CKD  - , sCr 12.64. with decreased PO intake and energy and weakness over the past couple days.   - bicarb <7 on admit, VBG pH 7.06, lactate 1. AG 23. likely 2/2 uremia, less likely ingestion. no hx of diabetes, glucose 122  - pH now normalized 7.3-7.4   - s/p 1 amp bicarb en route. MICU consulted, not candidate for urgent HD  - bicarb gtt stopped, LR at 100 cc/h  - tox screen  - neph consult, appreciate assistance  - Monitor BMP Improving, Likely due to GÉNESIS on CKD  - , sCr 12.64. with decreased PO intake and energy and weakness over the past couple days.   - bicarb <7 on admit, VBG pH 7.06, lactate 1. AG 23. likely 2/2 uremia, less likely ingestion. no hx of diabetes, glucose 122  - pH now normalized 7.3-7.4   - s/p 1 amp bicarb en route. MICU consulted, not candidate for urgent HD  - bicarb gtt stopped  - bicarb 650 mg BID  - neph consult, appreciate assistance  - Monitor BMP

## 2021-10-13 ENCOUNTER — TRANSCRIPTION ENCOUNTER (OUTPATIENT)
Age: 86
End: 2021-10-13

## 2021-10-13 VITALS
HEART RATE: 68 BPM | SYSTOLIC BLOOD PRESSURE: 151 MMHG | RESPIRATION RATE: 20 BRPM | DIASTOLIC BLOOD PRESSURE: 97 MMHG | TEMPERATURE: 98 F | OXYGEN SATURATION: 100 %

## 2021-10-13 LAB
ALBUMIN SERPL ELPH-MCNC: 2.8 G/DL — LOW (ref 3.3–5)
ALP SERPL-CCNC: 74 U/L — SIGNIFICANT CHANGE UP (ref 40–120)
ALT FLD-CCNC: 5 U/L — SIGNIFICANT CHANGE UP (ref 4–41)
ANION GAP SERPL CALC-SCNC: 18 MMOL/L — HIGH (ref 7–14)
AST SERPL-CCNC: 10 U/L — SIGNIFICANT CHANGE UP (ref 4–40)
BASOPHILS # BLD AUTO: 0.03 K/UL — SIGNIFICANT CHANGE UP (ref 0–0.2)
BASOPHILS NFR BLD AUTO: 0.4 % — SIGNIFICANT CHANGE UP (ref 0–2)
BILIRUB SERPL-MCNC: 0.4 MG/DL — SIGNIFICANT CHANGE UP (ref 0.2–1.2)
BUN SERPL-MCNC: 95 MG/DL — HIGH (ref 7–23)
CALCIUM SERPL-MCNC: 6.9 MG/DL — LOW (ref 8.4–10.5)
CHLORIDE SERPL-SCNC: 107 MMOL/L — SIGNIFICANT CHANGE UP (ref 98–107)
CLOSURE TME COLL+EPINEP BLD: 85 K/UL — SIGNIFICANT CHANGE UP (ref 150–400)
CO2 SERPL-SCNC: 15 MMOL/L — LOW (ref 22–31)
CREAT SERPL-MCNC: 7.27 MG/DL — HIGH (ref 0.5–1.3)
EOSINOPHIL # BLD AUTO: 0.39 K/UL — SIGNIFICANT CHANGE UP (ref 0–0.5)
EOSINOPHIL NFR BLD AUTO: 5.7 % — SIGNIFICANT CHANGE UP (ref 0–6)
GLUCOSE SERPL-MCNC: 113 MG/DL — HIGH (ref 70–99)
HCT VFR BLD CALC: 26.6 % — LOW (ref 39–50)
HGB BLD-MCNC: 8.6 G/DL — LOW (ref 13–17)
IANC: 4.81 K/UL — SIGNIFICANT CHANGE UP (ref 1.5–8.5)
IMM GRANULOCYTES NFR BLD AUTO: 2.2 % — HIGH (ref 0–1.5)
LYMPHOCYTES # BLD AUTO: 1.01 K/UL — SIGNIFICANT CHANGE UP (ref 1–3.3)
LYMPHOCYTES # BLD AUTO: 14.7 % — SIGNIFICANT CHANGE UP (ref 13–44)
MAGNESIUM SERPL-MCNC: 2 MG/DL — SIGNIFICANT CHANGE UP (ref 1.6–2.6)
MCHC RBC-ENTMCNC: 30.3 PG — SIGNIFICANT CHANGE UP (ref 27–34)
MCHC RBC-ENTMCNC: 32.3 GM/DL — SIGNIFICANT CHANGE UP (ref 32–36)
MCV RBC AUTO: 93.7 FL — SIGNIFICANT CHANGE UP (ref 80–100)
MONOCYTES # BLD AUTO: 0.46 K/UL — SIGNIFICANT CHANGE UP (ref 0–0.9)
MONOCYTES NFR BLD AUTO: 6.7 % — SIGNIFICANT CHANGE UP (ref 2–14)
NEUTROPHILS # BLD AUTO: 4.81 K/UL — SIGNIFICANT CHANGE UP (ref 1.8–7.4)
NEUTROPHILS NFR BLD AUTO: 70.3 % — SIGNIFICANT CHANGE UP (ref 43–77)
NRBC # BLD: 0 /100 WBCS — SIGNIFICANT CHANGE UP
NRBC # FLD: 0 K/UL — SIGNIFICANT CHANGE UP
PHOSPHATE SERPL-MCNC: 4.7 MG/DL — HIGH (ref 2.5–4.5)
PLATELET # BLD AUTO: 116 K/UL — LOW (ref 150–400)
POTASSIUM SERPL-MCNC: 3.7 MMOL/L — SIGNIFICANT CHANGE UP (ref 3.5–5.3)
POTASSIUM SERPL-SCNC: 3.7 MMOL/L — SIGNIFICANT CHANGE UP (ref 3.5–5.3)
PROT SERPL-MCNC: 5.3 G/DL — LOW (ref 6–8.3)
RBC # BLD: 2.84 M/UL — LOW (ref 4.2–5.8)
RBC # FLD: 15.3 % — HIGH (ref 10.3–14.5)
SODIUM SERPL-SCNC: 140 MMOL/L — SIGNIFICANT CHANGE UP (ref 135–145)
WBC # BLD: 6.85 K/UL — SIGNIFICANT CHANGE UP (ref 3.8–10.5)
WBC # FLD AUTO: 6.85 K/UL — SIGNIFICANT CHANGE UP (ref 3.8–10.5)

## 2021-10-13 PROCEDURE — 99233 SBSQ HOSP IP/OBS HIGH 50: CPT

## 2021-10-13 PROCEDURE — 99239 HOSP IP/OBS DSCHRG MGMT >30: CPT

## 2021-10-13 RX ORDER — OMEPRAZOLE 10 MG/1
1 CAPSULE, DELAYED RELEASE ORAL
Qty: 30 | Refills: 0
Start: 2021-10-13 | End: 2021-11-11

## 2021-10-13 RX ORDER — NIFEDIPINE 30 MG
1 TABLET, EXTENDED RELEASE 24 HR ORAL
Qty: 30 | Refills: 0
Start: 2021-10-13 | End: 2021-11-11

## 2021-10-13 RX ORDER — CALCIUM GLUCONATE 100 MG/ML
1 VIAL (ML) INTRAVENOUS ONCE
Refills: 0 | Status: COMPLETED | OUTPATIENT
Start: 2021-10-13 | End: 2021-10-13

## 2021-10-13 RX ORDER — NIFEDIPINE 30 MG
1 TABLET, EXTENDED RELEASE 24 HR ORAL
Qty: 0 | Refills: 0 | DISCHARGE
Start: 2021-10-13

## 2021-10-13 RX ORDER — CEFPODOXIME PROXETIL 100 MG
2 TABLET ORAL
Qty: 8 | Refills: 0
Start: 2021-10-13 | End: 2021-10-16

## 2021-10-13 RX ORDER — CALCITRIOL 0.5 UG/1
2 CAPSULE ORAL
Qty: 0 | Refills: 0 | DISCHARGE
Start: 2021-10-13

## 2021-10-13 RX ORDER — SODIUM BICARBONATE 1 MEQ/ML
1 SYRINGE (ML) INTRAVENOUS
Qty: 0 | Refills: 0 | DISCHARGE
Start: 2021-10-13

## 2021-10-13 RX ORDER — CALCIUM CARBONATE 500(1250)
1 TABLET ORAL
Qty: 90 | Refills: 0
Start: 2021-10-13 | End: 2021-11-11

## 2021-10-13 RX ORDER — CALCITRIOL 0.5 UG/1
2 CAPSULE ORAL
Qty: 60 | Refills: 0
Start: 2021-10-13 | End: 2021-11-11

## 2021-10-13 RX ORDER — LANOLIN ALCOHOL/MO/W.PET/CERES
1 CREAM (GRAM) TOPICAL
Qty: 0 | Refills: 0 | DISCHARGE
Start: 2021-10-13

## 2021-10-13 RX ORDER — LANOLIN ALCOHOL/MO/W.PET/CERES
3 CREAM (GRAM) TOPICAL AT BEDTIME
Refills: 0 | Status: DISCONTINUED | OUTPATIENT
Start: 2021-10-13 | End: 2021-10-13

## 2021-10-13 RX ORDER — METOPROLOL TARTRATE 50 MG
1 TABLET ORAL
Qty: 30 | Refills: 0
Start: 2021-10-13 | End: 2021-11-11

## 2021-10-13 RX ORDER — SODIUM BICARBONATE 1 MEQ/ML
1 SYRINGE (ML) INTRAVENOUS
Qty: 60 | Refills: 0
Start: 2021-10-13 | End: 2021-11-11

## 2021-10-13 RX ORDER — CALCIUM CARBONATE 500(1250)
1 TABLET ORAL
Qty: 0 | Refills: 0 | DISCHARGE
Start: 2021-10-13

## 2021-10-13 RX ORDER — LANOLIN ALCOHOL/MO/W.PET/CERES
1 CREAM (GRAM) TOPICAL
Qty: 30 | Refills: 0
Start: 2021-10-13 | End: 2021-11-11

## 2021-10-13 RX ORDER — DUTASTERIDE 0.5 MG/1
1 CAPSULE, LIQUID FILLED ORAL
Qty: 30 | Refills: 0
Start: 2021-10-13 | End: 2021-11-11

## 2021-10-13 RX ORDER — OMEPRAZOLE 10 MG/1
1 CAPSULE, DELAYED RELEASE ORAL
Qty: 0 | Refills: 0 | DISCHARGE

## 2021-10-13 RX ADMIN — Medication 3 MILLIGRAM(S): at 00:55

## 2021-10-13 RX ADMIN — HEPARIN SODIUM 5000 UNIT(S): 5000 INJECTION INTRAVENOUS; SUBCUTANEOUS at 05:39

## 2021-10-13 RX ADMIN — Medication 50 MILLIGRAM(S): at 05:38

## 2021-10-13 RX ADMIN — CEFTRIAXONE 100 MILLIGRAM(S): 500 INJECTION, POWDER, FOR SOLUTION INTRAMUSCULAR; INTRAVENOUS at 12:18

## 2021-10-13 RX ADMIN — Medication 50 GRAM(S): at 13:16

## 2021-10-13 RX ADMIN — HEPARIN SODIUM 5000 UNIT(S): 5000 INJECTION INTRAVENOUS; SUBCUTANEOUS at 13:17

## 2021-10-13 RX ADMIN — Medication 30 MILLIGRAM(S): at 05:39

## 2021-10-13 RX ADMIN — CALCITRIOL 1 MICROGRAM(S): 0.5 CAPSULE ORAL at 11:55

## 2021-10-13 RX ADMIN — Medication 1 TABLET(S): at 13:17

## 2021-10-13 RX ADMIN — FINASTERIDE 5 MILLIGRAM(S): 5 TABLET, FILM COATED ORAL at 11:55

## 2021-10-13 RX ADMIN — Medication 650 MILLIGRAM(S): at 05:38

## 2021-10-13 RX ADMIN — Medication 1 TABLET(S): at 05:38

## 2021-10-13 NOTE — PROGRESS NOTE ADULT - SUBJECTIVE AND OBJECTIVE BOX
[facility] Division of Kidney Diseases & Hypertension  FOLLOW UP NOTE  --------------------------------------------------------------------------------  HPI: 89 year old male with CKD3, HTN, BPH, hiatal hernia presented to [facility] for abnormal labs. Pt. with chronic diarrhea for past 1 month, Outpatient lab work with Scr of 12.48 and bicarb of < 5, which prompted ER referral. Nephrology following for GÉNESIS and metabolic acidosis. On review of labs on [facility], patient noted to have Scr of 12.64 and serum potassium of 5.7, with bicarb of < 7. Pt. initiated on bicarb gtt on 10/7/2 now off IVF.      Patient was seen and examined at bedside.. Reported feeling well. Denies CP, SOB, fever, chills, nausea, vomiting, diarrhea, LE edema or dysuria.        PAST HISTORY  --------------------------------------------------------------------------------  No significant changes to PMH, PSH, FHx, SHx, unless otherwise noted    ALLERGIES & MEDICATIONS  --------------------------------------------------------------------------------  Allergies    No Known Allergies    Intolerances      Standing Inpatient Medications  calcitriol   Capsule 1 MICROGram(s) Oral daily  calcium carbonate    500 mG (Tums) Chewable 1 Tablet(s) Chew three times a day  calcium gluconate IVPB 1 Gram(s) IV Intermittent once  cefTRIAXone   IVPB 1000 milliGRAM(s) IV Intermittent every 24 hours  finasteride 5 milliGRAM(s) Oral daily  heparin   Injectable 5000 Unit(s) SubCutaneous every 8 hours  influenza  Vaccine (HIGH DOSE) 0.7 milliLiter(s) IntraMuscular once  melatonin 3 milliGRAM(s) Oral <User Schedule>  melatonin 3 milliGRAM(s) Oral at bedtime  metoprolol tartrate 50 milliGRAM(s) Oral two times a day  NIFEdipine XL 30 milliGRAM(s) Oral daily  sodium bicarbonate 650 milliGRAM(s) Oral two times a day    PRN Inpatient Medications  acetaminophen   Tablet .. 650 milliGRAM(s) Oral every 6 hours PRN      REVIEW OF SYSTEMS  --------------------------------------------------------------------------------  Gen: no fever  Respiratory: no sob  CV: no cp  GI: no ab pain  : no urinary complaints  MSK: no pain    VITALS/PHYSICAL EXAM  --------------------------------------------------------------------------------  T(C): 36.6 (10-13-21 @ 05:35), Max: 36.7 (10-12-21 @ 22:00)  HR: 77 (10-13-21 @ 05:35) (62 - 80)  BP: 137/73 (10-13-21 @ 05:35) (137/73 - 178/86)  ABP: --  ABP(mean): --  RR: 20 (10-13-21 @ 05:35) (17 - 20)  SpO2: 98% (10-13-21 @ 05:35) (95% - 100%)  CVP(mm Hg): --        Physical Exam:  	Gen: NAD  	HEENT: MMM  	Pulm: CTA B/L, no crackles   	CV: S1S2  	Abd: Soft, +BS   	Ext: No LE edema B/L  	Neuro: Awake  	Skin: Warm and dry      LABS/STUDIES  --------------------------------------------------------------------------------              8.6    6.85  >-----------<  116      [10-13-21 @ 07:29]              26.6     140  |  107  |  95  ----------------------------<  113      [10-13-21 @ 07:29]  3.7   |  15  |  7.27        Ca     6.9     [10-13-21 @ 07:29]      Mg     2.00     [10-13-21 @ 07:29]      Phos  4.7     [10-13-21 @ 07:29]    TPro  5.3  /  Alb  2.8  /  TBili  0.4  /  DBili  x   /  AST  10  /  ALT  5   /  AlkPhos  74  [10-13-21 @ 07:29]          Creatinine Trend:  SCr 7.27 [10-13 @ 07:29]  SCr 7.34 [10-12 @ 07:56]  SCr 7.55 [10-11 @ 23:37]  SCr 7.83 [10-11 @ 07:56]  SCr 7.66 [10-10 @ 21:38]    Urinalysis - [10-11-21 @ 08:00]      Color Light Yellow / Appearance Clear / SG 1.010 / pH 7.5      Gluc Trace / Ketone Negative  / Bili Negative / Urobili <2 mg/dL       Blood Moderate / Protein 100 mg/dL / Leuk Est Large / Nitrite Negative      RBC 11 / WBC 22 / Hyaline 1 / Gran  / Sq Epi  / Non Sq Epi 0 / Bacteria Many    Urine Creatinine 46      [10-08-21 @ 17:11]  Urine Protein 128      [10-08-21 @ 17:11]  Urine Sodium 103      [10-08-21 @ 17:11]  Urine Urea Nitrogen 334.0      [10-08-21 @ 17:11]  Urine Chloride 91      [10-08-21 @ 17:11]  Urine Osmolality 443      [10-07-21 @ 06:45]    Iron 67, TIBC 134, %sat 50      [10-07-21 @ 07:27]  Ferritin 204      [10-07-21 @ 07:27]  PTH -- (Ca --)      [10-08-21 @ 17:11]   730  Vitamin D (25OH) 5.0      [10-08-21 @ 22:39]

## 2021-10-13 NOTE — PROGRESS NOTE ADULT - PROBLEM SELECTOR PLAN 1
Pt with GÉNESIS on CKD in the setting of poor appetite and diarrhea. Exact duration of GÉNESIS however unknown. Noted to have Scr of 1.4 prior to admission on 9/11/17, and no labs since. Outpatient labs with Scr of 12.48 and bicarb of less than 5. Scr of 12.64 with serum potassium of of 5.7, and bicarb of < 7 in ER. Pt. initiated on bicarb drip. Bicarb drip was discontinued given hypokalemia and hypocalcemia. Spot urine TP/Cr elevated at 2.7. Kidney u/s with b/l multiple cysts with no hydronephrosis. Also reviewed prior renal imaging from 2015 and 2017 consistent with renal parenchymal disease.  Spoke with daughter yesterday, she said that she would want to hold off HD for now, and would monitor Scr and manage his symptoms accordingly - the patient himself agrees. Monitor labs and accurate urine output. Avoid NSAIDs, ACEI/ARBS, RCA and nephrotoxins. Dose medications as per eGFR.  No renal objection to discharge.  Will arrange for outpatient follow up.  Discsussed with Dr. Franco.

## 2021-10-13 NOTE — PROGRESS NOTE ADULT - PROBLEM SELECTOR PLAN 3
- clean catch U/A on 10/11 with large LE, mod blood, many bacteria, pyruia  - Follow-up urine culture   - asymptomatic  - CTX x3 days - clean catch U/A on 10/11 with large LE, mod blood, many bacteria, pyruia  - Follow-up urine culture   - asymptomatic  - CTX x7 days

## 2021-10-13 NOTE — DISCHARGE NOTE PROVIDER - NSFOLLOWUPCLINICS_GEN_ALL_ED_FT
[facility]  Nephrology  [address] [place] [address], [address] [place] [address]  Phone: [phone]  Fax:

## 2021-10-13 NOTE — PROGRESS NOTE ADULT - PROBLEM/PLAN-1
DISPLAY PLAN FREE TEXT
complains of pain/discomfort

## 2021-10-13 NOTE — PROGRESS NOTE ADULT - ATTENDING COMMENTS
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 Improved with medical management.   Patient feels well today and states he feels much stronger. He would like to go home.     #GÉNESIS on CKD  #High anion gap metabolic acidosis  #Hypocalcemia  #Anemia of Chronic Kidney disease   - Renal following recs appreciated. BUN/Cr continues to improve slowly w/ IVF hydration however Cr has now stabilized, suspect he is likely close to his new baseline.   - S/p bicarb gtt w/ improvement in bicarb. No indication for HD currently   - S/p LR, now off as becoming overloaded per renal POCUS    - Persistent hypocalcemia will replete as needed, likely due to CKD. Monitor QTC. Increased calcitriol to 1mcg today   - Acidemia improved, pH now normalized   - 10/12 Hgb 7.1 s/p 1U of PRBCs w/ appropriate response   - Started on bicarb per renal   - UA clean catch w/ +LE and +WBC/bacteria. Will treat for UTI w/ Ceftriaxone. Urine culture w/ >3 organisms      #Thrombocytopenia  - Plts 140s on admission, now 90s- 110s. Remains stable. Recommend outpatient monitoring.     #Dispo - Discussed with Dr. Fall from nephrology, patient okay for discharge from renal perspective on current oral medications. Discussed with both of patient's daughters - they would like patient to go home. They have a rolling walker at home. Discussed he will need very close interval follow-up with nephrology and repeat BMP in about 1 week. They are aware and will see Dr. Fall in clinic. CM assistance with home care appreciated.   Discussed with HS 3 Dr. Dan  40 minutes spent on discharge planning

## 2021-10-13 NOTE — PROGRESS NOTE ADULT - PROBLEM SELECTOR PLAN 6
- HDS, asymptomatic without s/s bleeding  - plts 140s on admit, now stable in 80s-90s  - will draw CBC in blue top to prevent clumping

## 2021-10-13 NOTE — PROGRESS NOTE ADULT - PROBLEM SELECTOR PLAN 3
Persistently hypocalcemia.  Continue calcium acetate. Given low Vitamin D ( total and 1,25), gave one dose of ergocalciferol 50,000 units Q weekly on 10/9. iPTH level significantly elevated. Likely signifies ongoing CKD and now severe hypocalcemia stimulating PTH release. Monitor Calcium.  Continue calcitriol 1 daily.

## 2021-10-13 NOTE — PROGRESS NOTE ADULT - PROBLEM SELECTOR PLAN 1
Improving, Likely due to GÉNESIS on CKD  - , sCr 12.64. with decreased PO intake and energy and weakness over the past couple days.   - bicarb <7 on admit, VBG pH 7.06, lactate 1. AG 23. likely 2/2 uremia, less likely ingestion. no hx of diabetes, glucose 122  - pH now normalized 7.3-7.4   - s/p 1 amp bicarb en route. MICU consulted, not candidate for urgent HD  - bicarb gtt stopped  - bicarb 650 mg BID  - neph consult, appreciate assistance  - Monitor BMP

## 2021-10-13 NOTE — PROGRESS NOTE ADULT - PROBLEM SELECTOR PROBLEM 1
Acute kidney injury superimposed on CKD
High anion gap metabolic acidosis
Acute kidney injury superimposed on CKD
High anion gap metabolic acidosis
Acute kidney injury superimposed on CKD
Acute kidney injury superimposed on CKD
High anion gap metabolic acidosis
Acute kidney injury superimposed on CKD
High anion gap metabolic acidosis
High anion gap metabolic acidosis

## 2021-10-13 NOTE — PROGRESS NOTE ADULT - SUBJECTIVE AND OBJECTIVE BOX
PROGRESS NOTE:   Authored by Estrellita Dan MD  Internal Medicine  Pager [place] [phone]  Pager [phone]    Patient is a 89y old  Male who presents with a chief complaint of anion gap metabolic acidosis (12 Oct 2021 10:28)      SUBJECTIVE / OVERNIGHT EVENTS:    MEDICATIONS  (STANDING):  calcitriol   Capsule 1 MICROGram(s) Oral daily  calcium carbonate    500 mG (Tums) Chewable 1 Tablet(s) Chew three times a day  cefTRIAXone   IVPB 1000 milliGRAM(s) IV Intermittent every 24 hours  finasteride 5 milliGRAM(s) Oral daily  heparin   Injectable 5000 Unit(s) SubCutaneous every 8 hours  influenza  Vaccine (HIGH DOSE) 0.7 milliLiter(s) IntraMuscular once  melatonin 3 milliGRAM(s) Oral <User Schedule>  melatonin 3 milliGRAM(s) Oral at bedtime  metoprolol tartrate 50 milliGRAM(s) Oral two times a day  NIFEdipine XL 30 milliGRAM(s) Oral daily  sodium bicarbonate 650 milliGRAM(s) Oral two times a day    MEDICATIONS  (PRN):  acetaminophen   Tablet .. 650 milliGRAM(s) Oral every 6 hours PRN Mild Pain (1 - 3), Moderate Pain (4 - 6)      CAPILLARY BLOOD GLUCOSE        I&O's Summary    11 Oct 2021 07:01  -  12 Oct 2021 07:00  --------------------------------------------------------  IN: 400 mL / OUT: 0 mL / NET: 400 mL        PHYSICAL EXAM:  Vital Signs Last 24 Hrs  T(C): 36.6 (13 Oct 2021 05:35), Max: 36.7 (12 Oct 2021 22:00)  T(F): 97.8 (13 Oct 2021 05:35), Max: 98 (12 Oct 2021 22:00)  HR: 77 (13 Oct 2021 05:35) (62 - 80)  BP: 137/73 (13 Oct 2021 05:35) (137/73 - 178/86)  BP(mean): --  RR: 20 (13 Oct 2021 05:35) (17 - 20)  SpO2: 98% (13 Oct 2021 05:35) (95% - 100%)  CONSTITUTIONAL: Well-groomed, in no apparent distress  EYES: No conjunctival or scleral injection, non-icteric;   ENMT: No external nasal lesions; MMM  NECK: Trachea midline without palpable neck mass; thyroid not enlarged and non-tender  RESPIRATORY: Breathing comfortably; no dullness to percussion; lungs CTA without wheeze/rhonchi/rales  CARDIOVASCULAR: +S1S2, RRR, no M/G/R; pedal pulses full and symmetric; no lower extremity edema  GASTROINTESTINAL: No palpable masses or tenderness, +BS throughout, no rebound/guarding; no hepatosplenomegaly; no hernia palpated  LYMPHATIC: No cervical LAD or tenderness  SKIN: No rashes or ulcers noted  NEUROLOGIC: CN II-XII intact; sensation intact in LEs b/l to light touch  PSYCHIATRIC: A+O x 3; mood and affect appropriate; appropriate insight and judgment    LABS:                        7.9    6.95  )-----------( 85       ( 12 Oct 2021 07:56 )             23.3     10-12    138  |  106  |  101<H>  ----------------------------<  90  3.9   |  15<L>  |  7.34<H>    Ca    6.9<L>      12 Oct 2021 07:56  Phos  4.6     10-12  Mg     2.00     10-12    TPro  4.7<L>  /  Alb  2.5<L>  /  TBili  0.4  /  DBili  x   /  AST  13  /  ALT  5   /  AlkPhos  65  10-12          Urinalysis Basic - ( 11 Oct 2021 08:00 )    Color: Light Yellow / Appearance: Clear / S.010 / pH: x  Gluc: x / Ketone: Negative  / Bili: Negative / Urobili: <2 mg/dL   Blood: x / Protein: 100 mg/dL / Nitrite: Negative   Leuk Esterase: Large / RBC: 11 /HPF / WBC 22 /HPF   Sq Epi: x / Non Sq Epi: 0 /HPF / Bacteria: Many        Culture - Urine (collected 11 Oct 2021 08:30)  Source: Clean Catch Clean Catch (Midstream)  Final Report (12 Oct 2021 22:48):    >=3 organisms. Probable collection contamination.        RADIOLOGY & ADDITIONAL TESTS:  Results Reviewed:   Imaging Personally Reviewed:  Electrocardiogram Personally Reviewed:    COORDINATION OF CARE:  Care Discussed with Consultants/Other Providers [Y/N]:  Prior or Outpatient Records Reviewed [Y/N]:   PROGRESS NOTE:   Authored by Estrellita Dan MD  Internal Medicine  Pager [place] [phone]  Pager [phone]    Patient is a 89y old  Male who presents with a chief complaint of anion gap metabolic acidosis (12 Oct 2021 10:28)      SUBJECTIVE / OVERNIGHT EVENTS: NAEO. Patient seen and examined at bedside without acute complaints. no f/c. no chest pain, sob. just wants to sleep today    MEDICATIONS  (STANDING):  calcitriol   Capsule 1 MICROGram(s) Oral daily  calcium carbonate    500 mG (Tums) Chewable 1 Tablet(s) Chew three times a day  cefTRIAXone   IVPB 1000 milliGRAM(s) IV Intermittent every 24 hours  finasteride 5 milliGRAM(s) Oral daily  heparin   Injectable 5000 Unit(s) SubCutaneous every 8 hours  influenza  Vaccine (HIGH DOSE) 0.7 milliLiter(s) IntraMuscular once  melatonin 3 milliGRAM(s) Oral <User Schedule>  melatonin 3 milliGRAM(s) Oral at bedtime  metoprolol tartrate 50 milliGRAM(s) Oral two times a day  NIFEdipine XL 30 milliGRAM(s) Oral daily  sodium bicarbonate 650 milliGRAM(s) Oral two times a day    MEDICATIONS  (PRN):  acetaminophen   Tablet .. 650 milliGRAM(s) Oral every 6 hours PRN Mild Pain (1 - 3), Moderate Pain (4 - 6)      CAPILLARY BLOOD GLUCOSE        I&O's Summary    11 Oct 2021 07:01  -  12 Oct 2021 07:00  --------------------------------------------------------  IN: 400 mL / OUT: 0 mL / NET: 400 mL        PHYSICAL EXAM:  Vital Signs Last 24 Hrs  T(C): 36.6 (13 Oct 2021 05:35), Max: 36.7 (12 Oct 2021 22:00)  T(F): 97.8 (13 Oct 2021 05:35), Max: 98 (12 Oct 2021 22:00)  HR: 77 (13 Oct 2021 05:35) (62 - 80)  BP: 137/73 (13 Oct 2021 05:35) (137/73 - 178/86)  BP(mean): --  RR: 20 (13 Oct 2021 05:35) (17 - 20)  SpO2: 98% (13 Oct 2021 05:35) (95% - 100%)  CONSTITUTIONAL: Well-groomed, in no apparent distress  EYES: No conjunctival or scleral injection, non-icteric;   ENMT: No external nasal lesions; MMM  NECK: Trachea midline without palpable neck mass; thyroid not enlarged and non-tender  RESPIRATORY: Breathing comfortably; no dullness to percussion; lungs CTA without wheeze/rhonchi/rales  CARDIOVASCULAR: +S1S2, RRR, no M/G/R; pedal pulses full and symmetric; no lower extremity edema  GASTROINTESTINAL: No palpable masses or tenderness, +BS throughout, no rebound/guarding; no hepatosplenomegaly; no hernia palpated  PSYCHIATRIC: A+O x 3; mood and affect appropriate; appropriate insight and judgment    LABS:                        7.9    6.95  )-----------( 85       ( 12 Oct 2021 07:56 )             23.3     10-12    138  |  106  |  101<H>  ----------------------------<  90  3.9   |  15<L>  |  7.34<H>    Ca    6.9<L>      12 Oct 2021 07:56  Phos  4.6     10-12  Mg     2.00     10-12    TPro  4.7<L>  /  Alb  2.5<L>  /  TBili  0.4  /  DBili  x   /  AST  13  /  ALT  5   /  AlkPhos  65  10-12          Urinalysis Basic - ( 11 Oct 2021 08:00 )    Color: Light Yellow / Appearance: Clear / S.010 / pH: x  Gluc: x / Ketone: Negative  / Bili: Negative / Urobili: <2 mg/dL   Blood: x / Protein: 100 mg/dL / Nitrite: Negative   Leuk Esterase: Large / RBC: 11 /HPF / WBC 22 /HPF   Sq Epi: x / Non Sq Epi: 0 /HPF / Bacteria: Many        Culture - Urine (collected 11 Oct 2021 08:30)  Source: Clean Catch Clean Catch (Midstream)  Final Report (12 Oct 2021 22:48):    >=3 organisms. Probable collection contamination.        RADIOLOGY & ADDITIONAL TESTS:  Results Reviewed:   Imaging Personally Reviewed:  Electrocardiogram Personally Reviewed:    COORDINATION OF CARE:  Care Discussed with Consultants/Other Providers [Y/N]:  Prior or Outpatient Records Reviewed [Y/N]:

## 2021-10-13 NOTE — DISCHARGE NOTE NURSING/CASE MANAGEMENT/SOCIAL WORK - PATIENT PORTAL LINK FT
You can access the FollowMyHealth Patient Portal offered by [facility] by registering at the following website: [email or web address]. By joining [facility]’s FollowMyHealth portal, you will also be able to view your health information using other applications (apps) compatible with our system.

## 2021-10-13 NOTE — PROGRESS NOTE ADULT - PROBLEM SELECTOR PLAN 2
In setting of chronic diarrhea and advance renal failure. Last bicarb is 15. Continue on Sodium bicarbonate 650 mg PO BID. Monitor serum CO2 and pH.

## 2021-10-13 NOTE — PROGRESS NOTE ADULT - PROBLEM SELECTOR PLAN 5
improved from admit. corrected calcium 6.2 on admit, now 8, iCal .77, now 0.87  - replete w/ IV Calcium as needed. Replete Mg to 2   - vit D 25-oh, vit D 1,25-OH , , in response to hypocalcemia  - calcitriol 1 mcg daily PO  - 10/9: Ca improving, will give additional 1g today and recheck  - EKG, monitor QTC improved from admit. corrected calcium 6.2 on admit, now 8, iCal .77, now 0.87  - replete w/ IV Calcium as needed. Replete Mg to 2   - vit D 25-oh, vit D 1,25-OH , , in response to hypocalcemia  - calcitriol 1 mcg daily PO  - 10/9: Ca improving, will give additional 1g today and recheck  10/13: another 1g nannette gluconate  - EKG, monitor QTC

## 2021-10-13 NOTE — DISCHARGE NOTE PROVIDER - NSDCMRMEDTOKEN_GEN_ALL_CORE_FT
Avodart 0.5 mg oral capsule: 1 cap(s) orally once a day  metoprolol succinate 50 mg oral tablet, extended release: 1 tab(s) orally once a day  NIFEdipine extended release 60 mg oral tablet, extended release: 1 tab(s) orally once a day  omeprazole 20 mg oral delayed release capsule: 1 cap(s) orally once a day   Avodart 0.5 mg oral capsule: 1 cap(s) orally once a day  calcitriol 0.5 mcg oral capsule: 2 cap(s) orally once a day  calcium carbonate 500 mg (200 mg elemental calcium) oral tablet, chewable: 1 tab(s) orally 3 times a day  cefpodoxime 100 mg oral tablet: 2 tab(s) orally every 24 hours   melatonin 3 mg oral tablet: 1 tab(s) orally   melatonin 3 mg oral tablet: 1 tab(s) orally once a day (at bedtime)  metoprolol succinate 50 mg oral tablet, extended release: 1 tab(s) orally once a day  NIFEdipine 30 mg oral tablet, extended release: 1 tab(s) orally once a day  omeprazole 20 mg oral delayed release capsule: 1 cap(s) orally once a day  sodium bicarbonate 650 mg oral tablet: 1 tab(s) orally 2 times a day   Avodart 0.5 mg oral capsule: 1 cap(s) orally once a day  calcitriol 0.5 mcg oral capsule: 2 cap(s) orally once a day  calcium carbonate 500 mg (200 mg elemental calcium) oral tablet, chewable: 1 tab(s) orally 3 times a day  cefpodoxime 100 mg oral tablet: 2 tab(s) orally every 24 hours   melatonin 3 mg oral tablet: 1 tab(s) orally once a day (at bedtime)  metoprolol succinate 50 mg oral tablet, extended release: 1 tab(s) orally once a day  NIFEdipine 30 mg oral tablet, extended release: 1 tab(s) orally once a day  omeprazole 20 mg oral delayed release capsule: 1 cap(s) orally once a day  sodium bicarbonate 650 mg oral tablet: 1 tab(s) orally 2 times a day

## 2021-10-13 NOTE — PROGRESS NOTE ADULT - PROVIDER SPECIALTY LIST ADULT
Internal Medicine
Nephrology
Nephrology
Internal Medicine
Nephrology
Internal Medicine

## 2021-10-13 NOTE — DISCHARGE NOTE PROVIDER - HOSPITAL COURSE
Ge Weber is a 89M with CKD3b (hasn't seen a doctor in a couple years), HTN, GERD, Hiatal hernia, iron deficiency anemia who presents with lab abnormalities. Per patient's daughter, patient has been getting progressively worsening shortness of breath on exertion for the past couple of months. But over the last 10 days has been getting much worse. Has belly pain with eating with associated decreased PO intake but able to drink Gatorade He denies dizziness and headaches, and palpitations. Has been having diarrhea for the past 4 months, watery no blood. Associated with PO intake, has loose stools no matter what he eats. No LE swelling, no chest pain or discomfort. Still urinating well. Denies fevers, chills, cough. Per patient's daughter, he became so weak that daughter had an NP come to house to assess patient and obtain CXR, blood draw, COVID swab. Prescribed zithromax and medrol pack (first started Monday). Blood results came back with profoundly low bicarb so he presented to the ED for further evaluation.    In the ED, he was afebrile, hemodynamically stable, RR18, satting well on room air. Labs notable for bicarb <7, , sCr 12.64, anion gap 23. VBG pH 7.06, pCO2 24, HCO3 7. Lactate 1. Received 1 amp bicarb prior to arrival, and started on bicarb gtt. MICU was consulted, not a candidate for urgent HD, recommending bicarb gtt.    Of note, patient diagnosed with kidney disease around 20 years ago. Not sure what caused it, was seeing a nephrologist for many years but lost to follow up some time ago. Never had electrolyte abnormalities like this, never been on dialysis.   Ge Weber is a 89M with CKD3b (hasn't seen a doctor in a couple years), HTN, GERD, Hiatal hernia, iron deficiency anemia who presents with lab abnormalities. Per patient's daughter, patient has been getting progressively worsening shortness of breath on exertion for the past couple of months. But over the last 10 days has been getting much worse. Has belly pain with eating with associated decreased PO intake but able to drink Gatorade He denies dizziness and headaches, and palpitations. Has been having diarrhea for the past 4 months, watery no blood. Associated with PO intake, has loose stools no matter what he eats. No LE swelling, no chest pain or discomfort. Still urinating well. Denies fevers, chills, cough. Per patient's daughter, he became so weak that daughter had an NP come to house to assess patient and obtain CXR, blood draw, COVID swab. Prescribed zithromax and medrol pack (first started Monday). Blood results came back with profoundly low bicarb so he presented to the ED for further evaluation.    In the ED, he was afebrile, hemodynamically stable, RR18, satting well on room air. Labs notable for bicarb <7, , sCr 12.64, anion gap 23. VBG pH 7.06, pCO2 24, HCO3 7. Lactate 1. Received 1 amp bicarb prior to arrival, and started on bicarb gtt. MICU was consulted, not a candidate for urgent HD, recommending bicarb gtt.    High anion gap metabolic acidosis.   ·  Improving, Likely due to GÉNESIS on CKD. On admission, Bicarb <7 with anion gap 23, pH 7.06 on VBG, lactate 1. Likely 2/2 uremia. , sCr 12.64. with decreased PO intake and energy and weakness over the past couple days. s/p 1 amp bicarb en route.   - MICU consulted in the ED, not candidate for urgent HD  - Nephrology was consulted and patient was started on bicarb gtt with improvement of pH on subsequent VBG, and slow improvement of serum bicarb. Patient never became alkalotic on the drip. Then received IV pushes of bicarb  - pH now normalized 7.3-7.4   -   - bicarb gtt stopped  - bicarb  mg BID  - Monitor BMP.    Acute kidney injury superimposed on CKD.   - sCr 12.64 on admit, baseline ~1.3 (2017)  - per patient, he is making urine. never been on dialysis. GFR on admit 3    - Renal US w/ renal parenchymal disease   - Renal protective measures: Please renally dose all medications; Avoid nephrotoxic medications (NSAIDS, IV contrast); Avoid ACEi and ARBs in the setting of GÉNESIS; Maintain MAP >65; Low Na, K, phos, and protein diet  - Orthostatic this AM, will c/w maintenance IVF.    Acute UTI.   ·  Plan: - clean catch U/A on 10/11 with large LE, mod blood, many bacteria, pyruia  - Follow-up urine culture   - asymptomatic  - CTX x3 days.    Hypertension.   ·  Plan: BP above goal   - Metop to home dose 50mg BID  - increased nifedipine for elevated BPs.    Hypocalcemia.   ·  Plan: improved from admit. corrected calcium 6.2 on admit, now 8, iCal .77, now 0.87  - replete w/ IV Calcium as needed. Replete Mg to 2   - vit D 25-oh, vit D 1,25-OH , , in response to hypocalcemia  - calcitriol 1 mcg daily PO  - 10/9: Ca improving, will give additional 1g today and recheck  - EKG, monitor QTC.    Thrombocytopenia.   ·  Plan: - HDS, asymptomatic without s/s bleeding  - plts 140s on admit, now stable in 80s-90s  - will draw CBC in blue top to prevent clumping.     Watery diarrhea.   ·  Plan: - resolved. will obtain GI PCR, stool O&P, stool culture if symptoms resume  - no leukocytosis or fevers or recent abx use to suggest C diff, will continue to monitor, if worsening will consider sending.    Normocytic anemia.   ·  Plan: Likely 2/2 CKD  - Fe studies wnl  - 1u pRBC and 4000u EPO on 10/11,   - Will continue to monitor CBC.    GERD (gastroesophageal reflux disease).   ·  Plan: - c/w PPI.    BPH (benign prostatic hyperplasia).   ·  Plan; - c/w finasteride.    Preventive measure.   ·  Plan: FEN/GI: kidney diet, soft   Lines:  GTT:  PPx: hep sq  PT/OT: consult  Dispo: Family would like patient to go home w/ home PT   Code Status: Full, pending discussion.   Ge Weber is a 89M with CKD3b (hasn't seen a doctor in a couple years), HTN, GERD, Hiatal hernia, iron deficiency anemia who presents with lab abnormalities. Per patient's daughter, patient has been getting progressively worsening shortness of breath on exertion for the past couple of months. But over the last 10 days has been getting much worse. Has belly pain with eating with associated decreased PO intake but able to drink Gatorade He denies dizziness and headaches, and palpitations. Has been having diarrhea for the past 4 months, watery no blood. Associated with PO intake, has loose stools no matter what he eats. No LE swelling, no chest pain or discomfort. Still urinating well. Denies fevers, chills, cough. Per patient's daughter, he became so weak that daughter had an NP come to house to assess patient and obtain CXR, blood draw, COVID swab. Prescribed zithromax and medrol pack (first started Monday). Blood results came back with profoundly low bicarb so he presented to the ED for further evaluation.    In the ED, he was afebrile, hemodynamically stable, RR18, satting well on room air. Labs notable for bicarb <7, , sCr 12.64, anion gap 23. VBG pH 7.06, pCO2 24, HCO3 7. Lactate 1. Received 1 amp bicarb prior to arrival, and started on bicarb gtt. MICU was consulted, not a candidate for urgent HD, recommending bicarb gtt.    High anion gap metabolic acidosis.   Improving, Likely due to GÉNESIS on CKD. On admission, Bicarb <7 with anion gap 23, pH 7.06 on VBG, lactate 1. Likely 2/2 uremia. , sCr 12.64. with decreased PO intake and energy and weakness over the past couple days. s/p 1 amp bicarb en route.   - MICU consulted in the ED, not candidate for urgent HD  - Nephrology was consulted and patient was started on bicarb gtt with improvement of pH on subsequent VBG, and slow improvement of serum bicarb. Patient never became alkalotic on the drip. Then received IV pushes of bicarb. However, patient's bicarb level stayed stable at around 15 with pH now normalized 7.3-7.4. Patient was transitioned to PO bicarb 650 mg BID at discharge.     Hypocalcemia.   ·  Plan: improved from admit. corrected calcium 6.2 on admit, now 8, iCal .77, now 0.87  - replete w/ IV Calcium as needed. Replete Mg to 2   - vit D 25-oh, vit D 1,25-OH , , in response to hypocalcemia  - calcitriol 1 mcg daily PO  - 10/9: Ca improving, will give additional 1g today and recheck  - EKG, monitor QTC.    Acute UTI.   - clean catch U/A on 10/11 with large LE, mod blood, many bacteria, pyruia. Urine culture with >3 organisms. Patient describes fatigue and lethargy.  - Ceftriaxone (10/11-10-13), continue cefpodoxime 200 mg q24 h for total 7 day course of antibiotics (last day 10/17)      Acute kidney injury superimposed on CKD.   - sCr 12.64 on admit, baseline ~1.3 (2017)  - per patient, he is making urine. never been on dialysis. GFR on admit 3  - Renal US w/ renal parenchymal disease without hydronephrosis    Hypertension.   ·  Plan: BP above goal   - Metop to home dose 50mg BID  - increased nifedipine for elevated BPs.    Thrombocytopenia.   HDS, asymptomatic without s/s bleeding. Platelets 140s on admit, now stable in 80s-90s.      Watery diarrhea.    Patient initially presented with watery diarrhea, but was resolved upon admission and had no further episodes throughout hospitalization    Normocytic anemia.   Likely 2/2 CKD  - Fe studies wnl  - 1u pRBC and 4000u EPO on 10/11    GERD (gastroesophageal reflux disease).   ·  Plan: - c/w PPI.    BPH (benign prostatic hyperplasia).   ·  Plan; - c/w finasteride.    Patient was seen and evaluated by physical therapy, who recommended subacute rehab, but patient and family elected to go home with home PT.    At the time of discharge, patient was medically stable for discharge home with home PT with close follow up with nephrology   Ge Weber is a 89M with CKD3b (hasn't seen a doctor in a couple years), HTN, GERD, Hiatal hernia, iron deficiency anemia who presents with lab abnormalities. Per patient's daughter, patient has been getting progressively worsening shortness of breath on exertion for the past couple of months. But over the last 10 days has been getting much worse. Has belly pain with eating with associated decreased PO intake but able to drink Gatorade He denies dizziness and headaches, and palpitations. Has been having diarrhea for the past 4 months, watery no blood. Associated with PO intake, has loose stools no matter what he eats. No LE swelling, no chest pain or discomfort. Still urinating well. Denies fevers, chills, cough. Per patient's daughter, he became so weak that daughter had an NP come to house to assess patient and obtain CXR, blood draw, COVID swab. Prescribed zithromax and medrol pack (first started Monday). Blood results came back with profoundly low bicarb so he presented to the ED for further evaluation.    In the ED, he was afebrile, hemodynamically stable, RR18, satting well on room air. Labs notable for bicarb <7, , sCr 12.64, anion gap 23. VBG pH 7.06, pCO2 24, HCO3 7. Lactate 1. Received 1 amp bicarb prior to arrival, and started on bicarb gtt. MICU was consulted, not a candidate for urgent HD, recommending bicarb gtt.    High anion gap metabolic acidosis.   Improving, Likely due to GÉNESIS on CKD. On admission, Bicarb <7 with anion gap 23, pH 7.06 on VBG, lactate 1. Likely 2/2 uremia. , sCr 12.64. with decreased PO intake and energy and weakness over the past couple days. s/p 1 amp bicarb en route.   - MICU consulted in the ED, not candidate for urgent HD  - Nephrology was consulted and patient was started on bicarb gtt with improvement of pH on subsequent VBG, and slow improvement of serum bicarb. Patient never became alkalotic on the drip. Then received IV pushes of bicarb. However, patient's bicarb level stayed stable at around 15 with pH now normalized 7.3-7.4. Patient was transitioned to PO bicarb 650 mg BID at discharge.     Hypocalcemia.   On admission, corrected calcium 6.2, ionized calcium 0.77.  - replete w/ IV Calcium as needed. Replete Mg to 2   - vit D 25-oh, vit D 1,25-OH , , in response to hypocalcemia  - calcitriol 1 mcg daily PO  - 10/9: Ca improving, will give additional 1g today and recheck  - EKG, monitor QTC.    Acute UTI.   - clean catch U/A on 10/11 with large LE, mod blood, many bacteria, pyruia. Urine culture with >3 organisms. Patient describes fatigue and lethargy.  - Ceftriaxone (10/11-10-13), continue cefpodoxime 200 mg q24 h for total 7 day course of antibiotics (last day 10/17)      Acute kidney injury superimposed on CKD.   - sCr 12.64 on admit, baseline ~1.3 (2017)  - per patient, he is making urine. never been on dialysis. GFR on admit 3  - Renal US w/ renal parenchymal disease without hydronephrosis    Hypertension.   ·  Plan: BP above goal   - Metop to home dose 50mg BID  - increased nifedipine for elevated BPs.    Thrombocytopenia.   HDS, asymptomatic without s/s bleeding. Platelets 140s on admit, now stable in 80s-90s.      Watery diarrhea.    Patient initially presented with watery diarrhea, but was resolved upon admission and had no further episodes throughout hospitalization    Normocytic anemia.   Likely 2/2 CKD  - Fe studies wnl  - 1u pRBC and 4000u EPO on 10/11    GERD (gastroesophageal reflux disease).   ·  Plan: - c/w PPI.    BPH (benign prostatic hyperplasia).   ·  Plan; - c/w finasteride.    Patient was seen and evaluated by physical therapy, who recommended subacute rehab, but patient and family elected to go home with home PT.    At the time of discharge, patient was medically stable for discharge home with home PT with close follow up with nephrology   Ge Weber is a 89M with CKD3b (hasn't seen a doctor in a couple years), HTN, GERD, Hiatal hernia, iron deficiency anemia who presents with lab abnormalities. Per patient's daughter, patient has been getting progressively worsening shortness of breath on exertion for the past couple of months. But over the last 10 days has been getting much worse. Has belly pain with eating with associated decreased PO intake but able to drink Gatorade He denies dizziness and headaches, and palpitations. Has been having diarrhea for the past 4 months, watery no blood. Associated with PO intake, has loose stools no matter what he eats. No LE swelling, no chest pain or discomfort. Still urinating well. Denies fevers, chills, cough. Per patient's daughter, he became so weak that daughter had an NP come to house to assess patient and obtain CXR, blood draw, COVID swab. Prescribed zithromax and medrol pack (first started Monday). Blood results came back with profoundly low bicarb so he presented to the ED for further evaluation.    In the ED, he was afebrile, hemodynamically stable, RR18, satting well on room air. Labs notable for bicarb <7, , sCr 12.64, anion gap 23. VBG pH 7.06, pCO2 24, HCO3 7. Lactate 1. Received 1 amp bicarb prior to arrival, and started on bicarb gtt. MICU was consulted, not a candidate for urgent HD, recommending bicarb gtt.    High anion gap metabolic acidosis.   Improving, Likely due to GÉNESIS on CKD. On admission, Bicarb <7 with anion gap 23, pH 7.06 on VBG, lactate 1. Likely 2/2 uremia. , sCr 12.64. with decreased PO intake and energy and weakness over the past couple days. s/p 1 amp bicarb en route.   - MICU consulted in the ED, not candidate for urgent HD  - Nephrology was consulted and patient was started on bicarb gtt with improvement of pH on subsequent VBG, and slow improvement of serum bicarb. Patient never became alkalotic on the drip. Then received IV pushes of bicarb. However, patient's bicarb level stayed stable at around 15 with pH now normalized 7.3-7.4. Patient was transitioned to PO bicarb 650 mg BID at discharge.     Hypocalcemia.   On admission, corrected calcium 6.2, ionized calcium 0.77.  - replete w/ IV Calcium as needed. Replete Mg to 2   - vit D 25-oh, vit D 1,25-OH , , in response to hypocalcemia  - calcitriol 1 mcg daily PO and vitamin D supplementation given per nephro recs  - Ca steadily improving during admission  - EKG, monitor QTC.    Acute UTI.   - clean catch U/A on 10/11 with large LE, mod blood, many bacteria, pyruia. Urine culture with >3 organisms. Patient describes fatigue and lethargy.  - Ceftriaxone (10/11-10-13), continue cefpodoxime 200 mg q24 h for total 7 day course of antibiotics (last day 10/17)    Acute kidney injury superimposed on CKD.   - sCr 12.64 on admit, baseline ~1.3 (2017)  - per patient, he is making urine. never been on dialysis. GFR on admit 3  - Renal US w/ renal parenchymal disease without hydronephrosis  - Cr slowly improving throughout admission down to 7.27 prior to dc    Hypertension.   ·  Plan: BP above goal   - Metop to home dose 50mg BID  - home nifedipine lowered to 30mg daily for low BPs    Thrombocytopenia.   HDS, asymptomatic without s/s bleeding. Platelets 140s on admit, stable in 80s-90s, improved to 116 on day of discharge     Watery diarrhea.    Patient initially presented with watery diarrhea, but was resolved upon admission and had no further episodes throughout hospitalization    Normocytic anemia.   Likely 2/2 CKD  - Fe studies wnl  - 1u pRBC and 4000u EPO on 10/11 with stable hgb afterwards    GERD (gastroesophageal reflux disease).   ·  Plan: - c/w home PPI.    BPH (benign prostatic hyperplasia).   ·  Plan; - c/w finasteride.    Patient was seen and evaluated by physical therapy, who recommended subacute rehab, but patient and family elected to go home with home PT.    At the time of discharge, patient was medically stable for discharge home with home PT with close follow up with nephrology

## 2021-10-13 NOTE — PROGRESS NOTE ADULT - ASSESSMENT
Ge Weber is a 89M with CKD3b (not on dialysis, hasn't seen a doctor in a couple years), HTN, GERD, Hiatal hernia, iron deficiency anemia who presents with GÉNESIS on CKD, anion gap metabolic acidosis, and uremia.
Pt with GÉNESIS on CKD in setting of volume depletion 
Ge Weber is a 89M with CKD3b (not on dialysis, hasn't seen a doctor in a couple years), HTN, GERD, Hiatal hernia, iron deficiency anemia who presents with GÉNESIS on CKD, anion gap metabolic acidosis, and uremia.
Pt with GÉNESIS on CKD in setting of volume depletion 
Ge Weber is a 89M with CKD3b (not on dialysis, hasn't seen a doctor in a couple years), HTN, GERD, Hiatal hernia, iron deficiency anemia who presents with GÉNESIS on CKD, anion gap metabolic acidosis, and uremia.
Pt with GÉNESIS on CKD in setting of volume depletion 
Ge Weber is a 89M with CKD3b (not on dialysis, hasn't seen a doctor in a couple years), HTN, GERD, Hiatal hernia, iron deficiency anemia who presents with GÉNESIS on CKD, anion gap metabolic acidosis, and uremia.

## 2021-10-13 NOTE — PROGRESS NOTE ADULT - PROBLEM SELECTOR PROBLEM 2
High anion gap metabolic acidosis
Acute kidney injury superimposed on CKD
High anion gap metabolic acidosis
Acute kidney injury superimposed on CKD

## 2021-10-13 NOTE — PROGRESS NOTE ADULT - PROBLEM/PLAN-3
DISPLAY PLAN FREE TEXT
FLACC
DISPLAY PLAN FREE TEXT

## 2021-10-13 NOTE — PROGRESS NOTE ADULT - PROBLEM SELECTOR PLAN 11
FEN/GI: kidney diet, soft   Lines:  GTT:  PPx: hep sq  PT/OT: consult  Dispo: Family would like patient to go home w/ home PT   Code Status: Full, pending discussion

## 2021-10-13 NOTE — DISCHARGE NOTE PROVIDER - NSDCCPCAREPLAN_GEN_ALL_CORE_FT
PRINCIPAL DISCHARGE DIAGNOSIS  Diagnosis: Acute kidney failure  Assessment and Plan of Treatment: You came in with an acute kidney injury on top of your chronic kidney disease. You were found to have very low bicarbonate levels and acidic blood. You were given bicarbonate through the IV to slowly bring your blood bicarbonate levels up. While the acidity level of your blood normalized at a proper rate, your bicarbonate levels did not normalize as well, and stayed at a stable, low level. We gave you bicarbonate supplementation based on the acidity of your blood. This is likely your new baseline level. You also had very low calcium levels, which was caused by your kidney failure as well. You were given several doses of calcium through the blood, and calcium supplementation tabs, to help keep it at a normal and appropriate level. Your calcium levels eventually improved, and you got repeat EKGs which showed that the electrical activity of your heart      SECONDARY DISCHARGE DIAGNOSES  Diagnosis: Acute UTI  Assessment and Plan of Treatment:      PRINCIPAL DISCHARGE DIAGNOSIS  Diagnosis: Acute kidney failure  Assessment and Plan of Treatment: You came in with an acute kidney injury on top of your chronic kidney disease. You were found to have very low bicarbonate levels and acidic blood. You were given bicarbonate through the IV to slowly bring your blood bicarbonate levels up. While the acidity level of your blood normalized at a proper rate, your bicarbonate levels did not normalize as well, and stayed at a stable, low level. We gave you bicarbonate supplementation based on the acidity of your blood. This is likely your new baseline level. You also had very low calcium levels, which was caused by your kidney failure as well. You were given several doses of calcium through the blood, and calcium supplementation tabs, to help keep it at a normal and appropriate level. Your calcium levels eventually improved, and you got repeat EKGs which showed that the electrical activity of your heart was normal. You never required dialysis, and you were seen by the nephrology team of doctors in the hospital throughout your admission. With the nephrologists, you and your family decided that you do not want dialysis. Please make sure to follow up with the nephrology clinic upon discharge to continue your care. Please make sure to continue your medications as prescribed, including your calcitriol      SECONDARY DISCHARGE DIAGNOSES  Diagnosis: Acute UTI  Assessment and Plan of Treatment: You had a urine test performed which showed that you had a urinary tract infection. You also had some fatigue and lethargy which can be caused by the urinary tract infection. You were given an antibiotic called ceftriaxone to treat this urinary tract infection. Please continue to finish your antibiotics as prescribed for a total 1 week of antibiotics. You were given an antibiotic called cefpodoxime 200 mg to take every 24 hours. Please return to the hospital if you have any fevers, chills, fatigue, lethargy, pain or burning on urination, belly pain, back pain, or are unable to urinate.    Diagnosis: Thrombocytopenia  Assessment and Plan of Treatment: You had low platelets, which can be caused by an infection or severe illness, or certain vitamin deficiencies. You never had excessive bleeding while you were in the hospital, and you never required platelet transfusions while you were in the hospital. Please follow up with your primary care doctor to continue your evaluation of low platelets. Please come to the hospital if you have any bleeding out of the ordinary, if you have trouble stopping any bleeds.    Diagnosis: Normocytic anemia  Assessment and Plan of Treatment: You were found to have anemia, which is a condition of low hemoglobin levels. Your hemoglobin levels slowly decreased during your hospital stay, and you required 1 unit of blood transfusion and one 4,000 unit administration of EPO. After the transfusion, your hemoglobin levels responded appropriately and you no longer required further transfusions or EPO. In your case your anemia was because of your chronic kidney disease. Please follow up with your primary care doctor to get periodic labs to monitor your hemoglobin levels. Additionally, please establish care with the nephrology clinic and continue to follow up with the kidney doctors. Please return to the hospital if you have any chest pain, shortness of breath, bleeding in your stool or urine, headaches, dizziness, lightheadness.     PRINCIPAL DISCHARGE DIAGNOSIS  Diagnosis: Acute kidney failure  Assessment and Plan of Treatment: You came in with an acute kidney injury on top of your chronic kidney disease. You were found to have very low bicarbonate levels and acidic blood. You were given bicarbonate through the IV to slowly bring your blood bicarbonate levels up. While the acidity level of your blood normalized at a proper rate, your bicarbonate levels did not normalize as well, and stayed at a stable, low level. We gave you bicarbonate supplementation based on the acidity of your blood. This is likely your new baseline level. You also had very low calcium levels, which was caused by your kidney failure as well. You were given several doses of calcium through the blood, and calcium supplementation tabs, to help keep it at a normal and appropriate level. Your calcium levels eventually improved, and you got repeat EKGs which showed that the electrical activity of your heart was normal. You never required dialysis, and you were seen by the nephrology team of doctors in the hospital throughout your admission. With the nephrologists, you and your family decided that you do not want dialysis. Please make sure to follow up with the nephrology clinic upon discharge to continue your care. Please make sure to continue your medications as prescribed, including your calcitriol      SECONDARY DISCHARGE DIAGNOSES  Diagnosis: Acute UTI  Assessment and Plan of Treatment: You had a urine test performed which showed that you had a urinary tract infection. You also had some fatigue and lethargy which can be caused by the urinary tract infection. You were given an antibiotic called ceftriaxone to treat this urinary tract infection. Please continue to finish your antibiotics as prescribed for a total 1 week of antibiotics. You were given an antibiotic called cefpodoxime 200 mg to take every 24 hours through 10/17. Please return to the hospital if you have any fevers, chills, fatigue, lethargy, pain or burning on urination, belly pain, back pain, or are unable to urinate.    Diagnosis: Thrombocytopenia  Assessment and Plan of Treatment: You had low platelets, which can be caused by an infection or severe illness, or certain vitamin deficiencies. You never had excessive bleeding while you were in the hospital, and you never required platelet transfusions while you were in the hospital. Please follow up with your primary care doctor to continue your evaluation of low platelets. Please come to the hospital if you have any bleeding out of the ordinary, if you have trouble stopping any bleeds.    Diagnosis: Normocytic anemia  Assessment and Plan of Treatment: You were found to have anemia, which is a condition of low hemoglobin levels. Your hemoglobin levels slowly decreased during your hospital stay, and you required 1 unit of blood transfusion and one 4,000 unit administration of EPO. After the transfusion, your hemoglobin levels responded appropriately and you no longer required further transfusions or EPO. In your case your anemia was because of your chronic kidney disease. Please follow up with your primary care doctor to get periodic labs to monitor your hemoglobin levels. Additionally, please establish care with the nephrology clinic and continue to follow up with the kidney doctors. Please return to the hospital if you have any chest pain, shortness of breath, bleeding in your stool or urine, headaches, dizziness, lightheadness.

## 2021-10-13 NOTE — PROGRESS NOTE ADULT - REASON FOR ADMISSION
anion gap metabolic acidosis

## 2021-10-13 NOTE — PROGRESS NOTE ADULT - PROBLEM SELECTOR PLAN 8
Likely 2/2 CKD  - Fe studies wnl  - 1u pRBC and 4000u EPO on 10/11,   - Will continue to monitor CBC

## 2021-10-13 NOTE — PROGRESS NOTE ADULT - PROBLEM SELECTOR PLAN 4
Last Hb 8.6.  Iron work up with no iron deficiency anemia. Got 1 unit of pRBC and s/p 1 dose of Epo 4,000 unit on 10/11. Monitor Hb.

## 2021-10-25 DIAGNOSIS — N18.4 CHRONIC KIDNEY DISEASE, STAGE 4 (SEVERE): ICD-10-CM

## 2021-11-05 ENCOUNTER — APPOINTMENT (OUTPATIENT)
Dept: NEPHROLOGY | Facility: CLINIC | Age: 86
End: 2021-11-05

## 2021-11-29 NOTE — PROGRESS NOTE ADULT - PROBLEM SELECTOR PLAN 3
Called and talked to patient and told him what Dr Destiny Nguyen suggested and also suggested benadryl cream that is OTC Persistently hypocalcemia. Serum. Continue calcitriol and calcium acetate. Given low Vitamin D ( total and 1,25), gave one dose of ergocalciferol 50,000 units Q weekly on 10/9. iPTH level significantly elevated. Likely signifies ongoing CKD and now severe hypocalcemia stimulating PTH release. Monitor Calcium. Monitor QTC.    If you have any questions, please feel free to contact me  Johnny Ch  Nephrology Fellow  [phone]  (After 5pm or on weekends please page the on-call fellow)

## 2021-12-03 ENCOUNTER — INPATIENT (INPATIENT)
Facility: HOSPITAL | Age: 86
LOS: 13 days | Discharge: HOME HEALTH | End: 2021-12-17
Attending: STUDENT IN AN ORGANIZED HEALTH CARE EDUCATION/TRAINING PROGRAM | Admitting: STUDENT IN AN ORGANIZED HEALTH CARE EDUCATION/TRAINING PROGRAM
Payer: MEDICARE

## 2021-12-03 VITALS
RESPIRATION RATE: 26 BRPM | OXYGEN SATURATION: 98 % | HEIGHT: 70 IN | DIASTOLIC BLOOD PRESSURE: 102 MMHG | TEMPERATURE: 98 F | HEART RATE: 117 BPM | SYSTOLIC BLOOD PRESSURE: 177 MMHG

## 2021-12-03 DIAGNOSIS — K31.89 OTHER DISEASES OF STOMACH AND DUODENUM: Chronic | ICD-10-CM

## 2021-12-03 DIAGNOSIS — K44.9 DIAPHRAGMATIC HERNIA WITHOUT OBSTRUCTION OR GANGRENE: Chronic | ICD-10-CM

## 2021-12-03 PROCEDURE — 99285 EMERGENCY DEPT VISIT HI MDM: CPT

## 2021-12-03 NOTE — ED ADULT TRIAGE NOTE - CHIEF COMPLAINT QUOTE
Pt complaining of shortness of breath x 2 days. EMS found pt to be wheezing on arrival, was given 1 duoneb with some improvement in breathing. Pt has no PMH of asthma/COPD. pt tachypneic in triage RR 26. Pt fully vaccinated against covid. PMH HTN, chronic kidney disease

## 2021-12-04 DIAGNOSIS — E87.2 ACIDOSIS: ICD-10-CM

## 2021-12-04 DIAGNOSIS — D64.9 ANEMIA, UNSPECIFIED: ICD-10-CM

## 2021-12-04 DIAGNOSIS — K21.9 GASTRO-ESOPHAGEAL REFLUX DISEASE WITHOUT ESOPHAGITIS: ICD-10-CM

## 2021-12-04 DIAGNOSIS — N18.5 CHRONIC KIDNEY DISEASE, STAGE 5: ICD-10-CM

## 2021-12-04 DIAGNOSIS — I10 ESSENTIAL (PRIMARY) HYPERTENSION: ICD-10-CM

## 2021-12-04 DIAGNOSIS — Z29.9 ENCOUNTER FOR PROPHYLACTIC MEASURES, UNSPECIFIED: ICD-10-CM

## 2021-12-04 DIAGNOSIS — E87.5 HYPERKALEMIA: ICD-10-CM

## 2021-12-04 LAB
ALBUMIN SERPL ELPH-MCNC: 3 G/DL — LOW (ref 3.3–5)
ALBUMIN SERPL ELPH-MCNC: 3.1 G/DL — LOW (ref 3.3–5)
ALP SERPL-CCNC: 69 U/L — SIGNIFICANT CHANGE UP (ref 40–120)
ALP SERPL-CCNC: 72 U/L — SIGNIFICANT CHANGE UP (ref 40–120)
ALT FLD-CCNC: 10 U/L — SIGNIFICANT CHANGE UP (ref 4–41)
ALT FLD-CCNC: <5 U/L — LOW (ref 4–41)
ANION GAP SERPL CALC-SCNC: 18 MMOL/L — HIGH (ref 7–14)
ANION GAP SERPL CALC-SCNC: 19 MMOL/L — HIGH (ref 7–14)
ANION GAP SERPL CALC-SCNC: 20 MMOL/L — HIGH (ref 7–14)
APTT BLD: 29.7 SEC — SIGNIFICANT CHANGE UP (ref 27–36.3)
AST SERPL-CCNC: 10 U/L — SIGNIFICANT CHANGE UP (ref 4–40)
AST SERPL-CCNC: 21 U/L — SIGNIFICANT CHANGE UP (ref 4–40)
B PERT DNA SPEC QL NAA+PROBE: SIGNIFICANT CHANGE UP
B PERT+PARAPERT DNA PNL SPEC NAA+PROBE: SIGNIFICANT CHANGE UP
BASOPHILS # BLD AUTO: 0.07 K/UL — SIGNIFICANT CHANGE UP (ref 0–0.2)
BASOPHILS # BLD AUTO: 0.09 K/UL — SIGNIFICANT CHANGE UP (ref 0–0.2)
BASOPHILS NFR BLD AUTO: 0.9 % — SIGNIFICANT CHANGE UP (ref 0–2)
BASOPHILS NFR BLD AUTO: 1 % — SIGNIFICANT CHANGE UP (ref 0–2)
BILIRUB SERPL-MCNC: 0.4 MG/DL — SIGNIFICANT CHANGE UP (ref 0.2–1.2)
BILIRUB SERPL-MCNC: 0.5 MG/DL — SIGNIFICANT CHANGE UP (ref 0.2–1.2)
BLD GP AB SCN SERPL QL: NEGATIVE — SIGNIFICANT CHANGE UP
BLOOD GAS VENOUS COMPREHENSIVE RESULT: SIGNIFICANT CHANGE UP
BORDETELLA PARAPERTUSSIS (RAPRVP): SIGNIFICANT CHANGE UP
BUN SERPL-MCNC: 86 MG/DL — HIGH (ref 7–23)
BUN SERPL-MCNC: 89 MG/DL — HIGH (ref 7–23)
BUN SERPL-MCNC: 92 MG/DL — HIGH (ref 7–23)
BUN SERPL-MCNC: 94 MG/DL — HIGH (ref 7–23)
BUN SERPL-MCNC: 99 MG/DL — HIGH (ref 7–23)
C PNEUM DNA SPEC QL NAA+PROBE: SIGNIFICANT CHANGE UP
CALCIUM SERPL-MCNC: 9.1 MG/DL — SIGNIFICANT CHANGE UP (ref 8.4–10.5)
CALCIUM SERPL-MCNC: 9.4 MG/DL — SIGNIFICANT CHANGE UP (ref 8.4–10.5)
CALCIUM SERPL-MCNC: 9.5 MG/DL — SIGNIFICANT CHANGE UP (ref 8.4–10.5)
CALCIUM SERPL-MCNC: 9.7 MG/DL — SIGNIFICANT CHANGE UP (ref 8.4–10.5)
CALCIUM SERPL-MCNC: 9.9 MG/DL — SIGNIFICANT CHANGE UP (ref 8.4–10.5)
CHLORIDE SERPL-SCNC: 105 MMOL/L — SIGNIFICANT CHANGE UP (ref 98–107)
CHLORIDE SERPL-SCNC: 106 MMOL/L — SIGNIFICANT CHANGE UP (ref 98–107)
CO2 SERPL-SCNC: 13 MMOL/L — LOW (ref 22–31)
CO2 SERPL-SCNC: 14 MMOL/L — LOW (ref 22–31)
CO2 SERPL-SCNC: 14 MMOL/L — LOW (ref 22–31)
CO2 SERPL-SCNC: 16 MMOL/L — LOW (ref 22–31)
CO2 SERPL-SCNC: 18 MMOL/L — LOW (ref 22–31)
CREAT SERPL-MCNC: 7.68 MG/DL — HIGH (ref 0.5–1.3)
CREAT SERPL-MCNC: 7.89 MG/DL — HIGH (ref 0.5–1.3)
CREAT SERPL-MCNC: 7.95 MG/DL — HIGH (ref 0.5–1.3)
CREAT SERPL-MCNC: 8.12 MG/DL — HIGH (ref 0.5–1.3)
CREAT SERPL-MCNC: 8.2 MG/DL — HIGH (ref 0.5–1.3)
DIALYSIS INSTRUMENT RESULT - HEPATITIS B SURFACE ANTIGEN: NEGATIVE — SIGNIFICANT CHANGE UP
EOSINOPHIL # BLD AUTO: 0 K/UL — SIGNIFICANT CHANGE UP (ref 0–0.5)
EOSINOPHIL # BLD AUTO: 0.01 K/UL — SIGNIFICANT CHANGE UP (ref 0–0.5)
EOSINOPHIL NFR BLD AUTO: 0 % — SIGNIFICANT CHANGE UP (ref 0–6)
EOSINOPHIL NFR BLD AUTO: 0.1 % — SIGNIFICANT CHANGE UP (ref 0–6)
FLUAV SUBTYP SPEC NAA+PROBE: SIGNIFICANT CHANGE UP
FLUBV RNA SPEC QL NAA+PROBE: SIGNIFICANT CHANGE UP
GLUCOSE SERPL-MCNC: 103 MG/DL — HIGH (ref 70–99)
GLUCOSE SERPL-MCNC: 88 MG/DL — SIGNIFICANT CHANGE UP (ref 70–99)
GLUCOSE SERPL-MCNC: 95 MG/DL — SIGNIFICANT CHANGE UP (ref 70–99)
GLUCOSE SERPL-MCNC: 97 MG/DL — SIGNIFICANT CHANGE UP (ref 70–99)
GLUCOSE SERPL-MCNC: 98 MG/DL — SIGNIFICANT CHANGE UP (ref 70–99)
HADV DNA SPEC QL NAA+PROBE: SIGNIFICANT CHANGE UP
HCOV 229E RNA SPEC QL NAA+PROBE: SIGNIFICANT CHANGE UP
HCOV HKU1 RNA SPEC QL NAA+PROBE: SIGNIFICANT CHANGE UP
HCOV NL63 RNA SPEC QL NAA+PROBE: SIGNIFICANT CHANGE UP
HCOV OC43 RNA SPEC QL NAA+PROBE: SIGNIFICANT CHANGE UP
HCT VFR BLD CALC: 22.8 % — LOW (ref 39–50)
HCT VFR BLD CALC: 24.3 % — LOW (ref 39–50)
HGB BLD-MCNC: 7.1 G/DL — LOW (ref 13–17)
HGB BLD-MCNC: 7.7 G/DL — LOW (ref 13–17)
HMPV RNA SPEC QL NAA+PROBE: SIGNIFICANT CHANGE UP
HPIV1 RNA SPEC QL NAA+PROBE: SIGNIFICANT CHANGE UP
HPIV2 RNA SPEC QL NAA+PROBE: SIGNIFICANT CHANGE UP
HPIV3 RNA SPEC QL NAA+PROBE: SIGNIFICANT CHANGE UP
HPIV4 RNA SPEC QL NAA+PROBE: SIGNIFICANT CHANGE UP
IANC: 6.11 K/UL — SIGNIFICANT CHANGE UP (ref 1.5–8.5)
IANC: 7.27 K/UL — SIGNIFICANT CHANGE UP (ref 1.5–8.5)
IMM GRANULOCYTES NFR BLD AUTO: 1.2 % — SIGNIFICANT CHANGE UP (ref 0–1.5)
IMM GRANULOCYTES NFR BLD AUTO: 1.6 % — HIGH (ref 0–1.5)
INR BLD: 1.25 RATIO — HIGH (ref 0.88–1.16)
LYMPHOCYTES # BLD AUTO: 1 K/UL — SIGNIFICANT CHANGE UP (ref 1–3.3)
LYMPHOCYTES # BLD AUTO: 1.03 K/UL — SIGNIFICANT CHANGE UP (ref 1–3.3)
LYMPHOCYTES # BLD AUTO: 11.1 % — LOW (ref 13–44)
LYMPHOCYTES # BLD AUTO: 12.8 % — LOW (ref 13–44)
M PNEUMO DNA SPEC QL NAA+PROBE: SIGNIFICANT CHANGE UP
MAGNESIUM SERPL-MCNC: 1.7 MG/DL — SIGNIFICANT CHANGE UP (ref 1.6–2.6)
MAGNESIUM SERPL-MCNC: 1.8 MG/DL — SIGNIFICANT CHANGE UP (ref 1.6–2.6)
MCHC RBC-ENTMCNC: 30.8 PG — SIGNIFICANT CHANGE UP (ref 27–34)
MCHC RBC-ENTMCNC: 30.9 PG — SIGNIFICANT CHANGE UP (ref 27–34)
MCHC RBC-ENTMCNC: 31.1 GM/DL — LOW (ref 32–36)
MCHC RBC-ENTMCNC: 31.7 GM/DL — LOW (ref 32–36)
MCV RBC AUTO: 97.2 FL — SIGNIFICANT CHANGE UP (ref 80–100)
MCV RBC AUTO: 99.1 FL — SIGNIFICANT CHANGE UP (ref 80–100)
MONOCYTES # BLD AUTO: 0.54 K/UL — SIGNIFICANT CHANGE UP (ref 0–0.9)
MONOCYTES # BLD AUTO: 0.77 K/UL — SIGNIFICANT CHANGE UP (ref 0–0.9)
MONOCYTES NFR BLD AUTO: 6.9 % — SIGNIFICANT CHANGE UP (ref 2–14)
MONOCYTES NFR BLD AUTO: 8.3 % — SIGNIFICANT CHANGE UP (ref 2–14)
NEUTROPHILS # BLD AUTO: 6.11 K/UL — SIGNIFICANT CHANGE UP (ref 1.8–7.4)
NEUTROPHILS # BLD AUTO: 7.27 K/UL — SIGNIFICANT CHANGE UP (ref 1.8–7.4)
NEUTROPHILS NFR BLD AUTO: 77.9 % — HIGH (ref 43–77)
NEUTROPHILS NFR BLD AUTO: 78.2 % — HIGH (ref 43–77)
NRBC # BLD: 0 /100 WBCS — SIGNIFICANT CHANGE UP
NRBC # BLD: 0 /100 WBCS — SIGNIFICANT CHANGE UP
NRBC # FLD: 0 K/UL — SIGNIFICANT CHANGE UP
NRBC # FLD: 0 K/UL — SIGNIFICANT CHANGE UP
NT-PROBNP SERPL-SCNC: SIGNIFICANT CHANGE UP PG/ML
PHOSPHATE SERPL-MCNC: 5.2 MG/DL — HIGH (ref 2.5–4.5)
PHOSPHATE SERPL-MCNC: 5.6 MG/DL — HIGH (ref 2.5–4.5)
PLATELET # BLD AUTO: 221 K/UL — SIGNIFICANT CHANGE UP (ref 150–400)
PLATELET # BLD AUTO: 234 K/UL — SIGNIFICANT CHANGE UP (ref 150–400)
POTASSIUM SERPL-MCNC: 5.4 MMOL/L — HIGH (ref 3.5–5.3)
POTASSIUM SERPL-MCNC: 5.8 MMOL/L — HIGH (ref 3.5–5.3)
POTASSIUM SERPL-MCNC: 6.5 MMOL/L — CRITICAL HIGH (ref 3.5–5.3)
POTASSIUM SERPL-SCNC: 5.4 MMOL/L — HIGH (ref 3.5–5.3)
POTASSIUM SERPL-SCNC: 5.8 MMOL/L — HIGH (ref 3.5–5.3)
POTASSIUM SERPL-SCNC: 6.5 MMOL/L — CRITICAL HIGH (ref 3.5–5.3)
PROT SERPL-MCNC: 6 G/DL — SIGNIFICANT CHANGE UP (ref 6–8.3)
PROT SERPL-MCNC: 6.5 G/DL — SIGNIFICANT CHANGE UP (ref 6–8.3)
PROTHROM AB SERPL-ACNC: 14.2 SEC — HIGH (ref 10.6–13.6)
RAPID RVP RESULT: SIGNIFICANT CHANGE UP
RBC # BLD: 2.3 M/UL — LOW (ref 4.2–5.8)
RBC # BLD: 2.5 M/UL — LOW (ref 4.2–5.8)
RBC # FLD: 15.3 % — HIGH (ref 10.3–14.5)
RBC # FLD: 15.3 % — HIGH (ref 10.3–14.5)
RH IG SCN BLD-IMP: POSITIVE — SIGNIFICANT CHANGE UP
RSV RNA SPEC QL NAA+PROBE: SIGNIFICANT CHANGE UP
RV+EV RNA SPEC QL NAA+PROBE: SIGNIFICANT CHANGE UP
SARS-COV-2 RNA SPEC QL NAA+PROBE: SIGNIFICANT CHANGE UP
SODIUM SERPL-SCNC: 138 MMOL/L — SIGNIFICANT CHANGE UP (ref 135–145)
SODIUM SERPL-SCNC: 140 MMOL/L — SIGNIFICANT CHANGE UP (ref 135–145)
SODIUM SERPL-SCNC: 140 MMOL/L — SIGNIFICANT CHANGE UP (ref 135–145)
SODIUM SERPL-SCNC: 141 MMOL/L — SIGNIFICANT CHANGE UP (ref 135–145)
SODIUM SERPL-SCNC: 142 MMOL/L — SIGNIFICANT CHANGE UP (ref 135–145)
TROPONIN T, HIGH SENSITIVITY RESULT: 82 NG/L — CRITICAL HIGH
TROPONIN T, HIGH SENSITIVITY RESULT: 95 NG/L — CRITICAL HIGH
WBC # BLD: 7.81 K/UL — SIGNIFICANT CHANGE UP (ref 3.8–10.5)
WBC # BLD: 9.32 K/UL — SIGNIFICANT CHANGE UP (ref 3.8–10.5)
WBC # FLD AUTO: 7.81 K/UL — SIGNIFICANT CHANGE UP (ref 3.8–10.5)
WBC # FLD AUTO: 9.32 K/UL — SIGNIFICANT CHANGE UP (ref 3.8–10.5)

## 2021-12-04 PROCEDURE — 71045 X-RAY EXAM CHEST 1 VIEW: CPT | Mod: 26,76

## 2021-12-04 PROCEDURE — 36556 INSERT NON-TUNNEL CV CATH: CPT | Mod: GC

## 2021-12-04 PROCEDURE — 99223 1ST HOSP IP/OBS HIGH 75: CPT | Mod: GC

## 2021-12-04 PROCEDURE — 99223 1ST HOSP IP/OBS HIGH 75: CPT

## 2021-12-04 RX ORDER — CHLORHEXIDINE GLUCONATE 213 G/1000ML
1 SOLUTION TOPICAL DAILY
Refills: 0 | Status: DISCONTINUED | OUTPATIENT
Start: 2021-12-04 | End: 2021-12-17

## 2021-12-04 RX ORDER — FUROSEMIDE 40 MG
80 TABLET ORAL ONCE
Refills: 0 | Status: COMPLETED | OUTPATIENT
Start: 2021-12-04 | End: 2021-12-04

## 2021-12-04 RX ORDER — NIFEDIPINE 30 MG
30 TABLET, EXTENDED RELEASE 24 HR ORAL DAILY
Refills: 0 | Status: DISCONTINUED | OUTPATIENT
Start: 2021-12-04 | End: 2021-12-09

## 2021-12-04 RX ORDER — INFLUENZA VIRUS VACCINE 15; 15; 15; 15 UG/.5ML; UG/.5ML; UG/.5ML; UG/.5ML
0.7 SUSPENSION INTRAMUSCULAR ONCE
Refills: 0 | Status: DISCONTINUED | OUTPATIENT
Start: 2021-12-04 | End: 2021-12-17

## 2021-12-04 RX ORDER — BUMETANIDE 0.25 MG/ML
2 INJECTION INTRAMUSCULAR; INTRAVENOUS EVERY 8 HOURS
Refills: 0 | Status: DISCONTINUED | OUTPATIENT
Start: 2021-12-04 | End: 2021-12-06

## 2021-12-04 RX ORDER — INSULIN HUMAN 100 [IU]/ML
5 INJECTION, SOLUTION SUBCUTANEOUS ONCE
Refills: 0 | Status: COMPLETED | OUTPATIENT
Start: 2021-12-04 | End: 2021-12-04

## 2021-12-04 RX ORDER — LANOLIN ALCOHOL/MO/W.PET/CERES
6 CREAM (GRAM) TOPICAL AT BEDTIME
Refills: 0 | Status: DISCONTINUED | OUTPATIENT
Start: 2021-12-04 | End: 2021-12-17

## 2021-12-04 RX ORDER — CALCIUM GLUCONATE 100 MG/ML
1 VIAL (ML) INTRAVENOUS ONCE
Refills: 0 | Status: COMPLETED | OUTPATIENT
Start: 2021-12-04 | End: 2021-12-04

## 2021-12-04 RX ORDER — FINASTERIDE 5 MG/1
5 TABLET, FILM COATED ORAL DAILY
Refills: 0 | Status: DISCONTINUED | OUTPATIENT
Start: 2021-12-04 | End: 2021-12-17

## 2021-12-04 RX ORDER — INSULIN HUMAN 100 [IU]/ML
10 INJECTION, SOLUTION SUBCUTANEOUS ONCE
Refills: 0 | Status: COMPLETED | OUTPATIENT
Start: 2021-12-04 | End: 2021-12-04

## 2021-12-04 RX ORDER — CALCITRIOL 0.5 UG/1
0.5 CAPSULE ORAL DAILY
Refills: 0 | Status: DISCONTINUED | OUTPATIENT
Start: 2021-12-04 | End: 2021-12-17

## 2021-12-04 RX ORDER — DEXTROSE 50 % IN WATER 50 %
50 SYRINGE (ML) INTRAVENOUS ONCE
Refills: 0 | Status: COMPLETED | OUTPATIENT
Start: 2021-12-04 | End: 2021-12-04

## 2021-12-04 RX ORDER — DEXTROSE 50 % IN WATER 50 %
25 SYRINGE (ML) INTRAVENOUS ONCE
Refills: 0 | Status: COMPLETED | OUTPATIENT
Start: 2021-12-04 | End: 2021-12-04

## 2021-12-04 RX ORDER — SODIUM BICARBONATE 1 MEQ/ML
50 SYRINGE (ML) INTRAVENOUS ONCE
Refills: 0 | Status: COMPLETED | OUTPATIENT
Start: 2021-12-04 | End: 2021-12-04

## 2021-12-04 RX ORDER — IPRATROPIUM/ALBUTEROL SULFATE 18-103MCG
3 AEROSOL WITH ADAPTER (GRAM) INHALATION ONCE
Refills: 0 | Status: COMPLETED | OUTPATIENT
Start: 2021-12-04 | End: 2021-12-04

## 2021-12-04 RX ORDER — PANTOPRAZOLE SODIUM 20 MG/1
40 TABLET, DELAYED RELEASE ORAL
Refills: 0 | Status: DISCONTINUED | OUTPATIENT
Start: 2021-12-04 | End: 2021-12-17

## 2021-12-04 RX ORDER — ACETAMINOPHEN 500 MG
650 TABLET ORAL EVERY 6 HOURS
Refills: 0 | Status: DISCONTINUED | OUTPATIENT
Start: 2021-12-04 | End: 2021-12-17

## 2021-12-04 RX ORDER — METOPROLOL TARTRATE 50 MG
50 TABLET ORAL DAILY
Refills: 0 | Status: DISCONTINUED | OUTPATIENT
Start: 2021-12-04 | End: 2021-12-07

## 2021-12-04 RX ORDER — SODIUM ZIRCONIUM CYCLOSILICATE 10 G/10G
10 POWDER, FOR SUSPENSION ORAL ONCE
Refills: 0 | Status: COMPLETED | OUTPATIENT
Start: 2021-12-04 | End: 2021-12-04

## 2021-12-04 RX ORDER — SODIUM BICARBONATE 1 MEQ/ML
650 SYRINGE (ML) INTRAVENOUS
Refills: 0 | Status: DISCONTINUED | OUTPATIENT
Start: 2021-12-04 | End: 2021-12-05

## 2021-12-04 RX ADMIN — Medication 100 GRAM(S): at 03:52

## 2021-12-04 RX ADMIN — CALCITRIOL 0.5 MICROGRAM(S): 0.5 CAPSULE ORAL at 15:31

## 2021-12-04 RX ADMIN — Medication 50 MILLIEQUIVALENT(S): at 05:18

## 2021-12-04 RX ADMIN — FINASTERIDE 5 MILLIGRAM(S): 5 TABLET, FILM COATED ORAL at 15:31

## 2021-12-04 RX ADMIN — SODIUM ZIRCONIUM CYCLOSILICATE 10 GRAM(S): 10 POWDER, FOR SUSPENSION ORAL at 05:17

## 2021-12-04 RX ADMIN — Medication 80 MILLIGRAM(S): at 05:30

## 2021-12-04 RX ADMIN — Medication 50 MILLIGRAM(S): at 15:31

## 2021-12-04 RX ADMIN — Medication 30 MILLIGRAM(S): at 15:31

## 2021-12-04 RX ADMIN — INSULIN HUMAN 10 UNIT(S): 100 INJECTION, SOLUTION SUBCUTANEOUS at 11:01

## 2021-12-04 RX ADMIN — Medication 3 MILLILITER(S): at 01:28

## 2021-12-04 RX ADMIN — BUMETANIDE 2 MILLIGRAM(S): 0.25 INJECTION INTRAMUSCULAR; INTRAVENOUS at 10:28

## 2021-12-04 RX ADMIN — Medication 25 MILLILITER(S): at 03:53

## 2021-12-04 RX ADMIN — Medication 650 MILLIGRAM(S): at 18:04

## 2021-12-04 RX ADMIN — INSULIN HUMAN 5 UNIT(S): 100 INJECTION, SOLUTION SUBCUTANEOUS at 04:04

## 2021-12-04 RX ADMIN — Medication 3 MILLILITER(S): at 08:33

## 2021-12-04 RX ADMIN — Medication 50 MILLILITER(S): at 11:02

## 2021-12-04 RX ADMIN — BUMETANIDE 2 MILLIGRAM(S): 0.25 INJECTION INTRAMUSCULAR; INTRAVENOUS at 18:03

## 2021-12-04 RX ADMIN — Medication 6 MILLIGRAM(S): at 23:12

## 2021-12-04 NOTE — ED PROVIDER NOTE - PROGRESS NOTE DETAILS
Leno, PGY-3: per nephro, give 80 lasix, 10 lokelma, amp bicarbonate, they will evaluate patient. admit to med on tele, no need for urgent dialysis at this time.

## 2021-12-04 NOTE — PHYSICAL THERAPY INITIAL EVALUATION ADULT - PERTINENT HX OF CURRENT PROBLEM, REHAB EVAL
89 y/o male, with a PmHx of CKD3, HTN, Gerd, [place], Iron Deficiency Anemia, presented to the [facility] ED with SOB. Admitted to telemetry for new HD and Hyperkalemia.

## 2021-12-04 NOTE — ED PROVIDER NOTE - PHYSICAL EXAMINATION
[Const] well-appearing, resting comfortably, no acute distress  [HEENT] PERRL, EOMI, moist mucus membranes  [Neck] Supple, trachea midline  [CV] +S1/S2, no m/r/g appreciated, no LE edema  [Lungs] Clear to auscultations bilaterally, no adventitious lung sounds  [Abd] soft, non-tender, nondistended in all 4 quadrants  [MSK] 5/5 upper extremity and lower extremity str bilaterally  [Skin] warm, dry, well-perfused  [Neuro] A&Ox3

## 2021-12-04 NOTE — CONSULT NOTE ADULT - PROBLEM SELECTOR RECOMMENDATION 9
Pt. with advance/progressive CKD 5. Scr on admission was 7.68 and K of 5.8 (non-hemolyzed) this morning at 1 am this morning, repeat Scr is 8.2 at 10 am today. Patient was admitted in October 2021 for electrolyte derangements, during that time, noted to have GÉNESIS on CKD, Scr was elevated at 12.64 with serum potassium of of 5.4, and bicarb of < 7 on 10/6/21, progressively improved with IV fluids, Scr was 7.27 and serum CO2 was 15 on discharge on 10/13/21. Currently with volume overload, pulomary edema and hyperkalemia. Received Lasix 80 mg IVP. Recommend to start on Bumex 2 mg IVP q8h. Plan for urgent HD today for hypervolemia and hyperkalemia management. Plan was discussed with patient and patient's daughter, consent obtained from patient's daughter and placed in chart. Patient being evaluated by ICU team. Monitor labs and urine output. Avoid NSAIDs, ACEI/ARBS, RCA and nephrotoxins. Dose medications as per eGFR. Pt. with advanced/progressive CKD stage 5. Scr elevated at 8.2 this morning. Pt. currently with fluid overload/pulmonary edema and hyperkalemia. Pt. received Lasix 80 mg IVP. Recommend to start on Bumex 2 mg IVP q8h. Recommend initiation of long-term HD. Pt. agreeable to initiate HD today. Consent obtained from patient's daughter and placed in chart. Pt. will need non-tunneled HD catheter placement today. Patient being evaluated by ICU team. Monitor labs and urine output. Avoid NSAIDs, ACEI/ARBS, RCA and nephrotoxins. Dose medications as per eGFR.

## 2021-12-04 NOTE — CONSULT NOTE ADULT - PROBLEM SELECTOR RECOMMENDATION 2
Initial serum potassium elevated at 5.8 (non-hemolyzed). Received medical management. Repeat serum potassium improved to 5.4 today. Plan for HD as above. Monitor serum potassium. Low potassium diet. Pt. with hyperkalemia in setting of advanced CKD. Pt. received medical management. Repeat serum potassium improved to 5.4 today. Plan for HD as outlined above. Monitor serum potassium. Low potassium diet.

## 2021-12-04 NOTE — H&P ADULT - PROBLEM SELECTOR PLAN 1
Bun/Cr:89/7.95 (unknown baseline)          RVP neg         COVID neg  BNP > 56,000          hsTrop: 82-->95  12/4 CXR - Bilateral fine and coarse interstitial opacities with small effusions consistent with pulmonary edema. No gross evidence of cardiomegaly or focal consolidations. No pneumothorax.  - [place] Renal c/s following  - plan is start new HD

## 2021-12-04 NOTE — PATIENT PROFILE ADULT - FALL HARM RISK - HARM RISK INTERVENTIONS
Assistance with ambulation/Assistance OOB with selected safe patient handling equipment/Communicate Risk of Fall with Harm to all staff/Discuss with provider need for PT consult/Monitor gait and stability/Reinforce activity limits and safety measures with patient and family/Tailored Fall Risk Interventions/Visual Cue: Yellow wristband and red socks/Bed in lowest position, wheels locked, appropriate side rails in place/Call bell, personal items and telephone in reach/Instruct patient to call for assistance before getting out of bed or chair/Non-slip footwear when patient is out of bed/[place] to call system/Physically safe environment - no spills, clutter or unnecessary equipment/Purposeful Proactive Rounding/Room/bathroom lighting operational, light cord in reach

## 2021-12-04 NOTE — CONSULT NOTE ADULT - SUBJECTIVE AND OBJECTIVE BOX
GENERAL SURGERY CONSULT NOTE  Consulting surgical team: C Team Surgery (Vascular)  Consulting attending: Dr. Baca    HPI:    *** FULL CONSULT NOTE TO FOLLOW ***    89 y/o male, with a PmHx of CKD3, HTN, Gerd, [place], Iron Deficiency Anemia, presented to the [facility] ED with SOB. Pt states for the past 2 days he has been having worsening SOB and can't walk more than 5 feet without getting out of breath. Denies any fever, chills, chest pain, HA, dizziness, blurred vision, abd pain, n/v. He had a similar complaint about 2 months ago and was at [facility] as well and they were debating wether to start HD or not. At that time the decision was made to hold off on HD and to just monitor him but for the past two days the sob has gotten worse so he came to the [facility] ED for an evaluation. Pt also c/o occasional episodes of diarrhea (denies any recent abx use). In the [facility] ED today, he was found to have a worsening Cr of 7.95 and was hyperkalemic to 5.8. He is s/p insulin, d50, bicarb, calcium gluconate and lokelma. He was seen by Nephrology and was recommended to start new HD. He on supplemental oxygen at this time but he appears comfortable. Now admitted to telemetry for further medical evaluation and treatment.   (04 Dec 2021 11:51)      PAST MEDICAL HISTORY:  HTN - Hypertension  CKD (chronic kidney disease)  Hiatal hernia  GERD (gastroesophageal reflux disease)  Prostate cancer    PAST SURGICAL HISTORY:  S/P appendectomy  Prostate  Acute gastric volvulus  Hernia, paraesophageal    MEDICATIONS:  acetaminophen     Tablet .. 650 milliGRAM(s) Oral every 6 hours PRN  buMETAnide Injectable 2 milliGRAM(s) IV Push every 8 hours  calcitriol   Capsule 0.5 MICROGram(s) Oral daily  chlorhexidine 2% Cloths 1 Application(s) Topical daily  finasteride 5 milliGRAM(s) Oral daily  metoprolol succinate ER 50 milliGRAM(s) Oral daily  NIFEdipine XL 30 milliGRAM(s) Oral daily  pantoprazole    Tablet 40 milliGRAM(s) Oral before breakfast  sodium bicarbonate 650 milliGRAM(s) Oral two times a day      ALLERGIES:  No Known Allergies      VITALS & I/Os:  Vital Signs Last 24 Hrs  T(C): 36.8 (04 Dec 2021 09:52), Max: 36.8 (03 Dec 2021 22:34)  T(F): 98.3 (04 Dec 2021 09:52), Max: 98.3 (04 Dec 2021 09:52)  HR: 107 (04 Dec 2021 09:52) (103 - 117)  BP: 169/102 (04 Dec 2021 09:52) (153/114 - 177/102)  RR: 19 (04 Dec 2021 09:52) (19 - 26)  SpO2: 97% (04 Dec 2021 09:52) (97% - 98%)      PHYSICAL EXAM:  GEN: resting in bed comfortably in NAD  NEURO: awake, alert  CV: warm, well-perfused  RESP: no increased WOB  ABD: soft, non-distended, non-tender without rebound tenderness or guarding  EXTR: no gross deformities; spontaneous movement in b/l U/L extrem     LABS:             7.7    9.32  )-----------( 234      ( 04 Dec 2021 08:13 )             24.3     142  |  106  |  94<H>  ----------------------------<  103<H>  5.4<H>   |  16<L>  |  8.20<H>    Ca    9.4      04 Dec 2021 10:20  Phos  5.2     12-04  Mg     1.70     12-04    TPro  6.5  /  Alb  3.1<L>  /  TBili  0.5  /  DBili  x   /  AST  21  /  ALT  10  /  AlkPhos  72  12-04    Lactate:  12-04 @ 01:27  1.4    PT/INR - ( 04 Dec 2021 01:27 )   PT: 14.2 sec;   INR: 1.25 ratio    PTT - ( 04 Dec 2021 01:27 )  PTT:29.7 sec      IMAGING:   GENERAL SURGERY CONSULT NOTE  Consulting surgical team: C Team Surgery (Vascular)  Consulting attending: Dr. Baca    HPI:  89yo M with Hx CKD3, HTN, GERD, and anemia who presented with SOB, found to be hyperkalemic. Patient was shifted multiple times in the ED with persistent hyperkalemia. Per nephrology, patient requires emergent hemodialysis. Vascular surgery consulted for emergent Shiley placement.     PAST MEDICAL HISTORY:  HTN - Hypertension  CKD (chronic kidney disease)  Hiatal hernia  GERD (gastroesophageal reflux disease)  Prostate cancer    PAST SURGICAL HISTORY:  S/P appendectomy  Prostate  Acute gastric volvulus  Hernia, paraesophageal    MEDICATIONS:  acetaminophen     Tablet .. 650 milliGRAM(s) Oral every 6 hours PRN  buMETAnide Injectable 2 milliGRAM(s) IV Push every 8 hours  calcitriol   Capsule 0.5 MICROGram(s) Oral daily  chlorhexidine 2% Cloths 1 Application(s) Topical daily  finasteride 5 milliGRAM(s) Oral daily  metoprolol succinate ER 50 milliGRAM(s) Oral daily  NIFEdipine XL 30 milliGRAM(s) Oral daily  pantoprazole    Tablet 40 milliGRAM(s) Oral before breakfast  sodium bicarbonate 650 milliGRAM(s) Oral two times a day    ALLERGIES:  No Known Allergies      VITALS & I/Os:  Vital Signs Last 24 Hrs  T(C): 36.8 (04 Dec 2021 09:52), Max: 36.8 (03 Dec 2021 22:34)  T(F): 98.3 (04 Dec 2021 09:52), Max: 98.3 (04 Dec 2021 09:52)  HR: 107 (04 Dec 2021 09:52) (103 - 117)  BP: 169/102 (04 Dec 2021 09:52) (153/114 - 177/102)  RR: 19 (04 Dec 2021 09:52) (19 - 26)  SpO2: 97% (04 Dec 2021 09:52) (97% - 98%)      PHYSICAL EXAM:  GEN: resting in bed comfortably in NAD  NEURO: awake, alert  HEENT: normocephalic, no gross deformities, no JVD  CV: warm, well-perfused  RESP: no increased WOB  ABD: soft, non-distended, non-tender without rebound tenderness or guarding  EXTR: no gross deformities; spontaneous movement in b/l U/L extrem     LABS:             7.7    9.32  )-----------( 234      ( 04 Dec 2021 08:13 )             24.3     142  |  106  |  94<H>  ----------------------------<  103<H>  5.4<H>   |  16<L>  |  8.20<H>    Ca    9.4      04 Dec 2021 10:20  Phos  5.2     12-04  Mg     1.70     12-04    TPro  6.5  /  Alb  3.1<L>  /  TBili  0.5  /  DBili  x   /  AST  21  /  ALT  10  /  AlkPhos  72  12-04    Lactate:  12-04 @ 01:27  1.4    PT/INR - ( 04 Dec 2021 01:27 )   PT: 14.2 sec;   INR: 1.25 ratio    PTT - ( 04 Dec 2021 01:27 )  PTT:29.7 sec      IMAGING:   GENERAL SURGERY CONSULT NOTE  Consulting surgical team: C Team Surgery (Vascular)  Consulting attending: Dr. Baca    HPI:  88yo M with Hx CKD3, HTN, GERD, and anemia who presented with SOB, found to be hyperkalemic. Patient was shifted multiple times in the ED with persistent hyperkalemia. Per nephrology, patient requires emergent hemodialysis. Vascular surgery consulted for emergent Shiley placement.     PAST MEDICAL HISTORY:  HTN - Hypertension  CKD (chronic kidney disease)  Hiatal hernia  GERD (gastroesophageal reflux disease)  Prostate cancer    PAST SURGICAL HISTORY:  S/P appendectomy  Prostate  Acute gastric volvulus  Hernia, paraesophageal    MEDICATIONS:  acetaminophen     Tablet .. 650 milliGRAM(s) Oral every 6 hours PRN  buMETAnide Injectable 2 milliGRAM(s) IV Push every 8 hours  calcitriol   Capsule 0.5 MICROGram(s) Oral daily  chlorhexidine 2% Cloths 1 Application(s) Topical daily  finasteride 5 milliGRAM(s) Oral daily  metoprolol succinate ER 50 milliGRAM(s) Oral daily  NIFEdipine XL 30 milliGRAM(s) Oral daily  pantoprazole    Tablet 40 milliGRAM(s) Oral before breakfast  sodium bicarbonate 650 milliGRAM(s) Oral two times a day    ALLERGIES:  No Known Allergies      VITALS & I/Os:  Vital Signs Last 24 Hrs  T(C): 36.8 (04 Dec 2021 09:52), Max: 36.8 (03 Dec 2021 22:34)  T(F): 98.3 (04 Dec 2021 09:52), Max: 98.3 (04 Dec 2021 09:52)  HR: 107 (04 Dec 2021 09:52) (103 - 117)  BP: 169/102 (04 Dec 2021 09:52) (153/114 - 177/102)  RR: 19 (04 Dec 2021 09:52) (19 - 26)  SpO2: 97% (04 Dec 2021 09:52) (97% - 98%)      PHYSICAL EXAM:  GEN: resting in bed comfortably in NAD  NEURO: awake, alert  HEENT: normocephalic, no gross deformities, no JVD  CV: warm, well-perfused  RESP: no increased WOB  ABD: soft, non-distended, non-tender without rebound tenderness or guarding  EXTR: no gross deformities; spontaneous movement in b/l U/L extrem     LABS:             7.7    9.32  )-----------( 234      ( 04 Dec 2021 08:13 )             24.3     142  |  106  |  94<H>  ----------------------------<  103<H>  5.4<H>   |  16<L>  |  8.20<H>    Ca    9.4      04 Dec 2021 10:20  Phos  5.2     12-04  Mg     1.70     12-04    TPro  6.5  /  Alb  3.1<L>  /  TBili  0.5  /  DBili  x   /  AST  21  /  ALT  10  /  AlkPhos  72  12-04    Lactate:  12-04 @ 01:27  1.4    PT/INR - ( 04 Dec 2021 01:27 )   PT: 14.2 sec;   INR: 1.25 ratio    PTT - ( 04 Dec 2021 01:27 )  PTT:29.7 sec      IMAGING:    CXR - 12/4:     Since the last exam, right IJ hemodialysis catheter has been placed and its tip is in the SVC. No complicating pneumothorax. Bilateral perihilar haze consistent with pulmonary edema may be slightly worse than the study earlier in the day.  COMPARISON:  December 4 at 2:10 AM    IMPRESSION:  Status post Shiley catheter placement without complications.

## 2021-12-04 NOTE — H&P ADULT - NSHPPHYSICALEXAM_GEN_ALL_CORE
Vital Signs Last 24 Hrs  T(C): 36.8 (04 Dec 2021 09:52), Max: 36.8 (03 Dec 2021 22:34)  T(F): 98.3 (04 Dec 2021 09:52), Max: 98.3 (04 Dec 2021 09:52)  HR: 107 (04 Dec 2021 09:52) (103 - 117)  BP: 169/102 (04 Dec 2021 09:52) (153/114 - 177/102)  BP(mean): --  RR: 19 (04 Dec 2021 09:52) (19 - 26)  SpO2: 97% (04 Dec 2021 09:52) (97% - 98%)    EKG: Sinus Tach @ 113, PAC's, LVH, T inv I, AVL, V4-6

## 2021-12-04 NOTE — CONSULT NOTE ADULT - SUBJECTIVE AND OBJECTIVE BOX
Patient is a 89y old  Male who presents with a chief complaint of Shortness of Breath (04 Dec 2021 11:51)      INTERVAL HPI/OVERNIGHT EVENTS:   No overnight events   Afebrile, hemodynamically stable     ICU Vital Signs Last 24 Hrs  T(C): 36.8 (04 Dec 2021 09:52), Max: 36.8 (03 Dec 2021 22:34)  T(F): 98.3 (04 Dec 2021 09:52), Max: 98.3 (04 Dec 2021 09:52)  HR: 107 (04 Dec 2021 09:52) (103 - 117)  BP: 169/102 (04 Dec 2021 09:52) (153/114 - 177/102)  BP(mean): --  ABP: --  ABP(mean): --  RR: 19 (04 Dec 2021 09:52) (19 - 26)  SpO2: 97% (04 Dec 2021 09:52) (97% - 98%)    I&O's Summary        LABS:                        7.7    9.32  )-----------( 234      ( 04 Dec 2021 08:13 )             24.3     12-04    142  |  106  |  94<H>  ----------------------------<  103<H>  5.4<H>   |  16<L>  |  8.20<H>    Ca    9.4      04 Dec 2021 10:20  Phos  5.2     12-04  Mg     1.70     12-04    TPro  6.5  /  Alb  3.1<L>  /  TBili  0.5  /  DBili  x   /  AST  21  /  ALT  10  /  AlkPhos  72  12-04    PT/INR - ( 04 Dec 2021 01:27 )   PT: 14.2 sec;   INR: 1.25 ratio         PTT - ( 04 Dec 2021 01:27 )  PTT:29.7 sec    CAPILLARY BLOOD GLUCOSE      POCT Blood Glucose.: 123 mg/dL (04 Dec 2021 10:09)        RADIOLOGY & ADDITIONAL TESTS:    Consultant(s) Notes Reviewed:  [x ] YES  [ ] NO    MEDICATIONS  (STANDING):  buMETAnide Injectable 2 milliGRAM(s) IV Push every 8 hours  calcitriol   Capsule 0.5 MICROGram(s) Oral daily  finasteride 5 milliGRAM(s) Oral daily  metoprolol succinate ER 50 milliGRAM(s) Oral daily  NIFEdipine XL 30 milliGRAM(s) Oral daily  pantoprazole    Tablet 40 milliGRAM(s) Oral before breakfast  sodium bicarbonate 650 milliGRAM(s) Oral two times a day    MEDICATIONS  (PRN):  acetaminophen     Tablet .. 650 milliGRAM(s) Oral every 6 hours PRN Temp greater or equal to 38C (100.4F), Mild Pain (1 - 3), Moderate Pain (4 - 6)      PHYSICAL EXAM:  GENERAL:   HEAD:  Atraumatic, Normocephalic  EYES: EOMI, PERRLA, conjunctiva and sclera clear  NECK: Supple, No JVD, Normal thyroid, no enlarged nodes  NERVOUS SYSTEM:  Alert & Awake.   CHEST/LUNG: B/L good air entry; No rales, rhonchi, or wheezing  HEART: S1S2 normal, no S3, Regular rate and rhythm; No murmurs  ABDOMEN: Soft, Nontender, Nondistended; Bowel sounds present  EXTREMITIES:  2+ Peripheral Pulses, No clubbing, cyanosis, or edema  LYMPH: No lymphadenopathy noted  SKIN: No rashes or lesions    Care Discussed with Consultants/Other Providers [ x] YES  [ ] NO CHIEF COMPLAINT: SOB    HPI: 89 y/o male, with a PmHx of CKD3, HTN, Gerd, [place], Iron Deficiency Anemia, presented to the [facility] ED with SOB. Pt states for the past 2 days he has been having worsening SOB and can't walk more than 5 feet without getting out of breath. Denies any fever, chills, chest pain, HA, dizziness, blurred vision, abd pain, n/v. He had a similar complaint about 2 months ago and was at [facility] as well and they were debating wether to start HD or not. At that time the decision was made to hold off on HD and to just monitor him but for the past two days the sob has gotten worse so he came to the [facility] ED for an evaluation. Pt also c/o occasional episodes of diarrhea (denies any recent abx use). In the [facility] ED today, he was found to have a worsening Cr of 7.95 and was hyperkalemic to 5.8. He is s/p insulin, d50, bicarb, calcium gluconate and lokelma. He was seen by Nephrology and was recommended to start new HD. He is on supplemental oxygen at this time but he appears comfortable. Now admitted to telemetry for further medical evaluation and treatment.    PAST MEDICAL & SURGICAL HISTORY:  HTN - Hypertension    CKD (chronic kidney disease)    Hiatal hernia    GERD (gastroesophageal reflux disease)    Prostate cancer    S/P appendectomy  performed at [facility] 10 y ago    Prostate  Brachytherapy [facility]y ago at [facility] - had seeds    Acute gastric volvulus  s/p laparoscopic reduction, PEG that was later reversed    Hernia, paraesophageal        FAMILY HISTORY:  FHx: hypertension  Mother    FHx: cancer of prostate  Father    Family history of colon cancer  Sister    Family history of gastric cancer  Brother        SOCIAL HISTORY:  Smoking: former 0.5 packs x 40+ years, quit 20 years ago  EtOH Use: No longer drinks alcohol  Marital Status:   Occupation: Retired  Recent Travel: None  Country of Birth: [place]  Advance Directives: FULL CODE    Allergies    No Known Allergies    Intolerances        HOME MEDICATIONS: Nifedipine 30 mg QD, Metoprolol Succinate 50 mg QD, Prilosec 20 mg QD, Sodium Bicarbonate 650 mg TID, Vit D3, Avodart 0.5 mg QD    REVIEW OF SYSTEMS:  Constitutional: No weakness, no LOC  CV: No CP, palpitations  Resp: Wheezing, SOB at rest  GI: No abdominal pain, no constipation, no diarrhea, no N/V  : Urinary incontinence  MSK: No Muscle pain  Neurological: No confusion  Psychiatric: No mood changes  Hematologic/Lymphatic: No bleeding  [ ] All other systems negative  [ ] Unable to assess ROS because ________    OBJECTIVE:  ICU Vital Signs Last 24 Hrs  T(C): 36.8 (04 Dec 2021 09:52), Max: 36.8 (03 Dec 2021 22:34)  T(F): 98.3 (04 Dec 2021 09:52), Max: 98.3 (04 Dec 2021 09:52)  HR: 107 (04 Dec 2021 09:52) (103 - 117)  BP: 169/102 (04 Dec 2021 09:52) (153/114 - 177/102)  BP(mean): --  ABP: --  ABP(mean): --  RR: 19 (04 Dec 2021 09:52) (19 - 26)  SpO2: 97% (04 Dec 2021 09:52) (97% - 98%)        CAPILLARY BLOOD GLUCOSE      POCT Blood Glucose.: 123 mg/dL (04 Dec 2021 10:09)      PHYSICAL EXAM:  General: Elderly male, speaking in full sentences, not using accessory muscles, on 3L NC  HEENT: PERRL, NCAT  Neck: No distended neck veins  Respiratory: Expiratory wheezes and crackles throughout  Cardiovascular: S1, S2, tachycardic  Abdomen: soft, non-distended, non tender, +BS  Extremities: No peripheral edema, 2+ peripheral pulses  Neurological: AOx3, non focal, SHAW  Psychiatry: No mood changes, normal affect    HOSPITAL MEDICATIONS:  MEDICATIONS  (STANDING):  buMETAnide Injectable 2 milliGRAM(s) IV Push every 8 hours  calcitriol   Capsule 0.5 MICROGram(s) Oral daily  finasteride 5 milliGRAM(s) Oral daily  metoprolol succinate ER 50 milliGRAM(s) Oral daily  NIFEdipine XL 30 milliGRAM(s) Oral daily  pantoprazole    Tablet 40 milliGRAM(s) Oral before breakfast  sodium bicarbonate 650 milliGRAM(s) Oral two times a day    MEDICATIONS  (PRN):  acetaminophen     Tablet .. 650 milliGRAM(s) Oral every 6 hours PRN Temp greater or equal to 38C (100.4F), Mild Pain (1 - 3), Moderate Pain (4 - 6)      LABS:                        7.7    9.32  )-----------( 234      ( 04 Dec 2021 08:13 )             24.3     12-04    142  |  106  |  94<H>  ----------------------------<  103<H>  5.4<H>   |  16<L>  |  8.20<H>    Ca    9.4      04 Dec 2021 10:20  Phos  5.2     12-04  Mg     1.70     12-04    TPro  6.5  /  Alb  3.1<L>  /  TBili  0.5  /  DBili  x   /  AST  21  /  ALT  10  /  AlkPhos  72  12-04    PT/INR - ( 04 Dec 2021 01:27 )   PT: 14.2 sec;   INR: 1.25 ratio         PTT - ( 04 Dec 2021 01:27 )  PTT:29.7 sec      Venous Blood Gas:  12-04 @ 01:27  7.28/33/36/16/57.9  VBG Lactate: 1.4      MICROBIOLOGY:     RADIOLOGY:  [ ] Reviewed and interpreted by me    EKG:

## 2021-12-04 NOTE — CONSULT NOTE ADULT - ASSESSMENT
Pt. with advance renal failure, with hyperkalemia and hypervolemia.  Pt. with advanced CKD, hypervolemia and hyperkalemia.

## 2021-12-04 NOTE — CONSULT NOTE ADULT - ASSESSMENT
88yo M with Hx CKD3 (not previously on HD), HTN, GERD, and anemia who presented with SOB and CP, found to have persistent hyperkalemia, with need for urgent HD. Vascular Surgery consulted for urgent Shiley placement. Patient is s/p urgent R IJ Shiley palcement on 12/4.     PLAN:   - CXR to evaluate Shiley position and r/o PTX  - HD per renal  - C/w care per primary team  - Vascular Surgery will follow    *** FULL CONSULT NOTE TO FOLLOW ***    Gina Oneal, PGY-2  C Team Surgery  #[id number]  89yo M with Hx CKD3 (not previously on HD), HTN, GERD, and anemia who presented with SOB and CP, found to have persistent hyperkalemia, with need for urgent HD. Vascular Surgery consulted for urgent Shiley placement. Patient is s/p urgent R IJ Shiley palcement on 12/4.     PLAN:   - CXR to evaluate Shiley position and r/o PTX  - HD per renal  - C/w care per primary team  - Vascular Surgery will follow    Patient discussed with Vascular surgery fellow, Dr. Raymond, on behalf of Dr. Keven Oneal, PGY-2  C Team Surgery  #[id number]

## 2021-12-04 NOTE — H&P ADULT - NSICDXPASTMEDICALHX_GEN_ALL_CORE_FT
PAST MEDICAL HISTORY:  CKD (chronic kidney disease)     GERD (gastroesophageal reflux disease)     Hiatal hernia     HTN - Hypertension     Prostate cancer

## 2021-12-04 NOTE — CONSULT NOTE ADULT - ASSESSMENT
Patient is an 89M with CKD3b (hasn't seen a doctor in a couple years), HTN, GERD, Hiatal hernia, iron deficiency anemia who presents with hyperkalemia    #Acute Hypoxic Resp Failure  -Satting well on 3L NC  -Wean down as tolerated  -Start Duonebs q6 standing for audible expiratory wheezes  -Agree with diuresis  -Strict Is/Os    #Hyperkalemia    #ESRD  -Requiring HD tonight per nephrology  -Patient does not require MICU for emergent HD        Patient is an 89M (former smoker) with CKD, HTN, GERD, Hiatal hernia, iron deficiency anemia who presents with 2 days of SOB, found to be volume overloaded w/ pulm edema on CXR and hyperkalemia in setting of now ESRD.    #Acute Hypoxic Resp Failure  -Likely 2/2 volume overload  -CXR pulm edema  -Currently comfortable, satting >95% on 3L NC  -Wean down as tolerated  -Continue Duonebs q6 standing for audible expiratory wheezes  -Agree with diuresis with Bumex 2mg q8  -Strict Is/Os    #Hyperkalemia  - S/p Hyperkalemia cocktail  -K+ 5.4 on most recent BMP  -Agree with repeat BMP, Mg and Phosph to monitor K    #ESRD  -Requiring HD tonight per nephrology  -Patient does not require MICU for emergent HD   -Current plan is for shiley catheter placement at bedside by vascular and initiation HD in dialysis unit, scheduled for 330 PM today    Not MICU candidate at this time. Plan discussed with MICU Fellow and Attending    Cary Dias, PGY-2  Internal Medicine  Pager: [phone] ([facility])/ [phone] ([place])

## 2021-12-04 NOTE — CONSULT NOTE ADULT - PROBLEM SELECTOR RECOMMENDATION 3
In setting of advance  renal failure. Serum CO2 is 16 today. Plan for HD as above. Monitor serum CO2.     If any questions, please feel free to contact me     Kellee Johnson  Nephrology Fellow  [facility] Pager: [phone]  [facility] Pager: [phone] Pt. with metabolic acidosis in setting of advanced renal failure. Serum CO2 is 16 today. Plan for HD as outlined above. Monitor serum CO2.     If any questions, please feel free to contact me     Kellee Johnson  Nephrology Fellow  [facility] Pager: [phone]  [facility] Pager: [phone]

## 2021-12-04 NOTE — ED PROVIDER NOTE - CLINICAL SUMMARY MEDICAL DECISION MAKING FREE TEXT BOX
89M with CKD3b (hasn't seen a doctor in a couple years), HTN, GERD, Hiatal hernia, iron deficiency anemia who p/w sob and brief episode of cp yesterday, recent admission Oct for metabolic acidosis, states his symptoms feel the same as october. pex lungs clear however just received duonebs by EMs, no known hx of asthma/copd but hx of heavy smoking quit 20 years ago. obtain labwork, x1 duoneb empirically, likely admission, check cardiac labs, ekg.

## 2021-12-04 NOTE — H&P ADULT - HISTORY OF PRESENT ILLNESS
89 y/o male, with a PmHx of CKD3, HTN, Gerd, "[employer] Iron Deficiency Anemia, presented to the [facility] ED with SOB. Pt states for the past 2 days he has been having worsening SOB and can't walk more than 5 feet without getting out of breath. Denies any fever, chills, chest pain, HA, dizziness, blurred vision, abd pain, n/v. He had a similar complaint about 2 months ago and was at [facility] as well and they were debating wether to start HD or not. At that time the decision was made to hold off on HD and to just monitor him but for the past two days the sob has gotten worse so he came to the [facility] ED for an evaluation. Pt also c/o occasional episodes of diarrhea (denies any recent abx use). In the [facility] ED today, he was found to have a worsening Cr of 7.95 and was hyperkalemic to 5.8. He is s/p insulin, d50, bicarb, calcium gluconate and lokelma. He was seen by Nephrology and was recommended to start new HD. He on supplemental oxygen at this time but he appears comfortable. Now admitted to telemetry for further medical evaluation and treatment.

## 2021-12-04 NOTE — ED PROVIDER NOTE - OBJECTIVE STATEMENT
89M with CKD3b (hasn't seen a doctor in a couple years), HTN, GERD, Hiatal hernia, iron deficiency anemia who presents with sob.    On prior admission 10/21, patient admitted for metabolic acidosis, hypocalcemia, UTI, GÉNESIS. 89M with CKD3b (hasn't seen a doctor in a couple years), HTN, GERD, Hiatal hernia, iron deficiency anemia who presents with sob for the past few days. No known hx asthma/copd however smoked cigarettes for 40 years, quit 20 years ago. EMS gave 1 duoneb in ambulance which he states helped. had slight episode of cp yesterday, denies chest pressure palpitations, lower extremity edema, sick contacts. denies urinary frequency/urgency/dysuria.     On prior admission 10/21, patient admitted for metabolic acidosis, hypocalcemia, UTI, GÉNESIS. 89M with CKD3b (hasn't seen a doctor in a couple years), HTN, GERD, Hiatal hernia, iron deficiency anemia who presents with sob for the past few days. No known hx asthma/copd however smoked cigarettes for 40 years, quit 20 years ago. EMS gave 1 duoneb in ambulance which he states helped. had slight episode of cp yesterday, denies chest pressure palpitations, lower extremity edema, sick contacts. denies urinary frequency/urgency/dysuria.     On prior admission 10/21, patient admitted for metabolic acidosis, hypocalcemia, UTI, GÉNESIS and required PRBC transfusion.

## 2021-12-04 NOTE — PHYSICAL THERAPY INITIAL EVALUATION ADULT - ADDITIONAL COMMENTS
Patient lives in [facility] with wife, 17 steps to enter. Prior to admission was independent in ADLs and ambulation.

## 2021-12-04 NOTE — ED PROVIDER NOTE - NSICDXPASTSURGICALHX_GEN_ALL_CORE_FT
PAST SURGICAL HISTORY:  Acute gastric volvulus s/p laparoscopic reduction, PEG    Hernia, paraesophageal     Prostate Brachytherapy 30y ago at [facility]    S/P appendectomy performed at [facility] 10 y ago

## 2021-12-04 NOTE — H&P ADULT - NSICDXFAMILYHX_GEN_ALL_CORE_FT
FAMILY HISTORY:  Family history of colon cancer, Sister  Family history of gastric cancer, Brother  FHx: cancer of prostate, Father  FHx: hypertension, Mother

## 2021-12-04 NOTE — PROCEDURE NOTE - NSICDXPROCEDURE_GEN_ALL_CORE_FT
PROCEDURES:  US Doppler guided insertion of central venous catheter 04-Dec-2021 15:21:56  Gina Oneal

## 2021-12-04 NOTE — H&P ADULT - ASSESSMENT
89 y/o male, with a PmHx of CKD3, HTN, Gerd, [place], Iron Deficiency Anemia, presented to the [facility] ED with SOB. Admitted to telemetry for new HD and Hyperkalemia.

## 2021-12-04 NOTE — H&P ADULT - ATTENDING COMMENTS
The patient is an 88 yo M with PMH of CKD5, HTN, GERD, [place] presented to [facility] ED w/ dyspnea, worsening progression over last 2 days found to be volume overloaded w/ pulm edema on CXR and hyperkalemia in setting of now ESRD.    - d/w renal, vascular, ICU. Plan for shiley catheter placement at bedside by vascular and initiation HD in dialysis unit, scheduled for 330 PM today  - supplemental O2 to maintain O2 sat > 95, pt w/ increased UOP w/ bumex IVP c/w 2 mg q8 at this time  - c/w sodium bicarbonate 650 mg bid in setting of hyper kalemia  - repeat K+ after cocktail 5.4  - Hgb 7.7, similar on last admission in setting of CKD, trend at this time

## 2021-12-04 NOTE — ED PROVIDER NOTE - ATTENDING CONTRIBUTION TO CARE
I have personally performed a history and physical examination of the patient and discussed management with the resident as well as the patient.  I reviewed the resident's note and agree with the documented findings and plan of care.  I have authored and modified critical sections of the Provider Note, including but not limited to HPI, Physical Exam and MDM.    89M with CKD3 not on dialysis, HTN, GERD, Hiatal hernia, iron deficiency anemia who p/w sob and brief episode of cp yesterday, recent admission Oct for metabolic acidosis with GÉNESIS requiring PRBC transfusion, states his symptoms feel the same as october. Possible symptomatic anemia vs génesis vs pleural effusion vs pneumonia. Obtain metabolic eval with cbc, cmp, tns. ACS less likely, obtain tni and ekg. Obtain cxr. symptomatic control prn, pt states ems albuterol mildly relieved symptoms. Likely admission.

## 2021-12-04 NOTE — CONSULT NOTE ADULT - SUBJECTIVE AND OBJECTIVE BOX
[facility] DIVISION OF KIDNEY DISEASES AND HYPERTENSION -- [phone]  -- INITIAL CONSULT NOTE  --------------------------------------------------------------------------------  HPI: Pt is an 88 y/o M w PMH of CKD3, HTN, BPH, GERD, hiatal hernia presented to [facility] for worsening SOB for 3 days prior to admission. Nephrology consulted for advance CKD, hyperkalemia and volume overload. Patient was recently admitted in October 2021 for electrolyte derangements, during that time, noted to have GÉNESIS on CKD, Scr was elevated at 12.64 with serum potassium of of 5.4, and bicarb of < 7 on 10/6/21, progressively improved with IV fluids, Scr was 7.27 and serum CO2 was 15 on discharge on 10/13/21. Scr on admission was 7.68 and K of 5.8 (non-hemolyzed) this morning at 1 am. Patient received Lasix 80 mg IVP, insulin/d50, sodium bicarb IVP, albuterol nebs and Lokelma 10 g oral, and calcium gluconate. Repeat Scr increased to 8.2 and serum potassium is 5.4 at 10 am today.      Patient was seen and examined at bedside. Appears tachypnic. Endorse SOB, which somewhat improved with nebs. Denies CP, ever, chills, nausea, vomiting, LE edema or dysuria.    PAST HISTORY  --------------------------------------------------------------------------------  PAST MEDICAL & SURGICAL HISTORY:  HTN - Hypertension    CKD (chronic kidney disease)    Hiatal hernia    GERD (gastroesophageal reflux disease)    S/P appendectomy  performed at [facility] 10 y ago    Prostate  Brachytherapy 30y ago at [facility]    Acute gastric volvulus  s/p laparoscopic reduction, PEG    Hernia, paraesophageal    FAMILY HISTORY:  Family history of cancer (Father, Mother)    PAST SOCIAL HISTORY:    ALLERGIES & MEDICATIONS  --------------------------------------------------------------------------------  Allergies    No Known Allergies    Intolerances    Standing Inpatient Medications  buMETAnide Injectable 2 milliGRAM(s) IV Push every 8 hours    PRN Inpatient Medications  acetaminophen     Tablet .. 650 milliGRAM(s) Oral every 6 hours PRN    REVIEW OF SYSTEMS  --------------------------------------------------------------------------------  Gen: No fevers/chills  Respiratory: ++ SOB   CV: No chest pain  GI: No abdominal pain  : No dysuria, hematuria  MSK: No  edema    All other systems were reviewed and are negative, except as noted.    VITALS/PHYSICAL EXAM  --------------------------------------------------------------------------------  T(C): 36.8 (12-04-21 @ 09:52), Max: 36.8 (12-03-21 @ 22:34)  HR: 107 (12-04-21 @ 09:52) (103 - 117)  BP: 169/102 (12-04-21 @ 09:52) (153/114 - 177/102)  RR: 19 (12-04-21 @ 09:52) (19 - 26)  SpO2: 97% (12-04-21 @ 09:52) (97% - 98%)  Wt(kg): --  Height (cm): 177.8 (12-03-21 @ 22:34)    Physical Exam:  	Gen: tachypneic   	HEENT: MMM  	Pulm: b/l crackles and wheezing   	CV: S1S2+, JVD noted   	Abd: Soft, +BS   	Ext: No LE edema B/L  	Neuro: Awake and alert   	Skin: Warm and dry    LABS/STUDIES  --------------------------------------------------------------------------------              7.7    9.32  >-----------<  234      [12-04-21 @ 08:13]              24.3     142  |  106  |  94  ----------------------------<  103      [12-04-21 @ 10:20]  5.4   |  16  |  8.20        Ca     9.4     [12-04-21 @ 10:20]      Mg     1.70     [12-04-21 @ 10:20]      Phos  5.2     [12-04-21 @ 10:20]    TPro  6.5  /  Alb  3.1  /  TBili  0.5  /  DBili  x   /  AST  21  /  ALT  10  /  AlkPhos  72  [12-04-21 @ 08:13]    PT/INR: PT 14.2 , INR 1.25       [12-04-21 @ 01:27]  PTT: 29.7       [12-04-21 @ 01:27]    Creatinine Trend:  SCr 8.20 [12-04 @ 10:20]  SCr 7.89 [12-04 @ 08:13]  SCr 7.95 [12-04 @ 04:11]  SCr 7.68 [12-04 @ 01:27]    Urinalysis - [10-11-21 @ 08:00]      Color Light Yellow / Appearance Clear / SG 1.010 / pH 7.5      Gluc Trace / Ketone Negative  / Bili Negative / Urobili <2 mg/dL       Blood Moderate / Protein 100 mg/dL / Leuk Est Large / Nitrite Negative      RBC 11 / WBC 22 / Hyaline 1 / Gran  / Sq Epi  / Non Sq Epi 0 / Bacteria Many    Iron 28, TIBC 184, %sat 15      [12-04-21 @ 05:36]  Ferritin 357      [12-04-21 @ 05:36] [facility] DIVISION OF KIDNEY DISEASES AND HYPERTENSION -- [phone]  -- INITIAL CONSULT NOTE  --------------------------------------------------------------------------------  HPI: 89 y/o M w PMH of CKD3, HTN, BPH, GERD, hiatal hernia presented to [facility] for worsening SOB for 3 days prior to admission. Nephrology consulted for advanced CKD, hyperkalemia and volume overload. Pt. was hospitalized at [facility] in October 2021 with GÉNESIS on CKD, hyperkalemia and metabolic acidosis. Scr at that time was elevated at 12.64. Pt. received IVFs and Scr decreased to 7.27 on 10/13/21. Scr on this admission was elevated at 7.68 and serum potassium was 5.8 (non-hemolyzed) today morning at 1 am. Patient received Lasix 80 mg IVP, insulin/d50, sodium bicarb IVP, albuterol nebs and Lokelma 10 g oral, and calcium gluconate. Repeat Scr increased to 8.2 and serum potassium is 5.4 at 10 am today.      Patient was seen and examined at bedside. Appears tachypneic, gives history of SOB, somewhat improved with nebs. Denies CP, ever, chills, nausea, vomiting, LE edema or dysuria.    PAST HISTORY  --------------------------------------------------------------------------------  PAST MEDICAL & SURGICAL HISTORY:  HTN - Hypertension    CKD (chronic kidney disease)    Hiatal hernia    GERD (gastroesophageal reflux disease)    S/P appendectomy  performed at [facility] 10 y ago    Prostate  Brachytherapy 30y ago at [facility]    Acute gastric volvulus  s/p laparoscopic reduction, PEG    Hernia, paraesophageal    FAMILY HISTORY:  Family history of cancer (Father, Mother)    PAST SOCIAL HISTORY:    ALLERGIES & MEDICATIONS  --------------------------------------------------------------------------------  Allergies    No Known Allergies    Intolerances    Standing Inpatient Medications  buMETAnide Injectable 2 milliGRAM(s) IV Push every 8 hours    PRN Inpatient Medications  acetaminophen     Tablet .. 650 milliGRAM(s) Oral every 6 hours PRN    REVIEW OF SYSTEMS  --------------------------------------------------------------------------------  Gen: No fevers/chills  Respiratory: SOB++   CV: No chest pain  GI: No abdominal pain  : No dysuria, hematuria  MSK: No  edema    All other systems were reviewed and are negative, except as noted.    VITALS/PHYSICAL EXAM  --------------------------------------------------------------------------------  T(C): 36.8 (12-04-21 @ 09:52), Max: 36.8 (12-03-21 @ 22:34)  HR: 107 (12-04-21 @ 09:52) (103 - 117)  BP: 169/102 (12-04-21 @ 09:52) (153/114 - 177/102)  RR: 19 (12-04-21 @ 09:52) (19 - 26)  SpO2: 97% (12-04-21 @ 09:52) (97% - 98%)  Wt(kg): --  Height (cm): 177.8 (12-03-21 @ 22:34)    Physical Exam:  	Gen: tachypneic   	HEENT: JVD+   	Pulm: FFair air entry B/L, crackles and wheezing heard  	CV: S1S2+  	Abd: Soft, +BS   	Ext: No LE edema B/L  	Neuro: Awake and alert   	Skin: Warm and dry    LABS/STUDIES  --------------------------------------------------------------------------------              7.7    9.32  >-----------<  234      [12-04-21 @ 08:13]              24.3     142  |  106  |  94  ----------------------------<  103      [12-04-21 @ 10:20]  5.4   |  16  |  8.20        Ca     9.4     [12-04-21 @ 10:20]      Mg     1.70     [12-04-21 @ 10:20]      Phos  5.2     [12-04-21 @ 10:20]    TPro  6.5  /  Alb  3.1  /  TBili  0.5  /  DBili  x   /  AST  21  /  ALT  10  /  AlkPhos  72  [12-04-21 @ 08:13]    Creatinine Trend:  SCr 8.20 [12-04 @ 10:20]  SCr 7.89 [12-04 @ 08:13]  SCr 7.95 [12-04 @ 04:11]  SCr 7.68 [12-04 @ 01:27]

## 2021-12-04 NOTE — PHYSICAL THERAPY INITIAL EVALUATION ADULT - PLANNED THERAPY INTERVENTIONS, PT EVAL
balance training/bed mobility training/gait training/strengthening/transfer training
inability to concentrate/inability to sleep/decreased activity level/inability to work

## 2021-12-04 NOTE — ED ADULT NURSE NOTE - OBJECTIVE STATEMENT
Pt received in rm 3. C/o SOB for 2x days. Pt was given 1x duoneb tx by EMS, pt verbalized relief. Hx of HN and chronic kidney disease. A&Ox4, breathing even and unlabored, arrived with 4L NC, spo2 98%. Mild wheezing noted. NSR on the cardiac monitor. Pt arrived with 20G IV on left AC. Labs drawn and sent. Pt medicated per MAR. NAD. Denies chest pain, dizziness, headache, nausea or vomiting. Will continue to monitor

## 2021-12-04 NOTE — H&P ADULT - NSHPSOCIALHISTORY_GEN_ALL_CORE
Marital Status:     Occupation: Retired - was a  on [address]    Tobacco Use: d/c smoking 30 years ago; used to smoke 0.5pks/day x 25 years    ETOH Use: denies    Drug Use: denies    Flu Vaccine:   denies                         Pneumonia Vaccine:   denies                              COVID Vaccine: completed Pfizer vaccine 3/2021

## 2021-12-05 DIAGNOSIS — E87.2 ACIDOSIS: ICD-10-CM

## 2021-12-05 LAB
ANION GAP SERPL CALC-SCNC: 17 MMOL/L — HIGH (ref 7–14)
BASOPHILS # BLD AUTO: 0.05 K/UL — SIGNIFICANT CHANGE UP (ref 0–0.2)
BASOPHILS NFR BLD AUTO: 0.6 % — SIGNIFICANT CHANGE UP (ref 0–2)
BUN SERPL-MCNC: 78 MG/DL — HIGH (ref 7–23)
CALCIUM SERPL-MCNC: 8.3 MG/DL — LOW (ref 8.4–10.5)
CHLORIDE SERPL-SCNC: 102 MMOL/L — SIGNIFICANT CHANGE UP (ref 98–107)
CO2 SERPL-SCNC: 19 MMOL/L — LOW (ref 22–31)
CREAT SERPL-MCNC: 5.66 MG/DL — HIGH (ref 0.5–1.3)
EOSINOPHIL # BLD AUTO: 0.03 K/UL — SIGNIFICANT CHANGE UP (ref 0–0.5)
EOSINOPHIL NFR BLD AUTO: 0.3 % — SIGNIFICANT CHANGE UP (ref 0–6)
GLUCOSE SERPL-MCNC: 98 MG/DL — SIGNIFICANT CHANGE UP (ref 70–99)
HBV CORE AB SER-ACNC: SIGNIFICANT CHANGE UP
HBV SURFACE AB SER-ACNC: <3 MIU/ML — LOW
HBV SURFACE AG SER-ACNC: SIGNIFICANT CHANGE UP
HCT VFR BLD CALC: 19.1 % — CRITICAL LOW (ref 39–50)
HCT VFR BLD CALC: 19.4 % — CRITICAL LOW (ref 39–50)
HCT VFR BLD CALC: 22.4 % — LOW (ref 39–50)
HCV AB S/CO SERPL IA: 0.13 S/CO — SIGNIFICANT CHANGE UP (ref 0–0.99)
HCV AB SERPL-IMP: SIGNIFICANT CHANGE UP
HGB BLD-MCNC: 6.1 G/DL — CRITICAL LOW (ref 13–17)
HGB BLD-MCNC: 6.2 G/DL — CRITICAL LOW (ref 13–17)
HGB BLD-MCNC: 7.2 G/DL — LOW (ref 13–17)
IANC: 6.7 K/UL — SIGNIFICANT CHANGE UP (ref 1.5–8.5)
IMM GRANULOCYTES NFR BLD AUTO: 1.3 % — SIGNIFICANT CHANGE UP (ref 0–1.5)
LYMPHOCYTES # BLD AUTO: 1.39 K/UL — SIGNIFICANT CHANGE UP (ref 1–3.3)
LYMPHOCYTES # BLD AUTO: 15.4 % — SIGNIFICANT CHANGE UP (ref 13–44)
MAGNESIUM SERPL-MCNC: 1.7 MG/DL — SIGNIFICANT CHANGE UP (ref 1.6–2.6)
MCHC RBC-ENTMCNC: 29.4 PG — SIGNIFICANT CHANGE UP (ref 27–34)
MCHC RBC-ENTMCNC: 31 PG — SIGNIFICANT CHANGE UP (ref 27–34)
MCHC RBC-ENTMCNC: 31.3 PG — SIGNIFICANT CHANGE UP (ref 27–34)
MCHC RBC-ENTMCNC: 31.4 GM/DL — LOW (ref 32–36)
MCHC RBC-ENTMCNC: 32.1 GM/DL — SIGNIFICANT CHANGE UP (ref 32–36)
MCHC RBC-ENTMCNC: 32.5 GM/DL — SIGNIFICANT CHANGE UP (ref 32–36)
MCV RBC AUTO: 91.4 FL — SIGNIFICANT CHANGE UP (ref 80–100)
MCV RBC AUTO: 95.5 FL — SIGNIFICANT CHANGE UP (ref 80–100)
MCV RBC AUTO: 99.5 FL — SIGNIFICANT CHANGE UP (ref 80–100)
MONOCYTES # BLD AUTO: 0.74 K/UL — SIGNIFICANT CHANGE UP (ref 0–0.9)
MONOCYTES NFR BLD AUTO: 8.2 % — SIGNIFICANT CHANGE UP (ref 2–14)
NEUTROPHILS # BLD AUTO: 6.7 K/UL — SIGNIFICANT CHANGE UP (ref 1.8–7.4)
NEUTROPHILS NFR BLD AUTO: 74.2 % — SIGNIFICANT CHANGE UP (ref 43–77)
NRBC # BLD: 0 /100 WBCS — SIGNIFICANT CHANGE UP
NRBC # FLD: 0 K/UL — SIGNIFICANT CHANGE UP
PHOSPHATE SERPL-MCNC: 4.5 MG/DL — SIGNIFICANT CHANGE UP (ref 2.5–4.5)
PLATELET # BLD AUTO: 186 K/UL — SIGNIFICANT CHANGE UP (ref 150–400)
PLATELET # BLD AUTO: 200 K/UL — SIGNIFICANT CHANGE UP (ref 150–400)
PLATELET # BLD AUTO: 209 K/UL — SIGNIFICANT CHANGE UP (ref 150–400)
POTASSIUM SERPL-MCNC: 4.7 MMOL/L — SIGNIFICANT CHANGE UP (ref 3.5–5.3)
POTASSIUM SERPL-SCNC: 4.7 MMOL/L — SIGNIFICANT CHANGE UP (ref 3.5–5.3)
RBC # BLD: 1.95 M/UL — LOW (ref 4.2–5.8)
RBC # BLD: 2 M/UL — LOW (ref 4.2–5.8)
RBC # BLD: 2.45 M/UL — LOW (ref 4.2–5.8)
RBC # FLD: 15.4 % — HIGH (ref 10.3–14.5)
RBC # FLD: 15.6 % — HIGH (ref 10.3–14.5)
RBC # FLD: 19.5 % — HIGH (ref 10.3–14.5)
SODIUM SERPL-SCNC: 138 MMOL/L — SIGNIFICANT CHANGE UP (ref 135–145)
WBC # BLD: 8.2 K/UL — SIGNIFICANT CHANGE UP (ref 3.8–10.5)
WBC # BLD: 8.47 K/UL — SIGNIFICANT CHANGE UP (ref 3.8–10.5)
WBC # BLD: 9.03 K/UL — SIGNIFICANT CHANGE UP (ref 3.8–10.5)
WBC # FLD AUTO: 8.2 K/UL — SIGNIFICANT CHANGE UP (ref 3.8–10.5)
WBC # FLD AUTO: 8.47 K/UL — SIGNIFICANT CHANGE UP (ref 3.8–10.5)
WBC # FLD AUTO: 9.03 K/UL — SIGNIFICANT CHANGE UP (ref 3.8–10.5)

## 2021-12-05 PROCEDURE — 99233 SBSQ HOSP IP/OBS HIGH 50: CPT

## 2021-12-05 PROCEDURE — 99233 SBSQ HOSP IP/OBS HIGH 50: CPT | Mod: GC

## 2021-12-05 RX ADMIN — Medication 650 MILLIGRAM(S): at 05:31

## 2021-12-05 RX ADMIN — PANTOPRAZOLE SODIUM 40 MILLIGRAM(S): 20 TABLET, DELAYED RELEASE ORAL at 05:34

## 2021-12-05 RX ADMIN — BUMETANIDE 2 MILLIGRAM(S): 0.25 INJECTION INTRAMUSCULAR; INTRAVENOUS at 03:03

## 2021-12-05 RX ADMIN — Medication 650 MILLIGRAM(S): at 05:34

## 2021-12-05 RX ADMIN — CALCITRIOL 0.5 MICROGRAM(S): 0.5 CAPSULE ORAL at 13:13

## 2021-12-05 RX ADMIN — FINASTERIDE 5 MILLIGRAM(S): 5 TABLET, FILM COATED ORAL at 13:12

## 2021-12-05 RX ADMIN — BUMETANIDE 2 MILLIGRAM(S): 0.25 INJECTION INTRAMUSCULAR; INTRAVENOUS at 16:56

## 2021-12-05 RX ADMIN — Medication 50 MILLIGRAM(S): at 05:34

## 2021-12-05 RX ADMIN — Medication 650 MILLIGRAM(S): at 13:40

## 2021-12-05 RX ADMIN — Medication 30 MILLIGRAM(S): at 05:34

## 2021-12-05 RX ADMIN — CHLORHEXIDINE GLUCONATE 1 APPLICATION(S): 213 SOLUTION TOPICAL at 12:49

## 2021-12-05 RX ADMIN — Medication 6 MILLIGRAM(S): at 21:22

## 2021-12-05 RX ADMIN — Medication 650 MILLIGRAM(S): at 13:10

## 2021-12-05 RX ADMIN — Medication 650 MILLIGRAM(S): at 06:00

## 2021-12-05 RX ADMIN — Medication 650 MILLIGRAM(S): at 17:04

## 2021-12-05 NOTE — PROVIDER CONTACT NOTE (CRITICAL VALUE NOTIFICATION) - ASSESSMENT
K: 6.5. Patient dyspneic on exertion. On 4L nasal cannula. Received 1 round of hyperkalemia cocktail. Daughter at bedside.
Pt asymptomatic, vital signs within normal limits. pt remains on 4L O2 NC. No changes noted on telemetry monitor- normal sinus rhythm. pt denies any chest pain, SOB, abdominal pain. No BMs noted. No signs of active bleeding noted at this time.
Pt asymptomatic, vital signs within normal limits. pt remains on 4L O2 NC. No changes noted on telemetry monitor- normal sinus rhythm. pt denies any chest pain, SOB, abdominal pain. No BMs noted. No signs of active bleeding noted at this time.

## 2021-12-05 NOTE — PROGRESS NOTE ADULT - PROBLEM SELECTOR PLAN 2
Pt. with hyperkalemia in setting of advanced CKD. Received 1st HD session yesterday. Serum potassium today is 4.7. Monitor serum potassium. Low potassium diet. Pt. with hyperkalemia in setting of advanced CKD. Pt. received HD yesterday. Serum potassium today is 4.7. Monitor serum potassium. Low potassium diet.

## 2021-12-05 NOTE — PROVIDER CONTACT NOTE (CRITICAL VALUE NOTIFICATION) - ACTION/TREATMENT ORDERED:
Give hyperkalemia cocktail that was ordered and not given in ED. Also give bumex. Continue to monitor.
provider at bedside to assess pt. Administer 1 unit PRBCS.
provider at bedside to assess pt. Repeat CBC. continue to monitor

## 2021-12-05 NOTE — PROGRESS NOTE ADULT - PROBLEM SELECTOR PLAN 3
Pt. with metabolic acidosis in setting of advanced renal failure. Underwent HD as above. Serum CO2 is 19 today. Would discontinue oral sodium bicarb as patient currently on HD. Monitor serum CO2. Pt. with metabolic acidosis in setting of advanced renal failure. Serum CO2 improved to 19 today. Plan for HD tomorrow. Monitor serum CO2.

## 2021-12-05 NOTE — PROVIDER CONTACT NOTE (CRITICAL VALUE NOTIFICATION) - NAME OF MD/NP/PA/DO NOTIFIED:
MD Burr
DYLLAN Sterling
[facility] [id number]
DYLLAN Sterling

## 2021-12-05 NOTE — PROGRESS NOTE ADULT - ASSESSMENT
Pt. with advanced CKD, hypervolemia and hyperkalemia.  Pt. with advanced CKD stage 5, hypervolemia and hyperkalemia, initiated on HD

## 2021-12-05 NOTE — PROGRESS NOTE ADULT - SUBJECTIVE AND OBJECTIVE BOX
Patient is a 89y old  Male who presents with a chief complaint of Shortness of Breath (05 Dec 2021 12:00)      SUBJECTIVE / OVERNIGHT EVENTS:    Patient seen and examined at bedside. Symptomatic improvement after HD, Hgb 6.1 this morning, no clear source of bleeding, No GIB reported.     MEDICATIONS  (STANDING):  buMETAnide Injectable 2 milliGRAM(s) IV Push every 8 hours  calcitriol   Capsule 0.5 MICROGram(s) Oral daily  chlorhexidine 2% Cloths 1 Application(s) Topical daily  finasteride 5 milliGRAM(s) Oral daily  influenza  Vaccine (HIGH DOSE) 0.7 milliLiter(s) IntraMuscular once  melatonin 6 milliGRAM(s) Oral at bedtime  metoprolol succinate ER 50 milliGRAM(s) Oral daily  NIFEdipine XL 30 milliGRAM(s) Oral daily  pantoprazole    Tablet 40 milliGRAM(s) Oral before breakfast  sodium bicarbonate 650 milliGRAM(s) Oral two times a day    MEDICATIONS  (PRN):  acetaminophen     Tablet .. 650 milliGRAM(s) Oral every 6 hours PRN Temp greater or equal to 38C (100.4F), Mild Pain (1 - 3), Moderate Pain (4 - 6)      Vital Signs Last 24 Hrs  T(C): 36.4 (05 Dec 2021 11:41), Max: 36.7 (04 Dec 2021 18:53)  T(F): 97.6 (05 Dec 2021 11:41), Max: 98 (04 Dec 2021 18:53)  HR: 77 (05 Dec 2021 13:13) (74 - 105)  BP: 114/56 (05 Dec 2021 13:13) (114/56 - 166/95)  BP(mean): --  RR: 17 (05 Dec 2021 13:13) (17 - 20)  SpO2: 95% (05 Dec 2021 13:13) (95% - 98%)  CAPILLARY BLOOD GLUCOSE        I&O's Summary    04 Dec 2021 07:01  -  05 Dec 2021 07:00  --------------------------------------------------------  IN: 600 mL / OUT: 1400 mL / NET: -800 mL        PHYSICAL EXAM:  Vital Signs Last 24 Hrs  T(C): 36.4 (12-05-21 @ 11:41)  T(F): 97.6 (12-05-21 @ 11:41), Max: 98 (12-04-21 @ 18:53)  HR: 77 (12-05-21 @ 13:13) (74 - 105)  BP: 114/56 (12-05-21 @ 13:13)  BP(mean): --  RR: 17 (12-05-21 @ 13:13) (17 - 20)  SpO2: 95% (12-05-21 @ 13:13) (95% - 98%)  Wt(kg): --    12-04 @ 07:01  -  12-05 @ 07:00  --------------------------------------------------------  IN: 600 mL / OUT: 1400 mL / NET: -800 mL      Constitutional: NAD, awake and alert  EYES: EOMI  ENT:  Normal Hearing, no tonsillar exudates   Neck: Soft and supple , no thyromegaly   Respiratory: Breath sounds are clear bilaterally, No wheezing, rales or rhonchi  Cardiovascular: S1 and S2, regular rate and rhythm, no Murmurs, gallops or rubs, no JVD,    Gastrointestinal: Bowel Sounds present, soft, nontender, nondistended, no guarding, no rebound  Extremities: No cyanosis or clubbing; warm to touch  Vascular: 2+ peripheral pulses lower ex  Neurological: No focal deficits, CN II-XII intact bilaterally, sensation to light touch intact in all extremities. Pupils are equally reactive to light and symmetrical in size.   Musculoskeletal: 5/5 strength b/l upper and lower extremities; no joint swelling.  Skin: No back bruise in RP area  Rectal: NAT performed no BRBPR/melena  Psych: no depression or anhedonia, AAOx3  HEME: no bruises, no nose bleeds      LABS:                        6.2    8.20  )-----------( 200      ( 05 Dec 2021 09:43 )             19.1     12-05    138  |  102  |  78<H>  ----------------------------<  98  4.7   |  19<L>  |  5.66<H>    Ca    8.3<L>      05 Dec 2021 07:35  Phos  4.5     12-05  Mg     1.70     12-05    TPro  6.5  /  Alb  3.1<L>  /  TBili  0.5  /  DBili  x   /  AST  21  /  ALT  10  /  AlkPhos  72  12-04    PT/INR - ( 04 Dec 2021 01:27 )   PT: 14.2 sec;   INR: 1.25 ratio         PTT - ( 04 Dec 2021 01:27 )  PTT:29.7 sec          RADIOLOGY & ADDITIONAL TESTS:    Imaging Personally Reviewed:    Consultant(s) Notes Reviewed:      Care Discussed with Consultants/Other Providers:

## 2021-12-05 NOTE — PROGRESS NOTE ADULT - PROBLEM SELECTOR PLAN 1
- Hgb 6.1 this morning, no definitive bleeding source, likley in setting of CKD, getting 1 u PRBC if does not respond appropriately CT A/P non con to r/o RP bleed.

## 2021-12-05 NOTE — PROGRESS NOTE ADULT - PROBLEM SELECTOR PLAN 1
Pt. with advanced/progressive CKD stage 5. Scr elevated at 8.2 on admission on 12/4/21. Initiated on HD on 12/4/21 for hyperkalemia and hypervolemia. Continue Bumex 2 mg IVP q8h. No plan for HD today. Will be for 2nd session HD tomorrow. Monitor labs and urine output. Avoid NSAIDs, ACEI/ARBS, RCA and nephrotoxins. Dose medications as per eGFR. Pt. with advanced/progressive CKD stage 5. Scr elevated at 8.2 on admission on 12/4/21. Pt. initiated on HD on 12/4/21 for advanced CKD, hyperkalemia and hypervolemia management. Pt. clinically improved. Plan for second HD treatment tomorrow. Monitor labs and urine output. Avoid NSAIDs, ACEI/ARBS, RCA and nephrotoxins. Dose medications as per eGFR.

## 2021-12-05 NOTE — PROGRESS NOTE ADULT - ASSESSMENT
88 y/o male, with a PmHx of CKD3, HTN, Gerd, Tribal, Iron Deficiency Anemia, presented to the [facility] ED with SOB. Admitted to telemetry for new HD and Hyperkalemia.

## 2021-12-05 NOTE — PROVIDER CONTACT NOTE (CRITICAL VALUE NOTIFICATION) - BACKGROUND
Pt admitted with SOB, GÉNESIS, and hyperkalemia
admitted with shortness of breath
Pt admitted with SOB, GÉNESIS, and hyperkalemia

## 2021-12-05 NOTE — PROGRESS NOTE ADULT - SUBJECTIVE AND OBJECTIVE BOX
[facility] DIVISION OF KIDNEY DISEASES AND HYPERTENSION -- FOLLOW UP NOTE  --------------------------------------------------------------------------------  HPI: 88 y/o M w PMH of CKD3, HTN, BPH, GERD, hiatal hernia presented to [facility] for worsening SOB for 3 days prior to admission. Nephrology consulted for advanced CKD, hyperkalemia and volume overload. Pt. was hospitalized at [facility] in October 2021 with GÉNESIS on CKD, hyperkalemia and metabolic acidosis. Scr at that time was elevated at 12.64. Pt. received IVFs and Scr decreased to 7.27 on 10/13/21. Scr on this admission was elevated at 7.68 and serum potassium was 5.8 (non-hemolyzed) on 12/4/2. Initiated on HD on 12/4/21 for hyperkalemia and hypervolemia.     Patient was seen and examined at bedside. Endorse feeling significantly better. Endorse SOB improved. Denies CP, ever, chills, nausea, vomiting, LE edema or dysuria.    PAST HISTORY  --------------------------------------------------------------------------------  No significant changes to PMH, PSH, FHx, SHx, unless otherwise noted    ALLERGIES & MEDICATIONS  --------------------------------------------------------------------------------  Allergies    No Known Allergies    Intolerances    Standing Inpatient Medications  buMETAnide Injectable 2 milliGRAM(s) IV Push every 8 hours  calcitriol   Capsule 0.5 MICROGram(s) Oral daily  chlorhexidine 2% Cloths 1 Application(s) Topical daily  finasteride 5 milliGRAM(s) Oral daily  influenza  Vaccine (HIGH DOSE) 0.7 milliLiter(s) IntraMuscular once  melatonin 6 milliGRAM(s) Oral at bedtime  metoprolol succinate ER 50 milliGRAM(s) Oral daily  NIFEdipine XL 30 milliGRAM(s) Oral daily  pantoprazole    Tablet 40 milliGRAM(s) Oral before breakfast  sodium bicarbonate 650 milliGRAM(s) Oral two times a day    PRN Inpatient Medications  acetaminophen     Tablet .. 650 milliGRAM(s) Oral every 6 hours PRN    REVIEW OF SYSTEMS  --------------------------------------------------------------------------------  Gen: No fevers/chills  Respiratory: No dyspnea, cough  CV: No chest pain  GI: No abdominal pain, diarrhea  : No dysuria, hematuria  MSK: No  edema    All other systems were reviewed and are negative, except as noted.    VITALS/PHYSICAL EXAM  --------------------------------------------------------------------------------  T(C): 36.3 (12-05-21 @ 08:05), Max: 36.7 (12-04-21 @ 18:53)  HR: 88 (12-05-21 @ 08:05) (88 - 105)  BP: 120/73 (12-05-21 @ 08:05) (120/73 - 166/95)  RR: 17 (12-05-21 @ 08:05) (17 - 20)  SpO2: 98% (12-05-21 @ 08:05) (95% - 98%)  Wt(kg): --  Height (cm): 177.8 (12-04-21 @ 12:41)  Weight (kg): 68 (12-04-21 @ 12:41)  BMI (kg/m2): 21.5 (12-04-21 @ 12:41)  BSA (m2): 1.85 (12-04-21 @ 12:41)    12-04-21 @ 07:01  -  12-05-21 @ 07:00  --------------------------------------------------------  IN: 600 mL / OUT: 1400 mL / NET: -800 mL    Physical Exam:  	Gen: NAD  	HEENT: MMM  	Pulm: CTA B/L, no crackles or wheezing   	CV: S1S2  	Abd: Soft, +BS   	Ext: No LE edema B/L  	Neuro: Awake  	Skin: Warm and dry  	Vascular access: RIJ non-tunneled HD catheter site: no active bleeding     LABS/STUDIES  --------------------------------------------------------------------------------              6.2    8.20  >-----------<  200      [12-05-21 @ 09:43]              19.1     138  |  102  |  78  ----------------------------<  98      [12-05-21 @ 07:35]  4.7   |  19  |  5.66        Ca     8.3     [12-05-21 @ 07:35]      Mg     1.70     [12-05-21 @ 07:35]      Phos  4.5     [12-05-21 @ 07:35]    TPro  6.5  /  Alb  3.1  /  TBili  0.5  /  DBili  x   /  AST  21  /  ALT  10  /  AlkPhos  72  [12-04-21 @ 08:13]    PT/INR: PT 14.2 , INR 1.25       [12-04-21 @ 01:27]  PTT: 29.7       [12-04-21 @ 01:27]    Creatinine Trend:  SCr 5.66 [12-05 @ 07:35]  SCr 8.12 [12-04 @ 15:18]  SCr 8.20 [12-04 @ 10:20]  SCr 7.89 [12-04 @ 08:13]  SCr 7.95 [12-04 @ 04:11] [facility] DIVISION OF KIDNEY DISEASES AND HYPERTENSION -- FOLLOW UP NOTE  --------------------------------------------------------------------------------  HPI: 89 y/o M w PMH of CKD3, HTN, BPH, GERD, hiatal hernia presented to [facility] for worsening SOB for 3 days prior to admission. Nephrology consulted for advanced CKD, hyperkalemia and volume overload. Pt. was hospitalized at [facility] in October 2021 with GÉNESIS on CKD, hyperkalemia and metabolic acidosis. Scr at that time was elevated at 12.64. Pt. received IVFs and Scr decreased to 7.27 on 10/13/21. Scr on this admission was elevated at 7.68 and serum potassium was 5.8 (non-hemolyzed) on 12/4/21. Pt. initiated on HD on 12/4/21 for advanced CKD, hyperkalemia and hypervolemia management.     Pt. seen and examined at bedside. Pt. feels significantly better and SOB resolved after HD treatment. No fever, chills, CP, SOB, HA or dizziness.    PAST HISTORY  --------------------------------------------------------------------------------  No significant changes to PMH, PSH, FHx, SHx, unless otherwise noted    ALLERGIES & MEDICATIONS  --------------------------------------------------------------------------------  Allergies    No Known Allergies    Intolerances    Standing Inpatient Medications  buMETAnide Injectable 2 milliGRAM(s) IV Push every 8 hours  calcitriol   Capsule 0.5 MICROGram(s) Oral daily  chlorhexidine 2% Cloths 1 Application(s) Topical daily  finasteride 5 milliGRAM(s) Oral daily  influenza  Vaccine (HIGH DOSE) 0.7 milliLiter(s) IntraMuscular once  melatonin 6 milliGRAM(s) Oral at bedtime  metoprolol succinate ER 50 milliGRAM(s) Oral daily  NIFEdipine XL 30 milliGRAM(s) Oral daily  pantoprazole    Tablet 40 milliGRAM(s) Oral before breakfast  sodium bicarbonate 650 milliGRAM(s) Oral two times a day    PRN Inpatient Medications  acetaminophen     Tablet .. 650 milliGRAM(s) Oral every 6 hours PRN    REVIEW OF SYSTEMS  --------------------------------------------------------------------------------  Gen: No fever  Respiratory: No dyspnea, cough  CV: No chest pain  GI: No abdominal pain  : No dysuria, hematuria  MSK: No  edema    All other systems were reviewed and are negative, except as noted.    VITALS/PHYSICAL EXAM  --------------------------------------------------------------------------------  T(C): 36.3 (12-05-21 @ 08:05), Max: 36.7 (12-04-21 @ 18:53)  HR: 88 (12-05-21 @ 08:05) (88 - 105)  BP: 120/73 (12-05-21 @ 08:05) (120/73 - 166/95)  RR: 17 (12-05-21 @ 08:05) (17 - 20)  SpO2: 98% (12-05-21 @ 08:05) (95% - 98%)  Wt(kg): --  Height (cm): 177.8 (12-04-21 @ 12:41)  Weight (kg): 68 (12-04-21 @ 12:41)  BMI (kg/m2): 21.5 (12-04-21 @ 12:41)  BSA (m2): 1.85 (12-04-21 @ 12:41)    12-04-21 @ 07:01  -  12-05-21 @ 07:00  --------------------------------------------------------  IN: 600 mL / OUT: 1400 mL / NET: -800 mL    Physical Exam:  	Gen: resting, NAD  	HEENT: MMM  	Pulm: CTA B/L, no crackles or wheezing   	CV: S1S2+  	Abd: Soft, +BS   	Ext: No LE edema B/L  	Neuro: Awake  	Skin: Warm and dry  	Vascular access: RIJ non-tunneled HD catheter site: no active bleeding     LABS/STUDIES  --------------------------------------------------------------------------------              6.2    8.20  >-----------<  200      [12-05-21 @ 09:43]              19.1     138  |  102  |  78  ----------------------------<  98      [12-05-21 @ 07:35]  4.7   |  19  |  5.66        Ca     8.3     [12-05-21 @ 07:35]      Mg     1.70     [12-05-21 @ 07:35]      Phos  4.5     [12-05-21 @ 07:35]    TPro  6.5  /  Alb  3.1  /  TBili  0.5  /  DBili  x   /  AST  21  /  ALT  10  /  AlkPhos  72  [12-04-21 @ 08:13]    Creatinine Trend:  SCr 5.66 [12-05 @ 07:35]  SCr 8.12 [12-04 @ 15:18]  SCr 8.20 [12-04 @ 10:20]  SCr 7.89 [12-04 @ 08:13]  SCr 7.95 [12-04 @ 04:11]

## 2021-12-05 NOTE — PROVIDER CONTACT NOTE (CRITICAL VALUE NOTIFICATION) - RECOMMENDATIONS
notify provider, obtain vital signs, continue telemetry monitoring, repeat CBC
notify provider, obtain vital signs, continue telemetry monitoring, Administer 1 unit PRBCs

## 2021-12-05 NOTE — CHART NOTE - NSCHARTNOTEFT_GEN_A_CORE
d/w renal fellow ok to dc sodium bicarbonate and continue bumex as ordered.  pt still urinating .  d/w Dr. Spaulding in agreement with plan

## 2021-12-05 NOTE — PROGRESS NOTE ADULT - PROBLEM SELECTOR PLAN 4
Uncertain etiology. Hb dropped to 6.2 this morning. No signs of active bleeding. Patient will receive 1 unit of pRBC today. Suggest to send iron studies including ferritin, TIBC and iron level. Monitor Hb.     If any questions, please feel free to contact me     Kellee Johnson  Nephrology Fellow  [facility] Pager: [phone]  [facility] Pager: [phone] Pt. with anemia in setting of CKD however exact etiology of acute anemia unknown. Hb decreased to 6.1/6.2 this morning. No signs of active bleeding. Plan for blood transfusion today as per discussion with primary team. Check iron studies including ferritin, TIBC and iron level. Monitor hemoglobin     If any questions, please feel free to contact me     Kellee Johnson  Nephrology Fellow  [facility] Pager: [phone]  [facility] Pager: [phone]

## 2021-12-05 NOTE — PROVIDER CONTACT NOTE (CRITICAL VALUE NOTIFICATION) - PERSON GIVING RESULT:
hematology lab
Erik Galdamez - toxicology
[facility]. DUNCAN Fontana
Erik Galdamez - toxicology

## 2021-12-06 LAB
24R-OH-CALCIDIOL SERPL-MCNC: 22.7 NG/ML — LOW (ref 30–80)
ANION GAP SERPL CALC-SCNC: 11 MMOL/L — SIGNIFICANT CHANGE UP (ref 7–14)
ANION GAP SERPL CALC-SCNC: 17 MMOL/L — HIGH (ref 7–14)
BASOPHILS # BLD AUTO: 0.05 K/UL — SIGNIFICANT CHANGE UP (ref 0–0.2)
BASOPHILS NFR BLD AUTO: 0.5 % — SIGNIFICANT CHANGE UP (ref 0–2)
BUN SERPL-MCNC: 50 MG/DL — HIGH (ref 7–23)
BUN SERPL-MCNC: 89 MG/DL — HIGH (ref 7–23)
CALCIUM SERPL-MCNC: 8 MG/DL — LOW (ref 8.4–10.5)
CALCIUM SERPL-MCNC: 8.1 MG/DL — LOW (ref 8.4–10.5)
CHLORIDE SERPL-SCNC: 101 MMOL/L — SIGNIFICANT CHANGE UP (ref 98–107)
CHLORIDE SERPL-SCNC: 99 MMOL/L — SIGNIFICANT CHANGE UP (ref 98–107)
CO2 SERPL-SCNC: 20 MMOL/L — LOW (ref 22–31)
CO2 SERPL-SCNC: 26 MMOL/L — SIGNIFICANT CHANGE UP (ref 22–31)
CREAT SERPL-MCNC: 4.33 MG/DL — HIGH (ref 0.5–1.3)
CREAT SERPL-MCNC: 6.51 MG/DL — HIGH (ref 0.5–1.3)
EOSINOPHIL # BLD AUTO: 0.43 K/UL — SIGNIFICANT CHANGE UP (ref 0–0.5)
EOSINOPHIL NFR BLD AUTO: 4.7 % — SIGNIFICANT CHANGE UP (ref 0–6)
GLUCOSE SERPL-MCNC: 105 MG/DL — HIGH (ref 70–99)
GLUCOSE SERPL-MCNC: 90 MG/DL — SIGNIFICANT CHANGE UP (ref 70–99)
HAPTOGLOB SERPL-MCNC: 139 MG/DL — SIGNIFICANT CHANGE UP (ref 34–200)
HCT VFR BLD CALC: 22 % — LOW (ref 39–50)
HGB BLD-MCNC: 7.1 G/DL — LOW (ref 13–17)
IANC: 6.51 K/UL — SIGNIFICANT CHANGE UP (ref 1.5–8.5)
IMM GRANULOCYTES NFR BLD AUTO: 2 % — HIGH (ref 0–1.5)
LDH SERPL L TO P-CCNC: 214 U/L — SIGNIFICANT CHANGE UP (ref 135–225)
LYMPHOCYTES # BLD AUTO: 1.38 K/UL — SIGNIFICANT CHANGE UP (ref 1–3.3)
LYMPHOCYTES # BLD AUTO: 15 % — SIGNIFICANT CHANGE UP (ref 13–44)
MAGNESIUM SERPL-MCNC: 1.7 MG/DL — SIGNIFICANT CHANGE UP (ref 1.6–2.6)
MAGNESIUM SERPL-MCNC: 1.8 MG/DL — SIGNIFICANT CHANGE UP (ref 1.6–2.6)
MCHC RBC-ENTMCNC: 29 PG — SIGNIFICANT CHANGE UP (ref 27–34)
MCHC RBC-ENTMCNC: 31.8 GM/DL — LOW (ref 32–36)
MCV RBC AUTO: 91.3 FL — SIGNIFICANT CHANGE UP (ref 80–100)
MONOCYTES # BLD AUTO: 0.65 K/UL — SIGNIFICANT CHANGE UP (ref 0–0.9)
MONOCYTES NFR BLD AUTO: 7.1 % — SIGNIFICANT CHANGE UP (ref 2–14)
NEUTROPHILS # BLD AUTO: 6.51 K/UL — SIGNIFICANT CHANGE UP (ref 1.8–7.4)
NEUTROPHILS NFR BLD AUTO: 70.7 % — SIGNIFICANT CHANGE UP (ref 43–77)
NRBC # BLD: 0 /100 WBCS — SIGNIFICANT CHANGE UP
NRBC # FLD: 0.02 K/UL — HIGH
PHOSPHATE SERPL-MCNC: 4 MG/DL — SIGNIFICANT CHANGE UP (ref 2.5–4.5)
PHOSPHATE SERPL-MCNC: 5.2 MG/DL — HIGH (ref 2.5–4.5)
PLATELET # BLD AUTO: 208 K/UL — SIGNIFICANT CHANGE UP (ref 150–400)
POTASSIUM SERPL-MCNC: 3.7 MMOL/L — SIGNIFICANT CHANGE UP (ref 3.5–5.3)
POTASSIUM SERPL-MCNC: 3.8 MMOL/L — SIGNIFICANT CHANGE UP (ref 3.5–5.3)
POTASSIUM SERPL-SCNC: 3.7 MMOL/L — SIGNIFICANT CHANGE UP (ref 3.5–5.3)
POTASSIUM SERPL-SCNC: 3.8 MMOL/L — SIGNIFICANT CHANGE UP (ref 3.5–5.3)
RBC # BLD: 2.41 M/UL — LOW (ref 4.2–5.8)
RBC # BLD: 2.41 M/UL — LOW (ref 4.2–5.8)
RBC # FLD: 19.9 % — HIGH (ref 10.3–14.5)
RETICS #: 69.4 K/UL — SIGNIFICANT CHANGE UP (ref 25–125)
RETICS/RBC NFR: 2.9 % — HIGH (ref 0.5–2.5)
SODIUM SERPL-SCNC: 136 MMOL/L — SIGNIFICANT CHANGE UP (ref 135–145)
SODIUM SERPL-SCNC: 138 MMOL/L — SIGNIFICANT CHANGE UP (ref 135–145)
VIT D25+D1,25 OH+D1,25 PNL SERPL-MCNC: 71.2 PG/ML — SIGNIFICANT CHANGE UP (ref 19.9–79.3)
WBC # BLD: 9.2 K/UL — SIGNIFICANT CHANGE UP (ref 3.8–10.5)
WBC # FLD AUTO: 9.2 K/UL — SIGNIFICANT CHANGE UP (ref 3.8–10.5)

## 2021-12-06 PROCEDURE — 99232 SBSQ HOSP IP/OBS MODERATE 35: CPT | Mod: GC

## 2021-12-06 PROCEDURE — 99232 SBSQ HOSP IP/OBS MODERATE 35: CPT

## 2021-12-06 RX ORDER — BUMETANIDE 0.25 MG/ML
2 INJECTION INTRAMUSCULAR; INTRAVENOUS
Refills: 0 | Status: DISCONTINUED | OUTPATIENT
Start: 2021-12-06 | End: 2021-12-07

## 2021-12-06 RX ORDER — IRON SUCROSE 20 MG/ML
100 INJECTION, SOLUTION INTRAVENOUS
Refills: 0 | Status: DISCONTINUED | OUTPATIENT
Start: 2021-12-06 | End: 2021-12-17

## 2021-12-06 RX ORDER — HEPARIN SODIUM 5000 [USP'U]/ML
5000 INJECTION INTRAVENOUS; SUBCUTANEOUS EVERY 8 HOURS
Refills: 0 | Status: DISCONTINUED | OUTPATIENT
Start: 2021-12-06 | End: 2021-12-17

## 2021-12-06 RX ADMIN — CALCITRIOL 0.5 MICROGRAM(S): 0.5 CAPSULE ORAL at 10:41

## 2021-12-06 RX ADMIN — Medication 6 MILLIGRAM(S): at 21:10

## 2021-12-06 RX ADMIN — Medication 30 MILLIGRAM(S): at 05:50

## 2021-12-06 RX ADMIN — BUMETANIDE 2 MILLIGRAM(S): 0.25 INJECTION INTRAMUSCULAR; INTRAVENOUS at 17:40

## 2021-12-06 RX ADMIN — FINASTERIDE 5 MILLIGRAM(S): 5 TABLET, FILM COATED ORAL at 10:41

## 2021-12-06 RX ADMIN — Medication 50 MILLIGRAM(S): at 05:49

## 2021-12-06 RX ADMIN — PANTOPRAZOLE SODIUM 40 MILLIGRAM(S): 20 TABLET, DELAYED RELEASE ORAL at 05:50

## 2021-12-06 RX ADMIN — HEPARIN SODIUM 5000 UNIT(S): 5000 INJECTION INTRAVENOUS; SUBCUTANEOUS at 21:10

## 2021-12-06 RX ADMIN — CHLORHEXIDINE GLUCONATE 1 APPLICATION(S): 213 SOLUTION TOPICAL at 10:32

## 2021-12-06 RX ADMIN — BUMETANIDE 2 MILLIGRAM(S): 0.25 INJECTION INTRAMUSCULAR; INTRAVENOUS at 02:13

## 2021-12-06 RX ADMIN — IRON SUCROSE 100 MILLIGRAM(S): 20 INJECTION, SOLUTION INTRAVENOUS at 13:27

## 2021-12-06 RX ADMIN — BUMETANIDE 2 MILLIGRAM(S): 0.25 INJECTION INTRAMUSCULAR; INTRAVENOUS at 10:42

## 2021-12-06 NOTE — PROGRESS NOTE ADULT - SUBJECTIVE AND OBJECTIVE BOX
Patient is a 89y old  Male who presents with a chief complaint of Shortness of Breath (06 Dec 2021 10:45)    SUBJECTIVE / OVERNIGHT EVENTS:    Patient seen in HD suite. No events overnight. This AM, patient without n/v/d/cp/sob. Still feels a little weak but much better than at admission.    MEDICATIONS  (STANDING):  buMETAnide Injectable 2 milliGRAM(s) IV Push every 8 hours  calcitriol   Capsule 0.5 MICROGram(s) Oral daily  chlorhexidine 2% Cloths 1 Application(s) Topical daily  finasteride 5 milliGRAM(s) Oral daily  influenza  Vaccine (HIGH DOSE) 0.7 milliLiter(s) IntraMuscular once  iron sucrose Injectable 100 milliGRAM(s) IV Push <User Schedule>  melatonin 6 milliGRAM(s) Oral at bedtime  metoprolol succinate ER 50 milliGRAM(s) Oral daily  NIFEdipine XL 30 milliGRAM(s) Oral daily  pantoprazole    Tablet 40 milliGRAM(s) Oral before breakfast    MEDICATIONS  (PRN):  acetaminophen     Tablet .. 650 milliGRAM(s) Oral every 6 hours PRN Temp greater or equal to 38C (100.4F), Mild Pain (1 - 3), Moderate Pain (4 - 6)      PHYSICAL EXAM:  T(C): 36.4 (12-06-21 @ 11:23), Max: 36.5 (12-05-21 @ 20:30)  HR: 81 (12-06-21 @ 11:23) (74 - 88)  BP: 120/65 (12-06-21 @ 11:23) (114/56 - 134/84)  RR: 18 (12-06-21 @ 11:23) (17 - 18)  SpO2: 99% (12-06-21 @ 10:34) (95% - 100%)  I&O's Summary    05 Dec 2021 07:01  -  06 Dec 2021 07:00  --------------------------------------------------------  IN: 300 mL / OUT: 0 mL / NET: 300 mL    06 Dec 2021 07:01  -  06 Dec 2021 13:09  --------------------------------------------------------  IN: 240 mL / OUT: 0 mL / NET: 240 mL      GENERAL: NAD, well-developed  HEAD:  Atraumatic, Normocephalic, MMM  CHEST/LUNG: No use of accessory muscles, CTAB, breathing non-labored  COR: RR, no mrcg  ABD: Soft, ND/NT, +BS  PSYCH: AAOx3  NEUROLOGY: CN II-XII grossly intact, moving all extremities  SKIN: No rashes or lesions  EXT: wwp, no cce    LABS:  CAPILLARY BLOOD GLUCOSE                              7.1    9.20  )-----------( 208      ( 06 Dec 2021 06:29 )             22.0     12-06    136  |  99  |  89<H>  ----------------------------<  90  3.8   |  20<L>  |  6.51<H>    Ca    8.1<L>      06 Dec 2021 06:29  Phos  5.2     12-06  Mg     1.80     12-06                  RADIOLOGY & ADDITIONAL TESTS:    Telemetry Personally Reviewed - SR 80s, PVCs    Imaging Personally Reviewed -     Imaging Reviewed -     Consultant(s) Notes Reviewed -       Care Discussed with Consultants/Other Providers -

## 2021-12-06 NOTE — PROGRESS NOTE ADULT - PROBLEM SELECTOR PLAN 3
Pt. with metabolic acidosis in setting of advanced renal failure. Serum CO2 improved to 20 today. Plan for HD today. Monitor serum CO2.

## 2021-12-06 NOTE — PROGRESS NOTE ADULT - PROBLEM SELECTOR PLAN 1
Pt. with advanced/progressive CKD stage 5. Scr elevated at 8.2 on admission on 12/4/21. Pt. initiated on HD on 12/4/21 for advanced CKD, hyperkalemia and hypervolemia management. Pt. clinically improved. Plan for second HD treatment today. Monitor labs and urine output. Avoid NSAIDs, ACEI/ARBS, RCA and nephrotoxins. Dose medications as per eGFR.

## 2021-12-06 NOTE — PROGRESS NOTE ADULT - ASSESSMENT
88yo M with Hx CKD3 (not previously on HD), HTN, GERD, and anemia who presented with SOB and CP, found to have persistent hyperkalemia, with need for urgent HD. Vascular Surgery consulted for urgent Shiley placement. Patient is s/p urgent R IJ Shiley placement on 12/4.     PLAN:   - s/p Right Shiley  - OR planning for AV fistula next week with Dr. Baca  - Please document medical risk stratification and clearance  - Please document cardiology risk stratification and clearance   - Please f/u vein mapping; (ordered)  - HD per renal  - C/w care per primary team  - Vascular Surgery will follow    Rod Ochoa MD PGY2  C Team Surgery  [id number]  89yo M with Hx CKD3 (not previously on HD), HTN, GERD, and anemia who presented with SOB and CP, found to have persistent hyperkalemia, with need for urgent HD. Vascular Surgery consulted for urgent Shiley placement. Patient is s/p urgent R IJ Shiley placement on 12/4.     PLAN:   - s/p Right Shiley  - OR planning for AV fistula next week with Dr. Baca  - Please document medical risk stratification and clearance  - Please document cardiology risk stratification and clearance   - Please f/u vein mapping; (ordered)  - Left arm precautions: please place pink band for arm precaution no blood draws, blood pressure measurements or IV line placement   - HD per renal  - C/w care per primary team  - Vascular Surgery will follow    Rod Ochoa MD PGY2  C Team Surgery  [id number]  88yo M with Hx CKD3 (not previously on HD), HTN, GERD, and anemia who presented with SOB and CP, found to have persistent hyperkalemia, with need for urgent HD. Vascular Surgery consulted for urgent Shiley placement. Patient is s/p urgent R IJ Shiley placement on 12/4.     PLAN:   - s/p Right Shiley  - OR planning for AV fistula next week with Dr. Baca on 12/15  - Please document medical risk stratification and clearance  - Please document cardiology risk stratification and clearance   - Please f/u vein mapping; (ordered)  - Left arm precautions: please place pink band for arm precaution no blood draws, blood pressure measurements or IV line placement   - HD per renal  - C/w care per primary team  - Vascular Surgery will follow    Rod Ochoa MD PGY2  C Team Surgery  [id number]

## 2021-12-06 NOTE — CHART NOTE - NSCHARTNOTEFT_GEN_A_CORE
Notified by RN that patient had 7 beats of Vtach. Patient currently asymptomatic and vitals stable. Will keep patient on telemetry overnight and send STAT labs. Will keep K>4.0 and Mag >2.0 . Will continue to monitor patient closely.    Martina Wilkins PA-C  pager [phone]

## 2021-12-06 NOTE — PROGRESS NOTE ADULT - ASSESSMENT
88 y/o male, with a PmHx of CKD3, HTN, Gerd, Torres [place], Iron Deficiency Anemia, presented to the [facility] ED with SOB. Admitted to telemetry for new HD and Hyperkalemia.

## 2021-12-06 NOTE — PROGRESS NOTE ADULT - SUBJECTIVE AND OBJECTIVE BOX
[facility] Division of Kidney Diseases & Hypertension  FOLLOW UP NOTE  [phone]--------------------------------------------------------------------------------    HPI: 89 year old male with CKD, HTN, BPH and GERD admitted to [facility] with worsening SOB. Nephrology consulted for advanced CKD, hyperkalemia and volume overload. Pt. was hospitalized at [facility] in October 2021 with GÉNESIS on CKD, hyperkalemia and metabolic acidosis. Scr at that time was elevated at 12.64. Pt. received IVFs and Scr decreased to 7.27 on 10/13/21. Scr on this admission was elevated at 7.68 and serum potassium was 5.8 (non-hemolyzed) on 12/4/21. Pt. initiated on HD on 12/4/21 for advanced CKD, hyperkalemia and hypervolemia management.     Pt. seen and examined at bedside. Pt. feels significantly better and SOB improved after HD treatment. No fever, chills, CP, SOB, HA or dizziness. Pt. scheduled for 2nd HD treatment later today.    PAST HISTORY  --------------------------------------------------------------------------------  No significant changes to PMH, PSH, FHx, SHx, unless otherwise noted    ALLERGIES & MEDICATIONS  --------------------------------------------------------------------------------  Allergies    No Known Allergies    Intolerances    Standing Inpatient Medications  buMETAnide Injectable 2 milliGRAM(s) IV Push every 8 hours  calcitriol   Capsule 0.5 MICROGram(s) Oral daily  chlorhexidine 2% Cloths 1 Application(s) Topical daily  finasteride 5 milliGRAM(s) Oral daily  influenza  Vaccine (HIGH DOSE) 0.7 milliLiter(s) IntraMuscular once  melatonin 6 milliGRAM(s) Oral at bedtime  metoprolol succinate ER 50 milliGRAM(s) Oral daily  NIFEdipine XL 30 milliGRAM(s) Oral daily  pantoprazole    Tablet 40 milliGRAM(s) Oral before breakfast    REVIEW OF SYSTEMS  --------------------------------------------------------------------------------  Gen: No fever  Respiratory: No dyspnea, cough  CV: No chest pain  GI: No abdominal pain  : No dysuria, hematuria  MSK: No  edema    All other systems were reviewed and are negative, except as noted.    VITALS/PHYSICAL EXAM  --------------------------------------------------------------------------------  T(C): 36.3 (12-06-21 @ 05:30), Max: 36.5 (12-05-21 @ 20:30)  HR: 88 (12-06-21 @ 10:34) (74 - 88)  BP: 122/61 (12-06-21 @ 10:34) (114/56 - 134/84)  RR: 18 (12-06-21 @ 10:34) (17 - 18)  SpO2: 99% (12-06-21 @ 10:34) (95% - 100%)  Wt(kg): --  Height (cm): 177.8 (12-04-21 @ 12:41)  Weight (kg): 68 (12-04-21 @ 12:41)  BMI (kg/m2): 21.5 (12-04-21 @ 12:41)  BSA (m2): 1.85 (12-04-21 @ 12:41)    12-05-21 @ 07:01  -  12-06-21 @ 07:00  --------------------------------------------------------  IN: 300 mL / OUT: 0 mL / NET: 300 mL    Physical Exam:              Gen: resting, NAD  	HEENT: Anicteric  	Pulm: Poor air entry at the bases B/L  	CV: S1S2+  	Abd: Soft, +BS   	Ext: No LE edema B/L  	Neuro: Awake  	Skin: Warm and dry  	Vascular access: RIJ non-tunneled HD catheter site: no active bleeding     LABS/STUDIES  --------------------------------------------------------------------------------              7.1    9.20  >-----------<  208      [12-06-21 @ 06:29]              22.0     136  |  99  |  89  ----------------------------<  90      [12-06-21 @ 06:29]  3.8   |  20  |  6.51        Ca     8.1     [12-06-21 @ 06:29]      Mg     1.80     [12-06-21 @ 06:29]      Phos  5.2     [12-06-21 @ 06:29]          [12-06-21 @ 06:29]    Creatinine Trend:  SCr 6.51 [12-06 @ 06:29]  SCr 5.66 [12-05 @ 07:35]  SCr 8.12 [12-04 @ 15:18]  SCr 8.20 [12-04 @ 10:20]  SCr 7.89 [12-04 @ 08:13]    Iron 28, TIBC 184, %sat 15      [12-04-21 @ 05:36]  Ferritin 357      [12-04-21 @ 05:36]  Vitamin D (25OH) 22.7      [12-06-21 @ 09:37]  TSH 3.86      [12-04-21 @ 05:36]  Lipid: chol 149, TG 70, HDL 41, LDL --      [12-04-21 @ 05:36]    HBsAb <3.0      [12-05-21 @ 09:35]  HBsAg Nonreact      [12-05-21 @ 09:35]  HBcAb Nonreact      [12-05-21 @ 09:35]  HCV 0.13, Nonreact      [12-05-21 @ 09:35] [facility] Division of Kidney Diseases & Hypertension  FOLLOW UP NOTE  [phone]--------------------------------------------------------------------------------    HPI: 89 year old male with CKD, HTN, BPH and GERD admitted to [facility] with worsening SOB. Nephrology consulted for advanced CKD, hyperkalemia and volume overload. Pt. was hospitalized at [facility] in October 2021 with GÉNESIS on CKD, hyperkalemia and metabolic acidosis. Scr at that time was elevated at 12.64. Pt. received IVFs and Scr decreased to 7.27 on 10/13/21. Scr on this admission was elevated at 7.68 and serum potassium was 5.8 (non-hemolyzed) on 12/4/21. Pt. initiated on HD on 12/4/21 for advanced CKD, hyperkalemia and hypervolemia management.     Pt. seen and examined at bedside. Pt. feels significantly better and SOB resolved after HD treatment. No fever, chills, CP, SOB, HA or dizziness. Pt. scheduled for 2nd HD treatment later today.    PAST HISTORY  --------------------------------------------------------------------------------  No significant changes to PMH, PSH, FHx, SHx, unless otherwise noted    ALLERGIES & MEDICATIONS  --------------------------------------------------------------------------------  Allergies    No Known Allergies    Intolerances    Standing Inpatient Medications  buMETAnide Injectable 2 milliGRAM(s) IV Push every 8 hours  calcitriol   Capsule 0.5 MICROGram(s) Oral daily  chlorhexidine 2% Cloths 1 Application(s) Topical daily  finasteride 5 milliGRAM(s) Oral daily  influenza  Vaccine (HIGH DOSE) 0.7 milliLiter(s) IntraMuscular once  melatonin 6 milliGRAM(s) Oral at bedtime  metoprolol succinate ER 50 milliGRAM(s) Oral daily  NIFEdipine XL 30 milliGRAM(s) Oral daily  pantoprazole    Tablet 40 milliGRAM(s) Oral before breakfast    REVIEW OF SYSTEMS  --------------------------------------------------------------------------------  Gen: No fever  Respiratory: No dyspnea, cough  CV: No chest pain  GI: No abdominal pain  : No dysuria, hematuria  MSK: No  edema    All other systems were reviewed and are negative, except as noted.    VITALS/PHYSICAL EXAM  --------------------------------------------------------------------------------  T(C): 36.3 (12-06-21 @ 05:30), Max: 36.5 (12-05-21 @ 20:30)  HR: 88 (12-06-21 @ 10:34) (74 - 88)  BP: 122/61 (12-06-21 @ 10:34) (114/56 - 134/84)  RR: 18 (12-06-21 @ 10:34) (17 - 18)  SpO2: 99% (12-06-21 @ 10:34) (95% - 100%)  Wt(kg): --  Height (cm): 177.8 (12-04-21 @ 12:41)  Weight (kg): 68 (12-04-21 @ 12:41)  BMI (kg/m2): 21.5 (12-04-21 @ 12:41)  BSA (m2): 1.85 (12-04-21 @ 12:41)    12-05-21 @ 07:01  -  12-06-21 @ 07:00  --------------------------------------------------------  IN: 300 mL / OUT: 0 mL / NET: 300 mL    Physical Exam:              Gen: resting, NAD  	HEENT: Anicteric  	Pulm: Fair air entry at the bases B/L  	CV: S1S2+  	Abd: Soft, +BS   	Ext: No LE edema B/L  	Neuro: Awake  	Skin: Warm and dry  	Vascular access: RIJ non-tunneled HD catheter site: no active bleeding     LABS/STUDIES  --------------------------------------------------------------------------------              7.1    9.20  >-----------<  208      [12-06-21 @ 06:29]              22.0     136  |  99  |  89  ----------------------------<  90      [12-06-21 @ 06:29]  3.8   |  20  |  6.51        Ca     8.1     [12-06-21 @ 06:29]      Mg     1.80     [12-06-21 @ 06:29]      Phos  5.2     [12-06-21 @ 06:29]          [12-06-21 @ 06:29]    Creatinine Trend:  SCr 6.51 [12-06 @ 06:29]  SCr 5.66 [12-05 @ 07:35]  SCr 8.12 [12-04 @ 15:18]  SCr 8.20 [12-04 @ 10:20]  SCr 7.89 [12-04 @ 08:13]    Iron 28, TIBC 184, %sat 15      [12-04-21 @ 05:36]  Ferritin 357      [12-04-21 @ 05:36]  Vitamin D (25OH) 22.7      [12-06-21 @ 09:37]  TSH 3.86      [12-04-21 @ 05:36]  Lipid: chol 149, TG 70, HDL 41, LDL --      [12-04-21 @ 05:36]    HBsAb <3.0      [12-05-21 @ 09:35]  HBsAg Nonreact      [12-05-21 @ 09:35]  HBcAb Nonreact      [12-05-21 @ 09:35]  HCV 0.13, Nonreact      [12-05-21 @ 09:35]

## 2021-12-06 NOTE — PROGRESS NOTE ADULT - PROBLEM SELECTOR PLAN 4
Pt. with anemia in setting of CKD however exact etiology of acute anemia unknown. Hb improved to 7.1 after PRBC transfusion. Iron stores deficient. Will start IV iron with HD. Monitor hemoglobin     If you have any questions, please feel free to contact me  Johnny Ch  Nephrology Fellow  [phone]  (After 5pm or on weekends please page the on-call fellow) Pt. with anemia in setting of CKD however exact etiology of acute anemia unknown. Hb improved to 7.1 after PRBC transfusion. TSAT low. Will start IV iron with HD. Monitor hemoglobin.     If you have any questions, please feel free to contact me  Johnny Ch  Nephrology Fellow  [phone]  (After 5pm or on weekends please page the on-call fellow)

## 2021-12-06 NOTE — PROGRESS NOTE ADULT - PROBLEM SELECTOR PLAN 2
Pt. with hyperkalemia in setting of advanced CKD. Pt. received HD 12/4/21. Serum potassium today WNL at 3.8. Monitor serum potassium. Low potassium diet.

## 2021-12-06 NOTE — PROGRESS NOTE ADULT - SUBJECTIVE AND OBJECTIVE BOX
VASCULAR SURGERY PROGRESS NOTE   ___________________________________________________________________    NAYANJUAN | 89y Male   [facility] [address]   1932 | [id number]     LOS 2d    Attending: Joi Guerrero    ___________________________________________________________________      SUBJECTIVE:   Patient seen today during morning rounds at bedside and found to be without acute distress. Denies chest pain, fever, severe pain, or SOB.     Allergies:  NKDA    OBJECTIVE:  Vitals:    T(C): 36.3 (21 @ 05:30), Max: 36.5 (21 @ 20:30)  HR: 84 (21 @ 05:30) (74 - 86)  BP: 134/84 (21 @ 05:30) (114/56 - 134/84)  RR: 17 (21 @ 05:30) (17 - 18)  SpO2: 98% (21 @ 05:30) (95% - 100%)      OUT:  Total OUT: 0 mL    Medications:  buMETAnide Injectable 2 milliGRAM(s) IV Push every 8 hours  influenza  Vaccine (HIGH DOSE) 0.7 milliLiter(s) IntraMuscular once  melatonin 6 milliGRAM(s) Oral at bedtime  metoprolol succinate ER 50 milliGRAM(s) Oral daily  NIFEdipine XL 30 milliGRAM(s) Oral daily  pantoprazole    Tablet 40 milliGRAM(s) Oral before breakfast    Laboratory:  WBC: 9.20 H&H: 7.1/22.0 Plt: 208  WBC: 8.47 H&H: 7.2/22.4 Plt: 186    Chemistry:                               Phos: 5.2 M.80  136  |  99  |  89  ----------------------------<  90  3.8   |  20  |  6.51    12-05                             Phos: 4.5 M.70  138  |  102  |  78  ----------------------------<  98  4.7   |  19  |  5.66    12-04   TPro 6.5 / Alb 3.1<L> / TBili 0.5 / DBili x  / AST/AST 21/10 / AlkPhos 72  12-04   TPro 6.0 / Alb 3.0<L> / TBili 0.4 / DBili x  / AST/AST 10/<5<L> / AlkPhos 69  PTT 29.7 PT/INR 14.2/1.25    Reviewed laboratory and imaging    COVID-19 PCR: Ju (06 Oct 2021 03:27)    Physical Exam:   Constitutional: resting in bed with no acute distress; right Shiley in position, non-tender, no collection, CDI  Respiratory: unlabored breathing, clear respiration  Gastrointestinal: Abdomen soft, non distended, non-tender  Extremities no gross deformities; spontaneous movement in b/l U/L extremities

## 2021-12-06 NOTE — PROGRESS NOTE ADULT - PROBLEM SELECTOR PLAN 2
- s/p HD initiation, improvement in K+ and volume overload  - will d/c tele given normalization of electrolytes  - IV iron per nephro  - HD per renal - s/p HD initiation, improvement in K+ and volume overload  - will d/c tele given normalization of electrolytes  - spoke w/nephro fellow Dr Ch; will d/c IV Bumex and transition to PO Bumex BID  - IV iron per nephro  - HD per renal

## 2021-12-06 NOTE — PROGRESS NOTE ADULT - PROBLEM SELECTOR PLAN 1
- Hgb 7.1 this morning, no definitive bleeding source, likely in setting of CKD. Iron studies suggestive of AOCI. May have some iron deficiency. Neprho starting IV iron with HD.

## 2021-12-06 NOTE — PROGRESS NOTE ADULT - ASSESSMENT
Pt. with advanced CKD stage 5, hypervolemia and hyperkalemia, initiated on HD Pt. with advanced CKD stage 5, hypervolemia and hyperkalemia, initiated on HD.

## 2021-12-07 DIAGNOSIS — N18.6 END STAGE RENAL DISEASE: ICD-10-CM

## 2021-12-07 LAB
ANION GAP SERPL CALC-SCNC: 14 MMOL/L — SIGNIFICANT CHANGE UP (ref 7–14)
BUN SERPL-MCNC: 52 MG/DL — HIGH (ref 7–23)
CALCIUM SERPL-MCNC: 8 MG/DL — LOW (ref 8.4–10.5)
CHLORIDE SERPL-SCNC: 101 MMOL/L — SIGNIFICANT CHANGE UP (ref 98–107)
CO2 SERPL-SCNC: 24 MMOL/L — SIGNIFICANT CHANGE UP (ref 22–31)
CREAT SERPL-MCNC: 4.51 MG/DL — HIGH (ref 0.5–1.3)
GLUCOSE SERPL-MCNC: 91 MG/DL — SIGNIFICANT CHANGE UP (ref 70–99)
HCT VFR BLD CALC: 22.7 % — LOW (ref 39–50)
HGB BLD-MCNC: 7.1 G/DL — LOW (ref 13–17)
MAGNESIUM SERPL-MCNC: 1.6 MG/DL — SIGNIFICANT CHANGE UP (ref 1.6–2.6)
MCHC RBC-ENTMCNC: 29.6 PG — SIGNIFICANT CHANGE UP (ref 27–34)
MCHC RBC-ENTMCNC: 31.3 GM/DL — LOW (ref 32–36)
MCV RBC AUTO: 94.6 FL — SIGNIFICANT CHANGE UP (ref 80–100)
NRBC # BLD: 0 /100 WBCS — SIGNIFICANT CHANGE UP
NRBC # FLD: 0 K/UL — SIGNIFICANT CHANGE UP
PHOSPHATE SERPL-MCNC: 4.3 MG/DL — SIGNIFICANT CHANGE UP (ref 2.5–4.5)
PLATELET # BLD AUTO: 206 K/UL — SIGNIFICANT CHANGE UP (ref 150–400)
POTASSIUM SERPL-MCNC: 3.6 MMOL/L — SIGNIFICANT CHANGE UP (ref 3.5–5.3)
POTASSIUM SERPL-SCNC: 3.6 MMOL/L — SIGNIFICANT CHANGE UP (ref 3.5–5.3)
RBC # BLD: 2.4 M/UL — LOW (ref 4.2–5.8)
RBC # FLD: 18.9 % — HIGH (ref 10.3–14.5)
SODIUM SERPL-SCNC: 139 MMOL/L — SIGNIFICANT CHANGE UP (ref 135–145)
WBC # BLD: 6.01 K/UL — SIGNIFICANT CHANGE UP (ref 3.8–10.5)
WBC # FLD AUTO: 6.01 K/UL — SIGNIFICANT CHANGE UP (ref 3.8–10.5)

## 2021-12-07 PROCEDURE — 90935 HEMODIALYSIS ONE EVALUATION: CPT

## 2021-12-07 PROCEDURE — 99232 SBSQ HOSP IP/OBS MODERATE 35: CPT

## 2021-12-07 RX ORDER — BUMETANIDE 0.25 MG/ML
2 INJECTION INTRAMUSCULAR; INTRAVENOUS DAILY
Refills: 0 | Status: DISCONTINUED | OUTPATIENT
Start: 2021-12-08 | End: 2021-12-17

## 2021-12-07 RX ORDER — METOPROLOL TARTRATE 50 MG
75 TABLET ORAL DAILY
Refills: 0 | Status: DISCONTINUED | OUTPATIENT
Start: 2021-12-08 | End: 2021-12-17

## 2021-12-07 RX ORDER — MAGNESIUM SULFATE 500 MG/ML
2 VIAL (ML) INJECTION ONCE
Refills: 0 | Status: DISCONTINUED | OUTPATIENT
Start: 2021-12-07 | End: 2021-12-07

## 2021-12-07 RX ORDER — POTASSIUM CHLORIDE 20 MEQ
40 PACKET (EA) ORAL ONCE
Refills: 0 | Status: DISCONTINUED | OUTPATIENT
Start: 2021-12-07 | End: 2021-12-07

## 2021-12-07 RX ORDER — METOPROLOL TARTRATE 50 MG
25 TABLET ORAL ONCE
Refills: 0 | Status: COMPLETED | OUTPATIENT
Start: 2021-12-07 | End: 2021-12-07

## 2021-12-07 RX ADMIN — Medication 6 MILLIGRAM(S): at 21:16

## 2021-12-07 RX ADMIN — Medication 30 MILLIGRAM(S): at 12:54

## 2021-12-07 RX ADMIN — Medication 25 MILLIGRAM(S): at 17:24

## 2021-12-07 RX ADMIN — HEPARIN SODIUM 5000 UNIT(S): 5000 INJECTION INTRAVENOUS; SUBCUTANEOUS at 17:19

## 2021-12-07 RX ADMIN — CALCITRIOL 0.5 MICROGRAM(S): 0.5 CAPSULE ORAL at 12:54

## 2021-12-07 RX ADMIN — FINASTERIDE 5 MILLIGRAM(S): 5 TABLET, FILM COATED ORAL at 12:54

## 2021-12-07 RX ADMIN — Medication 50 MILLIGRAM(S): at 12:53

## 2021-12-07 RX ADMIN — CHLORHEXIDINE GLUCONATE 1 APPLICATION(S): 213 SOLUTION TOPICAL at 12:52

## 2021-12-07 NOTE — PROGRESS NOTE ADULT - PROBLEM SELECTOR PLAN 2
- s/p HD initiation, improvement in K+ and volume overload  - will d/c tele given normalization of electrolytes  - reduce Bumex po to once daily  - IV iron per nephro  - HD per renal

## 2021-12-07 NOTE — PROGRESS NOTE ADULT - SUBJECTIVE AND OBJECTIVE BOX
VASCULAR SURGERY PROGRESS NOTE   ___________________________________________________________________    NAYANJUAN | 89y Male   [facility] [address] [address]   1932 | [id number]     LOS 3d    Attending: Gustavo Nunez    ___________________________________________________________________      SUBJECTIVE:   Patient seen today during morning rounds at bedside and found to be without acute distress. Denies chest pain, fever, severe pain, or SOB.     Allergies:  NKDA    OBJECTIVE:  Vitals:  T(C): 36.6 (21 @ 06:20), Max: 36.8 (21 @ 20:28)  HR: 82 (21 @ 06:20) (71 - 88)  BP: 130/78 (21 @ 06:20) (111/63 - 132/64)  RR: 17 (21 @ 06:20) (17 - 18)  SpO2: 100% (21 @ 05:40) (94% - 100%)    OUT:  Total OUT: 0 mL    Medications:  buMETAnide 2 milliGRAM(s) Oral two times a day  influenza  Vaccine (HIGH DOSE) 0.7 milliLiter(s) IntraMuscular once  melatonin 6 milliGRAM(s) Oral at bedtime  metoprolol succinate ER 50 milliGRAM(s) Oral daily  NIFEdipine XL 30 milliGRAM(s) Oral daily  pantoprazole    Tablet 40 milliGRAM(s) Oral before breakfast    Laboratory:  WBC: 6.01 H&H: 7.1/22.7 Plt: 206  WBC: 9.20 H&H: 7.1/22.0 Plt: 208    Chemistry:                               Phos: 4.3 M.60  139  |  101  |  52  ----------------------------<  91  3.6   |  24  |  4.51    12-06                             Phos: 4.0 M.70  138  |  101  |  50  ----------------------------<  105  3.7   |  26  |  4.33    PTT 29.7 PT/INR 14.2/1.25    Reviewed laboratory and imaging    heparin   Injectable 5000 SubCutaneous every 8 hours    COVID-19 PCR: Amietec (06 Oct 2021 03:27)    Physical Exam:   Constitutional: resting in bed with no acute distress  Respiratory: unlabored breathing, clear respiration  Gastrointestinal: Abdomen soft, non distended, non-tender  Extremities no gross deformities; spontaneous movement in b/l U/L extremities

## 2021-12-07 NOTE — PROGRESS NOTE ADULT - SUBJECTIVE AND OBJECTIVE BOX
[facility] DIVISION OF KIDNEY DISEASES AND HYPERTENSION --   --------------------------------------------------------------------------------  Chief Complaint: ESRD/Ongoing hemodialysis requirement    24 hour events/subjective: Pt. with advanced CKD stage 5, initiated on HD on 12/4/21 for fluid overload and hyperkalemia. Pt. underwent second HD treatment yesterday. Pt. seen and examined during third HD treatment today. Pt. feels better and says that his SOB has resolved. No fever, CP, SOB, HA or dizziness dueing HD rounds today.     PAST HISTORY  --------------------------------------------------------------------------------  No significant changes to PMH, PSH, FHx, SHx, unless otherwise noted    ALLERGIES & MEDICATIONS  --------------------------------------------------------------------------------  Allergies    No Known Allergies    Intolerances    Standing Inpatient Medications  buMETAnide 2 milliGRAM(s) Oral two times a day  calcitriol   Capsule 0.5 MICROGram(s) Oral daily  chlorhexidine 2% Cloths 1 Application(s) Topical daily  finasteride 5 milliGRAM(s) Oral daily  heparin   Injectable 5000 Unit(s) SubCutaneous every 8 hours  influenza  Vaccine (HIGH DOSE) 0.7 milliLiter(s) IntraMuscular once  iron sucrose Injectable 100 milliGRAM(s) IV Push <User Schedule>  melatonin 6 milliGRAM(s) Oral at bedtime  metoprolol succinate ER 50 milliGRAM(s) Oral daily  NIFEdipine XL 30 milliGRAM(s) Oral daily  pantoprazole    Tablet 40 milliGRAM(s) Oral before breakfast    PRN Inpatient Medications  acetaminophen     Tablet .. 650 milliGRAM(s) Oral every 6 hours PRN    REVIEW OF SYSTEMS  --------------------------------------------------------------------------------  Gen: No fever  Respiratory: No dyspnea  CV: No chest pain  GI: No abdominal pain  MSK: No edema  Neuro: No dizziness    VITALS/PHYSICAL EXAM  --------------------------------------------------------------------------------  T(C): 36.1 (12-07-21 @ 09:45), Max: 36.8 (12-06-21 @ 20:28)  HR: 77 (12-07-21 @ 09:45) (71 - 86)  BP: 113/62 (12-07-21 @ 09:45) (111/63 - 132/64)  RR: 18 (12-07-21 @ 09:45) (17 - 18)  SpO2: 100% (12-07-21 @ 05:40) (94% - 100%)  Wt(kg): --    12-06-21 @ 07:01  -  12-07-21 @ 07:00  --------------------------------------------------------  IN: 1080 mL / OUT: 1900 mL / NET: -820 mL    12-07-21 @ 07:01  -  12-07-21 @ 11:24  --------------------------------------------------------  IN: 400 mL / OUT: 1400 mL / NET: -1000 mL    Physical Exam:  Gen: resting, NAD  	HEENT: No JVD  	Pulm: CTA B/L  	CV: S1S2+  	Abd: Soft  	LE: No edema  	Vascular access: Right IJ non-tunneled HD catheter site: no bleeding  LABS/STUDIES  --------------------------------------------------------------------------------              7.1    6.01  >-----------<  206      [12-07-21 @ 06:53]              22.7     139  |  101  |  52  ----------------------------<  91      [12-07-21 @ 06:53]  3.6   |  24  |  4.51        Ca     8.0     [12-07-21 @ 06:53]      Mg     1.60     [12-07-21 @ 06:53]      Phos  4.3     [12-07-21 @ 06:53]

## 2021-12-07 NOTE — PROGRESS NOTE ADULT - PROBLEM SELECTOR PLAN 1
- Hgb 7.1 this morning, no definitive bleeding source, likely in setting of CKD. Iron studies suggestive of AOCI. May have some iron deficiency. Neprho started IV iron with HD.  - repeat T&S in AM, will plan for transfusion of 1u pRBC depending on Hgb (nephro team advises us to consider transfusion)

## 2021-12-07 NOTE — PROGRESS NOTE ADULT - ASSESSMENT
88yo M with Hx CKD3 (not previously on HD), HTN, GERD, and anemia who presented with SOB and CP, found to have persistent hyperkalemia, with need for urgent HD. Vascular Surgery consulted for urgent Shiley placement. Patient is s/p urgent R IJ Shiley placement on 12/4.     PLAN:   - s/p Right Shiley  - OR planning for AV fistula next week with Dr. Baca on 12/15  - Please document medical risk stratification and clearance  - Please document cardiology risk stratification and clearance   - Please f/u vein mapping; (ordered)  - Left arm precautions: please place pink band for arm precaution no blood draws, blood pressure measurements or IV line placement   - HD per renal  - C/w care per primary team  - Vascular Surgery will follow    Rod Ochoa MD PGY2  C Team Surgery  [id number]

## 2021-12-07 NOTE — PROGRESS NOTE ADULT - PROBLEM SELECTOR PLAN 1
Pt. with advanced CKD stage 5, initiated on HD during current hospital stay on 12/4/21. Pt. deemed ESRD. Pt. tolerating HD. BP stable during HD rounds. HD catheter functioning well. Vascular surgical note reviewed. Pt. with anemia, initiated on IV Venofer during HD. Hemoglobin low at 7.1 today, consider blood transfusion. Serum phosphorus level in target range. Decrease oral Bumex to 2 mg once daily. Monitor BP and labs. Assessment and plan for ESRD/HD discussed with patient.

## 2021-12-07 NOTE — PROGRESS NOTE ADULT - ASSESSMENT
89 y/o male, with a PmHx of CKD3, HTN, Gerd, [place], Iron Deficiency Anemia, presented to the [facility] ED with SOB. Admitted to telemetry for new HD and Hyperkalemia.

## 2021-12-07 NOTE — PROGRESS NOTE ADULT - SUBJECTIVE AND OBJECTIVE BOX
Patient is a 89y old  Male who presents with a chief complaint of Shortness of Breath (07 Dec 2021 11:23)      SUBJECTIVE / OVERNIGHT EVENTS:    No events overnight. This AM, patient without n/v/d/cp/sob.      MEDICATIONS  (STANDING):  calcitriol   Capsule 0.5 MICROGram(s) Oral daily  chlorhexidine 2% Cloths 1 Application(s) Topical daily  finasteride 5 milliGRAM(s) Oral daily  heparin   Injectable 5000 Unit(s) SubCutaneous every 8 hours  influenza  Vaccine (HIGH DOSE) 0.7 milliLiter(s) IntraMuscular once  iron sucrose Injectable 100 milliGRAM(s) IV Push <User Schedule>  melatonin 6 milliGRAM(s) Oral at bedtime  metoprolol succinate ER 25 milliGRAM(s) Oral once  NIFEdipine XL 30 milliGRAM(s) Oral daily  pantoprazole    Tablet 40 milliGRAM(s) Oral before breakfast    MEDICATIONS  (PRN):  acetaminophen     Tablet .. 650 milliGRAM(s) Oral every 6 hours PRN Temp greater or equal to 38C (100.4F), Mild Pain (1 - 3), Moderate Pain (4 - 6)      PHYSICAL EXAM:  T(C): 36.5 (12-07-21 @ 12:07), Max: 36.8 (12-06-21 @ 20:28)  HR: 84 (12-07-21 @ 12:07) (71 - 86)  BP: 107/62 (12-07-21 @ 12:07) (107/62 - 132/64)  RR: 17 (12-07-21 @ 12:07) (17 - 18)  SpO2: 98% (12-07-21 @ 12:07) (94% - 100%)  I&O's Summary    06 Dec 2021 07:01  -  07 Dec 2021 07:00  --------------------------------------------------------  IN: 1080 mL / OUT: 1900 mL / NET: -820 mL    07 Dec 2021 07:01  -  07 Dec 2021 12:54  --------------------------------------------------------  IN: 400 mL / OUT: 1400 mL / NET: -1000 mL      GENERAL: NAD, well-developed  HEAD:  Atraumatic, Normocephalic, MMM; R neck shiley  CHEST/LUNG: No use of accessory muscles, CTAB, breathing non-labored  COR: RR, no mrcg  ABD: Soft, ND/NT, +BS  PSYCH: AAOx3  NEUROLOGY: CN II-XII grossly intact, moving all extremities  SKIN: No rashes or lesions  EXT: wwp, no cce    LABS:  CAPILLARY BLOOD GLUCOSE                              7.1    6.01  )-----------( 206      ( 07 Dec 2021 06:53 )             22.7     12-07    139  |  101  |  52<H>  ----------------------------<  91  3.6   |  24  |  4.51<H>    Ca    8.0<L>      07 Dec 2021 06:53  Phos  4.3     12-07  Mg     1.60     12-07                Culture - Nose (collected 05 Dec 2021 03:26)  Source: .Nose Nose  Preliminary Report (07 Dec 2021 11:34):    Staphylococcus aureus isolated        RADIOLOGY & ADDITIONAL TESTS:    Telemetry Personally Reviewed - NSR, VT overnight and this AM - asymptomatic    Imaging Personally Reviewed -     Imaging Reviewed -     Consultant(s) Notes Reviewed -   nephro    Care Discussed with Consultants/Other Providers -

## 2021-12-07 NOTE — PROGRESS NOTE ADULT - PROBLEM SELECTOR PLAN 4
- c/w nifepidine/metorprolol  - episodes of Vtach overnight but asymptomatic; will increase dose of metoprolol; K and Mg at goal for patient on dialysis

## 2021-12-07 NOTE — PROVIDER CONTACT NOTE (OTHER) - ASSESSMENT
Pt is asymptomatic upon bedside assessment. No Chest pain or SOB present. Vitals stable. See vitals flowsheet.

## 2021-12-08 LAB
ANION GAP SERPL CALC-SCNC: 11 MMOL/L — SIGNIFICANT CHANGE UP (ref 7–14)
BLD GP AB SCN SERPL QL: NEGATIVE — SIGNIFICANT CHANGE UP
BUN SERPL-MCNC: 34 MG/DL — HIGH (ref 7–23)
CALCIUM SERPL-MCNC: 8.2 MG/DL — LOW (ref 8.4–10.5)
CHLORIDE SERPL-SCNC: 102 MMOL/L — SIGNIFICANT CHANGE UP (ref 98–107)
CO2 SERPL-SCNC: 26 MMOL/L — SIGNIFICANT CHANGE UP (ref 22–31)
CREAT SERPL-MCNC: 3.88 MG/DL — HIGH (ref 0.5–1.3)
CULTURE RESULTS: SIGNIFICANT CHANGE UP
GLUCOSE SERPL-MCNC: 105 MG/DL — HIGH (ref 70–99)
HCT VFR BLD CALC: 24.4 % — LOW (ref 39–50)
HGB BLD-MCNC: 8 G/DL — LOW (ref 13–17)
MAGNESIUM SERPL-MCNC: 1.7 MG/DL — SIGNIFICANT CHANGE UP (ref 1.6–2.6)
MCHC RBC-ENTMCNC: 30.2 PG — SIGNIFICANT CHANGE UP (ref 27–34)
MCHC RBC-ENTMCNC: 32.8 GM/DL — SIGNIFICANT CHANGE UP (ref 32–36)
MCV RBC AUTO: 92.1 FL — SIGNIFICANT CHANGE UP (ref 80–100)
NRBC # BLD: 0 /100 WBCS — SIGNIFICANT CHANGE UP
NRBC # FLD: 0 K/UL — SIGNIFICANT CHANGE UP
PHOSPHATE SERPL-MCNC: 4.1 MG/DL — SIGNIFICANT CHANGE UP (ref 2.5–4.5)
PLATELET # BLD AUTO: 233 K/UL — SIGNIFICANT CHANGE UP (ref 150–400)
POTASSIUM SERPL-MCNC: 3.8 MMOL/L — SIGNIFICANT CHANGE UP (ref 3.5–5.3)
POTASSIUM SERPL-SCNC: 3.8 MMOL/L — SIGNIFICANT CHANGE UP (ref 3.5–5.3)
RBC # BLD: 2.65 M/UL — LOW (ref 4.2–5.8)
RBC # FLD: 18.5 % — HIGH (ref 10.3–14.5)
RH IG SCN BLD-IMP: POSITIVE — SIGNIFICANT CHANGE UP
SODIUM SERPL-SCNC: 139 MMOL/L — SIGNIFICANT CHANGE UP (ref 135–145)
SPECIMEN SOURCE: SIGNIFICANT CHANGE UP
WBC # BLD: 6.8 K/UL — SIGNIFICANT CHANGE UP (ref 3.8–10.5)
WBC # FLD AUTO: 6.8 K/UL — SIGNIFICANT CHANGE UP (ref 3.8–10.5)

## 2021-12-08 PROCEDURE — 99232 SBSQ HOSP IP/OBS MODERATE 35: CPT

## 2021-12-08 RX ADMIN — FINASTERIDE 5 MILLIGRAM(S): 5 TABLET, FILM COATED ORAL at 10:26

## 2021-12-08 RX ADMIN — CALCITRIOL 0.5 MICROGRAM(S): 0.5 CAPSULE ORAL at 10:27

## 2021-12-08 RX ADMIN — PANTOPRAZOLE SODIUM 40 MILLIGRAM(S): 20 TABLET, DELAYED RELEASE ORAL at 06:37

## 2021-12-08 RX ADMIN — Medication 6 MILLIGRAM(S): at 21:52

## 2021-12-08 RX ADMIN — Medication 75 MILLIGRAM(S): at 06:36

## 2021-12-08 RX ADMIN — Medication 30 MILLIGRAM(S): at 06:36

## 2021-12-08 RX ADMIN — BUMETANIDE 2 MILLIGRAM(S): 0.25 INJECTION INTRAMUSCULAR; INTRAVENOUS at 06:37

## 2021-12-08 RX ADMIN — CHLORHEXIDINE GLUCONATE 1 APPLICATION(S): 213 SOLUTION TOPICAL at 10:27

## 2021-12-08 RX ADMIN — HEPARIN SODIUM 5000 UNIT(S): 5000 INJECTION INTRAVENOUS; SUBCUTANEOUS at 10:26

## 2021-12-08 RX ADMIN — HEPARIN SODIUM 5000 UNIT(S): 5000 INJECTION INTRAVENOUS; SUBCUTANEOUS at 18:44

## 2021-12-08 NOTE — CONSULT NOTE ADULT - SUBJECTIVE AND OBJECTIVE BOX
Al Bauman MD  Interventional Cardiology / Advance Heart Failure and Cardiac Transplant Specialist  [place] Office : [address] [place] [address] [place] [address]  Tel:   [place] Office : [address] [facility] [place] [address]  Tel: [phone]  Cell : [phone]    HISTORY OF PRESENTING ILLNESS:  89 y/o male, with a PmHx of CKD3, HTN, Gerd, [place], Iron Deficiency Anemia, presented to the [facility] ED with SOB. He has been having worsening SOB and can't walk more than 5 feet without getting out of breath. Denies any fever, chills, chest pain, HA, dizziness, blurred vision, abd pain, n/v. Admitted for worsening CKD and new HD. s/p shiley. Plan for AVF creation. Cardiology consulted for optimization. Patient denies cardiac history. Denies CP, SOB or palpitations currently.   	  MEDICATIONS:  buMETAnide 2 milliGRAM(s) Oral daily  heparin   Injectable 5000 Unit(s) SubCutaneous every 8 hours  metoprolol succinate ER 75 milliGRAM(s) Oral daily  NIFEdipine XL 30 milliGRAM(s) Oral daily        acetaminophen     Tablet .. 650 milliGRAM(s) Oral every 6 hours PRN  melatonin 6 milliGRAM(s) Oral at bedtime    pantoprazole    Tablet 40 milliGRAM(s) Oral before breakfast    finasteride 5 milliGRAM(s) Oral daily    calcitriol   Capsule 0.5 MICROGram(s) Oral daily  chlorhexidine 2% Cloths 1 Application(s) Topical daily  influenza  Vaccine (HIGH DOSE) 0.7 milliLiter(s) IntraMuscular once  iron sucrose Injectable 100 milliGRAM(s) IV Push <User Schedule>      PAST MEDICAL/SURGICAL HISTORY  PAST MEDICAL & SURGICAL HISTORY:  HTN - Hypertension    CKD (chronic kidney disease)    Hiatal hernia    GERD (gastroesophageal reflux disease)    Prostate cancer    S/P appendectomy  performed at [facility] 10 y ago    Prostate  Brachytherapy 30y ago at [facility] - had seeds    Acute gastric volvulus  s/p laparoscopic reduction, PEG that was later reversed    Hernia, paraesophageal        SOCIAL HISTORY: Substance Use (street drugs): ( x ) never used  (  ) other:    FAMILY HISTORY:  FHx: hypertension  Mother    FHx: cancer of prostate  Father    Family history of colon cancer  Sister    Family history of gastric cancer  Brother        REVIEW OF SYSTEMS:  CONSTITUTIONAL: No fever, weight loss, or fatigue  EYES: No eye pain, visual disturbances, or discharge  ENMT:  No difficulty hearing, tinnitus, vertigo; No sinus or throat pain  BREASTS: No pain, masses, or nipple discharge  GASTROINTESTINAL: No abdominal or epigastric pain. No nausea, vomiting, or hematemesis; No diarrhea or constipation. No melena or hematochezia.  GENITOURINARY: No dysuria, frequency, hematuria, or incontinence  NEUROLOGICAL: No headaches, memory loss, loss of strength, numbness, or tremors  ENDOCRINE: No heat or cold intolerance; No hair loss  MUSCULOSKELETAL: No joint pain or swelling; No muscle, back, or extremity pain  PSYCHIATRIC: No depression, anxiety, mood swings, or difficulty sleeping  HEME/LYMPH: No easy bruising, or bleeding gums  All others negative    PHYSICAL EXAM:  T(C): 36.3 (12-08-21 @ 11:55), Max: 36.6 (12-07-21 @ 17:20)  HR: 67 (12-08-21 @ 11:55) (67 - 76)  BP: 112/67 (12-08-21 @ 11:55) (101/59 - 119/63)  RR: 18 (12-08-21 @ 11:55) (16 - 18)  SpO2: 100% (12-08-21 @ 11:55) (96% - 100%)  Wt(kg): --  I&O's Summary    07 Dec 2021 07:01  -  08 Dec 2021 07:00  --------------------------------------------------------  IN: 1068 mL / OUT: 1400 mL / NET: -332 mL    08 Dec 2021 07:01  -  08 Dec 2021 15:56  --------------------------------------------------------  IN: 218 mL / OUT: 0 mL / NET: 218 mL          GENERAL: NAD  EYES: EOMI, PERRLA, conjunctiva and sclera clear  ENMT: No tonsillar erythema, exudates, or enlargement  Cardiovascular: Normal S1 S2, No JVD, No murmurs, No edema  Respiratory: Lungs clear to auscultation	  Gastrointestinal:  Soft, Non-tender, + BS	  Extremities: No edema                                    8.0    6.80  )-----------( 233      ( 08 Dec 2021 07:38 )             24.4     12-08    139  |  102  |  34<H>  ----------------------------<  105<H>  3.8   |  26  |  3.88<H>    Ca    8.2<L>      08 Dec 2021 07:38  Phos  4.1     12-08  Mg     1.70     12-08      proBNP:   Lipid Profile:   HgA1c:   TSH:     Consultant(s) Notes Reviewed:  [x ] YES  [ ] NO    Care Discussed with Consultants/Other Providers [ x] YES  [ ] NO    Imaging Personally Reviewed independently:  [x] YES  [ ] NO    All labs, radiologic studies, vitals, orders and medications list reviewed. Patient is seen and examined at bedside. Case discussed with medical team.

## 2021-12-08 NOTE — CONSULT NOTE ADULT - ASSESSMENT
88 y/o male, with a PmHx of CKD3, HTN, Gerd, [place], Iron Deficiency Anemia, presented to the [facility] ED with SOB.       EKG:  lateral TWI  Tele: NSR 60-70    1. Pre-op assessment  -denies CP, SOB or palpitations  -trops 82--95  -obtain echo     2. CKD  -now on HD  -s/p shiley   -plan for AVF creation  -f/u renal     3. HTN   -controlled  -c/w metoprolol and nifedipine  -continue to monitor BP    4. DVT Prophylaxis  -hep subq

## 2021-12-08 NOTE — PROGRESS NOTE ADULT - SUBJECTIVE AND OBJECTIVE BOX
Patient is a 89y old  Male who presents with a chief complaint of Shortness of Breath (07 Dec 2021 12:54)      SUBJECTIVE / OVERNIGHT EVENTS:    No events overnight. This AM, patient without n/v/d/cp/sob.      MEDICATIONS  (STANDING):  buMETAnide 2 milliGRAM(s) Oral daily  calcitriol   Capsule 0.5 MICROGram(s) Oral daily  chlorhexidine 2% Cloths 1 Application(s) Topical daily  finasteride 5 milliGRAM(s) Oral daily  heparin   Injectable 5000 Unit(s) SubCutaneous every 8 hours  influenza  Vaccine (HIGH DOSE) 0.7 milliLiter(s) IntraMuscular once  iron sucrose Injectable 100 milliGRAM(s) IV Push <User Schedule>  melatonin 6 milliGRAM(s) Oral at bedtime  metoprolol succinate ER 75 milliGRAM(s) Oral daily  NIFEdipine XL 30 milliGRAM(s) Oral daily  pantoprazole    Tablet 40 milliGRAM(s) Oral before breakfast    MEDICATIONS  (PRN):  acetaminophen     Tablet .. 650 milliGRAM(s) Oral every 6 hours PRN Temp greater or equal to 38C (100.4F), Mild Pain (1 - 3), Moderate Pain (4 - 6)      PHYSICAL EXAM:  T(C): 36.3 (12-08-21 @ 11:55), Max: 36.6 (12-07-21 @ 17:20)  HR: 67 (12-08-21 @ 11:55) (67 - 76)  BP: 112/67 (12-08-21 @ 11:55) (101/59 - 119/63)  RR: 18 (12-08-21 @ 11:55) (16 - 18)  SpO2: 100% (12-08-21 @ 11:55) (96% - 100%)  I&O's Summary    07 Dec 2021 07:01  -  08 Dec 2021 07:00  --------------------------------------------------------  IN: 1068 mL / OUT: 1400 mL / NET: -332 mL    08 Dec 2021 07:01  -  08 Dec 2021 14:55  --------------------------------------------------------  IN: 218 mL / OUT: 0 mL / NET: 218 mL    GENERAL: NAD, well-developed  HEAD:  Atraumatic, Normocephalic, MMM; R neck shiley  CHEST/LUNG: No use of accessory muscles, CTAB, breathing non-labored  COR: RR, no mrcg  ABD: Soft, ND/NT, +BS  PSYCH: AAOx3  NEUROLOGY: CN II-XII grossly intact, moving all extremities  SKIN: No rashes or lesions  EXT: wwp, no cce    LABS:  CAPILLARY BLOOD GLUCOSE                              8.0    6.80  )-----------( 233      ( 08 Dec 2021 07:38 )             24.4     12-08    139  |  102  |  34<H>  ----------------------------<  105<H>  3.8   |  26  |  3.88<H>    Ca    8.2<L>      08 Dec 2021 07:38  Phos  4.1     12-08  Mg     1.70     12-08                  RADIOLOGY & ADDITIONAL TESTS:    Telemetry Personally Reviewed - SR 70s, PVCs    Imaging Personally Reviewed -     Imaging Reviewed -     Consultant(s) Notes Reviewed -   vascular sx    Care Discussed with Consultants/Other Providers -

## 2021-12-08 NOTE — PROGRESS NOTE ADULT - SUBJECTIVE AND OBJECTIVE BOX
VASCULAR SURGERY PROGRESS NOTE   ___________________________________________________________________    NAYANJUAN | 89y Male   [facility] [address]   1932 | [id number]     LOS 4d    Attending: Gustavo Nunez    ___________________________________________________________________    SUBJECTIVE:   Patient seen today during morning rounds at bedside and found to be without acute distress. Denies chest pain, fever, severe pain, or SOB.     Allergies:  NKDA    OBJECTIVE:  Vitals:  T(C): 36.4 (21 @ 21:12), Max: 36.6 (21 @ 05:40)  HR: 75 (21 @ 21:12) (75 - 85)  BP: 111/67 (21 @ 21:12) (101/59 - 130/78)  RR: 18 (21 @ 21:12) (17 - 18)  SpO2: 96% (21 @ 21:12) (96% - 100%)    OUT:  Total OUT: 0 mL    OUT:    Voided (mL): 0 mL  Total OUT: 0 mL    Medications:  buMETAnide 2 milliGRAM(s) Oral daily  influenza  Vaccine (HIGH DOSE) 0.7 milliLiter(s) IntraMuscular once  melatonin 6 milliGRAM(s) Oral at bedtime  metoprolol succinate ER 75 milliGRAM(s) Oral daily  NIFEdipine XL 30 milliGRAM(s) Oral daily  pantoprazole    Tablet 40 milliGRAM(s) Oral before breakfast    Laboratory:  WBC: 6.01 H&H: 7.1/22.7 Plt: 206  WBC: 9.20 H&H: 7.1/22.0 Plt: 208    Chemistry:                               Phos: 4.3 M.60  139  |  101  |  52  ----------------------------<  91  3.6   |  24  |  4.51    12-06                             Phos: 4.0 M.70  138  |  101  |  50  ----------------------------<  105  3.7   |  26  |  4.33    PTT 29.7 PT/INR 14.2/1.25    Reviewed laboratory and imaging    heparin   Injectable 5000 SubCutaneous every 8 hours    COVID-19 PCR: Amietetoni (06 Oct 2021 03:27)    Physical Exam:   Constitutional: resting in bed with no acute distress  Respiratory: unlabored breathing, clear respiration  Gastrointestinal: Abdomen soft, non distended, non-tender  Extremities: no gross deformities; spontaneous movement in b/l U/L extremities, radial pulses bilaterally

## 2021-12-08 NOTE — PROGRESS NOTE ADULT - PROBLEM SELECTOR PLAN 3
- improved s/p HD, trend BMP A-T Advancement Flap Text: The defect edges were debeveled with a #15 scalpel blade.  Given the location of the defect, shape of the defect and the proximity to free margins an A-T advancement flap was deemed most appropriate.  Using a sterile surgical marker, an appropriate advancement flap was drawn incorporating the defect and placing the expected incisions within the relaxed skin tension lines where possible.    The area thus outlined was incised deep to adipose tissue with a #15 scalpel blade.  The skin margins were undermined to an appropriate distance in all directions utilizing iris scissors.

## 2021-12-08 NOTE — PROGRESS NOTE ADULT - PROBLEM SELECTOR PLAN 1
- Hgb 8.1 this morning, no definitive bleeding source, likely in setting of CKD. Iron studies suggestive of AOCI. May have some iron deficiency. Nephro started IV iron with HD.

## 2021-12-08 NOTE — PROGRESS NOTE ADULT - PROBLEM SELECTOR PLAN 4
- c/w nifepidine/metorprolol  - episodes of Vtach during admission but asymptomatic; increased dose of metoprolol; K and Mg at goal for patient on dialysis

## 2021-12-08 NOTE — PROGRESS NOTE ADULT - ASSESSMENT
88yo M with Hx CKD3 (not previously on HD), HTN, GERD, and anemia who presented with SOB and CP, found to have persistent hyperkalemia, with need for urgent HD. Vascular Surgery consulted for urgent Shiley placement. Patient is s/p urgent R IJ Shiley placement on 12/4.     PLAN:   - OR planning for AV fistula next week with Dr. Baca on 12/15  - Please document medical risk stratification and clearance  - Please document cardiology risk stratification and clearance   - Please f/u vein mapping; (ordered)  - Left arm precautions: please place pink band for arm precaution no blood draws, blood pressure measurements or IV line placement   - s/p Right Shiley  - HD per renal  - C/w care per primary team  - Vascular Surgery will follow    Rod Ochoa MD PGY2  C Team Surgery  [id number]    88yo M with Hx CKD3 (not previously on HD), HTN, GERD, and anemia who presented with SOB and CP, found to have persistent hyperkalemia, with need for urgent HD. Vascular Surgery consulted for urgent Shiley placement. Patient is s/p urgent R IJ Shiley placement on 12/4.     PLAN:   - OR planning for AV fistula Friday with Dr. Mcconnell  - Will need HD scheduled for either tomorrow or last shift Friday  - Please document medical risk stratification and clearance  - Please document cardiology risk stratification and clearance; pending echo (please expedite tomorrow)   - Please f/u vein mapping; (ordered)  - Left arm precautions: please place pink band for arm precaution no blood draws, blood pressure measurements or IV line placement   - s/p Right Shiley  - HD per renal  - C/w care per primary team  - Vascular Surgery will follow    Rod Ochoa MD PGY2  C Team Surgery  [id number]

## 2021-12-08 NOTE — PROGRESS NOTE ADULT - PROBLEM SELECTOR PLAN 2
- s/p HD initiation, improvement in K+ and volume overload  - reduced Bumex po to once daily  - IV iron per nephro  - HD per renal  - will need AVF, vascular sx planning for next week, will complete med clearance tomorrow  - Dr Aquino from cardiology consulted for cardiac clearance

## 2021-12-08 NOTE — PROGRESS NOTE ADULT - ASSESSMENT
89 y/o male, with a PmHx of CKD3, HTN, Gerd, [place], Iron Deficiency Anemia, presented to the [facility] ED with SOB. Admitted to telemetry for new HD and Hyperkalemia.

## 2021-12-09 LAB
ALBUMIN SERPL ELPH-MCNC: 2.4 G/DL — LOW (ref 3.3–5)
ALP SERPL-CCNC: 52 U/L — SIGNIFICANT CHANGE UP (ref 40–120)
ALT FLD-CCNC: 7 U/L — SIGNIFICANT CHANGE UP (ref 4–41)
ANION GAP SERPL CALC-SCNC: 10 MMOL/L — SIGNIFICANT CHANGE UP (ref 7–14)
AST SERPL-CCNC: 10 U/L — SIGNIFICANT CHANGE UP (ref 4–40)
BILIRUB SERPL-MCNC: 0.3 MG/DL — SIGNIFICANT CHANGE UP (ref 0.2–1.2)
BUN SERPL-MCNC: 41 MG/DL — HIGH (ref 7–23)
CALCIUM SERPL-MCNC: 7.1 MG/DL — LOW (ref 8.4–10.5)
CHLORIDE SERPL-SCNC: 104 MMOL/L — SIGNIFICANT CHANGE UP (ref 98–107)
CO2 SERPL-SCNC: 23 MMOL/L — SIGNIFICANT CHANGE UP (ref 22–31)
CREAT SERPL-MCNC: 4.59 MG/DL — HIGH (ref 0.5–1.3)
GLUCOSE SERPL-MCNC: 90 MG/DL — SIGNIFICANT CHANGE UP (ref 70–99)
HCT VFR BLD CALC: 22.8 % — LOW (ref 39–50)
HGB BLD-MCNC: 7.2 G/DL — LOW (ref 13–17)
MAGNESIUM SERPL-MCNC: 1.6 MG/DL — SIGNIFICANT CHANGE UP (ref 1.6–2.6)
MCHC RBC-ENTMCNC: 29.6 PG — SIGNIFICANT CHANGE UP (ref 27–34)
MCHC RBC-ENTMCNC: 31.6 GM/DL — LOW (ref 32–36)
MCV RBC AUTO: 93.8 FL — SIGNIFICANT CHANGE UP (ref 80–100)
NRBC # BLD: 0 /100 WBCS — SIGNIFICANT CHANGE UP
NRBC # FLD: 0 K/UL — SIGNIFICANT CHANGE UP
PHOSPHATE SERPL-MCNC: 4.7 MG/DL — HIGH (ref 2.5–4.5)
PLATELET # BLD AUTO: 230 K/UL — SIGNIFICANT CHANGE UP (ref 150–400)
POTASSIUM SERPL-MCNC: 3.4 MMOL/L — LOW (ref 3.5–5.3)
POTASSIUM SERPL-SCNC: 3.4 MMOL/L — LOW (ref 3.5–5.3)
PROT SERPL-MCNC: 5 G/DL — LOW (ref 6–8.3)
RBC # BLD: 2.43 M/UL — LOW (ref 4.2–5.8)
RBC # FLD: 18.3 % — HIGH (ref 10.3–14.5)
SODIUM SERPL-SCNC: 137 MMOL/L — SIGNIFICANT CHANGE UP (ref 135–145)
WBC # BLD: 6.95 K/UL — SIGNIFICANT CHANGE UP (ref 3.8–10.5)
WBC # FLD AUTO: 6.95 K/UL — SIGNIFICANT CHANGE UP (ref 3.8–10.5)

## 2021-12-09 PROCEDURE — 99232 SBSQ HOSP IP/OBS MODERATE 35: CPT

## 2021-12-09 PROCEDURE — 90935 HEMODIALYSIS ONE EVALUATION: CPT

## 2021-12-09 PROCEDURE — 93306 TTE W/DOPPLER COMPLETE: CPT | Mod: 26

## 2021-12-09 RX ORDER — MUPIROCIN 20 MG/G
1 OINTMENT TOPICAL
Refills: 0 | Status: COMPLETED | OUTPATIENT
Start: 2021-12-09 | End: 2021-12-14

## 2021-12-09 RX ADMIN — HEPARIN SODIUM 5000 UNIT(S): 5000 INJECTION INTRAVENOUS; SUBCUTANEOUS at 12:21

## 2021-12-09 RX ADMIN — CHLORHEXIDINE GLUCONATE 1 APPLICATION(S): 213 SOLUTION TOPICAL at 12:07

## 2021-12-09 RX ADMIN — BUMETANIDE 2 MILLIGRAM(S): 0.25 INJECTION INTRAMUSCULAR; INTRAVENOUS at 12:21

## 2021-12-09 RX ADMIN — FINASTERIDE 5 MILLIGRAM(S): 5 TABLET, FILM COATED ORAL at 12:21

## 2021-12-09 RX ADMIN — MUPIROCIN 1 APPLICATION(S): 20 OINTMENT TOPICAL at 23:00

## 2021-12-09 RX ADMIN — PANTOPRAZOLE SODIUM 40 MILLIGRAM(S): 20 TABLET, DELAYED RELEASE ORAL at 05:56

## 2021-12-09 RX ADMIN — HEPARIN SODIUM 5000 UNIT(S): 5000 INJECTION INTRAVENOUS; SUBCUTANEOUS at 22:03

## 2021-12-09 RX ADMIN — IRON SUCROSE 100 MILLIGRAM(S): 20 INJECTION, SOLUTION INTRAVENOUS at 08:27

## 2021-12-09 RX ADMIN — Medication 75 MILLIGRAM(S): at 12:21

## 2021-12-09 RX ADMIN — Medication 6 MILLIGRAM(S): at 22:03

## 2021-12-09 RX ADMIN — CALCITRIOL 0.5 MICROGRAM(S): 0.5 CAPSULE ORAL at 12:21

## 2021-12-09 NOTE — PROGRESS NOTE ADULT - SUBJECTIVE AND OBJECTIVE BOX
[facility] DIVISION OF KIDNEY DISEASES AND HYPERTENSION --   --------------------------------------------------------------------------------  Chief Complaint: ESRD/Ongoing hemodialysis requirement    24 hour events/subjective: Pt. with advanced CKD stage 5, initiated on long-term HD during current hospital stay. Pt. deemed ESRD. Pt. seen and examined during HD today. Pt. feels better and offered no complaints during HD rounds.    PAST HISTORY  --------------------------------------------------------------------------------  No significant changes to PMH, PSH, FHx, SHx, unless otherwise noted    ALLERGIES & MEDICATIONS  --------------------------------------------------------------------------------  Allergies    No Known Allergies    Intolerances    Standing Inpatient Medications  buMETAnide 2 milliGRAM(s) Oral daily  calcitriol   Capsule 0.5 MICROGram(s) Oral daily  chlorhexidine 2% Cloths 1 Application(s) Topical daily  finasteride 5 milliGRAM(s) Oral daily  heparin   Injectable 5000 Unit(s) SubCutaneous every 8 hours  influenza  Vaccine (HIGH DOSE) 0.7 milliLiter(s) IntraMuscular once  iron sucrose Injectable 100 milliGRAM(s) IV Push <User Schedule>  melatonin 6 milliGRAM(s) Oral at bedtime  metoprolol succinate ER 75 milliGRAM(s) Oral daily  NIFEdipine XL 30 milliGRAM(s) Oral daily  pantoprazole    Tablet 40 milliGRAM(s) Oral before breakfast    PRN Inpatient Medications  acetaminophen     Tablet .. 650 milliGRAM(s) Oral every 6 hours PRN    REVIEW OF SYSTEMS  --------------------------------------------------------------------------------  Gen: No fever  Respiratory: No dyspnea  CV: No chest pain  GI: No abdominal pain  MSK: No edema  Neuro: No dizziness    VITALS/PHYSICAL EXAM  --------------------------------------------------------------------------------  T(C): 36.7 (12-09-21 @ 09:35), Max: 36.8 (12-09-21 @ 06:25)  HR: 77 (12-09-21 @ 09:35) (67 - 77)  BP: 116/50 (12-09-21 @ 09:35) (112/67 - 117/63)  RR: 17 (12-09-21 @ 09:35) (17 - 18)  SpO2: 99% (12-08-21 @ 21:10) (99% - 100%)  Wt(kg): --    12-08-21 @ 07:01  -  12-09-21 @ 07:00  --------------------------------------------------------  IN: 668 mL / OUT: 0 mL / NET: 668 mL    12-09-21 @ 07:01  -  12-09-21 @ 10:07  --------------------------------------------------------  IN: 600 mL / OUT: 440 mL / NET: 160 mL    Physical Exam:  Gen: resting, NAD  	HEENT: No JVD  	Pulm: CTA B/L  	CV: S1S2+  	Abd: Soft  	LE: No edema  	Vascular access: Right IJ non-tunneled HD catheter site: no bleeding  LABS/STUDIES  --------------------------------------------------------------------------------              7.2    6.95  >-----------<  230      [12-09-21 @ 07:23]              22.8     137  |  104  |  41  ----------------------------<  90      [12-09-21 @ 07:23]  3.4   |  23  |  4.59        Ca     7.1     [12-09-21 @ 07:23]      Mg     1.60     [12-09-21 @ 07:23]      Phos  4.7     [12-09-21 @ 07:23]    TPro  5.0  /  Alb  2.4  /  TBili  0.3  /  DBili  x   /  AST  10  /  ALT  7   /  AlkPhos  52  [12-09-21 @ 07:23]    HBsAb <3.0      [12-05-21 @ 09:35]  HBsAg Nonreact      [12-05-21 @ 09:35]  HBcAb Nonreact      [12-05-21 @ 09:35]  HCV 0.13, Nonreact      [12-05-21 @ 09:35]

## 2021-12-09 NOTE — PROGRESS NOTE ADULT - SUBJECTIVE AND OBJECTIVE BOX
Patient is a 89y old  Male who presents with a chief complaint of Shortness of Breath (09 Dec 2021 10:07)      SUBJECTIVE / OVERNIGHT EVENTS:    No events overnight. This AM, patient without n/v/d/cp/sob.      MEDICATIONS  (STANDING):  buMETAnide 2 milliGRAM(s) Oral daily  calcitriol   Capsule 0.5 MICROGram(s) Oral daily  chlorhexidine 2% Cloths 1 Application(s) Topical daily  finasteride 5 milliGRAM(s) Oral daily  heparin   Injectable 5000 Unit(s) SubCutaneous every 8 hours  influenza  Vaccine (HIGH DOSE) 0.7 milliLiter(s) IntraMuscular once  iron sucrose Injectable 100 milliGRAM(s) IV Push <User Schedule>  melatonin 6 milliGRAM(s) Oral at bedtime  metoprolol succinate ER 75 milliGRAM(s) Oral daily  mupirocin 2% Ointment 1 Application(s) Topical two times a day  pantoprazole    Tablet 40 milliGRAM(s) Oral before breakfast    MEDICATIONS  (PRN):  acetaminophen     Tablet .. 650 milliGRAM(s) Oral every 6 hours PRN Temp greater or equal to 38C (100.4F), Mild Pain (1 - 3), Moderate Pain (4 - 6)      PHYSICAL EXAM:  T(C): 36.3 (12-09-21 @ 11:47), Max: 36.8 (12-09-21 @ 06:25)  HR: 67 (12-09-21 @ 11:47) (67 - 77)  BP: 122/57 (12-09-21 @ 11:47) (113/60 - 122/57)  RR: 17 (12-09-21 @ 11:47) (17 - 17)  SpO2: 100% (12-09-21 @ 11:47) (99% - 100%)  I&O's Summary    08 Dec 2021 07:01  -  09 Dec 2021 07:00  --------------------------------------------------------  IN: 668 mL / OUT: 0 mL / NET: 668 mL    09 Dec 2021 07:01  -  09 Dec 2021 12:29  --------------------------------------------------------  IN: 600 mL / OUT: 440 mL / NET: 160 mL      GENERAL: NAD, well-developed  HEAD:  Atraumatic, Normocephalic, MMM; R neck Shiley  CHEST/LUNG: No use of accessory muscles, CTAB, breathing non-labored  COR: RR, no mrcg  ABD: Soft, ND/NT, +BS  PSYCH: AAOx3  NEUROLOGY: CN II-XII grossly intact, moving all extremities  SKIN: No rashes or lesions  EXT: wwp, no cce    LABS:  CAPILLARY BLOOD GLUCOSE                              7.2    6.95  )-----------( 230      ( 09 Dec 2021 07:23 )             22.8     12-09    137  |  104  |  41<H>  ----------------------------<  90  3.4<L>   |  23  |  4.59<H>    Ca    7.1<L>      09 Dec 2021 07:23  Phos  4.7     12-09  Mg     1.60     12-09    TPro  5.0<L>  /  Alb  2.4<L>  /  TBili  0.3  /  DBili  x   /  AST  10  /  ALT  7   /  AlkPhos  52  12-09                RADIOLOGY & ADDITIONAL TESTS:    Telemetry Personally Reviewed - SR, PVCs    Imaging Personally Reviewed -     Imaging Reviewed -     Consultant(s) Notes Reviewed -   vasc, nephro    Care Discussed with Consultants/Other Providers -

## 2021-12-09 NOTE — PROGRESS NOTE ADULT - ASSESSMENT
89 y/o male, with a PmHx of CKD3, HTN, Gerd, [place], Iron Deficiency Anemia, presented to the [facility] ED with SOB.       EKG:  lateral TWI  Tele: NSR 60-70    1. Pre-op assessment  -denies CP, SOB or palpitations  -trops 82--95  -obtain echo     2. CKD  -now on HD  -s/p shiley   -plan for AVF creation  -f/u renal     3. HTN   -controlled  -c/w metoprolol and nifedipine  -continue to monitor BP    4. DVT Prophylaxis  -hep subq 88 y/o male, with a PmHx of CKD3, HTN, Gerd, [place], Iron Deficiency Anemia, presented to the [facility] ED with SOB.       EKG:  lateral TWI  Tele: NSR 60-70    1. Pre-op assessment  -denies CP, SOB or palpitations  -trops 82--95  - echo  shows moderate LV dysfunction , pt has lateral T wave inversions on EKG will discuss with pt and family  risk and benefit of angiogram to further evaluate for CAD prior to clearance for surgery, vascular surgery made aware    2. CKD  -now on HD  -s/p маринаley   -plan for AVF creation  -f/u renal     3. HTN   -controlled  -c/w metoprolol and nifedipine  -continue to monitor BP    4. DVT Prophylaxis  -hep subq

## 2021-12-09 NOTE — PROGRESS NOTE ADULT - SUBJECTIVE AND OBJECTIVE BOX
Al Bauman MD  Interventional Cardiology / Advance Heart Failure and Cardiac Transplant Specialist  [place] Office : [address] [place] [address] [place]. [address]  Tel:   [place] Office : [address] [facility] [place] [address]  Tel: [phone]  Cell : [phone]    Subjective/Overnight events: Pt is lying in bed comfortable not in distress, no chest pains no SOB no palpitations  	  MEDICATIONS:  buMETAnide 2 milliGRAM(s) Oral daily  heparin   Injectable 5000 Unit(s) SubCutaneous every 8 hours  metoprolol succinate ER 75 milliGRAM(s) Oral daily        acetaminophen     Tablet .. 650 milliGRAM(s) Oral every 6 hours PRN  melatonin 6 milliGRAM(s) Oral at bedtime    pantoprazole    Tablet 40 milliGRAM(s) Oral before breakfast    finasteride 5 milliGRAM(s) Oral daily    calcitriol   Capsule 0.5 MICROGram(s) Oral daily  chlorhexidine 2% Cloths 1 Application(s) Topical daily  influenza  Vaccine (HIGH DOSE) 0.7 milliLiter(s) IntraMuscular once  iron sucrose Injectable 100 milliGRAM(s) IV Push <User Schedule>  mupirocin 2% Ointment 1 Application(s) Topical two times a day      PAST MEDICAL/SURGICAL HISTORY  PAST MEDICAL & SURGICAL HISTORY:  HTN - Hypertension    CKD (chronic kidney disease)    Hiatal hernia    GERD (gastroesophageal reflux disease)    Prostate cancer    S/P appendectomy  performed at [facility] y ago    Prostate  Brachytherapy 30y ago at [facility] - had seeds    Acute gastric volvulus  s/p laparoscopic reduction, PEG that was later reversed    Hernia, paraesophageal        SOCIAL HISTORY: Substance Use (street drugs): ( x ) never used  (  ) other:    FAMILY HISTORY:  FHx: hypertension  Mother    FHx: cancer of prostate  Father    Family history of colon cancer  Sister    Family history of gastric cancer  Brother            PHYSICAL EXAM:  T(C): 36.3 (12-09-21 @ 11:47), Max: 36.8 (12-09-21 @ 06:25)  HR: 67 (12-09-21 @ 11:47) (67 - 77)  BP: 122/57 (12-09-21 @ 11:47) (113/60 - 122/57)  RR: 17 (12-09-21 @ 11:47) (17 - 17)  SpO2: 100% (12-09-21 @ 11:47) (99% - 100%)  Wt(kg): --  I&O's Summary    08 Dec 2021 07:01  -  09 Dec 2021 07:00  --------------------------------------------------------  IN: 668 mL / OUT: 0 mL / NET: 668 mL    09 Dec 2021 07:01  -  09 Dec 2021 12:30  --------------------------------------------------------  IN: 600 mL / OUT: 440 mL / NET: 160 mL          GENERAL: NAD  EYES: EOMI, PERRLA, conjunctiva and sclera clear  ENMT: No tonsillar erythema, exudates, or enlargement  Cardiovascular: Normal S1 S2, No JVD, No murmurs, No edema  Respiratory: Lungs clear to auscultation	  Gastrointestinal:  Soft, Non-tender, + BS	  Extremities: No edema                                7.2    6.95  )-----------( 230      ( 09 Dec 2021 07:23 )             22.8     12-09    137  |  104  |  41<H>  ----------------------------<  90  3.4<L>   |  23  |  4.59<H>    Ca    7.1<L>      09 Dec 2021 07:23  Phos  4.7     12-09  Mg     1.60     12-09    TPro  5.0<L>  /  Alb  2.4<L>  /  TBili  0.3  /  DBili  x   /  AST  10  /  ALT  7   /  AlkPhos  52  12-09    proBNP:   Lipid Profile:   HgA1c:   TSH:     Consultant(s) Notes Reviewed:  [x ] YES  [ ] NO    Care Discussed with Consultants/Other Providers [ x] YES  [ ] NO    Imaging Personally Reviewed independently:  [x] YES  [ ] NO    All labs, radiologic studies, vitals, orders and medications list reviewed. Patient is seen and examined at bedside. Case discussed with medical team.

## 2021-12-09 NOTE — PROGRESS NOTE ADULT - ASSESSMENT
88 y/o male, with a PmHx of CKD3, HTN, Gerd, [place], Iron Deficiency Anemia, presented to the [facility] ED with SOB. Admitted to telemetry for new HD and Hyperkalemia.

## 2021-12-09 NOTE — PROGRESS NOTE ADULT - ASSESSMENT
88yo M with Hx CKD3 (not previously on HD), HTN, GERD, and anemia who presented with SOB and CP, found to have persistent hyperkalemia, with need for urgent HD. Vascular Surgery consulted for urgent Shiley placement. Patient is s/p urgent R IJ Shiley placement on 12/4.     PLAN:   - OR planning for AV fistula Friday with Dr. Mcconnell  - Will need HD scheduled for either tomorrow or last shift Friday  - Please document medical risk stratification and clearance  - Please document cardiology risk stratification and clearance; pending echo (please expedite tomorrow)   - Please f/u vein mapping; (ordered)  - Left arm precautions: please place pink band for arm precaution no blood draws, blood pressure measurements or IV line placement   - Preoperative workup:     O COVID within 72 hours of OR (Collect one day prior to OR)     O NPO at Midnight day before OR     O PTT and PT/INR within 72 hours of OR (collect one day prior to OR)     O CBC, BMP, Mg, Phos drawn at 1AM to provide time for correction if needed     O Type and Screen X2 (One within 72 hours of OR and at least one other this admission)   - s/p Right Shiley  - HD per renal  - C/w care per primary team  - Vascular Surgery will follow    Rod Ochoa MD PGY2  C Team Surgery  [id number]

## 2021-12-09 NOTE — CONSULT NOTE ADULT - ASSESSMENT
Interventional Radiology    Evaluate for Procedure: permacath placement    HPI: 89y Male with ESRD. IR consulted for permacath placement. Scheduled for AVF fistula with vascular surgery tomorrow.    Allergies:   Medications (Abx/Cardiac/Anticoagulation/Blood Products)    buMETAnide: 2 milliGRAM(s) Oral (12-09 @ 12:21)  heparin   Injectable: 5000 Unit(s) SubCutaneous (12-09 @ 12:21)  metoprolol succinate ER: 75 milliGRAM(s) Oral (12-09 @ 12:21)  NIFEdipine XL: 30 milliGRAM(s) Oral (12-08 @ 06:36)    Data:    T(C): 36.5  HR: 75  BP: 119/71  RR: 17  SpO2: 100%    -WBC 6.95 / HgB 7.2 / Hct 22.8 / Plt 230  -Na 137 / Cl 104 / BUN 41 / Glucose 90  -K 3.4 / CO2 23 / Cr 4.59  -ALT 7 / Alk Phos 52 / T.Bili 0.3  -INR 1.25 / PTT 29.7    Assessment/Plan: 89y Male with ESRD. IR consulted for permacath placement. Scheduled for AVF fistula with vascular surgery tomorrow.      -- Since pt will be going to OR tomorrow with vascular surgery, permacath can be placed simultaneously  -- please don't hesitate to re-consult if anything further is needed from IR. Interventional Radiology    Evaluate for Procedure: permacath placement    HPI: 89y Male with ESRD. IR consulted for permacath placement. Scheduled for AVF fistula with vascular surgery tomorrow.    Allergies:   Medications (Abx/Cardiac/Anticoagulation/Blood Products)    buMETAnide: 2 milliGRAM(s) Oral (12-09 @ 12:21)  heparin   Injectable: 5000 Unit(s) SubCutaneous (12-09 @ 12:21)  metoprolol succinate ER: 75 milliGRAM(s) Oral (12-09 @ 12:21)  NIFEdipine XL: 30 milliGRAM(s) Oral (12-08 @ 06:36)    Data:    T(C): 36.5  HR: 75  BP: 119/71  RR: 17  SpO2: 100%    -WBC 6.95 / HgB 7.2 / Hct 22.8 / Plt 230  -Na 137 / Cl 104 / BUN 41 / Glucose 90  -K 3.4 / CO2 23 / Cr 4.59  -ALT 7 / Alk Phos 52 / T.Bili 0.3  -INR 1.25 / PTT 29.7    Assessment/Plan: 89y Male with ESRD. IR consulted for permacath placement. Scheduled for AVF fistula with vascular surgery tomorrow.      -- Since pt will be going to OR tomorrow with vascular surgery, permacath can be placed simultaneously by vascular surgery  -- please don't hesitate to re-consult if anything further is needed from IR.

## 2021-12-09 NOTE — PROGRESS NOTE ADULT - PROBLEM SELECTOR PLAN 4
- c/w nifepidine/metorprolol  - episodes of Vtach during admission but asymptomatic; increased dose of metoprolol; K and Mg at goal for patient on dialysis  - no significant tele events in >48 hours, electrolytes at goal; will d/c tele

## 2021-12-09 NOTE — CHART NOTE - NSCHARTNOTEFT_GEN_A_CORE
reviewed echo results with Dr. hurtado 1. Mitral annular calcification, otherwise normal mitral  valve.  2. Calcified trileaflet aortic valve with normal opening.  3. Normal left atrium.  LA volume index = 30 cc/m2.  4. Normal left ventricular internal dimensions and wall  thicknesses.  5. Moderate global left ventricular systolic dysfunction.  6. Normal right ventricular size and systolic function.  7. Estimated pulmonary artery systolic pressure equals 54  mm Hg, assuming right atrial pressure equals 10  mm Hg,  consistent with moderate pulmonary hypertension.  as per cards pt will need further card work up   not cleared for avf : vascular aware  d/w Ir pt will not being to OR in am if permacath can be placed tomorrow as per IR needs clearance for permacath as well.    if avf performed may get permacath by vascular at the same time   d/w Dr. Nunez medical attending

## 2021-12-09 NOTE — CHART NOTE - NSCHARTNOTEFT_GEN_A_CORE
Pre-Operative Clearance    Patients reports prior surgery >5 years ago without complications.    He states that at baseline he is able to climb a flight of stairs and walk >2 city blocks without stopping. METS likely >4.    Caraballo score >1.0% for vascular procedures. Patient is at increased risk of major adverse cardiovascular events in perioperative setting. This risk is not modifiable. No further testing is indicated prior to OR.    Awaiting cardiology recommendations for cardiac clearance. Pre-Operative Clearance    Patients reports prior surgery >5 years ago without complications.    He states that at baseline he is able to climb a flight of stairs and walk >2 city blocks without stopping. METS likely >4.    Caraballo score >1.0% for vascular procedures. Patient is at increased risk of major adverse cardiovascular events in perioperative setting.     TTE notably abnormal. Discussed with Dr. Bermudez of cardiology who does not recommend proceeding with surgery at this time.    Patient requires further testing/intervention prior to OR.

## 2021-12-09 NOTE — PROGRESS NOTE ADULT - SUBJECTIVE AND OBJECTIVE BOX
VASCULAR SURGERY PROGRESS NOTE   ___________________________________________________________________    JUAN SPICER | 89y Male   [facility] [address]   [id number] | [id number]     LOS 5d    Attending: Gustavo Nunez    ___________________________________________________________________      SUBJECTIVE:   Patient seen today during morning rounds at bedside and found to be without acute distress. Denies chest pain, fever, severe pain, or SOB.     Allergies:  NKDA    OBJECTIVE:  Vitals:    T(C): 36.8 (21 @ 06:25), Max: 36.8 (21 @ 06:25)  HR: 76 (21 @ 06:25) (67 - 76)  BP: 117/63 (21 @ 06:25) (112/67 - 117/63)  RR: 17 (21 @ 06:25) (16 - 18)  SpO2: 99% (21 @ 21:10) (99% - 100%)      OUT:  Total OUT: 0 mL          Medications:  buMETAnide 2 milliGRAM(s) Oral daily  influenza  Vaccine (HIGH DOSE) 0.7 milliLiter(s) IntraMuscular once  melatonin 6 milliGRAM(s) Oral at bedtime  metoprolol succinate ER 75 milliGRAM(s) Oral daily  NIFEdipine XL 30 milliGRAM(s) Oral daily  pantoprazole    Tablet 40 milliGRAM(s) Oral before breakfast          Laboratory:  WBC: 6.95 H&H: 7.2/22.8 Plt: 230  WBC: 6.80 H&H: 8.0/24.4 Plt: 233    Chemistry:                               Phos: 4.7 M.60  137  |  104  |  41  ----------------------------<  90  3.4   |  23  |  4.59          12-08                             Phos: 4.1 M.70  139  |  102  |  34  ----------------------------<  105  3.8   |  26  |  3.88            12-09   TPro 5.0<L> / Alb 2.4<L> / TBili 0.3 / DBili x  / AST/AST 10/7  / AlkPhos 52  PTT 29.7 PT/INR 14.2/1.25          Reviewed laboratory and imaging    heparin   Injectable 5000 SubCutaneous every 8 hours      COVID-19 PCR: NotDetec (06 Oct 2021 03:27)        Physical Exam:   Constitutional: resting in bed with no acute distress  Respiratory: unlabored breathing, clear respiration  Gastrointestinal: Abdomen soft, non distended, non-tender  Extremities: no gross deformities; spontaneous movement in b/l U/L extremities, radial pulses bilaterally

## 2021-12-09 NOTE — CONSULT NOTE ADULT - ATTENDING COMMENTS
Patient seen/examined. Consulted for HD access in this right-handed gentleman. Please obtain vein mapping. Left arm precautions. Obtain medical/cardiac clearance for surgery.
89y Male with ESRD. IR consulted for permacath placement. Scheduled for AVF fistula with vascular surgery tomorrow.      -- Since pt will be going to OR tomorrow with vascular surgery, permacath can be placed simultaneously by vascular surgery  -- please don't hesitate to re-consult if anything further is needed from IR.
Pt. with advanced CKD stage 5 presenting with fluid overload/pulmonary edema, hyperkalemia and acidosis. Plan to initiate HD today (as outlined above). Assessment and plan as outlined above. Monitor labs and urine output. Avoid any potential nephrotoxins. Dose medications as per eGFR.

## 2021-12-09 NOTE — PROGRESS NOTE ADULT - PROBLEM SELECTOR PLAN 1
Pt. tolerating HD. Pt. with intradialytic hypotension earlier. UF goal decreased. Dialysate temperature lowered. BP improved/stable at time of HD rounds. HD catheter functioning well. Pt. with anemia, initiated on IV iron therapy with HD. Hemoglobin level low at 7.2 today. Consider blood transfusion. Pt. scheduled for AVF creation surgery tomorrow. Pt. will also need tunneled HD catheter placement prior to discharge. Monitor BP and labs.

## 2021-12-09 NOTE — PROGRESS NOTE ADULT - ASSESSMENT
Pt. with advanced CKD stage 5, initated on long-term HD during current hospital stay. Pt. seen during HD today. /64 at time of HD rounds.

## 2021-12-09 NOTE — PROGRESS NOTE ADULT - PROBLEM SELECTOR PLAN 1
- No definitive bleeding source, likely in setting of CKD. Iron studies suggestive of AOCI. May have some iron deficiency. Nephro started IV iron with HD.  - will transfuse 1U pRBC prior to OR

## 2021-12-10 DIAGNOSIS — I50.22 CHRONIC SYSTOLIC (CONGESTIVE) HEART FAILURE: ICD-10-CM

## 2021-12-10 LAB
ALBUMIN SERPL ELPH-MCNC: 2.8 G/DL — LOW (ref 3.3–5)
ALP SERPL-CCNC: 60 U/L — SIGNIFICANT CHANGE UP (ref 40–120)
ALT FLD-CCNC: 6 U/L — SIGNIFICANT CHANGE UP (ref 4–41)
ANION GAP SERPL CALC-SCNC: 10 MMOL/L — SIGNIFICANT CHANGE UP (ref 7–14)
APTT BLD: 27.6 SEC — SIGNIFICANT CHANGE UP (ref 27–36.3)
AST SERPL-CCNC: 9 U/L — SIGNIFICANT CHANGE UP (ref 4–40)
BILIRUB SERPL-MCNC: 0.6 MG/DL — SIGNIFICANT CHANGE UP (ref 0.2–1.2)
BLD GP AB SCN SERPL QL: NEGATIVE — SIGNIFICANT CHANGE UP
BUN SERPL-MCNC: 32 MG/DL — HIGH (ref 7–23)
CALCIUM SERPL-MCNC: 8 MG/DL — LOW (ref 8.4–10.5)
CHLORIDE SERPL-SCNC: 103 MMOL/L — SIGNIFICANT CHANGE UP (ref 98–107)
CO2 SERPL-SCNC: 24 MMOL/L — SIGNIFICANT CHANGE UP (ref 22–31)
CREAT SERPL-MCNC: 3.98 MG/DL — HIGH (ref 0.5–1.3)
GLUCOSE SERPL-MCNC: 94 MG/DL — SIGNIFICANT CHANGE UP (ref 70–99)
HCT VFR BLD CALC: 27 % — LOW (ref 39–50)
HGB BLD-MCNC: 8.7 G/DL — LOW (ref 13–17)
INR BLD: 1.14 RATIO — SIGNIFICANT CHANGE UP (ref 0.88–1.16)
MAGNESIUM SERPL-MCNC: 1.8 MG/DL — SIGNIFICANT CHANGE UP (ref 1.6–2.6)
MCHC RBC-ENTMCNC: 29.8 PG — SIGNIFICANT CHANGE UP (ref 27–34)
MCHC RBC-ENTMCNC: 32.2 GM/DL — SIGNIFICANT CHANGE UP (ref 32–36)
MCV RBC AUTO: 92.5 FL — SIGNIFICANT CHANGE UP (ref 80–100)
NRBC # BLD: 0 /100 WBCS — SIGNIFICANT CHANGE UP
NRBC # FLD: 0 K/UL — SIGNIFICANT CHANGE UP
PHOSPHATE SERPL-MCNC: 4.3 MG/DL — SIGNIFICANT CHANGE UP (ref 2.5–4.5)
PLATELET # BLD AUTO: 212 K/UL — SIGNIFICANT CHANGE UP (ref 150–400)
POTASSIUM SERPL-MCNC: 3.9 MMOL/L — SIGNIFICANT CHANGE UP (ref 3.5–5.3)
POTASSIUM SERPL-SCNC: 3.9 MMOL/L — SIGNIFICANT CHANGE UP (ref 3.5–5.3)
PROT SERPL-MCNC: 5.2 G/DL — LOW (ref 6–8.3)
PROTHROM AB SERPL-ACNC: 13 SEC — SIGNIFICANT CHANGE UP (ref 10.6–13.6)
RBC # BLD: 2.92 M/UL — LOW (ref 4.2–5.8)
RBC # FLD: 18.5 % — HIGH (ref 10.3–14.5)
RH IG SCN BLD-IMP: POSITIVE — SIGNIFICANT CHANGE UP
SARS-COV-2 RNA SPEC QL NAA+PROBE: SIGNIFICANT CHANGE UP
SODIUM SERPL-SCNC: 137 MMOL/L — SIGNIFICANT CHANGE UP (ref 135–145)
WBC # BLD: 6.26 K/UL — SIGNIFICANT CHANGE UP (ref 3.8–10.5)
WBC # FLD AUTO: 6.26 K/UL — SIGNIFICANT CHANGE UP (ref 3.8–10.5)

## 2021-12-10 PROCEDURE — 93018 CV STRESS TEST I&R ONLY: CPT | Mod: GC

## 2021-12-10 PROCEDURE — 93016 CV STRESS TEST SUPVJ ONLY: CPT | Mod: GC

## 2021-12-10 PROCEDURE — 99233 SBSQ HOSP IP/OBS HIGH 50: CPT

## 2021-12-10 PROCEDURE — 78452 HT MUSCLE IMAGE SPECT MULT: CPT | Mod: 26

## 2021-12-10 RX ADMIN — HEPARIN SODIUM 5000 UNIT(S): 5000 INJECTION INTRAVENOUS; SUBCUTANEOUS at 06:46

## 2021-12-10 RX ADMIN — PANTOPRAZOLE SODIUM 40 MILLIGRAM(S): 20 TABLET, DELAYED RELEASE ORAL at 06:47

## 2021-12-10 RX ADMIN — CALCITRIOL 0.5 MICROGRAM(S): 0.5 CAPSULE ORAL at 17:21

## 2021-12-10 RX ADMIN — CHLORHEXIDINE GLUCONATE 1 APPLICATION(S): 213 SOLUTION TOPICAL at 11:45

## 2021-12-10 RX ADMIN — Medication 6 MILLIGRAM(S): at 21:24

## 2021-12-10 RX ADMIN — MUPIROCIN 1 APPLICATION(S): 20 OINTMENT TOPICAL at 06:47

## 2021-12-10 RX ADMIN — BUMETANIDE 2 MILLIGRAM(S): 0.25 INJECTION INTRAMUSCULAR; INTRAVENOUS at 06:46

## 2021-12-10 RX ADMIN — MUPIROCIN 1 APPLICATION(S): 20 OINTMENT TOPICAL at 17:21

## 2021-12-10 RX ADMIN — FINASTERIDE 5 MILLIGRAM(S): 5 TABLET, FILM COATED ORAL at 17:22

## 2021-12-10 RX ADMIN — Medication 75 MILLIGRAM(S): at 06:47

## 2021-12-10 RX ADMIN — HEPARIN SODIUM 5000 UNIT(S): 5000 INJECTION INTRAVENOUS; SUBCUTANEOUS at 21:25

## 2021-12-10 NOTE — CONSULT NOTE ADULT - SUBJECTIVE AND OBJECTIVE BOX
CHIEF COMPLAINT:Patient is a 89y old  Male who presents with a chief complaint of Shortness of Breath (09 Dec 2021 18:13)      HISTORY OF PRESENT ILLNESS:    89 male with history as below, CKD, HTN, plan for AVF found to have systolic CHF  denies any chest pain, sob, palpitation, dizziness or syncope.      PAST MEDICAL & SURGICAL HISTORY:  HTN - Hypertension    CKD (chronic kidney disease)    Hiatal hernia    GERD (gastroesophageal reflux disease)    Prostate cancer    S/P appendectomy  performed at [facility] 10 y ago    Prostate  Brachytherapy 30y ago at [facility] - had seeds    Acute gastric volvulus  s/p laparoscopic reduction, PEG that was later reversed    Hernia, paraesophageal            MEDICATIONS:  buMETAnide 2 milliGRAM(s) Oral daily  heparin   Injectable 5000 Unit(s) SubCutaneous every 8 hours  metoprolol succinate ER 75 milliGRAM(s) Oral daily        acetaminophen     Tablet .. 650 milliGRAM(s) Oral every 6 hours PRN  melatonin 6 milliGRAM(s) Oral at bedtime    pantoprazole    Tablet 40 milliGRAM(s) Oral before breakfast    finasteride 5 milliGRAM(s) Oral daily    calcitriol   Capsule 0.5 MICROGram(s) Oral daily  chlorhexidine 2% Cloths 1 Application(s) Topical daily  influenza  Vaccine (HIGH DOSE) 0.7 milliLiter(s) IntraMuscular once  iron sucrose Injectable 100 milliGRAM(s) IV Push <User Schedule>  mupirocin 2% Ointment 1 Application(s) Topical two times a day      FAMILY HISTORY:  FHx: hypertension  Mother    FHx: cancer of prostate  Father    Family history of colon cancer  Sister    Family history of gastric cancer  Brother        Non-contributory    SOCIAL HISTORY:    No tobacco, drugs or etoh    Allergies    No Known Allergies    Intolerances    	    REVIEW OF SYSTEMS:  as above  The rest of the 14 points ROS reviewed and except above they are unremarkable.        PHYSICAL EXAM:  T(C): 36.7 (12-09-21 @ 20:37), Max: 36.7 (12-09-21 @ 09:35)  HR: 72 (12-09-21 @ 20:37) (67 - 77)  BP: 141/76 (12-09-21 @ 20:37) (116/50 - 141/76)  RR: 18 (12-09-21 @ 20:37) (17 - 18)  SpO2: 99% (12-09-21 @ 20:37) (99% - 100%)  Wt(kg): --  I&O's Summary    08 Dec 2021 07:01  -  09 Dec 2021 07:00  --------------------------------------------------------  IN: 668 mL / OUT: 0 mL / NET: 668 mL    09 Dec 2021 07:01  -  10 Dec 2021 06:40  --------------------------------------------------------  IN: 1440 mL / OUT: 1090 mL / NET: 350 mL        JVP: Normal  Neck: supple  Lung: clear   CV: S1 S2 , Murmur:  Abd: soft  Ext: No edema  neuro: Awake / alert  Psych: flat affect  Skin: normal      LABS/DATA:    TELEMETRY: 	    ECG:  	   	  CARDIAC MARKERS:        < from: Transthoracic Echocardiogram (12.09.21 @ 10:22) >  CONCLUSIONS:  1. Mitral annular calcification, otherwise normal mitral  valve.  2. Calcified trileaflet aortic valve with normal opening.  3. Normal left atrium.  LA volume index = 30 cc/m2.  4. Normal left ventricular internal dimensions and wall  thicknesses.  5. Moderate global left ventricular systolic dysfunction.  6. Normal right ventricular size and systolic function.  7. Estimated pulmonary artery systolic pressure equals 54  mm Hg, assuming right atrial pressure equals 10  mm Hg,  consistent with moderate pulmonary hypertension.  ------------------------------------------------------------------------  Confirmed on  12/9/2021 - 14:44:39 by ONIEL Avina  ------------------------------------------------------------------------    < end of copied text >                                7.2    6.95  )-----------( 230      ( 09 Dec 2021 07:23 )             22.8     12-09    137  |  104  |  41<H>  ----------------------------<  90  3.4<L>   |  23  |  4.59<H>    Ca    7.1<L>      09 Dec 2021 07:23  Phos  4.7     12-09  Mg     1.60     12-09    TPro  5.0<L>  /  Alb  2.4<L>  /  TBili  0.3  /  DBili  x   /  AST  10  /  ALT  7   /  AlkPhos  52  12-09    proBNP: Serum Pro-Brain Natriuretic Peptide: >18497 pg/mL (12-04 @ 01:27)    Lipid Profile:   HgA1c:   TSH:

## 2021-12-10 NOTE — PROGRESS NOTE ADULT - ASSESSMENT
88yo M with Hx CKD3 (not previously on HD), HTN, GERD, and anemia who presented with SOB and CP, found to have persistent hyperkalemia, with need for urgent HD. Vascular Surgery consulted for urgent Shiley placement. Patient is s/p urgent R IJ Shiley placement on 12/4.     PLAN:   - OR planning for AV fistula Today with Dr. Mcconnell  - Please document medical risk stratification and clearance  - Please document cardiology risk stratification and clearance; needs stress echo; per stress test lab will be performed this morning  - Left arm precautions: please place pink band for arm precaution no blood draws, blood pressure measurements or IV line placement   - s/p Right Shiley  - HD per renal  - C/w care per primary team  - Vascular Surgery will follow    Rod Ochoa MD PGY2  C Team Surgery  [id number]    88yo M with Hx CKD3 (not previously on HD), HTN, GERD, and anemia who presented with SOB and CP, found to have persistent hyperkalemia, with need for urgent HD. Vascular Surgery consulted for urgent Shiley placement. Patient is s/p urgent R IJ Shiley placement on 12/4.     PLAN:   - OR planning for AV fistula Today with Dr. Mcconnell  - Appreciate IR recommendations; please hold off on Permcath placement at this time   - Please document medical risk stratification and clearance  - Please document cardiology risk stratification and clearance; needs stress echo; per stress test lab will be performed this morning  - Left arm precautions: please place pink band for arm precaution no blood draws, blood pressure measurements or IV line placement   - s/p Right Shiley  - HD per renal; please no HD prior to OR  - C/w care per primary team  - Vascular Surgery will follow    Rod Ochoa MD PGY2  C Team Surgery  [id number]    89yo M with Hx CKD3 (not previously on HD), HTN, GERD, and anemia who presented with SOB and CP, found to have persistent hyperkalemia, with need for urgent HD. Vascular Surgery consulted for urgent Shiley placement. Patient is s/p urgent R IJ Shiley placement on 12/4.     PLAN:   - OR planning for AV fistula Today with Dr. Mcconnell  - Appreciate IR recommendations; please hold off on Permcath placement at this time (getting early stick graft)  - Please document medical risk stratification and clearance  - Please document cardiology risk stratification and clearance; needs stress echo; per stress test lab will be performed this morning  - Left arm precautions: please place pink band for arm precaution no blood draws, blood pressure measurements or IV line placement   - s/p Right Shiley  - HD per renal; please no HD prior to OR  - C/w care per primary team  - Vascular Surgery will follow    Rod Ochoa MD PGY2  C Team Surgery  [id number]    89yo M with Hx CKD3 (not previously on HD), HTN, GERD, and anemia who presented with SOB and CP, found to have persistent hyperkalemia, with need for urgent HD. Vascular Surgery consulted for urgent Shiley placement. Patient is s/p urgent R IJ Shiley placement on 12/4.     PLAN:   - OR planning for AV fistula rescheduled for Tuesday with Dr. Mcconnell  - Appreciate IR recommendations; please hold off on Permcath placement at this time (getting early stick graft)  - Please document medical risk stratification and clearance  - Please document cardiology risk stratification and clearance; needs stress echo; per stress test lab will be performed this morning  - Left arm precautions: please place pink band for arm precaution no blood draws, blood pressure measurements or IV line placement   - s/p Right Shiley  - HD per renal; please no HD prior to OR  - C/w care per primary team  - Vascular Surgery will follow    Rod Ochoa MD PGY2  C Team Surgery  [id number]

## 2021-12-10 NOTE — CHART NOTE - NSCHARTNOTEFT_GEN_A_CORE
stress test:* There is a medium sized, moderate defect in the apical  wall that is predominantly fixed, suggestive of infarction  with minimal zan-infarct ischemia.There is a medium  sized, moderate defect in the inferior wall that is  predominantly fixed, suggestive of infarct.  * Post-stress gated wall motion analysis was performed  (LVEF = 32 %;LVEDV = 118 ml.), revealing moderate  hypokinesis in apical, inferior deng.  Dr. Haney made aware

## 2021-12-10 NOTE — PROGRESS NOTE ADULT - SUBJECTIVE AND OBJECTIVE BOX
Patient is a 89y old  Male who presents with a chief complaint of Shortness of Breath (10 Dec 2021 06:40)      SUBJECTIVE / OVERNIGHT EVENTS:    No events overnight. This AM, patient without n/v/d/cp/sob.      MEDICATIONS  (STANDING):  buMETAnide 2 milliGRAM(s) Oral daily  calcitriol   Capsule 0.5 MICROGram(s) Oral daily  chlorhexidine 2% Cloths 1 Application(s) Topical daily  finasteride 5 milliGRAM(s) Oral daily  heparin   Injectable 5000 Unit(s) SubCutaneous every 8 hours  influenza  Vaccine (HIGH DOSE) 0.7 milliLiter(s) IntraMuscular once  iron sucrose Injectable 100 milliGRAM(s) IV Push <User Schedule>  melatonin 6 milliGRAM(s) Oral at bedtime  metoprolol succinate ER 75 milliGRAM(s) Oral daily  mupirocin 2% Ointment 1 Application(s) Topical two times a day  pantoprazole    Tablet 40 milliGRAM(s) Oral before breakfast    MEDICATIONS  (PRN):  acetaminophen     Tablet .. 650 milliGRAM(s) Oral every 6 hours PRN Temp greater or equal to 38C (100.4F), Mild Pain (1 - 3), Moderate Pain (4 - 6)      PHYSICAL EXAM:  T(C): 36.5 (12-10-21 @ 06:43), Max: 36.7 (12-09-21 @ 20:37)  HR: 71 (12-10-21 @ 06:43) (71 - 75)  BP: 143/83 (12-10-21 @ 06:43) (119/71 - 143/83)  RR: 18 (12-10-21 @ 06:43) (17 - 18)  SpO2: 99% (12-10-21 @ 06:43) (99% - 100%)  I&O's Summary    09 Dec 2021 07:01  -  10 Dec 2021 07:00  --------------------------------------------------------  IN: 1590 mL / OUT: 1190 mL / NET: 400 mL      GENERAL: NAD, well-developed  HEAD:  Atraumatic, Normocephalic, MMM; R IJ Shiley in place  CHEST/LUNG: No use of accessory muscles, CTAB, breathing non-labored  COR: RR, no mrcg  ABD: Soft, ND/NT, +BS  PSYCH: AAOx3  NEUROLOGY: CN II-XII grossly intact, moving all extremities  SKIN: No rashes or lesions  EXT: wwp, no cce    LABS:  CAPILLARY BLOOD GLUCOSE                              8.7    6.26  )-----------( 212      ( 10 Dec 2021 07:42 )             27.0     12-10    137  |  103  |  32<H>  ----------------------------<  94  3.9   |  24  |  3.98<H>    Ca    8.0<L>      10 Dec 2021 07:42  Phos  4.3     12-10  Mg     1.80     12-10    TPro  5.2<L>  /  Alb  2.8<L>  /  TBili  0.6  /  DBili  x   /  AST  9   /  ALT  6   /  AlkPhos  60  12-10    PT/INR - ( 10 Dec 2021 07:42 )   PT: 13.0 sec;   INR: 1.14 ratio         PTT - ( 10 Dec 2021 07:42 )  PTT:27.6 sec            RADIOLOGY & ADDITIONAL TESTS:    Telemetry Personally Reviewed -     Imaging Personally Reviewed -     Imaging Reviewed -     Consultant(s) Notes Reviewed -       Care Discussed with Consultants/Other Providers - care d/w Dr Bermudez of cardiology - patient requires further ischemic evaluation prior to clearing for OR

## 2021-12-10 NOTE — PROGRESS NOTE ADULT - PROBLEM SELECTOR PLAN 1
- No definitive bleeding source, likely in setting of CKD. Iron studies suggestive of AOCI. May have some iron deficiency. Nephro started IV iron with HD.  - Hgb 8.7 after 1u pRBC on Dec 9

## 2021-12-10 NOTE — PROGRESS NOTE ADULT - PROBLEM SELECTOR PLAN 3
- s/p HD initiation, improvement in K+ and volume overload  - reduced Bumex po to once daily  - IV iron per nephro  - HD per renal  - will need AVF, vascular sx planning for next week, awaiting results of stress testing prior to clearing for OR  - care d/w vascular sx fellow, patient does not require Permcath as will undergo early stick graft

## 2021-12-10 NOTE — CONSULT NOTE ADULT - CONSULT REASON
Lauren placement for HD
Pre-Operative Cardiac Risk Stratification and Optimization  second opinion
Hyperkalemia/urgent HD
Advance CKD and hyperkalemia
permacath placement
cardiac clearance

## 2021-12-10 NOTE — PROGRESS NOTE ADULT - ASSESSMENT
88 y/o male, with a PmHx of CKD3, HTN, Gerd, [place], Iron Deficiency Anemia, presented to the [facility] ED with SOB. Admitted to telemetry for new HD and Hyperkalemia.

## 2021-12-10 NOTE — PROGRESS NOTE ADULT - SUBJECTIVE AND OBJECTIVE BOX
VASCULAR SURGERY PROGRESS NOTE   ___________________________________________________________________    JUAN SPICER | 89y Male   [facility] [address]   [id number] | [id number]     LOS 6d    Attending: Gustavo Nunez    ___________________________________________________________________      SUBJECTIVE:   Patient seen today during morning rounds at bedside and found to be without acute distress. Denies chest pain, fever, severe pain, or SOB.     Allergies:  NKDA    OBJECTIVE:  Vitals:    T(C): 36.5 (12-10-21 @ 06:43), Max: 36.7 (21 @ 09:35)  HR: 71 (12-10-21 @ 06:43) (67 - 77)  BP: 143/83 (12-10-21 @ 06:43) (116/50 - 143/83)  RR: 18 (12-10-21 @ 06:43) (17 - 18)  SpO2: 99% (12-10-21 @ 06:43) (99% - 100%)      OUT:    Voided (mL): 750 mL  Total OUT: 750 mL          Medications:  buMETAnide 2 milliGRAM(s) Oral daily  influenza  Vaccine (HIGH DOSE) 0.7 milliLiter(s) IntraMuscular once  melatonin 6 milliGRAM(s) Oral at bedtime  metoprolol succinate ER 75 milliGRAM(s) Oral daily  pantoprazole    Tablet 40 milliGRAM(s) Oral before breakfast          Laboratory:  WBC: 6.26 H&H: 8.7/27.0 Plt: 212  WBC: 6.95 H&H: 7.2/22.8 Plt: 230    Chemistry:                               Phos: 4.7 M.60  137  |  104  |  41  ----------------------------<  90  3.4   |  23  |  4.59          12-08                             Phos: 4.1 M.70  139  |  102  |  34  ----------------------------<  105  3.8   |  26  |  3.88    12-09   TPro 5.0<L> / Alb 2.4<L> / TBili 0.3 / DBili x  / AST/AST 10/7  / AlkPhos 52  PTT 27.6 PT/INR 13.0/1.14          Reviewed laboratory and imaging    heparin   Injectable 5000 SubCutaneous every 8 hours      COVID-19 PCR: NotDetec (09 Dec 2021 23:31)    Physical Exam:   Constitutional: resting in bed with no acute distress  Respiratory: unlabored breathing, clear respiration  Gastrointestinal: Abdomen soft, non distended, non-tender  Extremities: no gross deformities; spontaneous movement in b/l U/L extremities, radial pulses bilaterally

## 2021-12-10 NOTE — PROGRESS NOTE ADULT - PROBLEM SELECTOR PLAN 2
TTE noted global systolic dysfunction, EF 40%. Undergoing stress testing per cardiology.  Appears euvolemic with warm extremities. Not in decompensated HF.  Already on beta blocker. Will f/u cardiology recs re: ACEi in light of recent hyperkalemia (though likely could tolerate low dose ACEi now that he is on dialyssi)

## 2021-12-10 NOTE — CONSULT NOTE ADULT - ASSESSMENT
Systolic CHF   chronic   newly discovered  cont BB   will consider adding ace or arb in future  obtain pharm nuclear stress test    NSVT  cont BB  keep K within normal limit    HTN  stable    CKD  on HD   plan for AVF    Pre-Operative Cardiac Risk Stratification and Optimization   Based on patient history and physical exam, the patient is considered to have elevated risk   recommend pharm nuclear stress test    discussed with family

## 2021-12-10 NOTE — CONSULT NOTE ADULT - CONSULT REQUESTED DATE/TIME
04-Dec-2021 11:05
08-Dec-2021 15:56
09-Dec-2021 18:14
04-Dec-2021 13:17
04-Dec-2021 15:17
10-Dec-2021 06:40

## 2021-12-11 LAB
ALBUMIN SERPL ELPH-MCNC: 2.7 G/DL — LOW (ref 3.3–5)
ALP SERPL-CCNC: 59 U/L — SIGNIFICANT CHANGE UP (ref 40–120)
ALT FLD-CCNC: 5 U/L — SIGNIFICANT CHANGE UP (ref 4–41)
ANION GAP SERPL CALC-SCNC: 12 MMOL/L — SIGNIFICANT CHANGE UP (ref 7–14)
AST SERPL-CCNC: 8 U/L — SIGNIFICANT CHANGE UP (ref 4–40)
BILIRUB SERPL-MCNC: 0.4 MG/DL — SIGNIFICANT CHANGE UP (ref 0.2–1.2)
BUN SERPL-MCNC: 43 MG/DL — HIGH (ref 7–23)
CALCIUM SERPL-MCNC: 7.7 MG/DL — LOW (ref 8.4–10.5)
CHLORIDE SERPL-SCNC: 100 MMOL/L — SIGNIFICANT CHANGE UP (ref 98–107)
CO2 SERPL-SCNC: 24 MMOL/L — SIGNIFICANT CHANGE UP (ref 22–31)
CREAT SERPL-MCNC: 5.02 MG/DL — HIGH (ref 0.5–1.3)
GLUCOSE SERPL-MCNC: 89 MG/DL — SIGNIFICANT CHANGE UP (ref 70–99)
HCT VFR BLD CALC: 26 % — LOW (ref 39–50)
HGB BLD-MCNC: 8.2 G/DL — LOW (ref 13–17)
MAGNESIUM SERPL-MCNC: 1.7 MG/DL — SIGNIFICANT CHANGE UP (ref 1.6–2.6)
MCHC RBC-ENTMCNC: 29.5 PG — SIGNIFICANT CHANGE UP (ref 27–34)
MCHC RBC-ENTMCNC: 31.5 GM/DL — LOW (ref 32–36)
MCV RBC AUTO: 93.5 FL — SIGNIFICANT CHANGE UP (ref 80–100)
NRBC # BLD: 0 /100 WBCS — SIGNIFICANT CHANGE UP
NRBC # FLD: 0 K/UL — SIGNIFICANT CHANGE UP
PHOSPHATE SERPL-MCNC: 5.3 MG/DL — HIGH (ref 2.5–4.5)
PLATELET # BLD AUTO: 228 K/UL — SIGNIFICANT CHANGE UP (ref 150–400)
POTASSIUM SERPL-MCNC: 4.2 MMOL/L — SIGNIFICANT CHANGE UP (ref 3.5–5.3)
POTASSIUM SERPL-SCNC: 4.2 MMOL/L — SIGNIFICANT CHANGE UP (ref 3.5–5.3)
PROT SERPL-MCNC: 4.7 G/DL — LOW (ref 6–8.3)
RBC # BLD: 2.78 M/UL — LOW (ref 4.2–5.8)
RBC # FLD: 17.6 % — HIGH (ref 10.3–14.5)
SODIUM SERPL-SCNC: 136 MMOL/L — SIGNIFICANT CHANGE UP (ref 135–145)
WBC # BLD: 7.17 K/UL — SIGNIFICANT CHANGE UP (ref 3.8–10.5)
WBC # FLD AUTO: 7.17 K/UL — SIGNIFICANT CHANGE UP (ref 3.8–10.5)

## 2021-12-11 PROCEDURE — 99233 SBSQ HOSP IP/OBS HIGH 50: CPT

## 2021-12-11 RX ADMIN — Medication 75 MILLIGRAM(S): at 14:59

## 2021-12-11 RX ADMIN — IRON SUCROSE 100 MILLIGRAM(S): 20 INJECTION, SOLUTION INTRAVENOUS at 08:04

## 2021-12-11 RX ADMIN — HEPARIN SODIUM 5000 UNIT(S): 5000 INJECTION INTRAVENOUS; SUBCUTANEOUS at 22:42

## 2021-12-11 RX ADMIN — Medication 6 MILLIGRAM(S): at 22:42

## 2021-12-11 RX ADMIN — MUPIROCIN 1 APPLICATION(S): 20 OINTMENT TOPICAL at 05:35

## 2021-12-11 RX ADMIN — BUMETANIDE 2 MILLIGRAM(S): 0.25 INJECTION INTRAMUSCULAR; INTRAVENOUS at 05:35

## 2021-12-11 RX ADMIN — CHLORHEXIDINE GLUCONATE 1 APPLICATION(S): 213 SOLUTION TOPICAL at 15:00

## 2021-12-11 RX ADMIN — FINASTERIDE 5 MILLIGRAM(S): 5 TABLET, FILM COATED ORAL at 14:59

## 2021-12-11 RX ADMIN — MUPIROCIN 1 APPLICATION(S): 20 OINTMENT TOPICAL at 17:36

## 2021-12-11 RX ADMIN — HEPARIN SODIUM 5000 UNIT(S): 5000 INJECTION INTRAVENOUS; SUBCUTANEOUS at 05:34

## 2021-12-11 RX ADMIN — HEPARIN SODIUM 5000 UNIT(S): 5000 INJECTION INTRAVENOUS; SUBCUTANEOUS at 14:59

## 2021-12-11 RX ADMIN — PANTOPRAZOLE SODIUM 40 MILLIGRAM(S): 20 TABLET, DELAYED RELEASE ORAL at 05:34

## 2021-12-11 RX ADMIN — CALCITRIOL 0.5 MICROGRAM(S): 0.5 CAPSULE ORAL at 14:59

## 2021-12-11 NOTE — PROGRESS NOTE ADULT - PROBLEM SELECTOR PLAN 2
TTE noted global systolic dysfunction, EF 40%. Undergoing stress testing per cardiology.  Appears euvolemic with warm extremities. Not in decompensated HF.  Already on beta blocker. Cardiology may consider ACEi in future

## 2021-12-11 NOTE — PROGRESS NOTE ADULT - SUBJECTIVE AND OBJECTIVE BOX
Patient is a 89y old  Male who presents with a chief complaint of Shortness of Breath (11 Dec 2021 10:43)      SUBJECTIVE / OVERNIGHT EVENTS:    No events overnight. This AM, patient without n/v/d/cp/sob.  Seen in dialysis suite.    MEDICATIONS  (STANDING):  buMETAnide 2 milliGRAM(s) Oral daily  calcitriol   Capsule 0.5 MICROGram(s) Oral daily  chlorhexidine 2% Cloths 1 Application(s) Topical daily  finasteride 5 milliGRAM(s) Oral daily  heparin   Injectable 5000 Unit(s) SubCutaneous every 8 hours  influenza  Vaccine (HIGH DOSE) 0.7 milliLiter(s) IntraMuscular once  iron sucrose Injectable 100 milliGRAM(s) IV Push <User Schedule>  melatonin 6 milliGRAM(s) Oral at bedtime  metoprolol succinate ER 75 milliGRAM(s) Oral daily  mupirocin 2% Ointment 1 Application(s) Topical two times a day  pantoprazole    Tablet 40 milliGRAM(s) Oral before breakfast    MEDICATIONS  (PRN):  acetaminophen     Tablet .. 650 milliGRAM(s) Oral every 6 hours PRN Temp greater or equal to 38C (100.4F), Mild Pain (1 - 3), Moderate Pain (4 - 6)      PHYSICAL EXAM:  T(C): 36.4 (12-11-21 @ 09:51), Max: 36.6 (12-11-21 @ 05:40)  HR: 68 (12-11-21 @ 09:51) (68 - 72)  BP: 130/74 (12-11-21 @ 09:51) (127/89 - 145/70)  RR: 16 (12-11-21 @ 09:51) (16 - 18)  SpO2: 99% (12-11-21 @ 05:40) (99% - 99%)  I&O's Summary    10 Dec 2021 07:01  -  11 Dec 2021 07:00  --------------------------------------------------------  IN: 250 mL / OUT: 0 mL / NET: 250 mL    11 Dec 2021 07:01  -  11 Dec 2021 12:38  --------------------------------------------------------  IN: 400 mL / OUT: 1400 mL / NET: -1000 mL      GENERAL: NAD, well-developed, undergoing HD  HEAD:  Atraumatic, Normocephalic, MMM; +R IJ Shiley  CHEST/LUNG: No use of accessory muscles, CTAB, breathing non-labored  COR: RR, no mrcg  ABD: Soft, ND/NT, +BS  PSYCH: AAOx3  NEUROLOGY: CN II-XII grossly intact, moving all extremities  SKIN: No rashes or lesions  EXT: wwp, no cce    LABS:  CAPILLARY BLOOD GLUCOSE                              8.2    7.17  )-----------( 228      ( 11 Dec 2021 07:23 )             26.0     12-11    136  |  100  |  43<H>  ----------------------------<  89  4.2   |  24  |  5.02<H>    Ca    7.7<L>      11 Dec 2021 07:23  Phos  5.3     12-11  Mg     1.70     12-11    TPro  4.7<L>  /  Alb  2.7<L>  /  TBili  0.4  /  DBili  x   /  AST  8   /  ALT  5   /  AlkPhos  59  12-11    PT/INR - ( 10 Dec 2021 07:42 )   PT: 13.0 sec;   INR: 1.14 ratio         PTT - ( 10 Dec 2021 07:42 )  PTT:27.6 sec            RADIOLOGY & ADDITIONAL TESTS:    Telemetry Personally Reviewed -     Imaging Personally Reviewed -     Imaging Reviewed -   < from: Nuclear Stress Test-Pharmacologic (Nuclear Stress Test-Pharmacologic .) (12.10.21 @ 10:34) >   There is a medium sized, moderate defect in the apical  wall that is predominantly fixed, suggestive of infarction  with minimal zan-infarct ischemia.There is a medium  sized, moderate defect in the inferior wall that is  predominantly fixed, suggestive of infarct.  * Post-stress gated wall motion analysis was performed  (LVEF = 32 %;LVEDV = 118 ml.), revealing moderate  hypokinesis in apical, inferior walls.    < end of copied text >    Consultant(s) Notes Reviewed -   cardio, nephro    Care Discussed with Consultants/Other Providers -

## 2021-12-11 NOTE — PROGRESS NOTE ADULT - ASSESSMENT
88 y/o male, with a PmHx of CKD3, HTN, Gerd, [place], Iron Deficiency Anemia, presented to the [facility] ED with SOB. Admitted to telemetry for new HD and Hyperkalemia.

## 2021-12-11 NOTE — PROGRESS NOTE ADULT - PROBLEM SELECTOR PLAN 1
Pt. tolerating HD. Pt. with intradialytic hypotension earlier. UF goal decreased. Dialysate temperature lowered. BP improved/stable at time of HD rounds. HD catheter functioning well. Pt. with anemia, initiated on IV iron therapy with HD. Hemoglobin level low at 7.2 today. Consider blood transfusion. Pt. scheduled for AVF creation surgery tomorrow. Pt. will also need tunneled HD catheter placement prior to discharge. Monitor BP and labs.    12/11  - Seen during HD. No complaints. VSS  - Anticipate next HD on MONDAY (Orders placed). As per vasc Sx: "No HD prior to OR" which is on Tuesday

## 2021-12-11 NOTE — PROGRESS NOTE ADULT - SUBJECTIVE AND OBJECTIVE BOX
DATE OF SERVICE: 12-11-21 @ 10:44    Subjective: Patient seen and examined. No new events except as noted.     SUBJECTIVE/ROS:  Pt seen and examined early this am  no cp   no sob  no dizziness   no syncope       MEDICATIONS:  MEDICATIONS  (STANDING):  buMETAnide 2 milliGRAM(s) Oral daily  calcitriol   Capsule 0.5 MICROGram(s) Oral daily  chlorhexidine 2% Cloths 1 Application(s) Topical daily  finasteride 5 milliGRAM(s) Oral daily  heparin   Injectable 5000 Unit(s) SubCutaneous every 8 hours  influenza  Vaccine (HIGH DOSE) 0.7 milliLiter(s) IntraMuscular once  iron sucrose Injectable 100 milliGRAM(s) IV Push <User Schedule>  melatonin 6 milliGRAM(s) Oral at bedtime  metoprolol succinate ER 75 milliGRAM(s) Oral daily  mupirocin 2% Ointment 1 Application(s) Topical two times a day  pantoprazole    Tablet 40 milliGRAM(s) Oral before breakfast      PHYSICAL EXAM:  T(C): 36.4 (12-11-21 @ 09:51), Max: 36.6 (12-11-21 @ 05:40)  HR: 68 (12-11-21 @ 09:51) (68 - 72)  BP: 130/74 (12-11-21 @ 09:51) (127/89 - 145/70)  RR: 16 (12-11-21 @ 09:51) (16 - 18)  SpO2: 99% (12-11-21 @ 05:40) (99% - 99%)  Wt(kg): --  I&O's Summary    10 Dec 2021 07:01  -  11 Dec 2021 07:00  --------------------------------------------------------  IN: 250 mL / OUT: 0 mL / NET: 250 mL    11 Dec 2021 07:01  -  11 Dec 2021 10:44  --------------------------------------------------------  IN: 400 mL / OUT: 1400 mL / NET: -1000 mL            JVP: Normal  Neck: supple  Lung: clear   CV: S1 S2 , Murmur:  Abd: soft  Ext: No edema  neuro: Awake / alert  Psych: flat affect  Skin: normal``    LABS/DATA:    CARDIAC MARKERS:      < from: Nuclear Stress Test-Pharmacologic (Nuclear Stress Test-Pharmacologic .) (12.10.21 @ 10:34) >  IMPRESSIONS:Abnormal Study  * There is a medium sized, moderate defect in the apical  wall that is predominantly fixed, suggestive of infarction  with minimal zan-infarct ischemia.There is a medium  sized, moderate defect in the inferior wall that is  predominantly fixed, suggestive of infarct.  * Post-stress gated wall motion analysis was performed  (LVEF = 32 %;LVEDV = 118 ml.), revealing moderate  hypokinesis in apical, inferior walls.  ------------------------------------------------------------------------  Confirmed on  12/10/2021 - 15:48:44 by Elder Gonzalez MD,  [facility], [facility], [facility]  ------------------------------------------------------------------------    < end of copied text >                            8.2    7.17  )-----------( 228      ( 11 Dec 2021 07:23 )             26.0     12-11    136  |  100  |  43<H>  ----------------------------<  89  4.2   |  24  |  5.02<H>    Ca    7.7<L>      11 Dec 2021 07:23  Phos  5.3     12-11  Mg     1.70     12-11    TPro  4.7<L>  /  Alb  2.7<L>  /  TBili  0.4  /  DBili  x   /  AST  8   /  ALT  5   /  AlkPhos  59  12-11    proBNP:   Lipid Profile:   HgA1c:   TSH:     TELE:  EKG:

## 2021-12-11 NOTE — PROVIDER CONTACT NOTE (OTHER) - ACTION/TREATMENT ORDERED:
ok to continue at this rate
CLAUDIA Wilkins ordered STAT lab draw. Lab drawn and sent. Will continue to monitor pt.

## 2021-12-11 NOTE — PROGRESS NOTE ADULT - PROBLEM SELECTOR PLAN 3
- s/p HD initiation, improvement in K+ and volume overload  - reduced Bumex po to once daily  - IV iron per nephro  - HD per renal  - will need AVF, vascular sx planning for next week, stress testing and cardiology recs reviewed; patient requires no further testing prior to OR  - care d/w vascular sx fellow, patient does not require Permcath as will undergo early stick graft

## 2021-12-11 NOTE — PROGRESS NOTE ADULT - SUBJECTIVE AND OBJECTIVE BOX
[facility] DIVISION OF KIDNEY DISEASES AND HYPERTENSION --   --------------------------------------------------------------------------------  Chief Complaint: ESRD/Ongoing hemodialysis requirement    24 hour events/subjective: Seen during HD. No complaints. VSS.     PAST HISTORY  --------------------------------------------------------------------------------  No significant changes to PMH, PSH, FHx, SHx, unless otherwise noted    ALLERGIES & MEDICATIONS  --------------------------------------------------------------------------------  Allergies    No Known Allergies    Intolerances    Standing Inpatient Medications  buMETAnide 2 milliGRAM(s) Oral daily  calcitriol   Capsule 0.5 MICROGram(s) Oral daily  chlorhexidine 2% Cloths 1 Application(s) Topical daily  finasteride 5 milliGRAM(s) Oral daily  heparin   Injectable 5000 Unit(s) SubCutaneous every 8 hours  influenza  Vaccine (HIGH DOSE) 0.7 milliLiter(s) IntraMuscular once  iron sucrose Injectable 100 milliGRAM(s) IV Push <User Schedule>  melatonin 6 milliGRAM(s) Oral at bedtime  metoprolol succinate ER 75 milliGRAM(s) Oral daily  NIFEdipine XL 30 milliGRAM(s) Oral daily  pantoprazole    Tablet 40 milliGRAM(s) Oral before breakfast    PRN Inpatient Medications  acetaminophen     Tablet .. 650 milliGRAM(s) Oral every 6 hours PRN    REVIEW OF SYSTEMS  --------------------------------------------------------------------------------  Gen: No fever  Respiratory: No dyspnea  CV: No chest pain  GI: No abdominal pain  MSK: No edema  Neuro: No dizziness    VITALS/PHYSICAL EXAM  --------------------------------------------------------------------------------  Vital Signs Last 24 Hrs  T(C): 36.4 (11 Dec 2021 09:51), Max: 36.6 (11 Dec 2021 05:40)  T(F): 97.6 (11 Dec 2021 09:51), Max: 97.8 (11 Dec 2021 05:40)  HR: 68 (11 Dec 2021 09:51) (68 - 72)  BP: 130/74 (11 Dec 2021 09:51) (127/89 - 145/70)  BP(mean): --  RR: 16 (11 Dec 2021 09:51) (16 - 18)  SpO2: 99% (11 Dec 2021 05:40) (99% - 99%)    Physical Exam:  Gen: resting, NAD  	HEENT: No JVD  	Pulm: CTA B/L  	CV: S1S2+  	Abd: Soft  	LE: No edema  	Vascular access: Right IJ non-tunneled HD catheter site: no bleeding  LABS/STUDIES  --------------------------------------------------------------------------------            12-11    136  |  100  |  43<H>  ----------------------------<  89  4.2   |  24  |  5.02<H>    Ca    7.7<L>      11 Dec 2021 07:23  Phos  5.3     12-11  Mg     1.70     12-11    TPro  4.7<L>  /  Alb  2.7<L>  /  TBili  0.4  /  DBili  x   /  AST  8   /  ALT  5   /  AlkPhos  59  12-11

## 2021-12-11 NOTE — PROGRESS NOTE ADULT - PROBLEM SELECTOR PLAN 1
- No definitive bleeding source, likely in setting of CKD. Iron studies suggestive of AOCI. May have some iron deficiency. Nephro started IV iron with HD.  - Hgb acceptable

## 2021-12-12 LAB
ALBUMIN SERPL ELPH-MCNC: 2.7 G/DL — LOW (ref 3.3–5)
ALP SERPL-CCNC: 60 U/L — SIGNIFICANT CHANGE UP (ref 40–120)
ALT FLD-CCNC: <5 U/L — LOW (ref 4–41)
ANION GAP SERPL CALC-SCNC: 11 MMOL/L — SIGNIFICANT CHANGE UP (ref 7–14)
AST SERPL-CCNC: 9 U/L — SIGNIFICANT CHANGE UP (ref 4–40)
BILIRUB SERPL-MCNC: 0.4 MG/DL — SIGNIFICANT CHANGE UP (ref 0.2–1.2)
BUN SERPL-MCNC: 27 MG/DL — HIGH (ref 7–23)
CALCIUM SERPL-MCNC: 8 MG/DL — LOW (ref 8.4–10.5)
CHLORIDE SERPL-SCNC: 101 MMOL/L — SIGNIFICANT CHANGE UP (ref 98–107)
CO2 SERPL-SCNC: 25 MMOL/L — SIGNIFICANT CHANGE UP (ref 22–31)
CREAT SERPL-MCNC: 4.4 MG/DL — HIGH (ref 0.5–1.3)
GLUCOSE SERPL-MCNC: 97 MG/DL — SIGNIFICANT CHANGE UP (ref 70–99)
HAV IGM SER-ACNC: SIGNIFICANT CHANGE UP
HBV CORE AB SER-ACNC: SIGNIFICANT CHANGE UP
HBV CORE IGM SER-ACNC: SIGNIFICANT CHANGE UP
HBV SURFACE AB SER-ACNC: <3 MIU/ML — LOW
HBV SURFACE AG SER-ACNC: SIGNIFICANT CHANGE UP
HCT VFR BLD CALC: 26.4 % — LOW (ref 39–50)
HCV AB S/CO SERPL IA: 0.12 S/CO — SIGNIFICANT CHANGE UP (ref 0–0.99)
HCV AB SERPL-IMP: SIGNIFICANT CHANGE UP
HGB BLD-MCNC: 8.2 G/DL — LOW (ref 13–17)
MAGNESIUM SERPL-MCNC: 1.9 MG/DL — SIGNIFICANT CHANGE UP (ref 1.6–2.6)
MCHC RBC-ENTMCNC: 29.3 PG — SIGNIFICANT CHANGE UP (ref 27–34)
MCHC RBC-ENTMCNC: 31.1 GM/DL — LOW (ref 32–36)
MCV RBC AUTO: 94.3 FL — SIGNIFICANT CHANGE UP (ref 80–100)
NRBC # BLD: 0 /100 WBCS — SIGNIFICANT CHANGE UP
NRBC # FLD: 0 K/UL — SIGNIFICANT CHANGE UP
PHOSPHATE SERPL-MCNC: 4.8 MG/DL — HIGH (ref 2.5–4.5)
PLATELET # BLD AUTO: 194 K/UL — SIGNIFICANT CHANGE UP (ref 150–400)
POTASSIUM SERPL-MCNC: 4 MMOL/L — SIGNIFICANT CHANGE UP (ref 3.5–5.3)
POTASSIUM SERPL-SCNC: 4 MMOL/L — SIGNIFICANT CHANGE UP (ref 3.5–5.3)
PROT SERPL-MCNC: 4.9 G/DL — LOW (ref 6–8.3)
RBC # BLD: 2.8 M/UL — LOW (ref 4.2–5.8)
RBC # FLD: 17.5 % — HIGH (ref 10.3–14.5)
SODIUM SERPL-SCNC: 137 MMOL/L — SIGNIFICANT CHANGE UP (ref 135–145)
WBC # BLD: 6.76 K/UL — SIGNIFICANT CHANGE UP (ref 3.8–10.5)
WBC # FLD AUTO: 6.76 K/UL — SIGNIFICANT CHANGE UP (ref 3.8–10.5)

## 2021-12-12 PROCEDURE — 99232 SBSQ HOSP IP/OBS MODERATE 35: CPT

## 2021-12-12 RX ADMIN — HEPARIN SODIUM 5000 UNIT(S): 5000 INJECTION INTRAVENOUS; SUBCUTANEOUS at 21:31

## 2021-12-12 RX ADMIN — Medication 6 MILLIGRAM(S): at 21:31

## 2021-12-12 RX ADMIN — PANTOPRAZOLE SODIUM 40 MILLIGRAM(S): 20 TABLET, DELAYED RELEASE ORAL at 06:32

## 2021-12-12 RX ADMIN — CHLORHEXIDINE GLUCONATE 1 APPLICATION(S): 213 SOLUTION TOPICAL at 12:38

## 2021-12-12 RX ADMIN — Medication 75 MILLIGRAM(S): at 06:36

## 2021-12-12 RX ADMIN — MUPIROCIN 1 APPLICATION(S): 20 OINTMENT TOPICAL at 06:33

## 2021-12-12 RX ADMIN — FINASTERIDE 5 MILLIGRAM(S): 5 TABLET, FILM COATED ORAL at 12:37

## 2021-12-12 RX ADMIN — HEPARIN SODIUM 5000 UNIT(S): 5000 INJECTION INTRAVENOUS; SUBCUTANEOUS at 06:33

## 2021-12-12 RX ADMIN — BUMETANIDE 2 MILLIGRAM(S): 0.25 INJECTION INTRAMUSCULAR; INTRAVENOUS at 06:32

## 2021-12-12 RX ADMIN — CALCITRIOL 0.5 MICROGRAM(S): 0.5 CAPSULE ORAL at 12:37

## 2021-12-12 RX ADMIN — MUPIROCIN 1 APPLICATION(S): 20 OINTMENT TOPICAL at 19:38

## 2021-12-12 RX ADMIN — HEPARIN SODIUM 5000 UNIT(S): 5000 INJECTION INTRAVENOUS; SUBCUTANEOUS at 12:38

## 2021-12-12 NOTE — PROGRESS NOTE ADULT - ASSESSMENT
89 y/o male, with a PmHx of CKD3, HTN, Gerd, [place], Iron Deficiency Anemia, presented to the [facility] ED with SOB. Admitted to telemetry for new HD and Hyperkalemia (now resolved). Now awaiting AVF/graft creation prior to discharge.

## 2021-12-12 NOTE — PROGRESS NOTE ADULT - SUBJECTIVE AND OBJECTIVE BOX
DATE OF SERVICE: 12-12-21 @ 09:23    Subjective: Patient seen and examined. No new events except as noted.     SUBJECTIVE/ROS:  feels ok     MEDICATIONS:  MEDICATIONS  (STANDING):  buMETAnide 2 milliGRAM(s) Oral daily  calcitriol   Capsule 0.5 MICROGram(s) Oral daily  chlorhexidine 2% Cloths 1 Application(s) Topical daily  finasteride 5 milliGRAM(s) Oral daily  heparin   Injectable 5000 Unit(s) SubCutaneous every 8 hours  influenza  Vaccine (HIGH DOSE) 0.7 milliLiter(s) IntraMuscular once  iron sucrose Injectable 100 milliGRAM(s) IV Push <User Schedule>  melatonin 6 milliGRAM(s) Oral at bedtime  metoprolol succinate ER 75 milliGRAM(s) Oral daily  mupirocin 2% Ointment 1 Application(s) Topical two times a day  pantoprazole    Tablet 40 milliGRAM(s) Oral before breakfast      PHYSICAL EXAM:  T(C): 37.2 (12-12-21 @ 06:30), Max: 37.2 (12-12-21 @ 06:30)  HR: 80 (12-12-21 @ 06:30) (68 - 81)  BP: 120/57 (12-12-21 @ 06:30) (110/61 - 130/74)  RR: 18 (12-12-21 @ 06:30) (16 - 18)  SpO2: 98% (12-12-21 @ 06:30) (98% - 98%)  Wt(kg): --  I&O's Summary    11 Dec 2021 07:01  -  12 Dec 2021 07:00  --------------------------------------------------------  IN: 1240 mL / OUT: 1450 mL / NET: -210 mL            JVP: Normal  Neck: supple  Lung: clear   CV: S1 S2 , Murmur:  Abd: soft  Ext: No edema  neuro: Awake / alert  Psych: flat affect  Skin: normal``    LABS/DATA:    CARDIAC MARKERS:                                8.2    6.76  )-----------( 194      ( 12 Dec 2021 05:26 )             26.4     12-12    137  |  101  |  27<H>  ----------------------------<  97  4.0   |  25  |  4.40<H>    Ca    8.0<L>      12 Dec 2021 05:26  Phos  4.8     12-12  Mg     1.90     12-12    TPro  4.9<L>  /  Alb  2.7<L>  /  TBili  0.4  /  DBili  x   /  AST  9   /  ALT  <5<L>  /  AlkPhos  60  12-12    proBNP:   Lipid Profile:   HgA1c:   TSH:     TELE:  EKG:

## 2021-12-12 NOTE — PROGRESS NOTE ADULT - SUBJECTIVE AND OBJECTIVE BOX
Patient is a 89y old  Male who presents with a chief complaint of Shortness of Breath (12 Dec 2021 09:23)    SUBJECTIVE / OVERNIGHT EVENTS:    No events overnight. This AM, patient without n/v/d/cp/sob.      MEDICATIONS  (STANDING):  buMETAnide 2 milliGRAM(s) Oral daily  calcitriol   Capsule 0.5 MICROGram(s) Oral daily  chlorhexidine 2% Cloths 1 Application(s) Topical daily  finasteride 5 milliGRAM(s) Oral daily  heparin   Injectable 5000 Unit(s) SubCutaneous every 8 hours  influenza  Vaccine (HIGH DOSE) 0.7 milliLiter(s) IntraMuscular once  iron sucrose Injectable 100 milliGRAM(s) IV Push <User Schedule>  melatonin 6 milliGRAM(s) Oral at bedtime  metoprolol succinate ER 75 milliGRAM(s) Oral daily  mupirocin 2% Ointment 1 Application(s) Topical two times a day  pantoprazole    Tablet 40 milliGRAM(s) Oral before breakfast    MEDICATIONS  (PRN):  acetaminophen     Tablet .. 650 milliGRAM(s) Oral every 6 hours PRN Temp greater or equal to 38C (100.4F), Mild Pain (1 - 3), Moderate Pain (4 - 6)      PHYSICAL EXAM:  T(C): 37.2 (12-12-21 @ 06:30), Max: 37.2 (12-12-21 @ 06:30)  HR: 80 (12-12-21 @ 06:30) (72 - 81)  BP: 120/57 (12-12-21 @ 06:30) (110/61 - 130/68)  RR: 18 (12-12-21 @ 06:30) (17 - 18)  SpO2: 98% (12-12-21 @ 06:30) (98% - 98%)  I&O's Summary    11 Dec 2021 07:01  -  12 Dec 2021 07:00  --------------------------------------------------------  IN: 1240 mL / OUT: 1450 mL / NET: -210 mL      GENERAL: NAD, well-developed  HEAD:  Atraumatic, Normocephalic, MMM; R IJ Shiley in place  CHEST/LUNG: No use of accessory muscles, CTAB, breathing non-labored  COR: RR, no mrcg  ABD: Soft, ND/NT, +BS  PSYCH: AAOx3  NEUROLOGY: CN II-XII grossly intact, moving all extremities  SKIN: No rashes or lesions  EXT: wwp, no cce    LABS:  CAPILLARY BLOOD GLUCOSE                              8.2    6.76  )-----------( 194      ( 12 Dec 2021 05:26 )             26.4     12-12    137  |  101  |  27<H>  ----------------------------<  97  4.0   |  25  |  4.40<H>    Ca    8.0<L>      12 Dec 2021 05:26  Phos  4.8     12-12  Mg     1.90     12-12    TPro  4.9<L>  /  Alb  2.7<L>  /  TBili  0.4  /  DBili  x   /  AST  9   /  ALT  <5<L>  /  AlkPhos  60  12-12                RADIOLOGY & ADDITIONAL TESTS:    Telemetry Personally Reviewed -     Imaging Personally Reviewed -     Imaging Reviewed -     Consultant(s) Notes Reviewed -   cardiology    Care Discussed with Consultants/Other Providers -  no

## 2021-12-12 NOTE — DIETITIAN INITIAL EVALUATION ADULT. - OTHER INFO
Pt 89 yo male with PMHx of CKD3, HTN, GERD, [place], Iron Deficiency Anemia, presented with SOB - per chart review. Of note, Pt new HD; now awaiting AVF/graft creation prior to discharge.     At time of visit, Pt appears alert, oriented; family at bed side participated in the interview. Pt with good appetite reported. No report of chewing or swallowing difficulty; no report of nausea, vomiting or diarrhea @ this time. +BM (12/10) fecal incontinence, per flow sheet. Pt's height: 70" & Pt's weight: 150# reported; no report of weight loss or weight changes PTA. RDN offered to add PO supplement: Nepro - 1x daily, but Pt declined.     Renal Replacement (no prot restr, no conc K, no conc phos, low sodium) therapeutic diet restriction discussed with Pt & his family at bed side; written materials on therapeutic diet restriction also provided. Pt & family verbalized understanding. RDN remains available, Pt & his family made aware.

## 2021-12-12 NOTE — PROGRESS NOTE ADULT - ASSESSMENT
Systolic CHF   chronic   newly discovered  cont BB   will consider adding ace or arb in future    NSVT  cont BB  keep K within normal limit    HTN  stable    CKD  on HD   plan for AVF    Pre-Operative Cardiac Risk Stratification and Optimization   Based on patient history and physical exam, the patient is considered to have elevated risk   stress test shows mostly infarcted areas with minimal ischemia  pt has no active CV symptoms   can proceed to this low risk procedure with acceptable elevated risks

## 2021-12-12 NOTE — DIETITIAN INITIAL EVALUATION ADULT. - ADD RECOMMEND
1. Encourage & assist Pt with meals; Monitor PO diet tolerance; Honor food preferences;     2. Add Nephro-yolis 1 cap daily for micronutrient coverage;     3. Monitor labs, hydration status;     4. Will recommend Pt to follow up with appropriate RDN upon discharge for the purposes of long-term nutrition evaluation and diet education;

## 2021-12-13 ENCOUNTER — TRANSCRIPTION ENCOUNTER (OUTPATIENT)
Age: 86
End: 2021-12-13

## 2021-12-13 DIAGNOSIS — Z01.818 ENCOUNTER FOR OTHER PREPROCEDURAL EXAMINATION: ICD-10-CM

## 2021-12-13 LAB
ALBUMIN SERPL ELPH-MCNC: 2.6 G/DL — LOW (ref 3.3–5)
ALP SERPL-CCNC: 62 U/L — SIGNIFICANT CHANGE UP (ref 40–120)
ALT FLD-CCNC: 6 U/L — SIGNIFICANT CHANGE UP (ref 4–41)
ANION GAP SERPL CALC-SCNC: 10 MMOL/L — SIGNIFICANT CHANGE UP (ref 7–14)
APTT BLD: 29.2 SEC — SIGNIFICANT CHANGE UP (ref 27–36.3)
AST SERPL-CCNC: 7 U/L — SIGNIFICANT CHANGE UP (ref 4–40)
BILIRUB SERPL-MCNC: 0.3 MG/DL — SIGNIFICANT CHANGE UP (ref 0.2–1.2)
BLD GP AB SCN SERPL QL: NEGATIVE — SIGNIFICANT CHANGE UP
BUN SERPL-MCNC: 39 MG/DL — HIGH (ref 7–23)
CALCIUM SERPL-MCNC: 7.9 MG/DL — LOW (ref 8.4–10.5)
CHLORIDE SERPL-SCNC: 102 MMOL/L — SIGNIFICANT CHANGE UP (ref 98–107)
CO2 SERPL-SCNC: 24 MMOL/L — SIGNIFICANT CHANGE UP (ref 22–31)
CREAT SERPL-MCNC: 5.43 MG/DL — HIGH (ref 0.5–1.3)
GLUCOSE SERPL-MCNC: 99 MG/DL — SIGNIFICANT CHANGE UP (ref 70–99)
HCT VFR BLD CALC: 25.4 % — LOW (ref 39–50)
HGB BLD-MCNC: 7.9 G/DL — LOW (ref 13–17)
INR BLD: 1.18 RATIO — HIGH (ref 0.88–1.16)
MAGNESIUM SERPL-MCNC: 1.7 MG/DL — SIGNIFICANT CHANGE UP (ref 1.6–2.6)
MCHC RBC-ENTMCNC: 29.7 PG — SIGNIFICANT CHANGE UP (ref 27–34)
MCHC RBC-ENTMCNC: 31.1 GM/DL — LOW (ref 32–36)
MCV RBC AUTO: 95.5 FL — SIGNIFICANT CHANGE UP (ref 80–100)
NRBC # BLD: 0 /100 WBCS — SIGNIFICANT CHANGE UP
NRBC # FLD: 0 K/UL — SIGNIFICANT CHANGE UP
PHOSPHATE SERPL-MCNC: 5.9 MG/DL — HIGH (ref 2.5–4.5)
PLATELET # BLD AUTO: 245 K/UL — SIGNIFICANT CHANGE UP (ref 150–400)
POTASSIUM SERPL-MCNC: 3.9 MMOL/L — SIGNIFICANT CHANGE UP (ref 3.5–5.3)
POTASSIUM SERPL-SCNC: 3.9 MMOL/L — SIGNIFICANT CHANGE UP (ref 3.5–5.3)
PROT SERPL-MCNC: 5 G/DL — LOW (ref 6–8.3)
PROTHROM AB SERPL-ACNC: 13.3 SEC — SIGNIFICANT CHANGE UP (ref 10.6–13.6)
RBC # BLD: 2.66 M/UL — LOW (ref 4.2–5.8)
RBC # FLD: 17.4 % — HIGH (ref 10.3–14.5)
RH IG SCN BLD-IMP: POSITIVE — SIGNIFICANT CHANGE UP
SARS-COV-2 RNA SPEC QL NAA+PROBE: SIGNIFICANT CHANGE UP
SODIUM SERPL-SCNC: 136 MMOL/L — SIGNIFICANT CHANGE UP (ref 135–145)
WBC # BLD: 6.41 K/UL — SIGNIFICANT CHANGE UP (ref 3.8–10.5)
WBC # FLD AUTO: 6.41 K/UL — SIGNIFICANT CHANGE UP (ref 3.8–10.5)

## 2021-12-13 PROCEDURE — 99233 SBSQ HOSP IP/OBS HIGH 50: CPT

## 2021-12-13 PROCEDURE — 90935 HEMODIALYSIS ONE EVALUATION: CPT

## 2021-12-13 RX ORDER — ERYTHROPOIETIN 10000 [IU]/ML
4000 INJECTION, SOLUTION INTRAVENOUS; SUBCUTANEOUS
Refills: 0 | Status: DISCONTINUED | OUTPATIENT
Start: 2021-12-13 | End: 2021-12-17

## 2021-12-13 RX ADMIN — HEPARIN SODIUM 5000 UNIT(S): 5000 INJECTION INTRAVENOUS; SUBCUTANEOUS at 22:01

## 2021-12-13 RX ADMIN — CHLORHEXIDINE GLUCONATE 1 APPLICATION(S): 213 SOLUTION TOPICAL at 12:09

## 2021-12-13 RX ADMIN — PANTOPRAZOLE SODIUM 40 MILLIGRAM(S): 20 TABLET, DELAYED RELEASE ORAL at 06:05

## 2021-12-13 RX ADMIN — HEPARIN SODIUM 5000 UNIT(S): 5000 INJECTION INTRAVENOUS; SUBCUTANEOUS at 06:05

## 2021-12-13 RX ADMIN — ERYTHROPOIETIN 4000 UNIT(S): 10000 INJECTION, SOLUTION INTRAVENOUS; SUBCUTANEOUS at 09:08

## 2021-12-13 RX ADMIN — BUMETANIDE 2 MILLIGRAM(S): 0.25 INJECTION INTRAMUSCULAR; INTRAVENOUS at 11:59

## 2021-12-13 RX ADMIN — IRON SUCROSE 100 MILLIGRAM(S): 20 INJECTION, SOLUTION INTRAVENOUS at 08:08

## 2021-12-13 RX ADMIN — HEPARIN SODIUM 5000 UNIT(S): 5000 INJECTION INTRAVENOUS; SUBCUTANEOUS at 14:13

## 2021-12-13 RX ADMIN — Medication 75 MILLIGRAM(S): at 12:01

## 2021-12-13 RX ADMIN — Medication 6 MILLIGRAM(S): at 22:01

## 2021-12-13 RX ADMIN — CALCITRIOL 0.5 MICROGRAM(S): 0.5 CAPSULE ORAL at 12:00

## 2021-12-13 RX ADMIN — FINASTERIDE 5 MILLIGRAM(S): 5 TABLET, FILM COATED ORAL at 12:08

## 2021-12-13 RX ADMIN — MUPIROCIN 1 APPLICATION(S): 20 OINTMENT TOPICAL at 17:41

## 2021-12-13 RX ADMIN — MUPIROCIN 1 APPLICATION(S): 20 OINTMENT TOPICAL at 06:05

## 2021-12-13 NOTE — PROGRESS NOTE ADULT - ASSESSMENT
Pt. with advanced CKD stage 5, initated on long-term HD during current hospital stay. Pt. seen during HD today. /72 at time of HD rounds.

## 2021-12-13 NOTE — PROGRESS NOTE ADULT - PROBLEM SELECTOR PLAN 1
Patient with RCRI 2  plan for vascular HD stent placement.   elevated cardiovascular risk for low risk procedure. no medical contraindication for surgery.   will transfuse 1U PrBC today for optimization.

## 2021-12-13 NOTE — PROGRESS NOTE ADULT - ASSESSMENT
89yo M with Hx CKD3 (not previously on HD), HTN, GERD, and anemia who presented with SOB and CP, found to have persistent hyperkalemia, with need for urgent HD. Vascular Surgery consulted for urgent Shiley placement. Patient is s/p urgent R IJ Shiley placement on 12/4. Planning ofr OR 12/14 for AV fistula placement.    PLAN:   - OR planning for AV fistula rescheduled for Tuesday with Dr. Mcconnell  - Appreciate IR recommendations; please hold off on Permacath placement at this time (getting early stick graft)  - Please document medical risk stratification and clearance: per cardiology can proceed to this low risk procedure with acceptable elevated risks   - Left arm precautions: please place pink band for arm precaution no blood draws, blood pressure measurements or IV line placement   - s/p Right Shiley  - HD per renal; please no HD on Tuesday prior to OR, obtain after OR  - C/w care per primary team  - Vascular Surgery will follow    Yan Guerrero PGY-2  C Team Surgery  [id number]

## 2021-12-13 NOTE — PROGRESS NOTE ADULT - ASSESSMENT
88 y/o male, with a PmHx of CKD3, HTN, Gerd, [place], Iron Deficiency Anemia, presented to the [facility] ED with SOB. Admitted to telemetry for new HD and Hyperkalemia (now resolved). Now awaiting AVF/graft creation prior to discharge.

## 2021-12-13 NOTE — PROGRESS NOTE ADULT - SUBJECTIVE AND OBJECTIVE BOX
Vascular Surgery Progress Note  [id number]    Overnight events:   No acute events overnight    SUBJECTIVE:  Patient feels well, no f/n/c, no chest pain or shortness of breath    OBJECTIVE:  Vital Signs Last 24 Hrs  T(C): 36.7 (13 Dec 2021 09:50), Max: 37.4 (12 Dec 2021 22:13)  T(F): 98 (13 Dec 2021 09:50), Max: 99.4 (12 Dec 2021 22:13)  HR: 61 (13 Dec 2021 09:50) (61 - 65)  BP: 141/71 (13 Dec 2021 09:50) (114/52 - 141/71)  BP(mean): 88 (13 Dec 2021 04:40) (65 - 88)  RR: 16 (13 Dec 2021 09:50) (14 - 18)  SpO2: 99% (13 Dec 2021 09:50) (96% - 99%)    Physical Examination:  Constitutional: resting in bed with no acute distress  Respiratory: unlabored breathing, clear respiration  Gastrointestinal: Abdomen soft, non distended, non-tender  Extremities: no gross deformities; spontaneous movement in b/l U/L extremities, radial pulses bilaterally    I&O's Detail        LABS:                        7.9    6.41  )-----------( 245      ( 13 Dec 2021 07:58 )             25.4       12-13    136  |  102  |  39<H>  ----------------------------<  99  3.9   |  24  |  5.43<H>    Ca    7.9<L>      13 Dec 2021 07:58  Phos  5.9     12-13  Mg     1.70     12-13    TPro  5.0<L>  /  Alb  2.6<L>  /  TBili  0.3  /  DBili  x   /  AST  7   /  ALT  6   /  AlkPhos  62  12-13        IMAGING:  []

## 2021-12-13 NOTE — PROGRESS NOTE ADULT - PROBLEM SELECTOR PLAN 1
Pt. tolerating HD. Pt. with intradialytic hypotension earlier. UF goal decreased. Dialysate temperature lowered. BP improved/stable at time of HD rounds. HD catheter functioning well. Pt. with anemia, initiated on IV iron therapy with HD. Hemoglobin level low at 7.9 today. PLACIDO (Retacrit) ordered with HD today. Consider blood transfusion. Pt. awaiting AV access creation/placement surgery. Monitor BP and labs. Pt. tolerating HD. Pt. with intradialytic hypotension earlier. UF goal decreased. Dialysate temperature lowered. BP improved/stable at time of HD rounds. HD catheter functioning during rounds. Pt. with anemia, initiated on IV iron therapy with HD. Hemoglobin level low at 7.9 today. PLACIDO (Retacrit) ordered with HD today. Consider blood transfusion. Pt. awaiting AV access creation/placement surgery. Monitor BP and labs.

## 2021-12-13 NOTE — PROGRESS NOTE ADULT - PROBLEM SELECTOR PLAN 2
- No definitive bleeding source, likely in setting of CKD. Iron studies suggestive of AOCI. May have some iron deficiency. Nephro started IV iron with HD.  - Hgb 7.9 today. will transfuse one unit PRBC to optimize for vascular surgery tomorrow.

## 2021-12-13 NOTE — PROGRESS NOTE ADULT - SUBJECTIVE AND OBJECTIVE BOX
[facility] DIVISION OF KIDNEY DISEASES AND HYPERTENSION --   --------------------------------------------------------------------------------  Chief Complaint: ESRD/Ongoing hemodialysis requirement    24 hour events/subjective: Pt. with advanced CKD stage 5, initiated on long-term HD during current hospital stay. Pt. deemed ESRD. Pt. seen and examined during HD today. Pt. feels well and offered no complaints during HD rounds.    PAST HISTORY  --------------------------------------------------------------------------------  No significant changes to PMH, PSH, FHx, SHx, unless otherwise noted    ALLERGIES & MEDICATIONS  --------------------------------------------------------------------------------  Allergies    No Known Allergies    Intolerances    Standing Inpatient Medications  buMETAnide 2 milliGRAM(s) Oral daily  calcitriol   Capsule 0.5 MICROGram(s) Oral daily  chlorhexidine 2% Cloths 1 Application(s) Topical daily  finasteride 5 milliGRAM(s) Oral daily  heparin   Injectable 5000 Unit(s) SubCutaneous every 8 hours  influenza  Vaccine (HIGH DOSE) 0.7 milliLiter(s) IntraMuscular once  iron sucrose Injectable 100 milliGRAM(s) IV Push <User Schedule>  melatonin 6 milliGRAM(s) Oral at bedtime  metoprolol succinate ER 75 milliGRAM(s) Oral daily  mupirocin 2% Ointment 1 Application(s) Topical two times a day  pantoprazole    Tablet 40 milliGRAM(s) Oral before breakfast    PRN Inpatient Medications  acetaminophen     Tablet .. 650 milliGRAM(s) Oral every 6 hours PRN    REVIEW OF SYSTEMS  --------------------------------------------------------------------------------  Gen: No fever  Respiratory: No dyspnea  CV: No chest pain  GI: No abdominal pain  MSK: No edema  Neuro: No dizziness    VITALS/PHYSICAL EXAM  --------------------------------------------------------------------------------  T(C): 36.8 (12-13-21 @ 06:45), Max: 37.4 (12-12-21 @ 22:13)  HR: 64 (12-13-21 @ 06:45) (63 - 65)  BP: 124/70 (12-13-21 @ 06:45) (114/52 - 126/77)  RR: 16 (12-13-21 @ 06:45) (14 - 18)  SpO2: 99% (12-13-21 @ 06:45) (96% - 99%)  Wt(kg): --    Physical Exam:  Gen: resting, NAD  	HEENT: No JVD  	Pulm: CTA B/L  	CV: S1S2+  	Abd: Soft  	LE: No edema  	Vascular access: Right IJ non-tunneled HD catheter site: no bleeding  LABS/STUDIES  --------------------------------------------------------------------------------              7.9    6.41  >-----------<  245      [12-13-21 @ 07:58]              25.4     136  |  102  |  39  ----------------------------<  99      [12-13-21 @ 07:58]  3.9   |  24  |  5.43        Ca     7.9     [12-13-21 @ 07:58]      Mg     1.70     [12-13-21 @ 07:58]      Phos  5.9     [12-13-21 @ 07:58]    TPro  5.0  /  Alb  2.6  /  TBili  0.3  /  DBili  x   /  AST  7   /  ALT  6   /  AlkPhos  62  [12-13-21 @ 07:58]    HBsAb <3.0      [12-12-21 @ 09:50]  HBsAg Nonreact      [12-12-21 @ 09:50]  HBcAb Nonreact      [12-12-21 @ 09:50]  HCV 0.12, Nonreact      [12-12-21 @ 09:50]

## 2021-12-13 NOTE — PROGRESS NOTE ADULT - SUBJECTIVE AND OBJECTIVE BOX
DATE OF SERVICE: 12-13-21 @ 10:06    Subjective: Patient seen and examined. No new events except as noted.     SUBJECTIVE/ROS:  Pt seen and examined early this am        MEDICATIONS:  MEDICATIONS  (STANDING):  buMETAnide 2 milliGRAM(s) Oral daily  calcitriol   Capsule 0.5 MICROGram(s) Oral daily  chlorhexidine 2% Cloths 1 Application(s) Topical daily  epoetin jono-epbx (RETACRIT) Injectable 4000 Unit(s) IV Push <User Schedule>  finasteride 5 milliGRAM(s) Oral daily  heparin   Injectable 5000 Unit(s) SubCutaneous every 8 hours  influenza  Vaccine (HIGH DOSE) 0.7 milliLiter(s) IntraMuscular once  iron sucrose Injectable 100 milliGRAM(s) IV Push <User Schedule>  melatonin 6 milliGRAM(s) Oral at bedtime  metoprolol succinate ER 75 milliGRAM(s) Oral daily  mupirocin 2% Ointment 1 Application(s) Topical two times a day  pantoprazole    Tablet 40 milliGRAM(s) Oral before breakfast      PHYSICAL EXAM:  T(C): 36.8 (12-13-21 @ 06:45), Max: 37.4 (12-12-21 @ 22:13)  HR: 64 (12-13-21 @ 06:45) (63 - 65)  BP: 124/70 (12-13-21 @ 06:45) (114/52 - 126/77)  RR: 16 (12-13-21 @ 06:45) (14 - 18)  SpO2: 99% (12-13-21 @ 06:45) (96% - 99%)  Wt(kg): --  I&O's Summary          JVP: Normal  Neck: supple  Lung: clear   CV: S1 S2 , Murmur:  Abd: soft  Ext: No edema  neuro: Awake / alert  Psych: flat affect  Skin: normal``    LABS/DATA:    CARDIAC MARKERS:                                7.9    6.41  )-----------( 245      ( 13 Dec 2021 07:58 )             25.4     12-13    136  |  102  |  39<H>  ----------------------------<  99  3.9   |  24  |  5.43<H>    Ca    7.9<L>      13 Dec 2021 07:58  Phos  5.9     12-13  Mg     1.70     12-13    TPro  5.0<L>  /  Alb  2.6<L>  /  TBili  0.3  /  DBili  x   /  AST  7   /  ALT  6   /  AlkPhos  62  12-13    proBNP:   Lipid Profile:   HgA1c:   TSH:     TELE:  EKG:

## 2021-12-13 NOTE — PROGRESS NOTE ADULT - SUBJECTIVE AND OBJECTIVE BOX
[facility] Division of Hospital Medicine  Samuel Hinkle MD  In House Pager [phone]    Patient is a 89y old  Male who presents with a chief complaint of Shortness of Breath (13 Dec 2021 10:29)      SUBJECTIVE / OVERNIGHT EVENTS:  No overnight events. Labs and vitals reviewed.   Patient seen and examined at bedside, no acute complaints. anticipating OR tomorrow.   No fever, no chills, no SOB, no CP, no n/v/d, no abd pain, no dysuria      MEDICATIONS  (STANDING):  buMETAnide 2 milliGRAM(s) Oral daily  calcitriol   Capsule 0.5 MICROGram(s) Oral daily  chlorhexidine 2% Cloths 1 Application(s) Topical daily  epoetin jono-epbx (RETACRIT) Injectable 4000 Unit(s) IV Push <User Schedule>  finasteride 5 milliGRAM(s) Oral daily  heparin   Injectable 5000 Unit(s) SubCutaneous every 8 hours  influenza  Vaccine (HIGH DOSE) 0.7 milliLiter(s) IntraMuscular once  iron sucrose Injectable 100 milliGRAM(s) IV Push <User Schedule>  melatonin 6 milliGRAM(s) Oral at bedtime  metoprolol succinate ER 75 milliGRAM(s) Oral daily  mupirocin 2% Ointment 1 Application(s) Topical two times a day  pantoprazole    Tablet 40 milliGRAM(s) Oral before breakfast    MEDICATIONS  (PRN):  acetaminophen     Tablet .. 650 milliGRAM(s) Oral every 6 hours PRN Temp greater or equal to 38C (100.4F), Mild Pain (1 - 3), Moderate Pain (4 - 6)    CAPILLARY BLOOD GLUCOSE        I&O's Summary    13 Dec 2021 07:01  -  13 Dec 2021 14:56  --------------------------------------------------------  IN: 500 mL / OUT: 1186 mL / NET: -686 mL        PHYSICAL EXAM:  Vital Signs Last 24 Hrs  T(C): 36.7 (13 Dec 2021 11:31), Max: 37.4 (12 Dec 2021 22:13)  T(F): 98 (13 Dec 2021 11:31), Max: 99.4 (12 Dec 2021 22:13)  HR: 65 (13 Dec 2021 11:31) (61 - 65)  BP: 124/71 (13 Dec 2021 11:31) (114/52 - 141/71)  BP(mean): 88 (13 Dec 2021 04:40) (65 - 88)  RR: 16 (13 Dec 2021 11:31) (14 - 16)  SpO2: 100% (13 Dec 2021 11:31) (97% - 100%)    Gen: NAD; resting in bed. awake and alert.   Neck: right neck shiley in place.   Pulm: no respiratory distress; no accessory muscle use; CTA b/l; no wheezing; no crackles.   Cards: RRR, nl S1/S2; no obvious murmurs; no LE edema; no JVD  Abd: soft; NT on exam; +bs  Ext: no cyanosis; no joint effusion; no joint pain on palpation.  Skin: no rash; no cyanosis    LABS:                        7.9    6.41  )-----------( 245      ( 13 Dec 2021 07:58 )             25.4     12-13    136  |  102  |  39<H>  ----------------------------<  99  3.9   |  24  |  5.43<H>    Ca    7.9<L>      13 Dec 2021 07:58  Phos  5.9     12-13  Mg     1.70     12-13    TPro  5.0<L>  /  Alb  2.6<L>  /  TBili  0.3  /  DBili  x   /  AST  7   /  ALT  6   /  AlkPhos  62  12-13    PT/INR - ( 13 Dec 2021 07:58 )   PT: 13.3 sec;   INR: 1.18 ratio         PTT - ( 13 Dec 2021 07:58 )  PTT:29.2 sec            RADIOLOGY & ADDITIONAL TESTS:  Results Reviewed: Y  Imaging Personally Reviewed: Y  Electrocardiogram Personally Reviewed: Y    COORDINATION OF CARE:  Care Discussed with Consultants/Other Providers [Y/N]: Y  Prior or Outpatient Records Reviewed [Y/N]: Y

## 2021-12-13 NOTE — PROGRESS NOTE ADULT - ASSESSMENT
Systolic CHF   chronic   newly discovered  cont BB   will consider adding ace or arb in future , can be started as outpt     NSVT  cont BB  keep K within normal limit    HTN  stable    CKD  on HD   plan for AVF    Pre-Operative Cardiac Risk Stratification and Optimization   Based on patient history and physical exam, the patient is considered to have elevated risk   stress test shows mostly infarcted areas with minimal ischemia  pt has no active CV symptoms   can proceed to this low risk procedure with acceptable elevated risks

## 2021-12-14 LAB
ALBUMIN SERPL ELPH-MCNC: 2.7 G/DL — LOW (ref 3.3–5)
ALBUMIN SERPL ELPH-MCNC: 2.7 G/DL — LOW (ref 3.3–5)
ALP SERPL-CCNC: 64 U/L — SIGNIFICANT CHANGE UP (ref 40–120)
ALP SERPL-CCNC: 64 U/L — SIGNIFICANT CHANGE UP (ref 40–120)
ALT FLD-CCNC: <5 U/L — LOW (ref 4–41)
ALT FLD-CCNC: <5 U/L — LOW (ref 4–41)
ANION GAP SERPL CALC-SCNC: 11 MMOL/L — SIGNIFICANT CHANGE UP (ref 7–14)
ANION GAP SERPL CALC-SCNC: 12 MMOL/L — SIGNIFICANT CHANGE UP (ref 7–14)
APTT BLD: 30.1 SEC — SIGNIFICANT CHANGE UP (ref 27–36.3)
APTT BLD: 42.4 SEC — HIGH (ref 27–36.3)
AST SERPL-CCNC: 8 U/L — SIGNIFICANT CHANGE UP (ref 4–40)
AST SERPL-CCNC: 8 U/L — SIGNIFICANT CHANGE UP (ref 4–40)
BILIRUB SERPL-MCNC: 0.5 MG/DL — SIGNIFICANT CHANGE UP (ref 0.2–1.2)
BILIRUB SERPL-MCNC: 0.6 MG/DL — SIGNIFICANT CHANGE UP (ref 0.2–1.2)
BUN SERPL-MCNC: 26 MG/DL — HIGH (ref 7–23)
BUN SERPL-MCNC: 28 MG/DL — HIGH (ref 7–23)
CALCIUM SERPL-MCNC: 8.1 MG/DL — LOW (ref 8.4–10.5)
CALCIUM SERPL-MCNC: 8.2 MG/DL — LOW (ref 8.4–10.5)
CHLORIDE SERPL-SCNC: 100 MMOL/L — SIGNIFICANT CHANGE UP (ref 98–107)
CHLORIDE SERPL-SCNC: 100 MMOL/L — SIGNIFICANT CHANGE UP (ref 98–107)
CO2 SERPL-SCNC: 25 MMOL/L — SIGNIFICANT CHANGE UP (ref 22–31)
CO2 SERPL-SCNC: 26 MMOL/L — SIGNIFICANT CHANGE UP (ref 22–31)
CREAT SERPL-MCNC: 4.17 MG/DL — HIGH (ref 0.5–1.3)
CREAT SERPL-MCNC: 4.55 MG/DL — HIGH (ref 0.5–1.3)
GLUCOSE SERPL-MCNC: 95 MG/DL — SIGNIFICANT CHANGE UP (ref 70–99)
GLUCOSE SERPL-MCNC: 99 MG/DL — SIGNIFICANT CHANGE UP (ref 70–99)
HCT VFR BLD CALC: 29.8 % — LOW (ref 39–50)
HCT VFR BLD CALC: 31 % — LOW (ref 39–50)
HGB BLD-MCNC: 9.4 G/DL — LOW (ref 13–17)
HGB BLD-MCNC: 9.7 G/DL — LOW (ref 13–17)
INR BLD: 1.18 RATIO — HIGH (ref 0.88–1.16)
INR BLD: 1.18 RATIO — HIGH (ref 0.88–1.16)
MAGNESIUM SERPL-MCNC: 1.7 MG/DL — SIGNIFICANT CHANGE UP (ref 1.6–2.6)
MAGNESIUM SERPL-MCNC: 1.8 MG/DL — SIGNIFICANT CHANGE UP (ref 1.6–2.6)
MCHC RBC-ENTMCNC: 29.1 PG — SIGNIFICANT CHANGE UP (ref 27–34)
MCHC RBC-ENTMCNC: 29.5 PG — SIGNIFICANT CHANGE UP (ref 27–34)
MCHC RBC-ENTMCNC: 31.3 GM/DL — LOW (ref 32–36)
MCHC RBC-ENTMCNC: 31.5 GM/DL — LOW (ref 32–36)
MCV RBC AUTO: 93.1 FL — SIGNIFICANT CHANGE UP (ref 80–100)
MCV RBC AUTO: 93.4 FL — SIGNIFICANT CHANGE UP (ref 80–100)
NRBC # BLD: 0 /100 WBCS — SIGNIFICANT CHANGE UP
NRBC # BLD: 0 /100 WBCS — SIGNIFICANT CHANGE UP
NRBC # FLD: 0 K/UL — SIGNIFICANT CHANGE UP
NRBC # FLD: 0 K/UL — SIGNIFICANT CHANGE UP
PHOSPHATE SERPL-MCNC: 5 MG/DL — HIGH (ref 2.5–4.5)
PHOSPHATE SERPL-MCNC: 5.2 MG/DL — HIGH (ref 2.5–4.5)
PLATELET # BLD AUTO: 182 K/UL — SIGNIFICANT CHANGE UP (ref 150–400)
PLATELET # BLD AUTO: 206 K/UL — SIGNIFICANT CHANGE UP (ref 150–400)
POTASSIUM SERPL-MCNC: 4 MMOL/L — SIGNIFICANT CHANGE UP (ref 3.5–5.3)
POTASSIUM SERPL-MCNC: 4.1 MMOL/L — SIGNIFICANT CHANGE UP (ref 3.5–5.3)
POTASSIUM SERPL-SCNC: 4 MMOL/L — SIGNIFICANT CHANGE UP (ref 3.5–5.3)
POTASSIUM SERPL-SCNC: 4.1 MMOL/L — SIGNIFICANT CHANGE UP (ref 3.5–5.3)
PROT SERPL-MCNC: 4.6 G/DL — LOW (ref 6–8.3)
PROT SERPL-MCNC: 4.9 G/DL — LOW (ref 6–8.3)
PROTHROM AB SERPL-ACNC: 13.4 SEC — SIGNIFICANT CHANGE UP (ref 10.6–13.6)
PROTHROM AB SERPL-ACNC: 13.5 SEC — SIGNIFICANT CHANGE UP (ref 10.6–13.6)
RBC # BLD: 3.19 M/UL — LOW (ref 4.2–5.8)
RBC # BLD: 3.33 M/UL — LOW (ref 4.2–5.8)
RBC # FLD: 18.9 % — HIGH (ref 10.3–14.5)
RBC # FLD: 18.9 % — HIGH (ref 10.3–14.5)
SODIUM SERPL-SCNC: 136 MMOL/L — SIGNIFICANT CHANGE UP (ref 135–145)
SODIUM SERPL-SCNC: 138 MMOL/L — SIGNIFICANT CHANGE UP (ref 135–145)
WBC # BLD: 5.94 K/UL — SIGNIFICANT CHANGE UP (ref 3.8–10.5)
WBC # BLD: 6.88 K/UL — SIGNIFICANT CHANGE UP (ref 3.8–10.5)
WBC # FLD AUTO: 5.94 K/UL — SIGNIFICANT CHANGE UP (ref 3.8–10.5)
WBC # FLD AUTO: 6.88 K/UL — SIGNIFICANT CHANGE UP (ref 3.8–10.5)

## 2021-12-14 PROCEDURE — 99233 SBSQ HOSP IP/OBS HIGH 50: CPT

## 2021-12-14 PROCEDURE — 36830 ARTERY-VEIN NONAUTOGRAFT: CPT | Mod: LT

## 2021-12-14 RX ORDER — HEPARIN SODIUM 5000 [USP'U]/ML
500 INJECTION INTRAVENOUS; SUBCUTANEOUS
Refills: 0 | Status: DISCONTINUED | OUTPATIENT
Start: 2021-12-14 | End: 2021-12-17

## 2021-12-14 RX ORDER — ACETAMINOPHEN 500 MG
1000 TABLET ORAL ONCE
Refills: 0 | Status: DISCONTINUED | OUTPATIENT
Start: 2021-12-14 | End: 2021-12-17

## 2021-12-14 RX ORDER — SODIUM CHLORIDE 9 MG/ML
1000 INJECTION, SOLUTION INTRAVENOUS
Refills: 0 | Status: DISCONTINUED | OUTPATIENT
Start: 2021-12-14 | End: 2021-12-14

## 2021-12-14 RX ORDER — ONDANSETRON 8 MG/1
4 TABLET, FILM COATED ORAL ONCE
Refills: 0 | Status: DISCONTINUED | OUTPATIENT
Start: 2021-12-14 | End: 2021-12-14

## 2021-12-14 RX ORDER — HEPARIN SODIUM 5000 [USP'U]/ML
2000 INJECTION INTRAVENOUS; SUBCUTANEOUS ONCE
Refills: 0 | Status: DISCONTINUED | OUTPATIENT
Start: 2021-12-14 | End: 2021-12-17

## 2021-12-14 RX ORDER — OXYCODONE HYDROCHLORIDE 5 MG/1
2.5 TABLET ORAL ONCE
Refills: 0 | Status: DISCONTINUED | OUTPATIENT
Start: 2021-12-14 | End: 2021-12-17

## 2021-12-14 RX ADMIN — MUPIROCIN 1 APPLICATION(S): 20 OINTMENT TOPICAL at 05:14

## 2021-12-14 RX ADMIN — HEPARIN SODIUM 5000 UNIT(S): 5000 INJECTION INTRAVENOUS; SUBCUTANEOUS at 21:08

## 2021-12-14 RX ADMIN — FINASTERIDE 5 MILLIGRAM(S): 5 TABLET, FILM COATED ORAL at 15:45

## 2021-12-14 RX ADMIN — CALCITRIOL 0.5 MICROGRAM(S): 0.5 CAPSULE ORAL at 15:45

## 2021-12-14 RX ADMIN — PANTOPRAZOLE SODIUM 40 MILLIGRAM(S): 20 TABLET, DELAYED RELEASE ORAL at 05:18

## 2021-12-14 RX ADMIN — SODIUM CHLORIDE 75 MILLILITER(S): 9 INJECTION, SOLUTION INTRAVENOUS at 14:06

## 2021-12-14 RX ADMIN — Medication 6 MILLIGRAM(S): at 21:04

## 2021-12-14 RX ADMIN — BUMETANIDE 2 MILLIGRAM(S): 0.25 INJECTION INTRAMUSCULAR; INTRAVENOUS at 05:14

## 2021-12-14 RX ADMIN — Medication 650 MILLIGRAM(S): at 22:00

## 2021-12-14 RX ADMIN — Medication 650 MILLIGRAM(S): at 21:08

## 2021-12-14 RX ADMIN — HEPARIN SODIUM 5000 UNIT(S): 5000 INJECTION INTRAVENOUS; SUBCUTANEOUS at 05:18

## 2021-12-14 RX ADMIN — CHLORHEXIDINE GLUCONATE 1 APPLICATION(S): 213 SOLUTION TOPICAL at 15:43

## 2021-12-14 RX ADMIN — Medication 75 MILLIGRAM(S): at 05:14

## 2021-12-14 NOTE — PROGRESS NOTE ADULT - ASSESSMENT
89yo M with Hx CKD3 (not previously on HD), HTN, GERD, and anemia who presented with SOB and CP, found to have persistent hyperkalemia, with need for urgent HD. Vascular Surgery consulted for urgent Shiley placement. Patient is s/p urgent R IJ Shiley placement on 12/4. Planning ofr OR 12/14 for AV fistula placement.    PLAN:   - OR today for AV graft with Dr. Mcconnell  - Please document medical risk stratification and clearance: per cardiology can proceed to this low risk procedure with acceptable elevated risks   - Left arm precautions: please place pink band for arm precaution no blood draws, blood pressure measurements or IV line placement   - s/p Right Shiley, working well  - HD per renal; please no HD on Tuesday prior to OR, obtain after OR  - C/w care per primary team  - Vascular Surgery will follow    Yan Guerrero PGY-2  C Team Surgery  [id number]

## 2021-12-14 NOTE — PROGRESS NOTE ADULT - ASSESSMENT
Systolic CHF   chronic   newly discovered  cont BB   will consider adding ace or arb in future , can be started as outpt     NSVT  cont BB  monitor electrolytes     HTN  stable    CKD  on HD   plan for AVF    Pre-Operative Cardiac Risk Stratification and Optimization   Based on patient history and physical exam, the patient is considered to have elevated risk   stress test shows mostly infarcted areas with minimal ischemia  pt has no active CV symptoms   can proceed to this low risk procedure with acceptable elevated risks

## 2021-12-14 NOTE — PROGRESS NOTE ADULT - SUBJECTIVE AND OBJECTIVE BOX
[facility] Division of Hospital Medicine  Samuel Hinkle MD  In House Pager [phone]    Patient is a 89y old  Male who presents with a chief complaint of Shortness of Breath (14 Dec 2021 09:08)      SUBJECTIVE / OVERNIGHT EVENTS:  No overnight events. Labs and vitals reviewed.   Patient seen and examined at bedside, no acute complaints. s/p vascular graft surgery.   No fever, no chills, no SOB, no CP, no n/v/d, no abd pain, no dysuria      MEDICATIONS  (STANDING):  buMETAnide 2 milliGRAM(s) Oral daily  calcitriol   Capsule 0.5 MICROGram(s) Oral daily  chlorhexidine 2% Cloths 1 Application(s) Topical daily  epoetin jono-epbx (RETACRIT) Injectable 4000 Unit(s) IV Push <User Schedule>  finasteride 5 milliGRAM(s) Oral daily  heparin   Injectable 5000 Unit(s) SubCutaneous every 8 hours  influenza  Vaccine (HIGH DOSE) 0.7 milliLiter(s) IntraMuscular once  iron sucrose Injectable 100 milliGRAM(s) IV Push <User Schedule>  melatonin 6 milliGRAM(s) Oral at bedtime  metoprolol succinate ER 75 milliGRAM(s) Oral daily  pantoprazole    Tablet 40 milliGRAM(s) Oral before breakfast    MEDICATIONS  (PRN):  acetaminophen     Tablet .. 650 milliGRAM(s) Oral every 6 hours PRN Temp greater or equal to 38C (100.4F), Mild Pain (1 - 3), Moderate Pain (4 - 6)  acetaminophen   IVPB .. 1000 milliGRAM(s) IV Intermittent once PRN Moderate Pain (4 - 6)  oxyCODONE    IR 2.5 milliGRAM(s) Oral once PRN Severe Pain (7 - 10)    CAPILLARY BLOOD GLUCOSE        I&O's Summary    13 Dec 2021 07:01  -  14 Dec 2021 07:00  --------------------------------------------------------  IN: 968 mL / OUT: 1186 mL / NET: -218 mL    14 Dec 2021 07:01  -  14 Dec 2021 16:07  --------------------------------------------------------  IN: 400 mL / OUT: 0 mL / NET: 400 mL        PHYSICAL EXAM:  Vital Signs Last 24 Hrs  T(C): 36.4 (14 Dec 2021 14:59), Max: 37 (13 Dec 2021 16:10)  T(F): 97.5 (14 Dec 2021 14:59), Max: 98.6 (13 Dec 2021 16:10)  HR: 67 (14 Dec 2021 14:59) (59 - 76)  BP: 121/67 (14 Dec 2021 14:59) (101/57 - 154/70)  BP(mean): 78 (14 Dec 2021 14:30) (78 - 94)  RR: 18 (14 Dec 2021 14:59) (12 - 19)  SpO2: 98% (14 Dec 2021 14:59) (98% - 100%)    Gen: NAD; resting in bed. awake and alert.   Pulm: no respiratory distress; no accessory muscle use; CTA b/l; no wheezing; no crackles.   Cards: RRR, nl S1/S2; no obvious murmurs; no LE edema; no JVD  Abd: soft; NT on exam; +bs  Ext: no cyanosis; no joint effusion; no joint pain on palpation.  Skin: no rash; no cyanosis    LABS:                        9.4    5.94  )-----------( 206      ( 14 Dec 2021 07:16 )             29.8     12-14    138  |  100  |  28<H>  ----------------------------<  95  4.1   |  26  |  4.55<H>    Ca    8.2<L>      14 Dec 2021 07:16  Phos  5.0     12-14  Mg     1.80     12-14    TPro  4.9<L>  /  Alb  2.7<L>  /  TBili  0.5  /  DBili  x   /  AST  8   /  ALT  <5<L>  /  AlkPhos  64  12-14    PT/INR - ( 14 Dec 2021 07:16 )   PT: 13.5 sec;   INR: 1.18 ratio         PTT - ( 14 Dec 2021 07:16 )  PTT:30.1 sec            RADIOLOGY & ADDITIONAL TESTS:  Results Reviewed: Y  Imaging Personally Reviewed: Y  Electrocardiogram Personally Reviewed: Y    COORDINATION OF CARE:  Care Discussed with Consultants/Other Providers [Y/N]: Y  Prior or Outpatient Records Reviewed [Y/N]: Y

## 2021-12-14 NOTE — CHART NOTE - NSCHARTNOTEFT_GEN_A_CORE
May use graft for next HD session.   [place] Accuseal Graft Instructions:    -Please adhere to septic technique and use sterile gloves.  -ward clean puncture with 17 gauge needle  -maintain blood flow 200-250 ml/min  -administer low dose heparin  -Hold pressure for 10-15 minutes post needle withdrawal!    Vascular   #[id number]

## 2021-12-14 NOTE — CHART NOTE - NSCHARTNOTEFT_GEN_A_CORE
POST-OPERATIVE CHECK    SUBJECTIVE  Patient is s/p LUE brachial-brachial graft with [place]. Pt had arm pain, which resolved with Tylenol. Pt denies nausea, vomiting, chest pain.    Vital Signs Last 24 Hrs  T(C): 36.4 (14 Dec 2021 14:59), Max: 37 (14 Dec 2021 14:00)  T(F): 97.5 (14 Dec 2021 14:59), Max: 98.6 (14 Dec 2021 14:00)  HR: 67 (14 Dec 2021 14:59) (59 - 76)  BP: 121/67 (14 Dec 2021 14:59) (116/65 - 154/70)  BP(mean): 78 (14 Dec 2021 14:30) (78 - 94)  RR: 18 (14 Dec 2021 14:59) (12 - 19)  SpO2: 98% (14 Dec 2021 14:59) (98% - 100%)  I&O's Detail    13 Dec 2021 07:01  -  14 Dec 2021 07:00  --------------------------------------------------------  IN:    Oral Fluid: 468 mL    Other (mL): 500 mL  Total IN: 968 mL    OUT:    Other (mL): 1186 mL  Total OUT: 1186 mL    Total NET: -218 mL      14 Dec 2021 07:01  -  14 Dec 2021 18:50  --------------------------------------------------------  IN:    Lactated Ringers: 150 mL    Oral Fluid: 250 mL  Total IN: 400 mL    OUT:  Total OUT: 0 mL    Total NET: 400 mL        buMETAnide 2  heparin   Injectable 5000  metoprolol succinate ER 75    PAST MEDICAL & SURGICAL HISTORY:  HTN - Hypertension    CKD (chronic kidney disease)    Hiatal hernia    GERD (gastroesophageal reflux disease)    Prostate cancer    S/P appendectomy  performed at [facility] 10 y ago    Prostate  Brachytherapy 30y ago at [facility] - had seeds    Acute gastric volvulus  s/p laparoscopic reduction, PEG that was later reversed    Hernia, paraesophageal        PHYSICAL EXAM  General: NAD, resting comfortably in bed  Pulmonary: Nonlabored breathing, no respiratory distress  Cardiovascular: NSR  Abdominal: soft, NT/ND  Extremities: WWP, LUE with palpable thrill over graft    LABS                        9.4    5.94  )-----------( 206      ( 14 Dec 2021 07:16 )             29.8     12-14    138  |  100  |  28<H>  ----------------------------<  95  4.1   |  26  |  4.55<H>    Ca    8.2<L>      14 Dec 2021 07:16  Phos  5.0     12-14  Mg     1.80     12-14    TPro  4.9<L>  /  Alb  2.7<L>  /  TBili  0.5  /  DBili  x   /  AST  8   /  ALT  <5<L>  /  AlkPhos  64  12-14    PT/INR - ( 14 Dec 2021 07:16 )   PT: 13.5 sec;   INR: 1.18 ratio         PTT - ( 14 Dec 2021 07:16 )  PTT:30.1 sec  CAPILLARY BLOOD GLUCOSE      ASSESSMENT  89yo M with Hx CKD3 (not previously on HD), HTN, GERD, and anemia who presented with SOB and CP, found to have persistent hyperkalemia, with need for urgent HD. Vascular Surgery consulted for urgent Shiley placement. Patient is s/p urgent R IJ Shiley placement on 12/4. Now s/p 12/14 AV fistula placement.    PLAN  - Left arm precautions: please place pink band for arm precaution no blood draws, blood pressure measurements or IV line placement   - s/p Right Lauren, working well  - HD per renal; please no HD on Tuesday prior to OR, obtain after OR  ****PLEASE REFER TO CHART NOTE OUTLINING GORE GRAFT HD INSTRUCTIONS*****  - C/w care per primary team  - Vascular Surgery will follow    Vascular Surgery  [id number]

## 2021-12-14 NOTE — PROGRESS NOTE ADULT - PROBLEM SELECTOR PLAN 3
- s/p HD initiation, improvement in K+ and volume overload  - reduced Bumex po to once daily  - IV iron per nephro  - HD per renal  - s/p AVG placement 12/14.   - plan for HD via AVG, if functioning well, can d/toni swanson.

## 2021-12-14 NOTE — BRIEF OPERATIVE NOTE - NSICDXBRIEFPROCEDURE_GEN_ALL_CORE_FT
PROCEDURES:  Creation of arteriovenous fistula using synthetic graft 14-Dec-2021 13:15:07  Omega Lopez

## 2021-12-14 NOTE — PROGRESS NOTE ADULT - SUBJECTIVE AND OBJECTIVE BOX
Vascular Surgery Progress Note    SUBJECTIVE:  Reports pain is well controlled, feels well, npo since last night, denies chest pain, shortness of breath, f/n/c    OBJECTIVE:  Vital Signs Last 24 Hrs  T(C): 36.5 (14 Dec 2021 05:00), Max: 37 (13 Dec 2021 16:10)  T(F): 97.7 (14 Dec 2021 05:00), Max: 98.6 (13 Dec 2021 16:10)  HR: 66 (14 Dec 2021 05:00) (61 - 67)  BP: 120/60 (14 Dec 2021 05:00) (101/57 - 141/71)  BP(mean): --  RR: 17 (14 Dec 2021 05:00) (16 - 19)  SpO2: 99% (14 Dec 2021 05:00) (98% - 100%)    Physical Examination:  Constitutional: resting in bed with no acute distress  Respiratory: unlabored breathing, clear respiration  Gastrointestinal: Abdomen soft, non distended, non-tender  Extremities: no gross deformities; spontaneous movement in b/l U/L extremities, radial pulses bilaterally      I&O's Detail    13 Dec 2021 07:01  -  14 Dec 2021 07:00  --------------------------------------------------------  IN:    Oral Fluid: 468 mL    Other (mL): 500 mL  Total IN: 968 mL    OUT:    Other (mL): 1186 mL  Total OUT: 1186 mL    Total NET: -218 mL            LABS:                        9.4    5.94  )-----------( 206      ( 14 Dec 2021 07:16 )             29.8       12-14    138  |  100  |  28<H>  ----------------------------<  95  4.1   |  26  |  4.55<H>    Ca    8.2<L>      14 Dec 2021 07:16  Phos  5.0     12-14  Mg     1.80     12-14    TPro  4.9<L>  /  Alb  2.7<L>  /  TBili  0.5  /  DBili  x   /  AST  8   /  ALT  <5<L>  /  AlkPhos  64  12-14        IMAGING:  []

## 2021-12-14 NOTE — PROGRESS NOTE ADULT - ASSESSMENT
89 y/o male, with a PmHx of CKD3, HTN, Gerd, [place], Iron Deficiency Anemia, presented to the [facility] ED with SOB. Admitted to telemetry for new HD and Hyperkalemia (now resolved). s/p AVG placement 12/14.

## 2021-12-14 NOTE — PRE-OP CHECKLIST - ALLERGIES REVIEWED
Call back to patient reviewed results and recommendations. He is in agreement. Sent information to his Laquita baez per patient request.     Orders entered   done

## 2021-12-14 NOTE — PROGRESS NOTE ADULT - SUBJECTIVE AND OBJECTIVE BOX
DATE OF SERVICE: 12-14-21 @ 06:48    Subjective: Patient seen and examined. No new events except as noted.     SUBJECTIVE/ROS:  No chest pain, dyspnea, palpitation, or dizziness.       MEDICATIONS:  MEDICATIONS  (STANDING):  buMETAnide 2 milliGRAM(s) Oral daily  calcitriol   Capsule 0.5 MICROGram(s) Oral daily  chlorhexidine 2% Cloths 1 Application(s) Topical daily  epoetin jono-epbx (RETACRIT) Injectable 4000 Unit(s) IV Push <User Schedule>  finasteride 5 milliGRAM(s) Oral daily  heparin   Injectable 5000 Unit(s) SubCutaneous every 8 hours  influenza  Vaccine (HIGH DOSE) 0.7 milliLiter(s) IntraMuscular once  iron sucrose Injectable 100 milliGRAM(s) IV Push <User Schedule>  melatonin 6 milliGRAM(s) Oral at bedtime  metoprolol succinate ER 75 milliGRAM(s) Oral daily  pantoprazole    Tablet 40 milliGRAM(s) Oral before breakfast      PHYSICAL EXAM:  T(C): 36.5 (12-14-21 @ 05:00), Max: 37 (12-13-21 @ 16:10)  HR: 66 (12-14-21 @ 05:00) (61 - 67)  BP: 120/60 (12-14-21 @ 05:00) (101/57 - 141/71)  RR: 17 (12-14-21 @ 05:00) (16 - 19)  SpO2: 99% (12-14-21 @ 05:00) (98% - 100%)  Wt(kg): --  I&O's Summary    13 Dec 2021 07:01  -  14 Dec 2021 06:48  --------------------------------------------------------  IN: 768 mL / OUT: 1186 mL / NET: -418 mL            JVP: Normal  Neck: supple  Lung: clear   CV: S1 S2 , Murmur:  Abd: soft  Ext: No edema  neuro: Awake / alert  Psych: flat affect  Skin: normal``    LABS/DATA:    CARDIAC MARKERS:                                9.7    6.88  )-----------( 182      ( 14 Dec 2021 01:48 )             31.0     12-14    136  |  100  |  26<H>  ----------------------------<  99  4.0   |  25  |  4.17<H>    Ca    8.1<L>      14 Dec 2021 01:48  Phos  5.2     12-14  Mg     1.70     12-14    TPro  4.6<L>  /  Alb  2.7<L>  /  TBili  0.6  /  DBili  x   /  AST  8   /  ALT  <5<L>  /  AlkPhos  64  12-14    proBNP:   Lipid Profile:   HgA1c:   TSH:     TELE:  EKG:

## 2021-12-14 NOTE — PROGRESS NOTE ADULT - PROBLEM SELECTOR PLAN 1
- No definitive bleeding source, likely in setting of CKD. Iron studies suggestive of AOCI. May have some iron deficiency. Nephro started IV iron with HD.  - h/h improve with transfusion.   - monitor h/h.

## 2021-12-15 LAB
ALBUMIN SERPL ELPH-MCNC: 2.7 G/DL — LOW (ref 3.3–5)
ALP SERPL-CCNC: 62 U/L — SIGNIFICANT CHANGE UP (ref 40–120)
ALT FLD-CCNC: <5 U/L — LOW (ref 4–41)
ANION GAP SERPL CALC-SCNC: 14 MMOL/L — SIGNIFICANT CHANGE UP (ref 7–14)
AST SERPL-CCNC: 7 U/L — SIGNIFICANT CHANGE UP (ref 4–40)
BILIRUB SERPL-MCNC: 0.3 MG/DL — SIGNIFICANT CHANGE UP (ref 0.2–1.2)
BUN SERPL-MCNC: 36 MG/DL — HIGH (ref 7–23)
CALCIUM SERPL-MCNC: 8 MG/DL — LOW (ref 8.4–10.5)
CHLORIDE SERPL-SCNC: 100 MMOL/L — SIGNIFICANT CHANGE UP (ref 98–107)
CO2 SERPL-SCNC: 24 MMOL/L — SIGNIFICANT CHANGE UP (ref 22–31)
CREAT SERPL-MCNC: 5.54 MG/DL — HIGH (ref 0.5–1.3)
GLUCOSE BLDC GLUCOMTR-MCNC: 117 MG/DL — HIGH (ref 70–99)
GLUCOSE SERPL-MCNC: 96 MG/DL — SIGNIFICANT CHANGE UP (ref 70–99)
HCT VFR BLD CALC: 30 % — LOW (ref 39–50)
HGB BLD-MCNC: 10 G/DL — LOW (ref 13–17)
MAGNESIUM SERPL-MCNC: 1.8 MG/DL — SIGNIFICANT CHANGE UP (ref 1.6–2.6)
MCHC RBC-ENTMCNC: 30.1 PG — SIGNIFICANT CHANGE UP (ref 27–34)
MCHC RBC-ENTMCNC: 33.3 GM/DL — SIGNIFICANT CHANGE UP (ref 32–36)
MCV RBC AUTO: 90.4 FL — SIGNIFICANT CHANGE UP (ref 80–100)
NRBC # BLD: 0 /100 WBCS — SIGNIFICANT CHANGE UP
NRBC # FLD: 0 K/UL — SIGNIFICANT CHANGE UP
PHOSPHATE SERPL-MCNC: 6.8 MG/DL — HIGH (ref 2.5–4.5)
PLATELET # BLD AUTO: 210 K/UL — SIGNIFICANT CHANGE UP (ref 150–400)
POTASSIUM SERPL-MCNC: 4.1 MMOL/L — SIGNIFICANT CHANGE UP (ref 3.5–5.3)
POTASSIUM SERPL-SCNC: 4.1 MMOL/L — SIGNIFICANT CHANGE UP (ref 3.5–5.3)
PROT SERPL-MCNC: 4.8 G/DL — LOW (ref 6–8.3)
RBC # BLD: 3.32 M/UL — LOW (ref 4.2–5.8)
RBC # FLD: 18.1 % — HIGH (ref 10.3–14.5)
SARS-COV-2 RNA SPEC QL NAA+PROBE: SIGNIFICANT CHANGE UP
SODIUM SERPL-SCNC: 138 MMOL/L — SIGNIFICANT CHANGE UP (ref 135–145)
WBC # BLD: 7.33 K/UL — SIGNIFICANT CHANGE UP (ref 3.8–10.5)
WBC # FLD AUTO: 7.33 K/UL — SIGNIFICANT CHANGE UP (ref 3.8–10.5)

## 2021-12-15 PROCEDURE — 90935 HEMODIALYSIS ONE EVALUATION: CPT | Mod: GC

## 2021-12-15 PROCEDURE — 99233 SBSQ HOSP IP/OBS HIGH 50: CPT

## 2021-12-15 RX ORDER — LIDOCAINE 4 G/100G
1 CREAM TOPICAL ONCE
Refills: 0 | Status: COMPLETED | OUTPATIENT
Start: 2021-12-15 | End: 2021-12-15

## 2021-12-15 RX ADMIN — PANTOPRAZOLE SODIUM 40 MILLIGRAM(S): 20 TABLET, DELAYED RELEASE ORAL at 06:03

## 2021-12-15 RX ADMIN — LIDOCAINE 1 APPLICATION(S): 4 CREAM TOPICAL at 11:00

## 2021-12-15 RX ADMIN — Medication 6 MILLIGRAM(S): at 22:10

## 2021-12-15 RX ADMIN — Medication 650 MILLIGRAM(S): at 18:34

## 2021-12-15 RX ADMIN — CALCITRIOL 0.5 MICROGRAM(S): 0.5 CAPSULE ORAL at 16:34

## 2021-12-15 RX ADMIN — CHLORHEXIDINE GLUCONATE 1 APPLICATION(S): 213 SOLUTION TOPICAL at 16:33

## 2021-12-15 RX ADMIN — HEPARIN SODIUM 5000 UNIT(S): 5000 INJECTION INTRAVENOUS; SUBCUTANEOUS at 06:05

## 2021-12-15 RX ADMIN — Medication 650 MILLIGRAM(S): at 12:50

## 2021-12-15 RX ADMIN — BUMETANIDE 2 MILLIGRAM(S): 0.25 INJECTION INTRAMUSCULAR; INTRAVENOUS at 06:13

## 2021-12-15 RX ADMIN — FINASTERIDE 5 MILLIGRAM(S): 5 TABLET, FILM COATED ORAL at 16:34

## 2021-12-15 RX ADMIN — ERYTHROPOIETIN 4000 UNIT(S): 10000 INJECTION, SOLUTION INTRAVENOUS; SUBCUTANEOUS at 14:35

## 2021-12-15 RX ADMIN — Medication 75 MILLIGRAM(S): at 06:13

## 2021-12-15 RX ADMIN — Medication 650 MILLIGRAM(S): at 12:20

## 2021-12-15 NOTE — PROGRESS NOTE ADULT - SUBJECTIVE AND OBJECTIVE BOX
ANESTHESIA POSTOP CHECK    89y Male POSTOP DAY 1   Vital Signs Last 24 Hrs  T(C): 36.7 (15 Dec 2021 06:02), Max: 37 (14 Dec 2021 14:00)  T(F): 98 (15 Dec 2021 06:02), Max: 98.6 (14 Dec 2021 14:00)  HR: 84 (15 Dec 2021 06:02) (67 - 84)  BP: 143/66 (15 Dec 2021 06:02) (116/65 - 144/80)  BP(mean): 78 (14 Dec 2021 14:30) (78 - 94)  RR: 18 (15 Dec 2021 06:02) (12 - 18)  SpO2: 100% (15 Dec 2021 06:02) (98% - 100%)  I&O's Summary    14 Dec 2021 07:01  -  15 Dec 2021 07:00  --------------------------------------------------------  IN: 636 mL / OUT: 0 mL / NET: 636 mL    15 Dec 2021 07:01  -  15 Dec 2021 12:31  --------------------------------------------------------  IN: 100 mL / OUT: 0 mL / NET: 100 mL        [x ] NO APPARENT ANESTHESIA COMPLICATIONS      Comments:

## 2021-12-15 NOTE — PROGRESS NOTE ADULT - PROBLEM SELECTOR PLAN 1
Pt. tolerating HD. Pt. with intradialytic hypotension earlier. UF goal decreased. Dialysate temperature lowered. BP improved/stable at time of HD rounds. AVG functioning well during rounds. Pt. with anemia, initiated on IV iron therapy with HD. Pt. underwent PRBC transfusion on 12/13/21. Hemoglobin improved to 10.0 today. PLACIDO (Retacrit) ordered with HD today. Recommend to remove RIJ non-tunneled HD catheter. Monitor BP and labs.    If you have any questions, please feel free to contact me  Johnny Ch  Nephrology Fellow  [phone]  (After 5pm or on weekends please page the on-call fellow)

## 2021-12-15 NOTE — PROGRESS NOTE ADULT - SUBJECTIVE AND OBJECTIVE BOX
[facility] Division of Hospital Medicine  Samuel Hinkle MD  In House Pager [phone]    Patient is a 89y old  Male who presents with a chief complaint of Shortness of Breath (15 Dec 2021 14:02)      SUBJECTIVE / OVERNIGHT EVENTS:  No overnight events. Labs and vitals reviewed. stable.   Patient seen and examined at bedside, no acute complaints. planning for first HD via new AVG today.   No fever, no chills, no SOB, no CP, no n/v/d, no abd pain, no dysuria      MEDICATIONS  (STANDING):  buMETAnide 2 milliGRAM(s) Oral daily  calcitriol   Capsule 0.5 MICROGram(s) Oral daily  chlorhexidine 2% Cloths 1 Application(s) Topical daily  epoetin jono-epbx (RETACRIT) Injectable 4000 Unit(s) IV Push <User Schedule>  finasteride 5 milliGRAM(s) Oral daily  heparin   Injectable 5000 Unit(s) SubCutaneous every 8 hours  heparin   Injectable. 500 Unit(s) Dialysis. every 1 hour  influenza  Vaccine (HIGH DOSE) 0.7 milliLiter(s) IntraMuscular once  iron sucrose Injectable 100 milliGRAM(s) IV Push <User Schedule>  lidocaine 4% Cream 1 Application(s) Topical once  melatonin 6 milliGRAM(s) Oral at bedtime  metoprolol succinate ER 75 milliGRAM(s) Oral daily  pantoprazole    Tablet 40 milliGRAM(s) Oral before breakfast  sodium chloride 0.9% with heparin 2,000 Unit(s)/L 2000 Unit(s) Dialysis once    MEDICATIONS  (PRN):  acetaminophen     Tablet .. 650 milliGRAM(s) Oral every 6 hours PRN Temp greater or equal to 38C (100.4F), Mild Pain (1 - 3), Moderate Pain (4 - 6)  acetaminophen   IVPB .. 1000 milliGRAM(s) IV Intermittent once PRN Moderate Pain (4 - 6)  oxyCODONE    IR 2.5 milliGRAM(s) Oral once PRN Severe Pain (7 - 10)    CAPILLARY BLOOD GLUCOSE      POCT Blood Glucose.: 117 mg/dL (15 Dec 2021 13:34)    I&O's Summary    14 Dec 2021 07:01  -  15 Dec 2021 07:00  --------------------------------------------------------  IN: 636 mL / OUT: 0 mL / NET: 636 mL    15 Dec 2021 07:01  -  15 Dec 2021 14:26  --------------------------------------------------------  IN: 100 mL / OUT: 0 mL / NET: 100 mL        PHYSICAL EXAM:  Vital Signs Last 24 Hrs  T(C): 36.4 (15 Dec 2021 11:30), Max: 36.7 (14 Dec 2021 20:11)  T(F): 97.5 (15 Dec 2021 11:30), Max: 98.1 (14 Dec 2021 20:11)  HR: 69 (15 Dec 2021 11:30) (67 - 84)  BP: 146/72 (15 Dec 2021 11:30) (116/65 - 146/72)  BP(mean): 78 (14 Dec 2021 14:30) (78 - 78)  RR: 16 (15 Dec 2021 11:30) (16 - 18)  SpO2: 100% (15 Dec 2021 06:02) (98% - 100%)    Gen: NAD; resting in bed. awake and alert.   Neck: right IJ shiley in place.   Pulm: no respiratory distress; no accessory muscle use; CTA b/l; no wheezing; no crackles.   Cards: RRR, nl S1/S2; no obvious murmurs; no LE edema; no JVD  Abd: soft; NT on exam; +bs  Ext: no cyanosis; no joint effusion; no joint pain on palpation; LUE AVG with +thrill.   Skin: no rash; no cyanosis    LABS:                        10.0   7.33  )-----------( 210      ( 15 Dec 2021 08:02 )             30.0     12-15    138  |  100  |  36<H>  ----------------------------<  96  4.1   |  24  |  5.54<H>    Ca    8.0<L>      15 Dec 2021 08:02  Phos  6.8     12-15  Mg     1.80     12-15    TPro  4.8<L>  /  Alb  2.7<L>  /  TBili  0.3  /  DBili  x   /  AST  7   /  ALT  <5<L>  /  AlkPhos  62  12-15    PT/INR - ( 14 Dec 2021 07:16 )   PT: 13.5 sec;   INR: 1.18 ratio         PTT - ( 14 Dec 2021 07:16 )  PTT:30.1 sec            RADIOLOGY & ADDITIONAL TESTS:  Results Reviewed: Y  Imaging Personally Reviewed: Y  Electrocardiogram Personally Reviewed: Y    COORDINATION OF CARE:  Care Discussed with Consultants/Other Providers [Y/N]: Y  Prior or Outpatient Records Reviewed [Y/N]: Y

## 2021-12-15 NOTE — PROGRESS NOTE ADULT - ASSESSMENT
89yo M with Hx CKD3 (not previously on HD), HTN, GERD, and anemia who presented with SOB and CP, found to have persistent hyperkalemia, with need for urgent HD. Vascular Surgery consulted for urgent Shiley placement. Patient is s/p urgent R IJ Shiley placement on 12/4. Planning ofr OR 12/14 for AV fistula placement. S/p AV graft placement on LUE, 12/14, doing well.    PLAN:   - Os/p 12/14 AVG placement  - Left arm precautions: please place pink band for arm precaution no blood draws, blood pressure measurements or IV line placement   - HD should be obtained according to requirements for early-stick graft, please refer to chart note from 12/14 and page withy any questions  - C/w care per primary team  - Vascular Surgery will follow    Yan Guerrero PGY-2  C Team Surgery  [id number]

## 2021-12-15 NOTE — PROGRESS NOTE ADULT - ASSESSMENT
Systolic CHF   chronic   newly discovered  cont BB   will consider adding ace or arb in future , can be started as outpt     NSVT  cont BB  monitor electrolytes     HTN  stable    CKD  on HD   sp AVF  fu with renal / vascular surgery    discussed with daughter at the bedside

## 2021-12-15 NOTE — PROGRESS NOTE ADULT - PROBLEM SELECTOR PLAN 3
- s/p HD initiation, improvement in K+ and volume overload  - reduced Bumex po to once daily  - IV iron per nephro  - HD per renal  - s/p AVG placement 12/14.   - HD session today. 2nd session Friday, if functioning well, can be dc per nephro.   - Lauren to be d/c tonight after HD session if no complications with HD, discussed with vascular.

## 2021-12-15 NOTE — PROGRESS NOTE ADULT - ASSESSMENT
Pt. with advanced CKD stage 5, initated on long-term HD during current hospital stay. Pt. seen during HD today. /57 at time of HD rounds.      Pt. with advanced CKD stage 5, initated on long-term HD during current hospital stay. Pt. deemed ESRD. Pt. seen during HD today. /57 at time of HD rounds.

## 2021-12-15 NOTE — CHART NOTE - NSCHARTNOTEFT_GEN_A_CORE
R IJ shiley catheter removed. Pressure held for 10 minutes. No bleeding. Dressed with gauze and tape.

## 2021-12-15 NOTE — PROGRESS NOTE ADULT - SUBJECTIVE AND OBJECTIVE BOX
[facility] Division of Kidney Diseases & Hypertension  FOLLOW UP NOTE  [phone]--------------------------------------------------------------------------------    Chief Complaint: ESRD/Ongoing hemodialysis requirement    24 hour events/subjective: Pt. with advanced CKD stage 5, initiated on long-term HD during current hospital stay. Pt. deemed ESRD. Pt. underwent LUE AVG placement with Vascular Surgeon Dr. Mcconnell yesterday (12/14/21). Pt. seen and examined during HD today with daughter present at bedside. Pt. feels well and offered no complaints during HD rounds.    PAST HISTORY  --------------------------------------------------------------------------------  No significant changes to PMH, PSH, FHx, SHx, unless otherwise noted    ALLERGIES & MEDICATIONS  --------------------------------------------------------------------------------  Allergies    No Known Allergies    Intolerances    Standing Inpatient Medications  buMETAnide 2 milliGRAM(s) Oral daily  calcitriol   Capsule 0.5 MICROGram(s) Oral daily  chlorhexidine 2% Cloths 1 Application(s) Topical daily  epoetin jono-epbx (RETACRIT) Injectable 4000 Unit(s) IV Push <User Schedule>  finasteride 5 milliGRAM(s) Oral daily  heparin   Injectable 5000 Unit(s) SubCutaneous every 8 hours  heparin   Injectable. 500 Unit(s) Dialysis. every 1 hour  influenza  Vaccine (HIGH DOSE) 0.7 milliLiter(s) IntraMuscular once  iron sucrose Injectable 100 milliGRAM(s) IV Push <User Schedule>  lidocaine 4% Cream 1 Application(s) Topical once  melatonin 6 milliGRAM(s) Oral at bedtime  metoprolol succinate ER 75 milliGRAM(s) Oral daily  pantoprazole    Tablet 40 milliGRAM(s) Oral before breakfast  sodium chloride 0.9% with heparin 2,000 Unit(s)/L 2000 Unit(s) Dialysis once    REVIEW OF SYSTEMS  --------------------------------------------------------------------------------  Gen: No fever  Respiratory: No dyspnea  CV: No chest pain  GI: No abdominal pain  MSK: No edema  Neuro: No dizziness    VITALS/PHYSICAL EXAM  --------------------------------------------------------------------------------  T(C): 36.4 (12-15-21 @ 11:30), Max: 36.7 (12-14-21 @ 20:11)  HR: 69 (12-15-21 @ 11:30) (67 - 84)  BP: 146/72 (12-15-21 @ 11:30) (116/65 - 146/72)  RR: 16 (12-15-21 @ 11:30) (16 - 18)  SpO2: 100% (12-15-21 @ 06:02) (98% - 100%)  Wt(kg): --  Height (cm): 170.8 (12-14-21 @ 10:28)  Weight (kg): 75.3 (12-14-21 @ 10:28)  BMI (kg/m2): 25.8 (12-14-21 @ 10:28)  BSA (m2): 1.87 (12-14-21 @ 10:28)    12-14-21 @ 07:01  -  12-15-21 @ 07:00  --------------------------------------------------------  IN: 636 mL / OUT: 0 mL / NET: 636 mL    12-15-21 @ 07:01  -  12-15-21 @ 14:03  --------------------------------------------------------  IN: 100 mL / OUT: 0 mL / NET: 100 mL    Physical Exam:              Gen: resting, NAD  	HEENT: No JVD  	Pulm: CTA B/L  	CV: S1S2+  	Abd: Soft  	LE: No edema  	Vascular access: Right IJ non-tunneled HD catheter site: no bleeding, LUE AVG being used for HD    LABS/STUDIES  --------------------------------------------------------------------------------              10.0   7.33  >-----------<  210      [12-15-21 @ 08:02]              30.0     138  |  100  |  36  ----------------------------<  96      [12-15-21 @ 08:02]  4.1   |  24  |  5.54        Ca     8.0     [12-15-21 @ 08:02]      Mg     1.80     [12-15-21 @ 08:02]      Phos  6.8     [12-15-21 @ 08:02]    TPro  4.8  /  Alb  2.7  /  TBili  0.3  /  DBili  x   /  AST  7   /  ALT  <5  /  AlkPhos  62  [12-15-21 @ 08:02]    Creatinine Trend:  SCr 5.54 [12-15 @ 08:02]  SCr 4.55 [12-14 @ 07:16]  SCr 4.17 [12-14 @ 01:48]  SCr 5.43 [12-13 @ 07:58]  SCr 4.40 [12-12 @ 05:26]    HBsAb <3.0      [12-12-21 @ 09:50]  HBsAg Nonreact      [12-12-21 @ 09:50]  HBcAb Nonreact      [12-12-21 @ 09:50]  HCV 0.12, Nonreact      [12-12-21 @ 09:50] [facility] Division of Kidney Diseases & Hypertension  FOLLOW UP NOTE  [phone]--------------------------------------------------------------------------------    Chief Complaint: ESRD/Ongoing hemodialysis requirement    24 hour events/subjective: Pt. with advanced CKD stage 5, initiated on long-term HD during current hospital stay. Pt. deemed ESRD. Pt. underwent LUE AVG placement with vascular surgeon Dr. Mcconnell on 12/14/21. Pt. seen and examined during HD today with daughter present at bedside. Pt. feels well and offered no complaints during HD rounds.    PAST HISTORY  --------------------------------------------------------------------------------  No significant changes to PMH, PSH, FHx, SHx, unless otherwise noted    ALLERGIES & MEDICATIONS  --------------------------------------------------------------------------------  Allergies    No Known Allergies    Intolerances    Standing Inpatient Medications  buMETAnide 2 milliGRAM(s) Oral daily  calcitriol   Capsule 0.5 MICROGram(s) Oral daily  chlorhexidine 2% Cloths 1 Application(s) Topical daily  epoetin jono-epbx (RETACRIT) Injectable 4000 Unit(s) IV Push <User Schedule>  finasteride 5 milliGRAM(s) Oral daily  heparin   Injectable 5000 Unit(s) SubCutaneous every 8 hours  heparin   Injectable. 500 Unit(s) Dialysis. every 1 hour  influenza  Vaccine (HIGH DOSE) 0.7 milliLiter(s) IntraMuscular once  iron sucrose Injectable 100 milliGRAM(s) IV Push <User Schedule>  lidocaine 4% Cream 1 Application(s) Topical once  melatonin 6 milliGRAM(s) Oral at bedtime  metoprolol succinate ER 75 milliGRAM(s) Oral daily  pantoprazole    Tablet 40 milliGRAM(s) Oral before breakfast  sodium chloride 0.9% with heparin 2,000 Unit(s)/L 2000 Unit(s) Dialysis once    REVIEW OF SYSTEMS  --------------------------------------------------------------------------------  Gen: No fever  Respiratory: No dyspnea  CV: No chest pain  GI: No abdominal pain  MSK: No edema  Neuro: No dizziness    VITALS/PHYSICAL EXAM  --------------------------------------------------------------------------------  T(C): 36.4 (12-15-21 @ 11:30), Max: 36.7 (12-14-21 @ 20:11)  HR: 69 (12-15-21 @ 11:30) (67 - 84)  BP: 146/72 (12-15-21 @ 11:30) (116/65 - 146/72)  RR: 16 (12-15-21 @ 11:30) (16 - 18)  SpO2: 100% (12-15-21 @ 06:02) (98% - 100%)  Wt(kg): --  Height (cm): 170.8 (12-14-21 @ 10:28)  Weight (kg): 75.3 (12-14-21 @ 10:28)  BMI (kg/m2): 25.8 (12-14-21 @ 10:28)  BSA (m2): 1.87 (12-14-21 @ 10:28)    12-14-21 @ 07:01  -  12-15-21 @ 07:00  --------------------------------------------------------  IN: 636 mL / OUT: 0 mL / NET: 636 mL    12-15-21 @ 07:01  -  12-15-21 @ 14:03  --------------------------------------------------------  IN: 100 mL / OUT: 0 mL / NET: 100 mL    Physical Exam:              Gen: resting, NAD  	HEENT: No JVD  	Pulm: CTA B/L  	CV: S1S2+  	Abd: Soft  	LE: No edema  	Vascular access: Right IJ non-tunneled HD catheter site: no bleeding, LUE AVG being used for HD    LABS/STUDIES  --------------------------------------------------------------------------------              10.0   7.33  >-----------<  210      [12-15-21 @ 08:02]              30.0     138  |  100  |  36  ----------------------------<  96      [12-15-21 @ 08:02]  4.1   |  24  |  5.54        Ca     8.0     [12-15-21 @ 08:02]      Mg     1.80     [12-15-21 @ 08:02]      Phos  6.8     [12-15-21 @ 08:02]    TPro  4.8  /  Alb  2.7  /  TBili  0.3  /  DBili  x   /  AST  7   /  ALT  <5  /  AlkPhos  62  [12-15-21 @ 08:02]    Creatinine Trend:  SCr 5.54 [12-15 @ 08:02]  SCr 4.55 [12-14 @ 07:16]  SCr 4.17 [12-14 @ 01:48]  SCr 5.43 [12-13 @ 07:58]  SCr 4.40 [12-12 @ 05:26]    HBsAb <3.0      [12-12-21 @ 09:50]  HBsAg Nonreact      [12-12-21 @ 09:50]  HBcAb Nonreact      [12-12-21 @ 09:50]  HCV 0.12, Nonreact      [12-12-21 @ 09:50]

## 2021-12-15 NOTE — PROGRESS NOTE ADULT - SUBJECTIVE AND OBJECTIVE BOX
DATE OF SERVICE: 12-15-21 @ 09:55    Subjective: Patient seen and examined. No new events except as noted.     SUBJECTIVE/ROS:  feels well   No chest pain, dyspnea, palpitation, or dizziness.       MEDICATIONS:  MEDICATIONS  (STANDING):  buMETAnide 2 milliGRAM(s) Oral daily  calcitriol   Capsule 0.5 MICROGram(s) Oral daily  chlorhexidine 2% Cloths 1 Application(s) Topical daily  epoetin jono-epbx (RETACRIT) Injectable 4000 Unit(s) IV Push <User Schedule>  finasteride 5 milliGRAM(s) Oral daily  heparin   Injectable 5000 Unit(s) SubCutaneous every 8 hours  heparin   Injectable. 500 Unit(s) Dialysis. every 1 hour  influenza  Vaccine (HIGH DOSE) 0.7 milliLiter(s) IntraMuscular once  iron sucrose Injectable 100 milliGRAM(s) IV Push <User Schedule>  lidocaine 4% Cream 1 Application(s) Topical once  melatonin 6 milliGRAM(s) Oral at bedtime  metoprolol succinate ER 75 milliGRAM(s) Oral daily  pantoprazole    Tablet 40 milliGRAM(s) Oral before breakfast  sodium chloride 0.9% with heparin 2,000 Unit(s)/L 2000 Unit(s) Dialysis once      PHYSICAL EXAM:  T(C): 36.7 (12-15-21 @ 06:02), Max: 37 (12-14-21 @ 14:00)  HR: 84 (12-15-21 @ 06:02) (62 - 84)  BP: 143/66 (12-15-21 @ 06:02) (116/65 - 154/70)  RR: 18 (12-15-21 @ 06:02) (12 - 18)  SpO2: 100% (12-15-21 @ 06:02) (98% - 100%)  Wt(kg): --  I&O's Summary    14 Dec 2021 07:01  -  15 Dec 2021 07:00  --------------------------------------------------------  IN: 636 mL / OUT: 0 mL / NET: 636 mL      Height (cm): 170.8 (12-14 @ 10:28)  Weight (kg): 75.3 (12-14 @ 10:28)  BMI (kg/m2): 25.8 (12-14 @ 10:28)  BSA (m2): 1.87 (12-14 @ 10:28)      JVP: Normal  Neck: supple  Lung: clear   CV: S1 S2 , Murmur:  Abd: soft  Ext: No edema  neuro: Awake / alert  Psych: flat affect  Skin: normal``    LABS/DATA:    CARDIAC MARKERS:                                10.0   7.33  )-----------( 210      ( 15 Dec 2021 08:02 )             30.0     12-15    138  |  100  |  36<H>  ----------------------------<  96  4.1   |  24  |  5.54<H>    Ca    8.0<L>      15 Dec 2021 08:02  Phos  6.8     12-15  Mg     1.80     12-15    TPro  4.8<L>  /  Alb  2.7<L>  /  TBili  0.3  /  DBili  x   /  AST  7   /  ALT  <5<L>  /  AlkPhos  62  12-15    proBNP:   Lipid Profile:   HgA1c:   TSH:     TELE:  EKG:

## 2021-12-15 NOTE — PROGRESS NOTE ADULT - PROBLEM SELECTOR PLAN 1
- No definitive bleeding source, likely in setting of CKD. Iron studies suggestive of AOCI. May have some iron deficiency. Nephro started IV iron with HD.  - h/h improve with transfusion.   - monitor h/h.  - EPO and Iron supplement per nephrology.

## 2021-12-15 NOTE — PROGRESS NOTE ADULT - SUBJECTIVE AND OBJECTIVE BOX
Vascular Surgery Progress Note    SUBJECTIVE:  Reports pain is well controlled, no acute events overnight, generally feels well  Denies f/n/c, chest pain or shortness of breath  Denies weaknes/numbness/pain in upper extremity    OBJECTIVE:  Vital Signs Last 24 Hrs  T(C): 36.7 (15 Dec 2021 06:02), Max: 37 (14 Dec 2021 14:00)  T(F): 98 (15 Dec 2021 06:02), Max: 98.6 (14 Dec 2021 14:00)  HR: 84 (15 Dec 2021 06:02) (67 - 84)  BP: 143/66 (15 Dec 2021 06:02) (116/65 - 144/80)  BP(mean): 78 (14 Dec 2021 14:30) (78 - 94)  RR: 18 (15 Dec 2021 06:02) (12 - 18)  SpO2: 100% (15 Dec 2021 06:02) (98% - 100%)    Physical Examination:  Constitutional: resting in bed with no acute distress  Respiratory: unlabored breathing, clear respiration  Gastrointestinal: Nondistended  Extremities: no gross deformities; spontaneous movement in b/l U/L extremities  LUE with dressing c/d/i except for some strike through at more proximal dressing, palpable thrill, radial pulse palpable, no weakness/numbness in hand    I&O's Detail    14 Dec 2021 07:01  -  15 Dec 2021 07:00  --------------------------------------------------------  IN:    Lactated Ringers: 150 mL    Oral Fluid: 486 mL  Total IN: 636 mL    OUT:  Total OUT: 0 mL    Total NET: 636 mL      15 Dec 2021 07:01  -  15 Dec 2021 11:39  --------------------------------------------------------  IN:    Oral Fluid: 100 mL  Total IN: 100 mL    OUT:  Total OUT: 0 mL    Total NET: 100 mL            LABS:                        10.0   7.33  )-----------( 210      ( 15 Dec 2021 08:02 )             30.0       12-15    138  |  100  |  36<H>  ----------------------------<  96  4.1   |  24  |  5.54<H>    Ca    8.0<L>      15 Dec 2021 08:02  Phos  6.8     12-15  Mg     1.80     12-15    TPro  4.8<L>  /  Alb  2.7<L>  /  TBili  0.3  /  DBili  x   /  AST  7   /  ALT  <5<L>  /  AlkPhos  62  12-15        IMAGING:  []

## 2021-12-15 NOTE — PROGRESS NOTE ADULT - ASSESSMENT
89 y/o male, with a PmHx of CKD3, HTN, Gerd, M[place], Iron Deficiency Anemia, presented to the [facility] ED with SOB. Admitted to telemetry for new HD and Hyperkalemia (now resolved). s/p AVG placement 12/14.

## 2021-12-16 ENCOUNTER — TRANSCRIPTION ENCOUNTER (OUTPATIENT)
Age: 86
End: 2021-12-16

## 2021-12-16 LAB
ANION GAP SERPL CALC-SCNC: 12 MMOL/L — SIGNIFICANT CHANGE UP (ref 7–14)
BUN SERPL-MCNC: 25 MG/DL — HIGH (ref 7–23)
CALCIUM SERPL-MCNC: 8.2 MG/DL — LOW (ref 8.4–10.5)
CHLORIDE SERPL-SCNC: 102 MMOL/L — SIGNIFICANT CHANGE UP (ref 98–107)
CO2 SERPL-SCNC: 26 MMOL/L — SIGNIFICANT CHANGE UP (ref 22–31)
CREAT SERPL-MCNC: 3.92 MG/DL — HIGH (ref 0.5–1.3)
GLUCOSE SERPL-MCNC: 93 MG/DL — SIGNIFICANT CHANGE UP (ref 70–99)
HCT VFR BLD CALC: 32.2 % — LOW (ref 39–50)
HGB BLD-MCNC: 10.1 G/DL — LOW (ref 13–17)
MAGNESIUM SERPL-MCNC: 1.8 MG/DL — SIGNIFICANT CHANGE UP (ref 1.6–2.6)
MCHC RBC-ENTMCNC: 29.4 PG — SIGNIFICANT CHANGE UP (ref 27–34)
MCHC RBC-ENTMCNC: 31.4 GM/DL — LOW (ref 32–36)
MCV RBC AUTO: 93.9 FL — SIGNIFICANT CHANGE UP (ref 80–100)
NRBC # BLD: 0 /100 WBCS — SIGNIFICANT CHANGE UP
NRBC # FLD: 0 K/UL — SIGNIFICANT CHANGE UP
PHOSPHATE SERPL-MCNC: 4.8 MG/DL — HIGH (ref 2.5–4.5)
PLATELET # BLD AUTO: 221 K/UL — SIGNIFICANT CHANGE UP (ref 150–400)
POTASSIUM SERPL-MCNC: 3.9 MMOL/L — SIGNIFICANT CHANGE UP (ref 3.5–5.3)
POTASSIUM SERPL-SCNC: 3.9 MMOL/L — SIGNIFICANT CHANGE UP (ref 3.5–5.3)
RBC # BLD: 3.43 M/UL — LOW (ref 4.2–5.8)
RBC # FLD: 17.9 % — HIGH (ref 10.3–14.5)
SODIUM SERPL-SCNC: 140 MMOL/L — SIGNIFICANT CHANGE UP (ref 135–145)
WBC # BLD: 6.79 K/UL — SIGNIFICANT CHANGE UP (ref 3.8–10.5)
WBC # FLD AUTO: 6.79 K/UL — SIGNIFICANT CHANGE UP (ref 3.8–10.5)

## 2021-12-16 PROCEDURE — 99233 SBSQ HOSP IP/OBS HIGH 50: CPT

## 2021-12-16 RX ORDER — ROBINUL 0.2 MG/ML
0.4 INJECTION INTRAMUSCULAR; INTRAVENOUS EVERY 4 HOURS
Refills: 0 | Status: DISCONTINUED | OUTPATIENT
Start: 2021-12-16 | End: 2021-12-16

## 2021-12-16 RX ORDER — HYDROMORPHONE HYDROCHLORIDE 2 MG/ML
0.2 INJECTION INTRAMUSCULAR; INTRAVENOUS; SUBCUTANEOUS
Refills: 0 | Status: DISCONTINUED | OUTPATIENT
Start: 2021-12-16 | End: 2021-12-16

## 2021-12-16 RX ORDER — HEPARIN SODIUM 5000 [USP'U]/ML
250 INJECTION INTRAVENOUS; SUBCUTANEOUS
Refills: 0 | Status: DISCONTINUED | OUTPATIENT
Start: 2021-12-16 | End: 2021-12-17

## 2021-12-16 RX ADMIN — BUMETANIDE 2 MILLIGRAM(S): 0.25 INJECTION INTRAMUSCULAR; INTRAVENOUS at 05:06

## 2021-12-16 RX ADMIN — PANTOPRAZOLE SODIUM 40 MILLIGRAM(S): 20 TABLET, DELAYED RELEASE ORAL at 05:06

## 2021-12-16 RX ADMIN — Medication 6 MILLIGRAM(S): at 21:39

## 2021-12-16 RX ADMIN — Medication 75 MILLIGRAM(S): at 05:07

## 2021-12-16 RX ADMIN — HEPARIN SODIUM 5000 UNIT(S): 5000 INJECTION INTRAVENOUS; SUBCUTANEOUS at 05:07

## 2021-12-16 RX ADMIN — Medication 650 MILLIGRAM(S): at 05:30

## 2021-12-16 RX ADMIN — CALCITRIOL 0.5 MICROGRAM(S): 0.5 CAPSULE ORAL at 13:07

## 2021-12-16 RX ADMIN — FINASTERIDE 5 MILLIGRAM(S): 5 TABLET, FILM COATED ORAL at 13:07

## 2021-12-16 RX ADMIN — Medication 650 MILLIGRAM(S): at 04:34

## 2021-12-16 RX ADMIN — HEPARIN SODIUM 5000 UNIT(S): 5000 INJECTION INTRAVENOUS; SUBCUTANEOUS at 13:07

## 2021-12-16 RX ADMIN — HEPARIN SODIUM 5000 UNIT(S): 5000 INJECTION INTRAVENOUS; SUBCUTANEOUS at 21:36

## 2021-12-16 RX ADMIN — CHLORHEXIDINE GLUCONATE 1 APPLICATION(S): 213 SOLUTION TOPICAL at 13:06

## 2021-12-16 NOTE — PROGRESS NOTE ADULT - PROBLEM SELECTOR PLAN 6
- cont PPI
-HSQ for DVT ppx given low suspicion for hemorrhage
-HSQ for DVT ppx given low suspicion for hemorrhage
- cont PPI
- intermittent pneumatic compression
-HSQ for DVT ppx given low suspicion for hemorrhage
- cont PPI
-HSQ for DVT ppx given low suspicion for hemorrhage
- c/w nifepidine/metorprolol  - previous episodes of Vtach during admission but asymptomatic; increased dose of metoprolol; K and Mg at goal for patient on dialysis  - no indication for further telemetry
- cont PPI

## 2021-12-16 NOTE — PROGRESS NOTE ADULT - SUBJECTIVE AND OBJECTIVE BOX
DATE OF SERVICE: 12-16-21 @ 09:42    Subjective: Patient seen and examined. No new events except as noted.     SUBJECTIVE/ROS:  Pt seen and examined early this am         MEDICATIONS:  MEDICATIONS  (STANDING):  buMETAnide 2 milliGRAM(s) Oral daily  calcitriol   Capsule 0.5 MICROGram(s) Oral daily  chlorhexidine 2% Cloths 1 Application(s) Topical daily  epoetin jono-epbx (RETACRIT) Injectable 4000 Unit(s) IV Push <User Schedule>  finasteride 5 milliGRAM(s) Oral daily  heparin   Injectable 5000 Unit(s) SubCutaneous every 8 hours  heparin   Injectable. 500 Unit(s) Dialysis. every 1 hour  influenza  Vaccine (HIGH DOSE) 0.7 milliLiter(s) IntraMuscular once  iron sucrose Injectable 100 milliGRAM(s) IV Push <User Schedule>  melatonin 6 milliGRAM(s) Oral at bedtime  metoprolol succinate ER 75 milliGRAM(s) Oral daily  pantoprazole    Tablet 40 milliGRAM(s) Oral before breakfast  sodium chloride 0.9% with heparin 2,000 Unit(s)/L 2000 Unit(s) Dialysis once      PHYSICAL EXAM:  T(C): 36.7 (12-16-21 @ 05:04), Max: 36.7 (12-16-21 @ 05:04)  HR: 70 (12-16-21 @ 05:04) (68 - 78)  BP: 128/70 (12-16-21 @ 05:04) (97/54 - 151/76)  RR: 16 (12-16-21 @ 05:04) (16 - 18)  SpO2: 100% (12-16-21 @ 05:04) (100% - 100%)  Wt(kg): --  I&O's Summary    15 Dec 2021 07:01  -  16 Dec 2021 07:00  --------------------------------------------------------  IN: 1136 mL / OUT: 500 mL / NET: 636 mL            JVP: Normal  Neck: supple  Lung: clear   CV: S1 S2 , Murmur:  Abd: soft  Ext: No edema  neuro: Awake / alert  Psych: flat affect  Skin: normal``    LABS/DATA:    CARDIAC MARKERS:                                10.1   6.79  )-----------( 221      ( 16 Dec 2021 05:09 )             32.2     12-16    140  |  102  |  25<H>  ----------------------------<  93  3.9   |  26  |  3.92<H>    Ca    8.2<L>      16 Dec 2021 05:09  Phos  4.8     12-16  Mg     1.80     12-16    TPro  4.8<L>  /  Alb  2.7<L>  /  TBili  0.3  /  DBili  x   /  AST  7   /  ALT  <5<L>  /  AlkPhos  62  12-15    proBNP:   Lipid Profile:   HgA1c:   TSH:     TELE:  EKG:

## 2021-12-16 NOTE — PROGRESS NOTE ADULT - ASSESSMENT
Systolic CHF   chronic   newly discovered  cont BB   will consider adding ace or arb in future , can be started as outpt     NSVT  cont BB  monitor electrolytes     HTN  stable    CKD  on HD   sp AVF  fu with renal / vascular surgery

## 2021-12-16 NOTE — PROGRESS NOTE ADULT - PROBLEM SELECTOR PROBLEM 2
CKD (chronic kidney disease) stage 5, GFR less than 15 ml/min
Chronic systolic congestive heart failure
Hyperkalemia
Hyperkalemia
Chronic systolic congestive heart failure
CKD (chronic kidney disease) stage 5, GFR less than 15 ml/min
CKD (chronic kidney disease) stage 5, GFR less than 15 ml/min
Anemia
Chronic systolic congestive heart failure
Chronic systolic congestive heart failure
CKD (chronic kidney disease) stage 5, GFR less than 15 ml/min
Chronic systolic congestive heart failure
CKD (chronic kidney disease) stage 5, GFR less than 15 ml/min
Chronic systolic congestive heart failure

## 2021-12-16 NOTE — DISCHARGE NOTE PROVIDER - NSDCMRMEDTOKEN_GEN_ALL_CORE_FT
Avodart 0.5 mg oral capsule: 1 cap(s) orally once a day  calcitriol 0.5 mcg oral capsule: 2 cap(s) orally once a day  metoprolol succinate 50 mg oral tablet, extended release: 1 tab(s) orally once a day  NIFEdipine 30 mg oral tablet, extended release: 1 tab(s) orally once a day  omeprazole 20 mg oral delayed release capsule: 1 cap(s) orally once a day  sodium bicarbonate 650 mg oral tablet: 1 tab(s) orally 2 times a day   Avodart 0.5 mg oral capsule: 1 cap(s) orally once a day  bumetanide 2 mg oral tablet: 1 tab(s) orally once a day  calcitriol 0.5 mcg oral capsule: 2 cap(s) orally once a day  metoprolol succinate 25 mg oral tablet, extended release: 3 tab(s) orally once a day  omeprazole 20 mg oral delayed release capsule: 1 cap(s) orally once a day

## 2021-12-16 NOTE — PROGRESS NOTE ADULT - PROBLEM SELECTOR PROBLEM 6
Hypertension
Need for prophylactic measure
GERD (gastroesophageal reflux disease)
GERD (gastroesophageal reflux disease)
Need for prophylactic measure
GERD (gastroesophageal reflux disease)
GERD (gastroesophageal reflux disease)
Need for prophylactic measure
GERD (gastroesophageal reflux disease)
GERD (gastroesophageal reflux disease)

## 2021-12-16 NOTE — PROGRESS NOTE ADULT - PROBLEM SELECTOR PROBLEM 7
Need for prophylactic measure
GERD (gastroesophageal reflux disease)
Need for prophylactic measure
Need for prophylactic measure

## 2021-12-16 NOTE — DISCHARGE NOTE PROVIDER - CARE PROVIDER_API CALL
Abelardo Haney  CARDIOVASCULAR DISEASE  [address] [address]  Phone: [phone]  Fax: [phone]  Follow Up Time:

## 2021-12-16 NOTE — PROGRESS NOTE ADULT - SUBJECTIVE AND OBJECTIVE BOX
[facility] Division of Hospital Medicine  Samuel Hinkle MD  In House Pager [phone]    Patient is a 89y old  Male who presents with a chief complaint of Shortness of Breath (16 Dec 2021 12:56)      SUBJECTIVE / OVERNIGHT EVENTS:  No overnight events. Labs and vitals reviewed. tolerated HD well yesterday, shiley removed.   Patient seen and examined at bedside, no acute complaints.   No fever, no chills, no SOB, no CP, no n/v/d, no abd pain, no dysuria      MEDICATIONS  (STANDING):  buMETAnide 2 milliGRAM(s) Oral daily  calcitriol   Capsule 0.5 MICROGram(s) Oral daily  chlorhexidine 2% Cloths 1 Application(s) Topical daily  epoetin jono-epbx (RETACRIT) Injectable 4000 Unit(s) IV Push <User Schedule>  finasteride 5 milliGRAM(s) Oral daily  heparin   Injectable 5000 Unit(s) SubCutaneous every 8 hours  heparin   Injectable. 250 Unit(s) Dialysis. every 1 hour  heparin   Injectable. 500 Unit(s) Dialysis. every 1 hour  influenza  Vaccine (HIGH DOSE) 0.7 milliLiter(s) IntraMuscular once  iron sucrose Injectable 100 milliGRAM(s) IV Push <User Schedule>  melatonin 6 milliGRAM(s) Oral at bedtime  metoprolol succinate ER 75 milliGRAM(s) Oral daily  pantoprazole    Tablet 40 milliGRAM(s) Oral before breakfast  sodium chloride 0.9% with heparin 2,000 Unit(s)/L 2000 Unit(s) Dialysis once    MEDICATIONS  (PRN):  acetaminophen     Tablet .. 650 milliGRAM(s) Oral every 6 hours PRN Temp greater or equal to 38C (100.4F), Mild Pain (1 - 3), Moderate Pain (4 - 6)  acetaminophen   IVPB .. 1000 milliGRAM(s) IV Intermittent once PRN Moderate Pain (4 - 6)  glycopyrrolate Injectable 0.4 milliGRAM(s) IV Push every 4 hours PRN secretions  HYDROmorphone  Injectable 0.2 milliGRAM(s) IV Push every 2 hours PRN shortness of breath  LORazepam   Injectable 0.5 milliGRAM(s) IV Push every 4 hours PRN Agitation  oxyCODONE    IR 2.5 milliGRAM(s) Oral once PRN Severe Pain (7 - 10)    CAPILLARY BLOOD GLUCOSE        I&O's Summary    15 Dec 2021 07:01  -  16 Dec 2021 07:00  --------------------------------------------------------  IN: 1136 mL / OUT: 500 mL / NET: 636 mL        PHYSICAL EXAM:  Vital Signs Last 24 Hrs  T(C): 36.5 (16 Dec 2021 11:18), Max: 36.7 (16 Dec 2021 05:04)  T(F): 97.7 (16 Dec 2021 11:18), Max: 98 (16 Dec 2021 05:04)  HR: 72 (16 Dec 2021 11:18) (68 - 78)  BP: 129/60 (16 Dec 2021 11:18) (97/54 - 151/76)  BP(mean): --  RR: 16 (16 Dec 2021 11:18) (16 - 18)  SpO2: 99% (16 Dec 2021 11:18) (99% - 100%)    Gen: NAD; resting in bed. awake and alert.   Pulm: no respiratory distress; no accessory muscle use; CTA b/l; no wheezing; no crackles.   Cards: RRR, nl S1/S2; no obvious murmurs; no LE edema; no JVD  Abd: soft; NT on exam; +bs  Ext: no cyanosis; no joint effusion; no joint pain on palpation. LUE AVG in place with +thrills.   Skin: no rash; no cyanosis    LABS:                        10.1   6.79  )-----------( 221      ( 16 Dec 2021 05:09 )             32.2     12-16    140  |  102  |  25<H>  ----------------------------<  93  3.9   |  26  |  3.92<H>    Ca    8.2<L>      16 Dec 2021 05:09  Phos  4.8     12-16  Mg     1.80     12-16    TPro  4.8<L>  /  Alb  2.7<L>  /  TBili  0.3  /  DBili  x   /  AST  7   /  ALT  <5<L>  /  AlkPhos  62  12-15                RADIOLOGY & ADDITIONAL TESTS:  Results Reviewed: Y  Imaging Personally Reviewed: Y  Electrocardiogram Personally Reviewed: Y    COORDINATION OF CARE:  Care Discussed with Consultants/Other Providers [Y/N]: Y  Prior or Outpatient Records Reviewed [Y/N]: Y

## 2021-12-16 NOTE — PROGRESS NOTE ADULT - ASSESSMENT
88yo M with Hx CKD3 (not previously on HD), HTN, GERD, and anemia who presented with SOB and CP, found to have persistent hyperkalemia, with need for urgent HD. Vascular Surgery consulted for urgent Shiley placement. Patient is s/p urgent R IJ Shiley placement on 12/4. Planning ofr OR 12/14 for AV fistula placement. S/p AV graft placement on LUE, 12/14, doing well.    PLAN:   - s/p 12/14 AVG placement  - shiley removed  - Left arm precautions: please place pink band for arm precaution no blood draws, blood pressure measurements or IV line placement   - HD should be obtained according to requirements for early-stick graft, please refer to chart note from 12/14 and page withy any questions    Yan Guerrero PGY-2  C Team Surgery  [id number]

## 2021-12-16 NOTE — PROGRESS NOTE ADULT - PROBLEM SELECTOR PROBLEM 3
CKD (chronic kidney disease) stage 5, GFR less than 15 ml/min
CKD (chronic kidney disease) stage 5, GFR less than 15 ml/min
Hyperkalemia
CKD (chronic kidney disease) stage 5, GFR less than 15 ml/min
CKD (chronic kidney disease) stage 5, GFR less than 15 ml/min
Metabolic acidosis
Metabolic acidosis
Hyperkalemia
Chronic systolic congestive heart failure
Hyperkalemia
Hyperkalemia
CKD (chronic kidney disease) stage 5, GFR less than 15 ml/min
Hyperkalemia
CKD (chronic kidney disease) stage 5, GFR less than 15 ml/min

## 2021-12-16 NOTE — CHART NOTE - NSCHARTNOTEFT_GEN_A_CORE
88 YO Male   Can not self propel to move about in the home   Needed for transport to and from doctors appointments and dialysis   Care giver available. Willing and able to provider assistance with wheelchair.   Patient can not perform ADLs with a walker, cane or crutch due to Congestive Heart Failure 88 YO Male   Can not self propel to move about in the home   Requires transport wheelchair. Needed for transport to and from doctors appointments and dialysis   Care giver available. Willing and able to provider assistance with wheelchair.   Patient can not perform ADLs with a walker, cane or crutch due to Congestive Heart Failure

## 2021-12-16 NOTE — PROGRESS NOTE ADULT - SUBJECTIVE AND OBJECTIVE BOX
Vascular Surgery Progress Note    SUBJECTIVE:  Reports pain is well controlled  No f/n/c/, no chest pain or shortness of breath  Tolerating diet    OBJECTIVE:  Vital Signs Last 24 Hrs  T(C): 36.7 (16 Dec 2021 05:04), Max: 36.7 (16 Dec 2021 05:04)  T(F): 98 (16 Dec 2021 05:04), Max: 98 (16 Dec 2021 05:04)  HR: 70 (16 Dec 2021 05:04) (68 - 78)  BP: 128/70 (16 Dec 2021 05:04) (97/54 - 151/76)  BP(mean): --  RR: 16 (16 Dec 2021 05:04) (16 - 18)  SpO2: 100% (16 Dec 2021 05:04) (100% - 100%)    Physical Examination:  Constitutional: resting in bed with no acute distress  Respiratory: unlabored breathing, clear respiration  Gastrointestinal: Nondistended  Extremities: no gross deformities; spontaneous movement in b/l U/L extremities  LUE with dressing c/d/i except for some strike through at more proximal dressing, palpable thrill, radial pulse palpable, no weakness/numbness in hand    I&O's Detail    15 Dec 2021 07:01  -  16 Dec 2021 07:00  --------------------------------------------------------  IN:    Oral Fluid: 636 mL    Other (mL): 500 mL  Total IN: 1136 mL    OUT:    Other (mL): 500 mL  Total OUT: 500 mL    Total NET: 636 mL            LABS:                        10.1   6.79  )-----------( 221      ( 16 Dec 2021 05:09 )             32.2       12-16    140  |  102  |  25<H>  ----------------------------<  93  3.9   |  26  |  3.92<H>    Ca    8.2<L>      16 Dec 2021 05:09  Phos  4.8     12-16  Mg     1.80     12-16    TPro  4.8<L>  /  Alb  2.7<L>  /  TBili  0.3  /  DBili  x   /  AST  7   /  ALT  <5<L>  /  AlkPhos  62  12-15        IMAGING:  []

## 2021-12-16 NOTE — PROGRESS NOTE ADULT - PROBLEM SELECTOR PLAN 7
-HSQ for DVT ppx given low suspicion for hemorrhage
- cont PPI
-HSQ for DVT ppx given low suspicion for hemorrhage

## 2021-12-16 NOTE — PROGRESS NOTE ADULT - PROBLEM SELECTOR PLAN 3
- s/p HD initiation, improvement in K+ and volume overload  - reduced Bumex po to once daily  - IV iron per nephro  - HD per renal  - s/p AVG placement 12/14.   - s/p HD session via AVG. 2nd session Friday, if functioning well, can be dc per nephro.

## 2021-12-16 NOTE — DISCHARGE NOTE PROVIDER - NSFOLLOWUPCLINICS_GEN_ALL_ED_FT
[facility] - Primary Care  Primary Care  [address] [place] [address]  Phone: [phone]  Fax:     [facility]  Nephrology  [address] [place] [address], [address] [place] [address]  Phone: [phone]  Fax:

## 2021-12-16 NOTE — PROGRESS NOTE ADULT - PROBLEM SELECTOR PLAN 5
- c/w nifepidine/metorprolol  - previous episodes of Vtach during admission but asymptomatic; increased dose of metoprolol; K and Mg at goal for patient on dialysis  - no indication for further telemetry
- cont PPI
- improved s/p HD, trend BMP
- cont PPI
- c/w nifepidine/metorprolol  - episodes of Vtach during admission but asymptomatic; increased dose of metoprolol; K and Mg at goal for patient on dialysis  - no significant tele events in >48 hours, electrolytes at goal; will d/c tele
- c/w nifepidine/metorprolol  - previous episodes of Vtach during admission but asymptomatic; increased dose of metoprolol; K and Mg at goal for patient on dialysis  - no indication for further telemetry

## 2021-12-16 NOTE — DISCHARGE NOTE PROVIDER - NSDCCPCAREPLAN_GEN_ALL_CORE_FT
PRINCIPAL DISCHARGE DIAGNOSIS  Diagnosis: Hyperkalemia  Assessment and Plan of Treatment: Imrpoved      SECONDARY DISCHARGE DIAGNOSES  Diagnosis: GERD (gastroesophageal reflux disease)  Assessment and Plan of Treatment: Continue Protonix    Diagnosis: ESRD on hemodialysis  Assessment and Plan of Treatment: Continue blood pressure, cholesterol and diabetic medications. Goal of hemoglobin A1C (HgbA1C) < 7%.  Avoid nephrotoxic drugs such as nonsteroidal anti-inflammatory agents (NSAIDs).   Please follow up with your nephrologist to monitor your kidney function, continue with low protein and potassium diet.  *********************May use graft for next HD session*************  [place] Accuseal Graft Instructions:  -Please adhere to septic technique and use sterile gloves.  -ward clean puncture with 17 gauge needle  -maintain blood flow 200-250 ml/min  -administer low dose heparin, 250U to be given during HD   -Hold pressure for 10-15 minutes post needle withdrawal!    Diagnosis: Anemia  Assessment and Plan of Treatment: No definitive bleeding source, likely in setting of CKD.    Diagnosis: Chronic systolic congestive heart failure  Assessment and Plan of Treatment: Low salt diet, fluid restriction to 1500 ml daily, monitor your fluid intake and weight daily, exercise as tolerated 30 minutes daily, and follow up with your physician within 7 days.    Diagnosis: Hypertension  Assessment and Plan of Treatment: Low sodium and fat diet, continue anti-hypertensive medications, and follow up with primary care physician.

## 2021-12-16 NOTE — PROGRESS NOTE ADULT - ASSESSMENT
88 y/o male, with a PmHx of CKD3, HTN, Gerd, Platinum, Iron Deficiency Anemia, presented to the [facility] ED with SOB. Admitted to telemetry for new HD and Hyperkalemia (now resolved). s/p AVG placement 12/14. plan for d/c 12/17 after another session of HD.

## 2021-12-16 NOTE — PROGRESS NOTE ADULT - PROBLEM SELECTOR PROBLEM 5
GERD (gastroesophageal reflux disease)
GERD (gastroesophageal reflux disease)
Hypertension
GERD (gastroesophageal reflux disease)
GERD (gastroesophageal reflux disease)
Hyperkalemia
GERD (gastroesophageal reflux disease)
Hypertension

## 2021-12-16 NOTE — PROGRESS NOTE ADULT - PROBLEM SELECTOR PROBLEM 4
Anemia
Hyperkalemia
CKD (chronic kidney disease) stage 5, GFR less than 15 ml/min
Hyperkalemia
Hypertension
Anemia
Hyperkalemia
Hypertension
Hyperkalemia

## 2021-12-17 ENCOUNTER — TRANSCRIPTION ENCOUNTER (OUTPATIENT)
Age: 86
End: 2021-12-17

## 2021-12-17 VITALS
SYSTOLIC BLOOD PRESSURE: 101 MMHG | HEART RATE: 84 BPM | OXYGEN SATURATION: 100 % | TEMPERATURE: 98 F | DIASTOLIC BLOOD PRESSURE: 52 MMHG | RESPIRATION RATE: 17 BRPM

## 2021-12-17 LAB
ANION GAP SERPL CALC-SCNC: 14 MMOL/L — SIGNIFICANT CHANGE UP (ref 7–14)
BUN SERPL-MCNC: 29 MG/DL — HIGH (ref 7–23)
CALCIUM SERPL-MCNC: 7.9 MG/DL — LOW (ref 8.4–10.5)
CHLORIDE SERPL-SCNC: 100 MMOL/L — SIGNIFICANT CHANGE UP (ref 98–107)
CO2 SERPL-SCNC: 24 MMOL/L — SIGNIFICANT CHANGE UP (ref 22–31)
CREAT SERPL-MCNC: 4.52 MG/DL — HIGH (ref 0.5–1.3)
GLUCOSE SERPL-MCNC: 93 MG/DL — SIGNIFICANT CHANGE UP (ref 70–99)
HCT VFR BLD CALC: 28.4 % — LOW (ref 39–50)
HGB BLD-MCNC: 9 G/DL — LOW (ref 13–17)
MAGNESIUM SERPL-MCNC: 1.9 MG/DL — SIGNIFICANT CHANGE UP (ref 1.6–2.6)
MCHC RBC-ENTMCNC: 29.3 PG — SIGNIFICANT CHANGE UP (ref 27–34)
MCHC RBC-ENTMCNC: 31.7 GM/DL — LOW (ref 32–36)
MCV RBC AUTO: 92.5 FL — SIGNIFICANT CHANGE UP (ref 80–100)
NRBC # BLD: 0 /100 WBCS — SIGNIFICANT CHANGE UP
NRBC # FLD: 0 K/UL — SIGNIFICANT CHANGE UP
PHOSPHATE SERPL-MCNC: 4.7 MG/DL — HIGH (ref 2.5–4.5)
PLATELET # BLD AUTO: 204 K/UL — SIGNIFICANT CHANGE UP (ref 150–400)
POTASSIUM SERPL-MCNC: 3.6 MMOL/L — SIGNIFICANT CHANGE UP (ref 3.5–5.3)
POTASSIUM SERPL-SCNC: 3.6 MMOL/L — SIGNIFICANT CHANGE UP (ref 3.5–5.3)
RBC # BLD: 3.07 M/UL — LOW (ref 4.2–5.8)
RBC # FLD: 17.6 % — HIGH (ref 10.3–14.5)
SARS-COV-2 RNA SPEC QL NAA+PROBE: SIGNIFICANT CHANGE UP
SODIUM SERPL-SCNC: 138 MMOL/L — SIGNIFICANT CHANGE UP (ref 135–145)
WBC # BLD: 4.67 K/UL — SIGNIFICANT CHANGE UP (ref 3.8–10.5)
WBC # FLD AUTO: 4.67 K/UL — SIGNIFICANT CHANGE UP (ref 3.8–10.5)

## 2021-12-17 PROCEDURE — 90935 HEMODIALYSIS ONE EVALUATION: CPT

## 2021-12-17 PROCEDURE — 99239 HOSP IP/OBS DSCHRG MGMT >30: CPT

## 2021-12-17 RX ORDER — BUMETANIDE 0.25 MG/ML
1 INJECTION INTRAMUSCULAR; INTRAVENOUS
Qty: 30 | Refills: 0
Start: 2021-12-17 | End: 2022-01-15

## 2021-12-17 RX ORDER — BNT162B2 0.23 MG/2.25ML
0.3 INJECTION, SUSPENSION INTRAMUSCULAR ONCE
Refills: 0 | Status: COMPLETED | OUTPATIENT
Start: 2021-12-17 | End: 2021-12-17

## 2021-12-17 RX ORDER — METOPROLOL TARTRATE 50 MG
0.5 TABLET ORAL
Qty: 0 | Refills: 0 | DISCHARGE
Start: 2021-12-17 | End: 2022-01-15

## 2021-12-17 RX ORDER — METOPROLOL TARTRATE 50 MG
3 TABLET ORAL
Qty: 90 | Refills: 0
Start: 2021-12-17 | End: 2022-01-15

## 2021-12-17 RX ORDER — LIDOCAINE 4 G/100G
1 CREAM TOPICAL ONCE
Refills: 0 | Status: DISCONTINUED | OUTPATIENT
Start: 2021-12-17 | End: 2021-12-17

## 2021-12-17 RX ADMIN — FINASTERIDE 5 MILLIGRAM(S): 5 TABLET, FILM COATED ORAL at 12:26

## 2021-12-17 RX ADMIN — BUMETANIDE 2 MILLIGRAM(S): 0.25 INJECTION INTRAMUSCULAR; INTRAVENOUS at 12:24

## 2021-12-17 RX ADMIN — CHLORHEXIDINE GLUCONATE 1 APPLICATION(S): 213 SOLUTION TOPICAL at 12:25

## 2021-12-17 RX ADMIN — BNT162B2 0.3 MILLILITER(S): 0.23 INJECTION, SUSPENSION INTRAMUSCULAR at 12:57

## 2021-12-17 RX ADMIN — HEPARIN SODIUM 5000 UNIT(S): 5000 INJECTION INTRAVENOUS; SUBCUTANEOUS at 05:39

## 2021-12-17 RX ADMIN — PANTOPRAZOLE SODIUM 40 MILLIGRAM(S): 20 TABLET, DELAYED RELEASE ORAL at 05:39

## 2021-12-17 RX ADMIN — Medication 650 MILLIGRAM(S): at 06:06

## 2021-12-17 RX ADMIN — CALCITRIOL 0.5 MICROGRAM(S): 0.5 CAPSULE ORAL at 12:24

## 2021-12-17 RX ADMIN — ERYTHROPOIETIN 4000 UNIT(S): 10000 INJECTION, SOLUTION INTRAVENOUS; SUBCUTANEOUS at 07:16

## 2021-12-17 RX ADMIN — Medication 75 MILLIGRAM(S): at 12:26

## 2021-12-17 NOTE — PROGRESS NOTE ADULT - PROBLEM SELECTOR PLAN 1
Pt. tolerating HD. BP stable at time of HD rounds. AVG functioning well during rounds. Pt. with anemia, initiated on IV iron therapy with HD. Pt. underwent PRBC transfusion on 12/13/21. Hemoglobin at goal (9.0) today. PLACIDO (Retacrit) ordered with HD today. Monitor BP and labs.    Pt. to be discharged later today. Instructions for HD via AVG previously communicated.    -250 ml/min, 17 gauge needles for cannulation and low dose heparin via dialysis machine to be used for HD sessions next week given new AVG placement as per directions of Vascular surgery.    If you have any questions, please feel free to contact me  Johnny Ch  Nephrology Fellow  [phone]  (After 5pm or on weekends please page the on-call fellow)

## 2021-12-17 NOTE — DISCHARGE NOTE NURSING/CASE MANAGEMENT/SOCIAL WORK - NSDCCRNAME_GEN_ALL_CORE_FT
[facility] address: [address] [place] [address] T TH Sat 5:30am slot. 1st outpt HD Mon 12/20/21. Pt needs to be there @ 5am for paperwork Due to the holiday these next 2 weeks.

## 2021-12-17 NOTE — PROGRESS NOTE ADULT - REASON FOR ADMISSION

## 2021-12-17 NOTE — CHART NOTE - NSCHARTNOTEFT_GEN_A_CORE
Source: Patient [x]      other [x] medical chart   Diet rx: DASH/TLC: Sodium & Cholesterol Restricted  Low Fat (LOWFAT)   1500mL Fluid Restriction ([id number]) (12-10-21 @ 12:31) [Active]    Pt 89 yo male with advanced CKD stage 5, initiated on long-term HD; Pt s/p LUE AVG placement with vascular on 12/14/21 - per chart review.    At time of visit, Pt appears alert, oriented. Per Pt, his appetite good but does not care much about hospital food. Family brings food from outside reported. Food preferences discussed; will recommend to add PO supplement: Nepro - 2x daily to diet rx. No report of chewing or swallowing difficulty; no report of vomiting or diarrhea @ this time. +BM (12/15), fecal incontinence, per flow sheet. No other food related concern voiced @ present. RDN remains available.     Pt's height: 67" (12/14)    IBW: 148#+/-10%    Pt's weights: 60.1 kg (12/17), 75.3 kg (12/14)   Pertinent Medications: Heparin, Epoetin, Bumex, Protonix,    Pertinent Labs:  12-17 Na138 mmol/L Glu 93 mg/dL K+ 3.6 mmol/L Cr  4.52 mg/dL<H> BUN 29 mg/dL<H> 12-17 Phos 4.7 mg/dL<H> 12-15 Alb 2.7 g/dL<L> 12-04 Chol 149 mg/dL LDL --    HDL 41 mg/dL Trig 70 mg/dL  Skin: per flow sheet, +dryness/ecchymosis    Estimated Needs: [x] no change since previous assessment  Previous Nutrition Diagnosis: [x] Altered Nutrition Related Lab Values  Nutrition Diagnosis is [x] ongoing    Nutrition Interventions/Recommendations:   1. PO diet rx: Renal Replacement (no prot restr, no conc K, no conc phos, low sodium); Fluid restriction per MD discretion;    2. Add PO supplement: Nepro 1 can x 2 daily (to provide additional ~850 Kcal, ~38 gm Protein);  3. Encourage & assist Pt with meals; Monitor PO diet tolerance; Honor food preferences;   4. Add Nephro-yolis 1 cap daily for micronutrient coverage;   5. Monitor labs, daily weights, hydration status;

## 2021-12-17 NOTE — PROGRESS NOTE ADULT - PROVIDER SPECIALTY LIST ADULT
Cardiology
Cardiology
Anesthesia
Cardiology
Cardiology
Nephrology
Vascular Surgery
Cardiology
Nephrology
Vascular Surgery
Nephrology
Nephrology
Vascular Surgery
Vascular Surgery
Nephrology
Nephrology
Hospitalist
Nephrology
Nephrology
Hospitalist

## 2021-12-17 NOTE — PROGRESS NOTE ADULT - ASSESSMENT
Systolic CHF   chronic   newly discovered  cont BB   will consider adding ace or arb in future , can be started as outpt     NSVT  cont BB  monitor electrolytes     HTN  stable    CKD  on HD   sp AVF  fu with renal / vascular surgery    can follow up as outpt  discussed with family

## 2021-12-17 NOTE — DISCHARGE NOTE NURSING/CASE MANAGEMENT/SOCIAL WORK - NSSCCARECORD_GEN_ALL_CORE
Home Care Agency/Durable Medical Equipment Agency Home Care Agency/Durable Medical Equipment Agency/Community [facility]

## 2021-12-17 NOTE — PROGRESS NOTE ADULT - ASSESSMENT
Pt. with advanced CKD stage 5, initated on long-term HD during current hospital stay. Pt. deemed ESRD. Pt. seen during HD today. /65 at time of HD rounds.

## 2021-12-17 NOTE — CHART NOTE - NSCHARTNOTEFT_GEN_A_CORE
Patient with CKD and ESRD now on new HD via new LUE AVG. tolerated HD well. scheduled for outpatient HD next week. outpatient follow up with nephrology and PCP.    medically stable for discharge home today. discussed with patient, JOEL MIJARES.    55 minutes spent coordinating discharge     Vital Signs Last 24 Hrs  T(C): 36.3 (17 Dec 2021 12:10), Max: 36.7 (16 Dec 2021 21:17)  T(F): 97.3 (17 Dec 2021 12:10), Max: 98.1 (16 Dec 2021 21:17)  HR: 85 (17 Dec 2021 12:10) (74 - 85)  BP: 107/50 (17 Dec 2021 12:10) (107/50 - 145/66)  BP(mean): --  RR: 17 (17 Dec 2021 12:10) (16 - 18)  SpO2: 99% (17 Dec 2021 12:10) (99% - 100%)    CAPILLARY BLOOD GLUCOSE                              9.0    4.67  )-----------( 204      ( 17 Dec 2021 07:45 )             28.4     12-17    138  |  100  |  29<H>  ----------------------------<  93  3.6   |  24  |  4.52<H>    Ca    7.9<L>      17 Dec 2021 07:45  Phos  4.7     12-17  Mg     1.90     12-17

## 2021-12-17 NOTE — CHART NOTE - NSCHARTNOTESELECT_GEN_ALL_CORE
AV Graft Access Instructions/Event Note
Event Note
Follow-up/Nutrition Services
Discharge/Event Note
Event Note
Event Note
POST-OP CHECK

## 2021-12-17 NOTE — DISCHARGE NOTE NURSING/CASE MANAGEMENT/SOCIAL WORK - PATIENT PORTAL LINK FT
You can access the FollowMyHealth Patient Portal offered by [facility] by registering at the following website: [email or web address]. By joining [facility]’s FollowMyHealth portal, you will also be able to view your health information using other applications (apps) compatible with our system.

## 2021-12-17 NOTE — PROGRESS NOTE ADULT - ATTENDING COMMENTS
I reviewed the overnight course of events on the unit, re-confirming the patient history. I discussed the care with the patient and their family. The plan of care was discussed with the ACP team and modifications were made to the notation where appropriate. Differential diagnosis and plan of care discussed with patient after the evaluation. Advanced care planning was discussed with patient and family.  Advanced care planning forms were reviewed and discussed.  Risks, benefits and alternatives of cardiac procedures were discussed in detail and all questions were answered. 35 minutes spent on total encounter of which more than fifty percent of the encounter was spent counseling and/or coordinating care by the attending physician.
I reviewed the overnight course of events on the unit, re-confirming the patient history. I discussed the care with the patient and their family. The plan of care was discussed with the ACP team and modifications were made to the notation where appropriate. Differential diagnosis and plan of care discussed with patient after the evaluation. Advanced care planning was discussed with patient and family.  Advanced care planning forms were reviewed and discussed.  Risks, benefits and alternatives of cardiac procedures were discussed in detail and all questions were answered. 35 minutes spent on total encounter of which more than fifty percent of the encounter was spent counseling and/or coordinating care by the attending physician.
Patient seen and evaluated on hd tolerating treatment
Pt. with ESRD, initiated on long-term HD during current hospital stay. Pt. seen and examined during HD today. Pt. underwent LUE AVG placement yesterday (12/14/21). Pt. feels better and offered no complaints during HD rounds. Pt. tolerating HD. Pt. with episode of intradialytic hypotension earlier. UF goal decreased. Dialysate temperature lowered. BP improved/stable during HD rounds. AVG functioning at prescribed blood flow. Monitor BP and labs. Assessment and plan for ESRD/HD discussed with patient and his daughter in HD unit.
Pt. with advanced CKD stage 5 presented to ER with fluid overload/pulmonary edema, hyperkalemia and acidosis. Pt. initiated on HD on 12/4/21. Pt. clinically improved. Labs reviewed. Plan for second HD treatment today. Monitor labs and urine output. Avoid any potential nephrotoxins. Dose medications as per eGFR.
Pt. with advanced CKD stage 5 presented to ER with fluid overload/pulmonary edema, hyperkalemia and acidosis. Pt. initiated on HD on 12/4/21. Pt. clinically improved. Labs reviewed. Plan for second HD treatment tomorrow. Monitor labs and urine output. Avoid any potential nephrotoxins. Dose medications as per eGFR.

## 2021-12-17 NOTE — PROGRESS NOTE ADULT - SUBJECTIVE AND OBJECTIVE BOX
[facility] Division of Kidney Diseases & Hypertension  FOLLOW UP NOTE  [phone]--------------------------------------------------------------------------------    Chief Complaint: ESRD/Ongoing hemodialysis requirement    24 hour events/subjective: Pt. with advanced CKD stage 5, initiated on long-term HD during current hospital stay. Pt. deemed ESRD. Pt. underwent LUE AVG placement with vascular surgeon Dr. Mcconnell on 12/14/21. Pt. seen and examined during HD today. Pt. feels well and offered no complaints during HD rounds.    PAST HISTORY  --------------------------------------------------------------------------------  No significant changes to PMH, PSH, FHx, SHx, unless otherwise noted    ALLERGIES & MEDICATIONS  --------------------------------------------------------------------------------  Allergies    No Known Allergies    Intolerances    Standing Inpatient Medications  buMETAnide 2 milliGRAM(s) Oral daily  calcitriol   Capsule 0.5 MICROGram(s) Oral daily  chlorhexidine 2% Cloths 1 Application(s) Topical daily  epoetin jono-epbx (RETACRIT) Injectable 4000 Unit(s) IV Push <User Schedule>  finasteride 5 milliGRAM(s) Oral daily  heparin   Injectable 5000 Unit(s) SubCutaneous every 8 hours  heparin   Injectable. 250 Unit(s) Dialysis. every 1 hour  heparin   Injectable. 500 Unit(s) Dialysis. every 1 hour  influenza  Vaccine (HIGH DOSE) 0.7 milliLiter(s) IntraMuscular once  iron sucrose Injectable 100 milliGRAM(s) IV Push <User Schedule>  lidocaine 2% Gel 1 Application(s) Topical once  melatonin 6 milliGRAM(s) Oral at bedtime  metoprolol succinate ER 75 milliGRAM(s) Oral daily  pantoprazole    Tablet 40 milliGRAM(s) Oral before breakfast  sodium chloride 0.9% with heparin 2,000 Unit(s)/L 2000 Unit(s) Dialysis once    REVIEW OF SYSTEMS  --------------------------------------------------------------------------------  Gen: No fever  Respiratory: No dyspnea  CV: No chest pain  GI: No abdominal pain  MSK: No edema  Neuro: No dizziness    VITALS/PHYSICAL EXAM  --------------------------------------------------------------------------------  T(C): 36.4 (12-17-21 @ 06:35), Max: 36.7 (12-16-21 @ 21:17)  HR: 79 (12-17-21 @ 06:35) (72 - 79)  BP: 145/66 (12-17-21 @ 06:35) (124/66 - 145/66)  RR: 17 (12-17-21 @ 06:35) (16 - 18)  SpO2: 100% (12-17-21 @ 06:35) (99% - 100%)  Wt(kg): --    12-16-21 @ 07:01  -  12-17-21 @ 07:00  --------------------------------------------------------  IN: 1460 mL / OUT: 500 mL / NET: 960 mL    12-17-21 @ 07:01  -  12-17-21 @ 10:26  --------------------------------------------------------  IN: 500 mL / OUT: 900 mL / NET: -400 mL    Physical Exam:              Gen: resting, NAD  	HEENT: No JVD  	Pulm: CTA B/L  	CV: S1S2+  	Abd: Soft  	LE: No edema  	Vascular access: LUE AVG being used for HD    LABS/STUDIES  --------------------------------------------------------------------------------              9.0    4.67  >-----------<  204      [12-17-21 @ 07:45]              28.4     138  |  100  |  29  ----------------------------<  93      [12-17-21 @ 07:45]  3.6   |  24  |  4.52        Ca     7.9     [12-17-21 @ 07:45]      Mg     1.90     [12-17-21 @ 07:45]      Phos  4.7     [12-17-21 @ 07:45]    Creatinine Trend:  SCr 4.52 [12-17 @ 07:45]  SCr 3.92 [12-16 @ 05:09]  SCr 5.54 [12-15 @ 08:02]  SCr 4.55 [12-14 @ 07:16]  SCr 4.17 [12-14 @ 01:48]    HBsAb <3.0      [12-12-21 @ 09:50]  HBsAg Nonreact      [12-12-21 @ 09:50]  HBcAb Nonreact      [12-12-21 @ 09:50]  HCV 0.12, Nonreact      [12-12-21 @ 09:50]

## 2021-12-17 NOTE — PROGRESS NOTE ADULT - PROBLEM SELECTOR PROBLEM 1
Anemia
Anemia
CKD (chronic kidney disease) stage 5, GFR less than 15 ml/min
ESRD on hemodialysis
Anemia
ESRD on hemodialysis
ESRD on hemodialysis
Anemia
ESRD on hemodialysis
Pre-operative clearance
Anemia
Anemia
ESRD on hemodialysis
ESRD on hemodialysis
Anemia
Anemia
CKD (chronic kidney disease) stage 5, GFR less than 15 ml/min
Anemia

## 2021-12-17 NOTE — PROGRESS NOTE ADULT - SUBJECTIVE AND OBJECTIVE BOX
DATE OF SERVICE: 12-17-21 @ 09:20    Subjective: Patient seen and examined. No new events except as noted.     SUBJECTIVE/ROS:  No chest pain, dyspnea, palpitation, or dizziness.       MEDICATIONS:  MEDICATIONS  (STANDING):  buMETAnide 2 milliGRAM(s) Oral daily  calcitriol   Capsule 0.5 MICROGram(s) Oral daily  chlorhexidine 2% Cloths 1 Application(s) Topical daily  epoetin jono-epbx (RETACRIT) Injectable 4000 Unit(s) IV Push <User Schedule>  finasteride 5 milliGRAM(s) Oral daily  heparin   Injectable 5000 Unit(s) SubCutaneous every 8 hours  heparin   Injectable. 500 Unit(s) Dialysis. every 1 hour  heparin   Injectable. 250 Unit(s) Dialysis. every 1 hour  influenza  Vaccine (HIGH DOSE) 0.7 milliLiter(s) IntraMuscular once  iron sucrose Injectable 100 milliGRAM(s) IV Push <User Schedule>  lidocaine 2% Gel 1 Application(s) Topical once  melatonin 6 milliGRAM(s) Oral at bedtime  metoprolol succinate ER 75 milliGRAM(s) Oral daily  pantoprazole    Tablet 40 milliGRAM(s) Oral before breakfast  sodium chloride 0.9% with heparin 2,000 Unit(s)/L 2000 Unit(s) Dialysis once      PHYSICAL EXAM:  T(C): 36.4 (12-17-21 @ 06:35), Max: 36.7 (12-16-21 @ 21:17)  HR: 79 (12-17-21 @ 06:35) (72 - 79)  BP: 145/66 (12-17-21 @ 06:35) (124/66 - 145/66)  RR: 17 (12-17-21 @ 06:35) (16 - 18)  SpO2: 100% (12-17-21 @ 06:35) (99% - 100%)  Wt(kg): --  I&O's Summary    16 Dec 2021 07:01  -  17 Dec 2021 07:00  --------------------------------------------------------  IN: 1460 mL / OUT: 500 mL / NET: 960 mL            JVP: Normal  Neck: supple  Lung: clear   CV: S1 S2 , Murmur:  Abd: soft  Ext: No edema  neuro: Awake / alert  Psych: flat affect  Skin: normal``    LABS/DATA:    CARDIAC MARKERS:                                9.0    4.67  )-----------( 204      ( 17 Dec 2021 07:45 )             28.4     12-17    138  |  100  |  29<H>  ----------------------------<  93  3.6   |  24  |  4.52<H>    Ca    7.9<L>      17 Dec 2021 07:45  Phos  4.7     12-17  Mg     1.90     12-17      proBNP:   Lipid Profile:   HgA1c:   TSH:     TELE:  EKG:

## 2021-12-17 NOTE — DISCHARGE NOTE NURSING/CASE MANAGEMENT/SOCIAL WORK - NSDCVIVACCINE_GEN_ALL_CORE_FT
No Vaccines Administered. COVID-19, mRNA, LNP-S, PF, 30 mcg/0.3 mL dose (Pfizer); 17-Dec-2021 12:57; Gigi Ennis (JOSEFINA); [employer]; [id number] (Exp. Date: 31-Jan-2022); IntraMuscular; Deltoid Right.; 0.3 milliLiter(s);

## 2021-12-17 NOTE — DISCHARGE NOTE NURSING/CASE MANAGEMENT/SOCIAL WORK - NSDCPEFALRISK_GEN_ALL_CORE
For information on Fall & Injury Prevention, visit: [email or web address] OR  [email or web address] OR  https://www.cdc.gov/steadi/patient.html

## 2021-12-27 ENCOUNTER — NON-APPOINTMENT (OUTPATIENT)
Age: 86
End: 2021-12-27

## 2022-01-01 NOTE — ED PROVIDER NOTE - WR ORDER ID 1
NB-Exam


Condition/Feeding


[place] Feeding Method:  Bottle





Examination


Vitals





Vital Signs








  Date Time  Temp Pulse Resp B/P (MAP) Pulse Ox O2 Delivery O2 Flow Rate FiO2


 


10/6/22 22:05 37.4 140 30     


 


10/6/22 13:59 36.7 136 44     


 


10/6/22 13:40 36.7 136 44     


 


10/6/22 13:15 36.8 140 40     


 


10/6/22 13:00 36.7 152 48     


 


10/6/22 12:47 36.8 144 52     








Level of Alertness:  Alert


Activity/State:  Active Alert


Head Circumference:  14.00


Fontanelles:  Soft


Anterior [place] Descriptio:  WNL


Cephalohematoma:  Yes


Sclera Description:  Clear


Ears:  Normal


Mouth, Nose, Eyes:  Hard & Soft Palate Intact


Red Reflex of the Eyes:  Present bilaterally


Neck:  Head Mobile, Clavicles Intact


Chest Circumference:  13.50


Cardiovascular:  Regular Rhythm


Respiratory:  Regular


Breath Sounds:  Clear


Caput Succedaneum:  No


Abdomen:  Soft


Abdomen Circumference:  12.75


Genitalia:  Appear Normal


Back:  Spine Closed, Gluteal Folds Equal


Hips:  WNL


Movement:  Symmetric-Body


Muscle Tone:  Active


Extremities:  5 digits present on each extremity





Weight/Height(Last Documented)


Height (Inches):  20.50


Height (Calculated Centimeters:  52.095251


Weight (Pounds):  7


Weight (Ounces):  8.6


Weight (Calculated Kilograms):  3.111563


Weight (Calculated Grams):  3418.953





NB-Plan/Progress


Plan/Progress


1.  Term  male delivered via  section


-Routine  care orders


-Infant to breast-feed as well as formula supplement





10/7


-continue with routine  care orders.


-discharge plan for tomorrow and it will follow up with pediatrician in [facility].











CUCO VALDOVINOS MD               Oct 7, 2022 10:01 [id number]

## 2022-02-18 NOTE — PROVIDER CONTACT NOTE (CRITICAL VALUE NOTIFICATION) - DATE AND TIME:
04-Dec-2021 02:49 Problem: ALTERED NUTRIENT INTAKE - ADULT  Goal: Nutrient intake appropriate for improving, restoring or maintaining nutritional needs  Description: INTERVENTIONS:  - Assess nutritional status and recommend course of action  - Monitor oral intake, labs, and treatment plans  - Recommend appropriate diets, oral nutritional supplements, and vitamin/mineral supplements  - Recommend, monitor, and adjust tube feedings and TPN/PPN based on assessed needs  - Provide specific nutrition education as appropriate  Outcome: Progressing 04-Dec-2021 02:50

## 2022-04-04 ENCOUNTER — INPATIENT (INPATIENT)
Facility: HOSPITAL | Age: 87
LOS: 0 days | Discharge: HOME HEALTH | End: 2022-04-05
Attending: STUDENT IN AN ORGANIZED HEALTH CARE EDUCATION/TRAINING PROGRAM | Admitting: STUDENT IN AN ORGANIZED HEALTH CARE EDUCATION/TRAINING PROGRAM
Payer: MEDICARE

## 2022-04-04 VITALS
HEART RATE: 85 BPM | TEMPERATURE: 98 F | OXYGEN SATURATION: 98 % | SYSTOLIC BLOOD PRESSURE: 190 MMHG | DIASTOLIC BLOOD PRESSURE: 89 MMHG | HEIGHT: 67.25 IN | RESPIRATION RATE: 18 BRPM

## 2022-04-04 DIAGNOSIS — L29.9 PRURITUS, UNSPECIFIED: ICD-10-CM

## 2022-04-04 DIAGNOSIS — E87.5 HYPERKALEMIA: ICD-10-CM

## 2022-04-04 DIAGNOSIS — K21.9 GASTRO-ESOPHAGEAL REFLUX DISEASE WITHOUT ESOPHAGITIS: ICD-10-CM

## 2022-04-04 DIAGNOSIS — K31.89 OTHER DISEASES OF STOMACH AND DUODENUM: Chronic | ICD-10-CM

## 2022-04-04 DIAGNOSIS — R53.1 WEAKNESS: ICD-10-CM

## 2022-04-04 DIAGNOSIS — N18.6 END STAGE RENAL DISEASE: ICD-10-CM

## 2022-04-04 DIAGNOSIS — K44.9 DIAPHRAGMATIC HERNIA WITHOUT OBSTRUCTION OR GANGRENE: Chronic | ICD-10-CM

## 2022-04-04 DIAGNOSIS — Z29.9 ENCOUNTER FOR PROPHYLACTIC MEASURES, UNSPECIFIED: ICD-10-CM

## 2022-04-04 DIAGNOSIS — I50.22 CHRONIC SYSTOLIC (CONGESTIVE) HEART FAILURE: ICD-10-CM

## 2022-04-04 DIAGNOSIS — I10 ESSENTIAL (PRIMARY) HYPERTENSION: ICD-10-CM

## 2022-04-04 PROBLEM — C61 MALIGNANT NEOPLASM OF PROSTATE: Chronic | Status: ACTIVE | Noted: 2021-12-04

## 2022-04-04 LAB
ALBUMIN SERPL ELPH-MCNC: 3.4 G/DL — SIGNIFICANT CHANGE UP (ref 3.3–5)
ALP SERPL-CCNC: 75 U/L — SIGNIFICANT CHANGE UP (ref 40–120)
ALT FLD-CCNC: 9 U/L — SIGNIFICANT CHANGE UP (ref 4–41)
ANION GAP SERPL CALC-SCNC: 16 MMOL/L — HIGH (ref 7–14)
ANION GAP SERPL CALC-SCNC: 18 MMOL/L — HIGH (ref 7–14)
ANION GAP SERPL CALC-SCNC: 19 MMOL/L — HIGH (ref 7–14)
APTT BLD: 31.9 SEC — SIGNIFICANT CHANGE UP (ref 27–36.3)
AST SERPL-CCNC: 16 U/L — SIGNIFICANT CHANGE UP (ref 4–40)
BASOPHILS # BLD AUTO: 0.06 K/UL — SIGNIFICANT CHANGE UP (ref 0–0.2)
BASOPHILS NFR BLD AUTO: 1.3 % — SIGNIFICANT CHANGE UP (ref 0–2)
BILIRUB SERPL-MCNC: 0.4 MG/DL — SIGNIFICANT CHANGE UP (ref 0.2–1.2)
BLD GP AB SCN SERPL QL: NEGATIVE — SIGNIFICANT CHANGE UP
BUN SERPL-MCNC: 43 MG/DL — HIGH (ref 7–23)
BUN SERPL-MCNC: 44 MG/DL — HIGH (ref 7–23)
BUN SERPL-MCNC: 45 MG/DL — HIGH (ref 7–23)
CALCIUM SERPL-MCNC: 10.7 MG/DL — HIGH (ref 8.4–10.5)
CALCIUM SERPL-MCNC: 10.8 MG/DL — HIGH (ref 8.4–10.5)
CALCIUM SERPL-MCNC: 9.8 MG/DL — SIGNIFICANT CHANGE UP (ref 8.4–10.5)
CHLORIDE SERPL-SCNC: 101 MMOL/L — SIGNIFICANT CHANGE UP (ref 98–107)
CHLORIDE SERPL-SCNC: 102 MMOL/L — SIGNIFICANT CHANGE UP (ref 98–107)
CHLORIDE SERPL-SCNC: 104 MMOL/L — SIGNIFICANT CHANGE UP (ref 98–107)
CO2 SERPL-SCNC: 19 MMOL/L — LOW (ref 22–31)
CO2 SERPL-SCNC: 20 MMOL/L — LOW (ref 22–31)
CO2 SERPL-SCNC: 21 MMOL/L — LOW (ref 22–31)
CREAT SERPL-MCNC: 7.85 MG/DL — HIGH (ref 0.5–1.3)
CREAT SERPL-MCNC: 8.46 MG/DL — HIGH (ref 0.5–1.3)
CREAT SERPL-MCNC: 8.57 MG/DL — HIGH (ref 0.5–1.3)
DIALYSIS INSTRUMENT RESULT - HEPATITIS B SURFACE ANTIGEN: NEGATIVE — SIGNIFICANT CHANGE UP
DIALYSIS INSTRUMENT RESULT - HEPATITIS B SURFACE ANTIGEN: NEGATIVE — SIGNIFICANT CHANGE UP
EGFR: 5 ML/MIN/1.73M2 — LOW
EGFR: 6 ML/MIN/1.73M2 — LOW
EGFR: 6 ML/MIN/1.73M2 — LOW
EOSINOPHIL # BLD AUTO: 0.02 K/UL — SIGNIFICANT CHANGE UP (ref 0–0.5)
EOSINOPHIL NFR BLD AUTO: 0.4 % — SIGNIFICANT CHANGE UP (ref 0–6)
FLUAV AG NPH QL: SIGNIFICANT CHANGE UP
FLUBV AG NPH QL: SIGNIFICANT CHANGE UP
GLUCOSE SERPL-MCNC: 109 MG/DL — HIGH (ref 70–99)
GLUCOSE SERPL-MCNC: 112 MG/DL — HIGH (ref 70–99)
GLUCOSE SERPL-MCNC: 144 MG/DL — HIGH (ref 70–99)
HCT VFR BLD CALC: 39.3 % — SIGNIFICANT CHANGE UP (ref 39–50)
HGB BLD-MCNC: 12.5 G/DL — LOW (ref 13–17)
IANC: 3.39 K/UL — SIGNIFICANT CHANGE UP (ref 1.8–7.4)
IMM GRANULOCYTES NFR BLD AUTO: 0.7 % — SIGNIFICANT CHANGE UP (ref 0–1.5)
INR BLD: 1.09 RATIO — SIGNIFICANT CHANGE UP (ref 0.88–1.16)
LYMPHOCYTES # BLD AUTO: 0.9 K/UL — LOW (ref 1–3.3)
LYMPHOCYTES # BLD AUTO: 19.8 % — SIGNIFICANT CHANGE UP (ref 13–44)
MAGNESIUM SERPL-MCNC: 2.1 MG/DL — SIGNIFICANT CHANGE UP (ref 1.6–2.6)
MAGNESIUM SERPL-MCNC: 2.3 MG/DL — SIGNIFICANT CHANGE UP (ref 1.6–2.6)
MCHC RBC-ENTMCNC: 31.1 PG — SIGNIFICANT CHANGE UP (ref 27–34)
MCHC RBC-ENTMCNC: 31.8 GM/DL — LOW (ref 32–36)
MCV RBC AUTO: 97.8 FL — SIGNIFICANT CHANGE UP (ref 80–100)
MONOCYTES # BLD AUTO: 0.14 K/UL — SIGNIFICANT CHANGE UP (ref 0–0.9)
MONOCYTES NFR BLD AUTO: 3.1 % — SIGNIFICANT CHANGE UP (ref 2–14)
NEUTROPHILS # BLD AUTO: 3.39 K/UL — SIGNIFICANT CHANGE UP (ref 1.8–7.4)
NEUTROPHILS NFR BLD AUTO: 74.7 % — SIGNIFICANT CHANGE UP (ref 43–77)
NRBC # BLD: 0 /100 WBCS — SIGNIFICANT CHANGE UP
NRBC # FLD: 0 K/UL — SIGNIFICANT CHANGE UP
NT-PROBNP SERPL-SCNC: SIGNIFICANT CHANGE UP PG/ML
PHOSPHATE SERPL-MCNC: 6.1 MG/DL — HIGH (ref 2.5–4.5)
PHOSPHATE SERPL-MCNC: 6.9 MG/DL — HIGH (ref 2.5–4.5)
PLATELET # BLD AUTO: 224 K/UL — SIGNIFICANT CHANGE UP (ref 150–400)
POTASSIUM SERPL-MCNC: 5.9 MMOL/L — HIGH (ref 3.5–5.3)
POTASSIUM SERPL-MCNC: 6 MMOL/L — HIGH (ref 3.5–5.3)
POTASSIUM SERPL-MCNC: 6.5 MMOL/L — CRITICAL HIGH (ref 3.5–5.3)
POTASSIUM SERPL-SCNC: 5.9 MMOL/L — HIGH (ref 3.5–5.3)
POTASSIUM SERPL-SCNC: 6 MMOL/L — HIGH (ref 3.5–5.3)
POTASSIUM SERPL-SCNC: 6.5 MMOL/L — CRITICAL HIGH (ref 3.5–5.3)
PROCALCITONIN SERPL-MCNC: 0.33 NG/ML — HIGH (ref 0.02–0.1)
PROT SERPL-MCNC: 6.3 G/DL — SIGNIFICANT CHANGE UP (ref 6–8.3)
PROTHROM AB SERPL-ACNC: 12.7 SEC — SIGNIFICANT CHANGE UP (ref 10.5–13.4)
RBC # BLD: 4.02 M/UL — LOW (ref 4.2–5.8)
RBC # FLD: 15.9 % — HIGH (ref 10.3–14.5)
RH IG SCN BLD-IMP: POSITIVE — SIGNIFICANT CHANGE UP
RSV RNA NPH QL NAA+NON-PROBE: SIGNIFICANT CHANGE UP
SARS-COV-2 RNA SPEC QL NAA+PROBE: SIGNIFICANT CHANGE UP
SODIUM SERPL-SCNC: 139 MMOL/L — SIGNIFICANT CHANGE UP (ref 135–145)
SODIUM SERPL-SCNC: 140 MMOL/L — SIGNIFICANT CHANGE UP (ref 135–145)
SODIUM SERPL-SCNC: 141 MMOL/L — SIGNIFICANT CHANGE UP (ref 135–145)
TROPONIN T, HIGH SENSITIVITY RESULT: 53 NG/L — CRITICAL HIGH
TROPONIN T, HIGH SENSITIVITY RESULT: 57 NG/L — CRITICAL HIGH
WBC # BLD: 4.54 K/UL — SIGNIFICANT CHANGE UP (ref 3.8–10.5)
WBC # FLD AUTO: 4.54 K/UL — SIGNIFICANT CHANGE UP (ref 3.8–10.5)

## 2022-04-04 PROCEDURE — 71045 X-RAY EXAM CHEST 1 VIEW: CPT | Mod: 26

## 2022-04-04 PROCEDURE — 99285 EMERGENCY DEPT VISIT HI MDM: CPT | Mod: 25

## 2022-04-04 PROCEDURE — 99222 1ST HOSP IP/OBS MODERATE 55: CPT | Mod: GC

## 2022-04-04 PROCEDURE — 93010 ELECTROCARDIOGRAM REPORT: CPT

## 2022-04-04 PROCEDURE — 99223 1ST HOSP IP/OBS HIGH 75: CPT | Mod: GC,AI

## 2022-04-04 PROCEDURE — 70450 CT HEAD/BRAIN W/O DYE: CPT | Mod: 26,MA

## 2022-04-04 RX ORDER — METOPROLOL TARTRATE 50 MG
12.5 TABLET ORAL DAILY
Refills: 0 | Status: DISCONTINUED | OUTPATIENT
Start: 2022-04-04 | End: 2022-04-05

## 2022-04-04 RX ORDER — CHLORHEXIDINE GLUCONATE 213 G/1000ML
1 SOLUTION TOPICAL DAILY
Refills: 0 | Status: DISCONTINUED | OUTPATIENT
Start: 2022-04-04 | End: 2022-04-05

## 2022-04-04 RX ORDER — HEPARIN SODIUM 5000 [USP'U]/ML
5000 INJECTION INTRAVENOUS; SUBCUTANEOUS EVERY 8 HOURS
Refills: 0 | Status: DISCONTINUED | OUTPATIENT
Start: 2022-04-04 | End: 2022-04-05

## 2022-04-04 RX ORDER — INSULIN HUMAN 100 [IU]/ML
5 INJECTION, SOLUTION SUBCUTANEOUS ONCE
Refills: 0 | Status: COMPLETED | OUTPATIENT
Start: 2022-04-04 | End: 2022-04-04

## 2022-04-04 RX ORDER — METOPROLOL TARTRATE 50 MG
12.5 TABLET ORAL DAILY
Refills: 0 | Status: DISCONTINUED | OUTPATIENT
Start: 2022-04-04 | End: 2022-04-04

## 2022-04-04 RX ORDER — SODIUM CHLORIDE 9 MG/ML
250 INJECTION, SOLUTION INTRAVENOUS ONCE
Refills: 0 | Status: COMPLETED | OUTPATIENT
Start: 2022-04-04 | End: 2022-04-04

## 2022-04-04 RX ORDER — PANTOPRAZOLE SODIUM 20 MG/1
40 TABLET, DELAYED RELEASE ORAL
Refills: 0 | Status: DISCONTINUED | OUTPATIENT
Start: 2022-04-04 | End: 2022-04-05

## 2022-04-04 RX ORDER — DEXTROSE 50 % IN WATER 50 %
50 SYRINGE (ML) INTRAVENOUS ONCE
Refills: 0 | Status: COMPLETED | OUTPATIENT
Start: 2022-04-04 | End: 2022-04-04

## 2022-04-04 RX ADMIN — HEPARIN SODIUM 5000 UNIT(S): 5000 INJECTION INTRAVENOUS; SUBCUTANEOUS at 22:06

## 2022-04-04 RX ADMIN — SODIUM CHLORIDE 250 MILLILITER(S): 9 INJECTION, SOLUTION INTRAVENOUS at 14:01

## 2022-04-04 RX ADMIN — Medication 12.5 MILLIGRAM(S): at 22:06

## 2022-04-04 RX ADMIN — INSULIN HUMAN 5 UNIT(S): 100 INJECTION, SOLUTION SUBCUTANEOUS at 14:01

## 2022-04-04 RX ADMIN — Medication 50 MILLILITER(S): at 14:01

## 2022-04-04 NOTE — H&P ADULT - NSICDXPASTSURGICALHX_GEN_ALL_CORE_FT
PAST SURGICAL HISTORY:  Acute gastric volvulus s/p laparoscopic reduction, PEG that was later reversed    Hernia, paraesophageal     Prostate Brachytherapy 30y ago at [facility] - had seeds    S/P appendectomy performed at [facility] 10 y ago

## 2022-04-04 NOTE — CONSULT NOTE ADULT - ATTENDING COMMENTS
Pt. with ESRD, hyperkalemia and HTN. Pt. noted to have elevated BP readings and elevated serum potassium levels in ER. Assessment and plan as outlined above. Pt. scheduled for HD treatment today. Monitor BP and labs.

## 2022-04-04 NOTE — ED PROVIDER NOTE - PROGRESS NOTE DETAILS
Nolan CLARK (PGY-2)  spoke to nephro fellow who will see pt in ED and arrange for HD today Nolan CLARK (PGY-2)  nephro fellow at bedside arranging for HD. Will admit pt to hospitalist

## 2022-04-04 NOTE — H&P ADULT - PROBLEM SELECTOR PLAN 1
Likely secondary to missing HD session today  EKG w/o peaked t-waves, w/o shortened QT, w/o ST depression  - s/p insulin 5      Plan:  - Monitor BMP q12  - HD today

## 2022-04-04 NOTE — CONSULT NOTE ADULT - PROBLEM SELECTOR RECOMMENDATION 2
Labs reviewed and pt. noted to be significantly hyperkalemic to 6.5. Plan for HD today as mentioned above. Serum potassium significantly elevated at 6.5. Pt. received medical therapy. Last serum potassium decreased to 5.9. Plan for HD today. Low potassium diet. Monitor serum potassium.

## 2022-04-04 NOTE — ED ADULT NURSE NOTE - OBJECTIVE STATEMENT
Pt received AOx4, ambulatory w. assistance at baseline . pmhx of CHF, HTN, CKD (MWF, last completed dialysis on Friday, AV fistula noted to MELANY) presents to the ED w. chief complaint of generalized itchiness all over body (no rash seen) and progressively worsening generalized weakness. Pt states he is normally able to walk around his house and get up from his chair w/o problems but from Sunday has been having problems getting up from his chair. Pt states she was not able to go to dialysis today because of the weakness. Endorses nausea w/o vomiting and SOB. Pt breathing even and unlabored, saturating 100% on RA. Denies CP, Vomiting, diarrhea, chills, fever, cough at this time. Abrasion noted to middle of the spine, tender to touch. Afebrile rectally. Safety precautions maintained.

## 2022-04-04 NOTE — H&P ADULT - ATTENDING COMMENTS
89M with ESRD on HD, HTN, systolic HF pw generalized weakness. found to be hyperkalemic, hypertensive. patient is taking prednisone for "rash". On exam. patient is awake and alert. reports generalized fatigue. no significant rash on exam, skin is dry with area of hyperkeratosis. BP elevated, has not take meds today and missed HD. f/u vitals and labs after HD session today. hold further steroid dose. clinically does not appear fluid overload. no signs of decompensated HF. PT and nutrition eval.

## 2022-04-04 NOTE — CONSULT NOTE ADULT - PROBLEM SELECTOR RECOMMENDATION 9
Pt. history of ESRD on HD TIW (MWF). Pt. was started on HD 12/2021 during his previous admission. He undergoes HD outpatient at the [facility] in [place]; last outpatient HD session was 4/1 without issues. He undergoes HD through his LUE AVG placed on 12/14/2021; had been using AVG prior to discharge during previous admission. Plan for HD today as per the  pt's maintenance schedule. Consent obtained and placed in the chart. Labs reviewed and patient noted to be hyperkalemic with potassium 6.5 upon presentation. Monitor labs and urine output. Avoid nephrotoxins. Dose medications as per ESRD. Pt. with history of ESRD on HD TIW (MWF). Pt. was started on HD during his hospital stay at [facility] in December 2021. Pt. receives his outpatient HD via LUE AVG at the [facility] in [place], [place]. Last outpatient HD session was 4/1 without issues. Pt. noted to have hyperkalemia on ER. Plan for HD today. Consent for HD obtained and placed in the chart. Monitor labs and urine output. Avoid nephrotoxins. Dose medications as per ESRD.

## 2022-04-04 NOTE — ED PROVIDER NOTE - OBJECTIVE STATEMENT
89M PMH HTN, HLD, systolic CHF, DOMINGA, ESRD (dialyzed MWF, last dialyzed 3 days ago), [place] p/w generalized weakness, worsening over past few days. Daughter at bedside states pt ambulates independently. He now is unable to get up from the couch. Denies f/c, chest pain, SOB, abd pain, urinary symptoms, leg swelling, rectal bleeding. Does endorse pruritis on his chest w/o any rash. Has new PCP but does not remember name ([facility]). Nephrologist Dr. Suman Jenkins, cardiologist Dr. Al Bauman, Dr. Abelardo Haney seen in the prior chart

## 2022-04-04 NOTE — CONSULT NOTE ADULT - SUBJECTIVE AND OBJECTIVE BOX
[facility] DIVISION OF KIDNEY DISEASES AND HYPERTENSION -- [phone]  -- INITIAL CONSULT NOTE  --------------------------------------------------------------------------------  HPI:    Patient is a 89 year old male with PMH of ESRD on HD MWF (started 12/2021), Prostate Cancer, GERD, and HTN who presents to the hospital today with complaints fo lower extremity weakness. The patient's family was at bedside and aided in providing history. The patient at baseline is able to walk around but since yesterday he was having lower extremity weakness. This morning he was supposed to go for HD at [facility] in [place], however was unable to do so because of his weakness. The patient also reports that he is short of breath and more tired this morning. The patient's last HD session was on Friday and he states he completed a full session without any issues. Of note the patient was admitted to [facility] in 12/2021 at which time he had presented with advancing kidney disease and was started on HD during that admission.     PAST HISTORY  --------------------------------------------------------------------------------  PAST MEDICAL & SURGICAL HISTORY:  HTN - Hypertension    Hiatal hernia    GERD (gastroesophageal reflux disease)    Prostate cancer    ESRD on dialysis  MWF dialysis    S/P appendectomy  performed at [facility] 10 y ago    Prostate  Brachytherapy 30y ago at [facility] - had seeds    Acute gastric volvulus  s/p laparoscopic reduction, PEG that was later reversed    Hernia, paraesophageal      FAMILY HISTORY:  FHx: hypertension  Mother    FHx: cancer of prostate  Father    Family history of colon cancer  Sister    Family history of gastric cancer  Brother      PAST SOCIAL HISTORY:    ALLERGIES & MEDICATIONS  --------------------------------------------------------------------------------  Allergies    No Known Allergies    Intolerances      Standing Inpatient Medications    PRN Inpatient Medications      REVIEW OF SYSTEMS  --------------------------------------------------------------------------------  Gen: No fevers/chills  Skin: No rashes  Head/Eyes/Ears: Normal hearing,   Respiratory: + dyspnea, no cough  CV: No chest pain  GI: No abdominal pain, diarrhea  : No dysuria, hematuria  MSK: No edema; LE weakness  Heme: No easy bruising or bleeding  Psych: No significant depression      VITALS/PHYSICAL EXAM  --------------------------------------------------------------------------------  T(C): 36.5 (04-04-22 @ 15:12), Max: 37.3 (04-04-22 @ 10:29)  HR: 92 (04-04-22 @ 15:12) (84 - 92)  BP: 204/95 (04-04-22 @ 15:12) (190/89 - 204/95)  RR: 18 (04-04-22 @ 15:12) (18 - 18)  SpO2: 100% (04-04-22 @ 15:12) (98% - 100%)  Wt(kg): --  Height (cm): 170.8 (04-04-22 @ 09:46)      Physical Exam:  	Gen: NAD  	HEENT: MMM  	Pulm: CTA B/L, no crackles or wheezing   	CV: S1S2  	Abd: Soft, +BS   	Ext: No LE edema B/L  	Neuro: Awake and alert  	Skin: Warm and dry  	Vascular access: LUE AVG with palpable thrill    LABS/STUDIES  --------------------------------------------------------------------------------              12.5   4.54  >-----------<  224      [04-04-22 @ 11:05]              39.3     140  |  102  |  45  ----------------------------<  144      [04-04-22 @ 15:26]  5.9   |  20  |  8.57        Ca     10.7     [04-04-22 @ 15:26]      Mg     2.10     [04-04-22 @ 13:13]      Phos  6.1     [04-04-22 @ 13:13]    TPro  6.3  /  Alb  3.4  /  TBili  0.4  /  DBili  x   /  AST  16  /  ALT  9   /  AlkPhos  75  [04-04-22 @ 11:05]    PT/INR: PT 12.7 , INR 1.09       [04-04-22 @ 11:05]  PTT: 31.9       [04-04-22 @ 11:05]      Creatinine Trend:  SCr 8.57 [04-04 @ 15:26]  SCr 7.85 [04-04 @ 13:13]  SCr 8.46 [04-04 @ 11:05]    Urinalysis - [10-11-21 @ 08:00]      Color Light Yellow / Appearance Clear / SG 1.010 / pH 7.5      Gluc Trace / Ketone Negative  / Bili Negative / Urobili <2 mg/dL       Blood Moderate / Protein 100 mg/dL / Leuk Est Large / Nitrite Negative      RBC 11 / WBC 22 / Hyaline 1 / Gran  / Sq Epi  / Non Sq Epi 0 / Bacteria Many      Iron 28, TIBC 184, %sat 15      [12-04-21 @ 05:36]  Ferritin 357      [12-04-21 @ 05:36]  PTH -- (Ca --)      [10-08-21 @ 17:11]   730  Vitamin D (25OH) 22.7      [12-06-21 @ 09:37]  TSH 3.86      [12-04-21 @ 05:36]  Lipid: chol 149, TG 70, HDL 41, LDL --      [12-04-21 @ 05:36]    HBsAb <3.0      [12-12-21 @ 09:50]  HBsAg Nonreact      [12-12-21 @ 09:50]  HBcAb Nonreact      [12-12-21 @ 09:50]  HCV 0.12, Nonreact      [12-12-21 @ 09:50]     [facility] DIVISION OF KIDNEY DISEASES AND HYPERTENSION -- [phone]  -- INITIAL CONSULT NOTE  --------------------------------------------------------------------------------  HPI: 89-year-old male with PMH of ESRD on HD MWF (started 12/2021), Prostate Cancer, GERD, and HTN who presents to the hospital today with complaints of lower extremity weakness. The patient's family was at bedside and aided in providing history. The patient at baseline is able to walk around but since yesterday he was having lower extremity weakness. This morning he was supposed to go for HD at his outpatient dialysis center ([facility] in [place], [place]) today however was unable to do so because of his weakness. The patient also reports that he is short of breath and more tired this morning. The patient's last HD session was on Friday 4/1/22 and he states he completed a full session without any issues. Of note, patient was admitted to [facility] in December 2021 with progressive/advanced kidney disease and was started on HD during that admission.     PAST HISTORY  --------------------------------------------------------------------------------  PAST MEDICAL & SURGICAL HISTORY:  HTN - Hypertension    Hiatal hernia    GERD (gastroesophageal reflux disease)    Prostate cancer    ESRD on dialysis  MWF dialysis    S/P appendectomy  performed at [facility] 10 y ago    Prostate  Brachytherapy 30y ago at [facility] - had seeds    Acute gastric volvulus  s/p laparoscopic reduction, PEG that was later reversed    Hernia, paraesophageal    FAMILY HISTORY:  FHx: hypertension  Mother    FHx: cancer of prostate  Father    Family history of colon cancer  Sister    Family history of gastric cancer  Brother    PAST SOCIAL HISTORY:    ALLERGIES & MEDICATIONS  --------------------------------------------------------------------------------  Allergies    No Known Allergies    Intolerances    Standing Inpatient Medications    PRN Inpatient Medications    REVIEW OF SYSTEMS  --------------------------------------------------------------------------------  Gen: No fever  Skin: No rash  Head/Eyes/Ears: Normal hearing   Respiratory: +dyspnea, no cough  CV: No chest pain  GI: No abdominal pain, diarrhea  : No dysuria, hematuria  MSK: No edema, +LE weakness  Heme: No easy bruising or bleeding  Psych: No significant depression    VITALS/PHYSICAL EXAM  --------------------------------------------------------------------------------  T(C): 36.5 (04-04-22 @ 15:12), Max: 37.3 (04-04-22 @ 10:29)  HR: 92 (04-04-22 @ 15:12) (84 - 92)  BP: 204/95 (04-04-22 @ 15:12) (190/89 - 204/95)  RR: 18 (04-04-22 @ 15:12) (18 - 18)  SpO2: 100% (04-04-22 @ 15:12) (98% - 100%)  Wt(kg): --  Height (cm): 170.8 (04-04-22 @ 09:46)    Physical Exam:  	Gen: resting, NAD  	HEENT: MMM  	Pulm: CTA B/L, no crackles or wheezing   	CV: S1S2+  	Abd: Soft, +BS   	Ext: No LE edema B/L  	Neuro: Awake and alert  	Skin: Warm and dry  	Vascular access: LUE AVG with palpable thrill    LABS/STUDIES  --------------------------------------------------------------------------------              12.5   4.54  >-----------<  224      [04-04-22 @ 11:05]              39.3     140  |  102  |  45  ----------------------------<  144      [04-04-22 @ 15:26]  5.9   |  20  |  8.57        Ca     10.7     [04-04-22 @ 15:26]      Mg     2.10     [04-04-22 @ 13:13]      Phos  6.1     [04-04-22 @ 13:13]    TPro  6.3  /  Alb  3.4  /  TBili  0.4  /  DBili  x   /  AST  16  /  ALT  9   /  AlkPhos  75  [04-04-22 @ 11:05]    Creatinine Trend:  SCr 8.57 [04-04 @ 15:26]  SCr 7.85 [04-04 @ 13:13]  SCr 8.46 [04-04 @ 11:05]    HBsAb <3.0      [12-12-21 @ 09:50]  HBsAg Nonreact      [12-12-21 @ 09:50]  HBcAb Nonreact      [12-12-21 @ 09:50]  HCV 0.12, Nonreact      [12-12-21 @ 09:50]

## 2022-04-04 NOTE — H&P ADULT - PROBLEM SELECTOR PLAN 3
Pt now has flaky rash present on face and arms  May be secondary to ESRD, w/ calcium phosphate crystal deposition in skin  - s/p 80mg prednisone    Plan:  - CTM for improvement w/ HD  - [place] lotion

## 2022-04-04 NOTE — PROVIDER CONTACT NOTE (OTHER) - RECOMMENDATIONS
Finish Dialysis 10 minutes early due to hypotension. 100 cc saline bolus given in machine to manage hypotension.

## 2022-04-04 NOTE — ED PROVIDER NOTE - CLINICAL SUMMARY MEDICAL DECISION MAKING FREE TEXT BOX
89M PMH HTN, HLD, systolic CHF, DOMINGA, ESRD (dialyzed MWF, last dialyzed 3 days ago), [place] p/w generalized weakness. ECG unchanged. Normal f/s. Hypertensive, but rest of VSS. Crackles at bases b/l, abd NT, no rash, no leg swelling, no neurovascular deficits  Will eval for ACS vs. CHF exacerbation. Will also eval for worsening anemia although no complaints of bleeding. Less likely infectious etiology given afebrile and no infectious symptoms. Less likely CVA given no neuro deficits on exam  Plan: cardiac labs, type/screen, cxr, likely admit

## 2022-04-04 NOTE — ED PROVIDER NOTE - ATTENDING CONTRIBUTION TO CARE
Attending note:   After face to face evaluation of this patient, I concur with above noted hx, pe, and care plan for this patient.  Jenkins: history provided by daughter and patient at bedside. Pt is a 89 yom with PHX as above with few days of itching without rash, and weakness. Pt has had worsening weakness over the weekend and now unable to get up to get to HD today. Pt noted no headache, fever, chills, neck pain, back pain, numbness, chest pain, sob, abd pain, nausea, vomiting. Pt is incontinent. Pt noted difficulty sleeping due to itching. Pt is well appearing, no distress, NC/AT, neck soft and supple, basilar rales, RRR, abdomen soft and non tender, skin pink without notable rash, no JVD, no pitting edema, motor 5/5 in all ext possibly 4+/5 in LLE, sensation normal. +[place] and speech clear. Gait not yet assessed.  Pt with generalized fatigue with only basilar rales on exam and pruritis - metabolic vs infectious vs neurologic - labs, CT, Ua, CXR. will need HG today prior to discharge.

## 2022-04-04 NOTE — ED PROVIDER NOTE - NS ED ROS FT
CONST: +generalized weakness, no fevers, no chills  ENT: no sore throat  CV: no chest pain, no leg swelling  RESP: no shortness of breath, no cough  ABD: no abdominal pain, no nausea, no vomiting, no diarrhea  : no dysuria, no flank pain, no hematuria  MSK: no back pain, no extremity pain  NEURO: no headache or additional neurologic complaints  SKIN:  no rash

## 2022-04-04 NOTE — ED PROVIDER NOTE - NSICDXPASTMEDICALHX_GEN_ALL_CORE_FT
PAST MEDICAL HISTORY:  ESRD on dialysis MWF dialysis    GERD (gastroesophageal reflux disease)     Hiatal hernia     HTN - Hypertension     Prostate cancer

## 2022-04-04 NOTE — H&P ADULT - PROBLEM SELECTOR PLAN 7
DVT ppx w/ lovenox  Diet: low K diet  GI ppx as above  Code status: DNR/DNI, MOLST in chart DVT ppx w/ lovenox  Diet: low K diet  GI ppx as above  PT/OT  Code status: DNR/DNI, MOLST in chart DVT ppx w/ subq hep  Diet: low K diet  GI ppx as above  PT/OT  Code status: DNR/DNI, MOLST in chart

## 2022-04-04 NOTE — H&P ADULT - NSHPSOCIALHISTORY_GEN_ALL_CORE
Used to smoke from age 14 to 60, 1 pk a day, used to drink EtOH but stopped at age 60, lives with wife, retired [employer]

## 2022-04-04 NOTE — ED ADULT NURSE NOTE - INTERVENTIONS DEFINITIONS
[place] to call system/Call bell, personal items and telephone within reach/Instruct patient to call for assistance/Room bathroom lighting operational/Non-slip footwear when patient is off stretcher/Physically safe environment: no spills, clutter or unnecessary equipment/Stretcher in lowest position, wheels locked, appropriate side rails in place/Provide visual cue, wrist band, yellow gown, etc./Monitor for mental status changes and reorient to person, place, and time

## 2022-04-04 NOTE — CONSULT NOTE ADULT - PROBLEM SELECTOR RECOMMENDATION 3
BP uncontrolled upon arrival to the ED with SBP in the 190's. Suspect that pressures elevated in setting of missed HD today and volume overload. Patient does report that he has acute drops in BP as well which leads to dizziness and syncopal episodes. Will monitor BP following HD. On metoprolol 25mg daily at home.     If any questions, please feel free to contact me     Al Aguila  Nephrology Fellow  [facility] Pager: [phone] BP uncontrolled upon arrival to the ED with SBP in the 190's. On metoprolol 25mg daily at home. BP elevated in setting of missed HD today and likely volume overload. Patient does report acute drops in BP during HD that leads to dizziness and syncopal episodes. Monitor BP closely during HD.     If any questions, please feel free to contact me     lA Aguila  Nephrology Fellow  [facility] Pager: [phone]

## 2022-04-04 NOTE — ED ADULT TRIAGE NOTE - CHIEF COMPLAINT QUOTE
p/t with hx ESRD on HD last dialyzed on Friday, c/o of generalized weakness, p/t denies any chest pain denies sob

## 2022-04-04 NOTE — H&P ADULT - HISTORY OF PRESENT ILLNESS
88 y/o male, with a PmHx of CKD3, HTN, Gerd, [place], Iron Deficiency Anemia presenting for weakness over the past few days.    In the ED the patients vitals are: /89, 85 bpm, afebrile  he patient was found to be hyperkalemic to 6.5, s/p insulin 5 and d50 after which it came down to 5.9  CTH neg for acute pathology, shows microvascular ischemic changes and a small left subdural fluid attenuation, CT Chest neg for acute pulm disease 88 y/o male, with a PMHx of ESRD on HD MWF, HTN, GERD, [place], DOMINGA, prostate cancer, CHF presenting for weakness over the past few days. Per patient he has been feeling weak for the past couple of weeks, and has had full body itching without an associated rash for the past 1.5 weeks. His weakness is associated with lightheadedness and dizziness, however he denies any syncopal episode. The full body itching got worse over the past 2 days, and he felt too weak to go to his HD session this morning, which prompted him to come into the hospital. Pt notes he also tends to feel lightheaded after HD sessions, and was told he had orthostatic hypotension. Per daughter he was taken off of nifedipine and his metoprolol was reduced to 12.5 at bedtime once he started HD in December secondary to low BP/syncope. Additionally, the patient tried benadryl for the itching but it did not work, she recently picked him up a short course of steroids (5 days) which he was prescribed for the itching, however at bedside pt only had one pill left. Daughter guessed he must have taken 4 at once to reduce the itching. He denies any visual disturbances, SOB, coughing, hemoptysis, N/V/C/D, fevers, chills or recent weight loss. Pt is anuric secondary to CKD.     In the ED the patients vitals are: /89, 85 bpm, afebrile  The patient was found to be hyperkalemic to 6.5, s/p insulin 5 and d50 after which it came down to 5.9  CTH neg for acute pathology, shows microvascular ischemic changes and a small left subdural fluid attenuation, CT Chest neg for acute pulm disease

## 2022-04-04 NOTE — PROVIDER CONTACT NOTE (OTHER) - ASSESSMENT
Blood pressure dropped from 195 mmhg SBP to 82 mmhg SBP; HR is 70s bpm to 90s bpm. Patient is alert and oriented to place, name and birthday. No chest pain noted.

## 2022-04-04 NOTE — H&P ADULT - NSHPREVIEWOFSYSTEMS_GEN_ALL_CORE
CONSTITUTIONAL:  No weight loss, fever, chills, weakness or fatigue.  HEENT:  Eyes:  No visual loss, blurred vision, double vision or yellow sclerae. Ears, Nose, Throat:  No hearing loss, sneezing, congestion, runny nose or sore throat.  CARDIOVASCULAR:  No chest pain, chest pressure or chest discomfort. No palpitations.  RESPIRATORY:  No shortness of breath, cough or sputum.  GASTROINTESTINAL:  No anorexia, nausea, vomiting or diarrhea. No abdominal pain or blood.  GENITOURINARY:  Denies hematuria, dysuria.   NEUROLOGICAL:  + headache and dizziness but denies syncope, paralysis, ataxia, numbness or tingling in the extremities. No change in bowel or bladder control.  MUSCULOSKELETAL:  No muscle, back pain, joint pain or stiffness.  HEMATOLOGIC:  No anemia or bleeding  LYMPHATICS:  No enlarged nodes.   PSYCHIATRIC:  No history of depression or anxiety.  ENDOCRINOLOGIC:  No reports of sweating, cold or heat intolerance. No polyuria or polydipsia.  SKIN: + itching all over body, most notably abdomen

## 2022-04-04 NOTE — H&P ADULT - PROBLEM SELECTOR PLAN 5
Plan  - c/w home PPI Echo 12/21 shows EF 41% with moderate global LV dysfunction and moderate pulmonary hypertension.  No concern for CHF exacerbation at this time

## 2022-04-04 NOTE — H&P ADULT - NSHPPHYSICALEXAM_GEN_ALL_CORE
GENERAL APPEARANCE: Well developed, NAD  HEENT:  PERRL, EOMI. [place]  NECK: Neck supple, non-tender no lymphadenopathy, masses or thyromegaly.  CARDIAC: Normal S1 and S2. no mrg. RRR  LUNGS: Clear to auscultation B/L, no rales, rhonchi, or wheezing  ABDOMEN: Soft , NTND, bowel sounds normal. No guarding or rebound.   MUSCULOSKELETAL: ROM intact.  No joint erythema or tenderness.   EXTREMITIES: No edema. Peripheral pulses intact.   NEUROLOGICAL: Non focal. Strength and sensation symmetric and intact throughout.   SKIN: Dry skin, erythematous patchy rash present on left arm and face   PSYCHIATRIC: AOx3 , Normal mood and affect

## 2022-04-04 NOTE — ED PROVIDER NOTE - PHYSICAL EXAMINATION
Physical Exam:  Gen: NAD, non-toxic appearing, awake alert   HEENT: EOMI, normal conjunctiva, oral mucosa moist  Lung: crackles at bases b/l, no respiratory distress, no wheezes/rhonchi/rales B/L, speaking in full sentences  CV: RRR  Abd: soft, NT, ND, no guarding, no rigidity, no rebound tenderness, no CVA tenderness   MSK: no visible deformities  Neuro: No focal sensory or motor deficits, no facial droop  Skin: Warm, well perfused, no rash, no leg swelling  ~Gurpreet Francisco MD (PGY-2)

## 2022-04-04 NOTE — H&P ADULT - PROBLEM SELECTOR PLAN 2
Notes to be hypertensive Noted to be hypertensive, per pt has orthostatic hypotension  - hypotension worse with dialysis sessions, so pt was taken off of nifedipine and metoprolol dose was reduced to 12.5 at bedtime    Plan:  - f/up orthostatics  - may resolve with dialysis today and home metoprolol -- will wait and reassess

## 2022-04-04 NOTE — H&P ADULT - ASSESSMENT
88 y/o male, with a PMHx of ESRD on HD MWF, HTN, GERD, [place], DOMINGA, prostate cancer, CHF presenting for weakness and itching, found to be hyperkalemic secondary to missed HD session

## 2022-04-04 NOTE — H&P ADULT - NSHPLABSRESULTS_GEN_ALL_CORE
LABS:                        12.5   4.54  )-----------( 224      ( 04 Apr 2022 11:05 )             39.3     04 Apr 2022 15:26    140    |  102    |  45     ----------------------------<  144    5.9     |  20     |  8.57     Ca    10.7       04 Apr 2022 15:26  Phos  6.1       04 Apr 2022 13:13  Mg     2.10      04 Apr 2022 13:13    TPro  6.3    /  Alb  3.4    /  TBili  0.4    /  DBili  x      /  AST  16     /  ALT  9      /  AlkPhos  75     04 Apr 2022 11:05    PT/INR - ( 04 Apr 2022 11:05 )   PT: 12.7 sec;   INR: 1.09 ratio         PTT - ( 04 Apr 2022 11:05 )  PTT:31.9 sec  CAPILLARY BLOOD GLUCOSE      POCT Blood Glucose.: 121 mg/dL (04 Apr 2022 16:05)  POCT Blood Glucose.: 125 mg/dL (04 Apr 2022 13:58)  POCT Blood Glucose.: 127 mg/dL (04 Apr 2022 09:48)    BLOOD CULTURE    RADIOLOGY & ADDITIONAL TESTS:    Imaging Personally Reviewed:  [ ] YES

## 2022-04-04 NOTE — PATIENT PROFILE ADULT - PATIENT REPRESENTATIVE: ( YOU CAN CHOOSE ANY PERSON THAT CAN ASSIST YOU WITH YOUR HEALTH CARE PREFERENCES, DOES NOT HAVE TO BE A SPOUSE, IMMEDIATE FAMILY OR SIGNIFICANT OTHER/PARTNER)
Follow Up Progress Note      Assessment: Clinic Care Coordinator RN spoke with patient today about the following issues:     1..  Patient requested refill of Atorvastatin.  Patient agreed to have labs in August.     2. Patient needs a refill of Chantix.   Patient has not smoked cigarettes or marijuana since restarting Chantix in June.   Clinic Care Coordinator RN educated patient on tabacco cessation.      3. Asthma: Patient reports his breathing became more labored with increase in humidity and heat.  Patient took prednisone and did nebs prn.  Patient reports his breathing has improved since.  Patient states that he has been using his sister's inhaler because his insurance charges $400 for inhaler.  Patient thinks it is asmanex. Clinic Care Coordinator RN reviewed patient's chart.  Pulmonary instructed patient once he completed Asmanex inhaler patient could stop and use albuterol.  Clinic Care Coordinator RN called patient with this update.  Patient verbalized understanding.  Patient has Albuterol inhaler. (see encounter 3/15/19)      Goals:   Goals        General    Improve chronic symptoms 1 (pt-stated)     Notes - Note created  3/1/2019  1:59 PM by Katie Brooke, RN    Goal 1  Statement: I will use my nebs and take medication as directed   Measure of Success: completed  Supportive Steps to Achieve: patient verbalizes understanding of directions   Barriers: none  Strengths: patient verbalizes understanding of directions   Date to Achieve By: 5/1/19  Patient expressed understanding of goal: yes                   Goal Progression: As of today's date 7/23/2019 goal is met at 76 - 100%.   Goal Status:  Active      Intervention/Education provided during outreach: see above      Outreach Frequency: month      Plan:   Patient will have lab work in August.   Care Coordinator will follow up next month.      declines

## 2022-04-04 NOTE — H&P ADULT - PROBLEM SELECTOR PLAN 4
Echo 12/21 shows EF 41% with moderate global LV dysfunction and moderate pulmonary hypertension.    Plan: On HD MWF  Nephro following    Plan:  - HD today

## 2022-04-04 NOTE — ED ADULT NURSE REASSESSMENT NOTE - NS ED NURSE REASSESS COMMENT FT1
Pt refusing Straight cath; per daughter, eisenberg has not worked before due to pt's stricture. Md made aware. Condom cath in place, awaiting urine.

## 2022-04-04 NOTE — PATIENT PROFILE ADULT - FALL HARM RISK - HARM RISK INTERVENTIONS
Assistance with ambulation/Assistance OOB with selected safe patient handling equipment/Communicate Risk of Fall with Harm to all staff/Discuss with provider need for PT consult/Monitor gait and stability/Reinforce activity limits and safety measures with patient and family/Tailored Fall Risk Interventions/Visual Cue: Yellow wristband and red socks/Bed in lowest position, wheels locked, appropriate side rails in place/Call bell, personal items and telephone in reach/Instruct patient to call for assistance before getting out of bed or chair/Non-slip footwear when patient is out of bed/[place] to call system/Physically safe environment - no spills, clutter or unnecessary equipment/Purposeful Proactive Rounding/Room/bathroom lighting operational, light cord in reach

## 2022-04-05 ENCOUNTER — TRANSCRIPTION ENCOUNTER (OUTPATIENT)
Age: 87
End: 2022-04-05

## 2022-04-05 VITALS — WEIGHT: 151.02 LBS

## 2022-04-05 DIAGNOSIS — E87.5 HYPERKALEMIA: ICD-10-CM

## 2022-04-05 DIAGNOSIS — N18.6 END STAGE RENAL DISEASE: ICD-10-CM

## 2022-04-05 LAB
ANION GAP SERPL CALC-SCNC: 12 MMOL/L — SIGNIFICANT CHANGE UP (ref 7–14)
BASOPHILS # BLD AUTO: 0.06 K/UL — SIGNIFICANT CHANGE UP (ref 0–0.2)
BASOPHILS NFR BLD AUTO: 0.8 % — SIGNIFICANT CHANGE UP (ref 0–2)
BUN SERPL-MCNC: 26 MG/DL — HIGH (ref 7–23)
CALCIUM SERPL-MCNC: 8.9 MG/DL — SIGNIFICANT CHANGE UP (ref 8.4–10.5)
CHLORIDE SERPL-SCNC: 96 MMOL/L — LOW (ref 98–107)
CO2 SERPL-SCNC: 25 MMOL/L — SIGNIFICANT CHANGE UP (ref 22–31)
CREAT SERPL-MCNC: 5.66 MG/DL — HIGH (ref 0.5–1.3)
EGFR: 9 ML/MIN/1.73M2 — LOW
EOSINOPHIL # BLD AUTO: 0.15 K/UL — SIGNIFICANT CHANGE UP (ref 0–0.5)
EOSINOPHIL NFR BLD AUTO: 2 % — SIGNIFICANT CHANGE UP (ref 0–6)
GLUCOSE SERPL-MCNC: 84 MG/DL — SIGNIFICANT CHANGE UP (ref 70–99)
HBV CORE AB SER-ACNC: SIGNIFICANT CHANGE UP
HBV SURFACE AB SER-ACNC: <3 MIU/ML — LOW
HBV SURFACE AB SER-ACNC: <3 MIU/ML — LOW
HBV SURFACE AG SER-ACNC: SIGNIFICANT CHANGE UP
HCT VFR BLD CALC: 35.4 % — LOW (ref 39–50)
HCV AB S/CO SERPL IA: 0.19 S/CO — SIGNIFICANT CHANGE UP (ref 0–0.99)
HCV AB SERPL-IMP: SIGNIFICANT CHANGE UP
HGB BLD-MCNC: 11.7 G/DL — LOW (ref 13–17)
IANC: 3.96 K/UL — SIGNIFICANT CHANGE UP (ref 1.8–7.4)
IMM GRANULOCYTES NFR BLD AUTO: 0.4 % — SIGNIFICANT CHANGE UP (ref 0–1.5)
LYMPHOCYTES # BLD AUTO: 2.54 K/UL — SIGNIFICANT CHANGE UP (ref 1–3.3)
LYMPHOCYTES # BLD AUTO: 34.3 % — SIGNIFICANT CHANGE UP (ref 13–44)
MAGNESIUM SERPL-MCNC: 2 MG/DL — SIGNIFICANT CHANGE UP (ref 1.6–2.6)
MCHC RBC-ENTMCNC: 31.4 PG — SIGNIFICANT CHANGE UP (ref 27–34)
MCHC RBC-ENTMCNC: 33.1 GM/DL — SIGNIFICANT CHANGE UP (ref 32–36)
MCV RBC AUTO: 94.9 FL — SIGNIFICANT CHANGE UP (ref 80–100)
MONOCYTES # BLD AUTO: 0.67 K/UL — SIGNIFICANT CHANGE UP (ref 0–0.9)
MONOCYTES NFR BLD AUTO: 9 % — SIGNIFICANT CHANGE UP (ref 2–14)
MRSA PCR RESULT.: SIGNIFICANT CHANGE UP
NEUTROPHILS # BLD AUTO: 3.96 K/UL — SIGNIFICANT CHANGE UP (ref 1.8–7.4)
NEUTROPHILS NFR BLD AUTO: 53.5 % — SIGNIFICANT CHANGE UP (ref 43–77)
NRBC # BLD: 0 /100 WBCS — SIGNIFICANT CHANGE UP
NRBC # FLD: 0 K/UL — SIGNIFICANT CHANGE UP
PHOSPHATE SERPL-MCNC: 5.7 MG/DL — HIGH (ref 2.5–4.5)
PLATELET # BLD AUTO: 196 K/UL — SIGNIFICANT CHANGE UP (ref 150–400)
POTASSIUM SERPL-MCNC: 4.8 MMOL/L — SIGNIFICANT CHANGE UP (ref 3.5–5.3)
POTASSIUM SERPL-SCNC: 4.8 MMOL/L — SIGNIFICANT CHANGE UP (ref 3.5–5.3)
RBC # BLD: 3.73 M/UL — LOW (ref 4.2–5.8)
RBC # FLD: 15.9 % — HIGH (ref 10.3–14.5)
S AUREUS DNA NOSE QL NAA+PROBE: SIGNIFICANT CHANGE UP
SODIUM SERPL-SCNC: 133 MMOL/L — LOW (ref 135–145)
WBC # BLD: 7.41 K/UL — SIGNIFICANT CHANGE UP (ref 3.8–10.5)
WBC # FLD AUTO: 7.41 K/UL — SIGNIFICANT CHANGE UP (ref 3.8–10.5)

## 2022-04-05 PROCEDURE — 99232 SBSQ HOSP IP/OBS MODERATE 35: CPT | Mod: GC

## 2022-04-05 PROCEDURE — 99239 HOSP IP/OBS DSCHRG MGMT >30: CPT

## 2022-04-05 RX ORDER — METOPROLOL TARTRATE 50 MG
12.5 TABLET ORAL AT BEDTIME
Refills: 0 | Status: DISCONTINUED | OUTPATIENT
Start: 2022-04-05 | End: 2022-04-05

## 2022-04-05 RX ADMIN — Medication 12.5 MILLIGRAM(S): at 05:28

## 2022-04-05 RX ADMIN — HEPARIN SODIUM 5000 UNIT(S): 5000 INJECTION INTRAVENOUS; SUBCUTANEOUS at 05:28

## 2022-04-05 RX ADMIN — CHLORHEXIDINE GLUCONATE 1 APPLICATION(S): 213 SOLUTION TOPICAL at 13:40

## 2022-04-05 RX ADMIN — PANTOPRAZOLE SODIUM 40 MILLIGRAM(S): 20 TABLET, DELAYED RELEASE ORAL at 06:02

## 2022-04-05 RX ADMIN — HEPARIN SODIUM 5000 UNIT(S): 5000 INJECTION INTRAVENOUS; SUBCUTANEOUS at 13:42

## 2022-04-05 NOTE — ADVANCED PRACTICE NURSE CONSULT - ASSESSMENT
A&Ox 4, [place], unable to get OOB due to weakness but able to turn with 1 assist, incontinent of urine (pt is on HD but still reports of urine leakage) Patient reports that he is able to know when he has BM. Skin warm, dry with increased moisture in intertriginous folds, scattered areas of hyperpigmentation and hypopigmentation, scattered areas of ecchymosis on bilateral upper extremities.     Sacrum and right elbow with blanchable erythema.     Left elbow with nonblanchable erythema. As per pt, he uses his left elbow to move himself up in bed. Area of erythema measuring 3cm x 3cm x0cm.     Right midback with growth on skin measuring 1cm x 1cm x0cm elevated above skin level 0.3cm, growth with erosion revealing pink base and small serosanguinous drainage. No pain reported by patient, Periwound intact, no erythema, no induration, no increased warmth noted.     B/l groin - MAD/IAD moisture/incontinence associated dermatitis with fissure and hyperpigmentation.     Left 4th toe trauma, as per patient, he hurt his foot when hit it on furniture at home. Bruise measuring 2.5cm x 0.5cm x 0cm.

## 2022-04-05 NOTE — DISCHARGE NOTE NURSING/CASE MANAGEMENT/SOCIAL WORK - NSDCPEFALRISK_GEN_ALL_CORE
For information on Fall & Injury Prevention, visit: [email or web address] OR  [email or web address] OR  https://www.cdc.gov/steadi/patient.html

## 2022-04-05 NOTE — DISCHARGE NOTE PROVIDER - NSFTFHOMEHTHYNRD_GEN_ALL_CORE
Anesthesia Post Evaluation    Patient: Manisha Wynne    Procedure(s) Performed: Procedure(s) (LRB):  ORIF, FRACTURE, RADIUS, DISTAL-left (Left)    Final Anesthesia Type: general    Patient location during evaluation: PACU  Patient participation: Yes- Able to Participate  Level of consciousness: awake and alert  Post-procedure vital signs: reviewed and stable  Pain management: adequate  Airway patency: patent    PONV status at discharge: No PONV  Anesthetic complications: no      Cardiovascular status: blood pressure returned to baseline  Respiratory status: unassisted  Hydration status: euvolemic  Follow-up not needed.          Vitals Value Taken Time   /63 1/24/2020 11:32 AM   Temp 36.6 °C (97.9 °F) 1/24/2020 10:24 AM   Pulse 68 1/24/2020 11:50 AM   Resp 22 1/24/2020 11:46 AM   SpO2 100 % 1/24/2020 11:50 AM   Vitals shown include unvalidated device data.      Event Time     Out of Recovery 11:36:35          Pain/Corie Score: Corie Score: 10 (1/24/2020 10:40 AM)        
Yes

## 2022-04-05 NOTE — PROGRESS NOTE ADULT - ATTENDING COMMENTS
89M with ESRD on HD presented yesterday with generalized fatigue. found to be hyperkalemic. clinically improved after HD session yesterday. patient reports feeling back at baseline this morning. BP improved, hyperK resolved. patient advised on low K diet. medically stable for d/c home today. c/w home meds. resume regular maintenance HD sessions. f/u with nephrology. outpatient vs home PT.     45 minutes spent coordinating discharge

## 2022-04-05 NOTE — PROGRESS NOTE ADULT - SUBJECTIVE AND OBJECTIVE BOX
[facility] DIVISION OF KIDNEY DISEASES AND HYPERTENSION --   --------------------------------------------------------------------------------  Chief Complaint: ESRD/Ongoing hemodialysis requirement    24 hour events/subjective: Pt. with ESRD on HD TIW presented to [facility] for LE weakness. Pt. found to have severe hyperkalemia on ER labs, received HD treatment yesterday. Pt. seen and examined today. Pt. feels better after receiving his HD treatment. No complaints offered. No LE weakness (as per pt). No fever, CP or SOB.    PAST HISTORY  --------------------------------------------------------------------------------  No significant changes to PMH, PSH, FHx, SHx, unless otherwise noted    ALLERGIES & MEDICATIONS  --------------------------------------------------------------------------------  Allergies    No Known Allergies    Intolerances    Standing Inpatient Medications  chlorhexidine 2% Cloths 1 Application(s) Topical daily  heparin   Injectable 5000 Unit(s) SubCutaneous every 8 hours  metoprolol succinate ER 12.5 milliGRAM(s) Oral at bedtime  pantoprazole    Tablet 40 milliGRAM(s) Oral before breakfast    PRN Inpatient Medications    REVIEW OF SYSTEMS  --------------------------------------------------------------------------------  Gen: No fever   Respiratory: No dyspnea   CV: No chest pain   GI: No abdominal pain   MSK: No edema   Neuro: No dizziness     All other systems were reviewed and are negative, except as noted.    VITALS/PHYSICAL EXAM  --------------------------------------------------------------------------------  T(C): 36.7 (04-05-22 @ 05:23), Max: 36.7 (04-05-22 @ 05:23)  HR: 78 (04-05-22 @ 05:23) (78 - 95)  BP: 157/89 (04-05-22 @ 05:23) (111/64 - 204/95)  RR: 17 (04-05-22 @ 05:23) (17 - 20)  SpO2: 97% (04-05-22 @ 05:23) (97% - 100%)  Wt(kg): --  Height (cm): 170.8 (04-04-22 @ 09:46)  Weight (kg): 68.5 (04-04-22 @ 22:02)  BMI (kg/m2): 23.5 (04-04-22 @ 22:02)  BSA (m2): 1.8 (04-04-22 @ 22:02)    04-04-22 @ 07:01  -  04-05-22 @ 07:00  --------------------------------------------------------  IN: 100 mL / OUT: 1600 mL / NET: -1500 mL    Physical Exam:    Gen: resting, NAD    HEENT: No JVD    Pulm: CTA B/L    CV: S1S2+    Abd: Soft    LE: No edema    Vascular access: LUE AVG: skin intact     LABS/STUDIES  --------------------------------------------------------------------------------              11.7   7.41  >-----------<  196      [04-05-22 @ 08:04]              35.4     133  |  96  |  26  ----------------------------<  84      [04-05-22 @ 08:04]  4.8   |  25  |  5.66        Ca     8.9     [04-05-22 @ 08:04]      Mg     2.00     [04-05-22 @ 08:04]      Phos  5.7     [04-05-22 @ 08:04]    TPro  6.3  /  Alb  3.4  /  TBili  0.4  /  DBili  x   /  AST  16  /  ALT  9   /  AlkPhos  75  [04-04-22 @ 11:05]
Patient is a 89y old  Male who presents with a chief complaint of WEAKNESS (04 Apr 2022 16:04)      SUBJECTIVE / OVERNIGHT EVENTS: Patient seen and examined at bedside.    MEDICATIONS  (STANDING):  chlorhexidine 2% Cloths 1 Application(s) Topical daily  heparin   Injectable 5000 Unit(s) SubCutaneous every 8 hours  metoprolol succinate ER 12.5 milliGRAM(s) Oral daily  pantoprazole    Tablet 40 milliGRAM(s) Oral before breakfast    MEDICATIONS  (PRN):      Vital Signs Last 24 Hrs  T(C): 36.7 (05 Apr 2022 05:23), Max: 37.3 (04 Apr 2022 10:29)  T(F): 98 (05 Apr 2022 05:23), Max: 99.1 (04 Apr 2022 10:29)  HR: 78 (05 Apr 2022 05:23) (78 - 95)  BP: 157/89 (05 Apr 2022 05:23) (111/64 - 204/95)  BP(mean): --  RR: 17 (05 Apr 2022 05:23) (17 - 20)  SpO2: 97% (05 Apr 2022 05:23) (97% - 100%)  CAPILLARY BLOOD GLUCOSE      POCT Blood Glucose.: 114 mg/dL (04 Apr 2022 21:07)  POCT Blood Glucose.: 121 mg/dL (04 Apr 2022 16:05)  POCT Blood Glucose.: 125 mg/dL (04 Apr 2022 13:58)  POCT Blood Glucose.: 127 mg/dL (04 Apr 2022 09:48)    I&O's Summary    04 Apr 2022 07:01  -  05 Apr 2022 07:00  --------------------------------------------------------  IN: 100 mL / OUT: 1600 mL / NET: -1500 mL        PHYSICAL EXAM:  GENERAL APPEARANCE: Well developed, NAD  HEENT:  PERRL, EOMI. [place]  NECK: Neck supple, non-tender no lymphadenopathy, masses or thyromegaly.  CARDIAC: Normal S1 and S2. no mrg. RRR  LUNGS: Clear to auscultation B/L, no rales, rhonchi, or wheezing  ABDOMEN: Soft , NTND, bowel sounds normal. No guarding or rebound.   MUSCULOSKELETAL: ROM intact.  No joint erythema or tenderness.   EXTREMITIES: No edema. Peripheral pulses intact.   NEUROLOGICAL: Non focal. Strength and sensation symmetric and intact throughout.   SKIN: Dry skin, erythematous patchy rash present on left arm and face   PSYCHIATRIC: AOx3 , Normal mood and affect    LABS:                        12.5   4.54  )-----------( 224      ( 04 Apr 2022 11:05 )             39.3     04-04    140  |  102  |  45<H>  ----------------------------<  144<H>  5.9<H>   |  20<L>  |  8.57<H>    Ca    10.7<H>      04 Apr 2022 15:26  Phos  6.1     04-04  Mg     2.10     04-04    TPro  6.3  /  Alb  3.4  /  TBili  0.4  /  DBili  x   /  AST  16  /  ALT  9   /  AlkPhos  75  04-04    PT/INR - ( 04 Apr 2022 11:05 )   PT: 12.7 sec;   INR: 1.09 ratio         PTT - ( 04 Apr 2022 11:05 )  PTT:31.9 sec          RADIOLOGY & ADDITIONAL TESTS:    Imaging Personally Reviewed:    Consultant(s) Notes Reviewed:      Care Discussed with Consultants/Other Providers:    Maritza Mcguire, PGY-1; [facility] Pager: [phone]; [facility] Pager: [phone]

## 2022-04-05 NOTE — PROGRESS NOTE ADULT - PROBLEM SELECTOR PLAN 1
Pt. with ESRD on HD TIW (MWF) received HD treatment yesterday at [facility]. Pt. clinically stable. Labs reviewed. Plan for maintenance HD tomorrow. Hemoglobin above target range. Serum phosphorus above target range. Recommend low phosphorus diet. Monitor BP and labs.

## 2022-04-05 NOTE — PHYSICAL THERAPY INITIAL EVALUATION ADULT - PERTINENT HX OF CURRENT PROBLEM, REHAB EVAL
89 year old male, with a PMH of ESRD on HD MWF, HTN, GERD, [place], DOMINGA, prostate cancer, CHF presenting for weakness and itching, found to be hyperkalemic secondary to missed HD session.

## 2022-04-05 NOTE — DIETITIAN INITIAL EVALUATION ADULT. - ENERGY INTAKE
- Pt reports continued good appetite/PO intake in house, tolerating diet. Food brought in by family present at bedside ([employer] and [employer]). Pt does not vocalize food preferences at this time.

## 2022-04-05 NOTE — DISCHARGE NOTE NURSING/CASE MANAGEMENT/SOCIAL WORK - PATIENT PORTAL LINK FT
You can access the FollowMyHealth Patient Portal offered by [facility] by registering at the following website: [email or web address]. By joining [facility]’s FollowMyHealth portal, you will also be able to view your health information using other applications (apps) compatible with our system.

## 2022-04-05 NOTE — DIETITIAN INITIAL EVALUATION ADULT. - NUTRITIONGOAL OUTCOME1
Detail Level: Zone
Detail Level: Generalized
Detail Level: Detailed
Pt to meet >75% of estimated needs to optimize nutrition/clinical status.

## 2022-04-05 NOTE — PROGRESS NOTE ADULT - PROBLEM SELECTOR PLAN 5
Echo 12/21 shows EF 41% with moderate global LV dysfunction and moderate pulmonary hypertension.  No concern for CHF exacerbation at this time

## 2022-04-05 NOTE — PHYSICAL THERAPY INITIAL EVALUATION ADULT - ADDITIONAL COMMENTS
Patient reports living in a senior home "[facility]," with wife, no steps to enter and +15 steps to negotiate with motorized chair lift. Patient ambulates with rolling walker at baseline in the home and requires assistance from wife for some ADLs. Patient goes to HD appointments 3x/week.    Patient left seated in bedside chair, call bell within reach, visitors present at bedside, comfortable and in NAD. Case Management present and aware.

## 2022-04-05 NOTE — DIETITIAN INITIAL EVALUATION ADULT. - REASON FOR ADMISSION
Per chart, pt is 89 year old male PMHx ESRD on HD, HTN, GERD, [place], DOMINGA, prostate cancer, CHF presenting for weakness and itching, found to be hyperkalemic secondary to missed HD session. DNR/DNI. 46

## 2022-04-05 NOTE — PROGRESS NOTE ADULT - PROBLEM SELECTOR PLAN 2
Pt. found to have severe hyperkalemia on ER labs. Pt. received HD treatment yesterday. Serum potassium WNL today. Low potassium diet advised. Monitor serum potassium.
Noted to be hypertensive, per pt has orthostatic hypotension  - hypotension worse with dialysis sessions, so pt was taken off of nifedipine and metoprolol dose was reduced to 12.5 at bedtime  - Given metoprolol 4/5 AM vs PM    Plan:  - f/up orthostatics 4/6  - re-adjust metoprolol for evening

## 2022-04-05 NOTE — ADVANCED PRACTICE NURSE CONSULT - REASON FOR CONSULT
Patient seen on skin care rounds after wound care referral received for assessment of skin impairment and recommendations of topical management. Chart reviewed: WBC 7.41, H/H 11.7/35.4, Platelets 196, INR 1.09, Serum albumin 3.4, BMI 23.5, Curtis 12.  Patient  is 89-year-old male with PMH of ESRD on HD MWF (started 12/2021), Prostate Cancer, GERD, and HTN who presents to the hospital with complaints of lower extremity weakness. Nephrology consult done for Dialysis.

## 2022-04-05 NOTE — DISCHARGE NOTE NURSING/CASE MANAGEMENT/SOCIAL WORK - NSDCVIVACCINE_GEN_ALL_CORE_FT
COVID-19, mRNA, LNP-S, PF, 30 mcg/0.3 mL dose (Pfizer); 17-Dec-2021 12:57; Gigi Ennis (JOSEFINA); [employer] Inc; [id number] (Exp. Date: 31-Jan-2022); IntraMuscular; Deltoid Right.; 0.3 milliLiter(s);

## 2022-04-05 NOTE — DISCHARGE NOTE PROVIDER - NSDCMRMEDTOKEN_GEN_ALL_CORE_FT
Avodart 0.5 mg oral capsule: 1 cap(s) orally once a day  calcitriol 0.5 mcg oral capsule: 2 cap(s) orally once a day  metoprolol succinate 25 mg oral tablet, extended release: 0.5 tab(s) orally once a day (at bedtime)  omeprazole 20 mg oral delayed release capsule: 1 cap(s) orally once a day   Avodart 0.5 mg oral capsule: 1 cap(s) orally once a day  metoprolol succinate 25 mg oral tablet, extended release: 0.5 tab(s) orally once a day (at bedtime)  omeprazole 20 mg oral delayed release capsule: 1 cap(s) orally once a day

## 2022-04-05 NOTE — DIETITIAN INITIAL EVALUATION ADULT. - PHYSCIAL ASSESSMENT
- No pressure injuries noted at this time.  - Pt with no overt signs of muscle wasting/fat loss upon visualization.   - Dosing weight (4/4/22) 151 lbs. Recent chart weight (12/14/21) 166 lbs.

## 2022-04-05 NOTE — DISCHARGE NOTE PROVIDER - NSFOLLOWUPCLINICS_GEN_ALL_ED_FT
[facility]  Nephrology  [address], [address] [place] [address]  Phone: [phone]  Fax:     [facility] Dermatol[facility]  Dermatology  [address], [address]  [place] [address]  Phone: [phone]  Fax:

## 2022-04-05 NOTE — DISCHARGE NOTE PROVIDER - HOSPITAL COURSE
The patient is an 89 year old male with a PMHx of ESRD on HD MWF, HTN, GERD, [place], DOMINGA, prostate cancer and CHF presented to the ED for weakness and itching. The patient felt increasingly weak for the past 1.5 weeks, associated with lightheadedness and dizziness w/o syncope. Of note the patient was recently taken off of nifedipine and his metoprolol was reduced to 12.5 at bedtime once he started HD in December secondary to low BP/syncope. Additionally, the patient tried benadryl for the itching but it did not work, per daughter pt accidentally took 80mg prednisone for itching. He denied any visual disturbances, SOB, coughing, hemoptysis, N/V/C/D, fevers, chills or recent weight loss.     In the ED the patients vitals are: /89, 85 bpm, afebrile. The patient was found to be hyperkalemic to 6.5, s/p insulin 5 and d50 after which it came down to 5.9. The CTH was neg for acute pathology, shows microvascular ischemic changes and a small left subdural fluid attenuation, CT Chest neg for acute pulm disease.    During the patients hospital course patient was evaluated by nephrology. He received HD per his MWF schedule and his hyperkalemia resolved to 4.8 and patient's symptoms dramatically improved after HD and now feels at baseline. He no longer feels itching or weakness.     Patient was counselled by nutrition about keeping a low potassium diet and given a written note for dietary discretion. Patient's overall itching also resolved post dialysis without further intervention. Pt's course was complicated by hypotension to 80s SBP during HD, but resolved quickly once his HD was finished. PT saw him and recommended home PT. Patient was evaluated by wound care and there was a growth in the back, we recommend further evaluation with dermatology outpatient.    The patient is currently medically stable for discharge

## 2022-04-05 NOTE — DISCHARGE NOTE PROVIDER - NSDCCPCAREPLAN_GEN_ALL_CORE_FT
PRINCIPAL DISCHARGE DIAGNOSIS  Diagnosis: Generalized weakness  Assessment and Plan of Treatment: You're generalized weakness was likely secondary to high potassium levels in your blood. You recieved medication to bring your potassium level down, and eventually hemodialysis. Your potassium level is now normal. You spoke to the nutriontist who went over your diet and you were given instructions on keeping a diet that maintains a low potassium level      SECONDARY DISCHARGE DIAGNOSES  Diagnosis: Itching  Assessment and Plan of Treatment: Your itching resolved after hemodialysis, the itching may be a complication of ESRD. If it comes back and does not resolve with future HD, please follow up with dermatology    Diagnosis: Hypertension  Assessment and Plan of Treatment: Your blood pressures were very high while you were in the hospital. Unfortunately, while you're in dialysis your pressures drop low. Please follow up with your PCP and nephrologist about managing the high blood pressure with the drops during dialsysis.    Diagnosis: Skin lesion  Assessment and Plan of Treatment: Wound care noticed a growth on your back. Please follow up with dermatology for further evaluation/consideration of biopsy.

## 2022-04-05 NOTE — DIETITIAN INITIAL EVALUATION ADULT. - OTHER INFO
- Pt confirms NKFA. Pt lives at home with wife, reports his daughter is a nurse. Pt reports generally good appetite/PO intake PTA.  - History of ESRD on HD. Pt with severe hyperkalemia on admission. Nephrology on board. Of note, pt was provided with HD nutrition education during previous admission per RDN note (12/12/21). Provided written nutrition education on hemodialysis nutritional recommendations. Topics include: increased protein needs including protein rich foods at every meal/snack, limiting sodium/phosphorus/potassium intake (foods high and low).   - No noted GI distress. No bowel regimen ordered at this time.

## 2022-04-05 NOTE — ADVANCED PRACTICE NURSE CONSULT - RECOMMEDATIONS
Topical recommendations:    Recommending Dermatology consult for growth on Right midback.     B/l groin and sacrum- Cleanse with skin cleanser, pat dry. Apply Misael moisture barrier cream twice a day and PRN with incontinent episodes. With episodes of incontinence only remove soiled layer of Misael, then reinforce with thin layer.     B/l elbows. Apply foam with borders for protection from friction. Change dressing every other day.     Right midback - cleanse skin growth with NS, pat dry, Cover with foam with border, change dressing daily. Continue monitoring for any changes in tissue type. Will refer patient to Dermatology for further evaluation.     Left 4th toe- apply 3 m liquid barrier film daily and continue monitoring for skin color changes.     Continue low air loss bed therapy, continue heel elevation, continue to turn & reposition q2h, soft pillow between bony prominences, continue moisture management with barrier creams & single breathable pad, continue measures to decrease friction/shear/pressure.     Plan of care discussed with patient, all questions answered, with primary RN Katie Bravo at bedside  and Dr Maritza Mcguire team [phone].    Please contact Wound/Ostomy Care Service Line if we can be of further assistance (ext [phone]).

## 2022-04-05 NOTE — PROGRESS NOTE ADULT - PROBLEM SELECTOR PLAN 1
Likely secondary to missing HD session today  EKG w/o peaked t-waves, w/o shortened QT, w/o ST depression  - s/p insulin 5  - s/p HD 4/4    Plan:  - Monitor BMP q12  - f/up nephro recs Likely secondary to missing HD session today  EKG w/o peaked t-waves, w/o shortened QT, w/o ST depression  - s/p insulin 5  - s/p HD 4/4    Plan:  - Monitor BMP q24  - f/up nephro recs

## 2022-04-12 PROBLEM — N18.6 END STAGE RENAL DISEASE: Chronic | Status: ACTIVE | Noted: 2022-04-04

## 2022-04-14 ENCOUNTER — APPOINTMENT (OUTPATIENT)
Dept: VASCULAR SURGERY | Facility: HOSPITAL | Age: 87
End: 2022-04-14

## 2022-04-14 ENCOUNTER — OUTPATIENT (OUTPATIENT)
Dept: OUTPATIENT SERVICES | Facility: HOSPITAL | Age: 87
LOS: 1 days | Discharge: HOME | End: 2022-04-14
Payer: MEDICARE

## 2022-04-14 DIAGNOSIS — K31.89 OTHER DISEASES OF STOMACH AND DUODENUM: Chronic | ICD-10-CM

## 2022-04-14 DIAGNOSIS — K44.9 DIAPHRAGMATIC HERNIA WITHOUT OBSTRUCTION OR GANGRENE: Chronic | ICD-10-CM

## 2022-04-14 PROCEDURE — 36902 INTRO CATH DIALYSIS CIRCUIT: CPT

## 2022-04-14 PROCEDURE — 76937 US GUIDE VASCULAR ACCESS: CPT | Mod: 26

## 2022-04-14 RX ORDER — SODIUM CHLORIDE 9 MG/ML
3 INJECTION INTRAMUSCULAR; INTRAVENOUS; SUBCUTANEOUS EVERY 8 HOURS
Refills: 0 | Status: DISCONTINUED | OUTPATIENT
Start: 2022-04-14 | End: 2022-04-28

## 2022-04-14 RX ORDER — HYDRALAZINE HCL 50 MG
25 TABLET ORAL ONCE
Refills: 0 | Status: COMPLETED | OUTPATIENT
Start: 2022-04-14 | End: 2022-04-14

## 2022-04-14 RX ADMIN — Medication 25 MILLIGRAM(S): at 14:18

## 2022-04-14 NOTE — H&P CARDIOLOGY - NSICDXPASTSURGICALHX_GEN_ALL_CORE_FT
PAST SURGICAL HISTORY:  Acute gastric volvulus s/p laparoscopic reduction, PEG that was later reversed    Hernia, paraesophageal     Prostate Brachytherapy 30y ago at [facility] - had seeds    S/P appendectomy performed at [facility] 10 y ago

## 2022-04-14 NOTE — H&P CARDIOLOGY - HISTORY OF PRESENT ILLNESS
89 y/o M with PMH of HTN, BPH, ESRD(on HD M/W/F), GERD presented to [facility] for AV fistulogram. Patient stated that he has been in his usual state of health and stated that in his last few dialysis the staff has been having trouble with the dialysis. Patient stated that the staff at the dialysis center had to give heparin to increase the flow of the blood, but patient was able to have full HD yesterday. Patient otherwise denied any hand numbness or pain. Patient otherwise denied any other complaints such as CP, SOB, fevers, chills, N/V/D/C, abdominal pain, dysuria, melena, hematochezia, recent travel, sick contact, cough, body aches, pleuritic or positional chest pain.     COVID PCR not detected on 04/11/22

## 2022-07-14 NOTE — PROGRESS NOTE ADULT - PROBLEM SELECTOR PROBLEM 11
Preventive measure
Pt was seen in supine c IV+ and R anterior hip dressing C/D/I. Pt is s/p R hip I&D day #1. and s/p R MEGAN anterior approach on 6/29. Pt c/o R hip pain but was able to participate in P.T. eval. Pt was instructed anterior hip precautions prior to P.T. intervention and pt c verbal understanding
Preventive measure
Preventive measure

## 2022-07-31 LAB — SARS-COV-2 N GENE NPH QL NAA+PROBE: NOT DETECTED

## 2022-08-02 ENCOUNTER — OUTPATIENT (OUTPATIENT)
Dept: OUTPATIENT SERVICES | Facility: HOSPITAL | Age: 87
LOS: 1 days | Discharge: HOME | End: 2022-08-02

## 2022-08-02 ENCOUNTER — APPOINTMENT (OUTPATIENT)
Dept: VASCULAR SURGERY | Facility: HOSPITAL | Age: 87
End: 2022-08-02

## 2022-08-02 DIAGNOSIS — K44.9 DIAPHRAGMATIC HERNIA WITHOUT OBSTRUCTION OR GANGRENE: Chronic | ICD-10-CM

## 2022-08-02 DIAGNOSIS — K31.89 OTHER DISEASES OF STOMACH AND DUODENUM: Chronic | ICD-10-CM

## 2022-08-02 DIAGNOSIS — I77.0 ARTERIOVENOUS FISTULA, ACQUIRED: Chronic | ICD-10-CM

## 2022-08-02 DIAGNOSIS — N18.6 END STAGE RENAL DISEASE: ICD-10-CM

## 2022-08-02 PROCEDURE — 36901 INTRO CATH DIALYSIS CIRCUIT: CPT | Mod: LT

## 2022-08-02 PROCEDURE — 99152 MOD SED SAME PHYS/QHP 5/>YRS: CPT

## 2022-08-02 PROCEDURE — 76937 US GUIDE VASCULAR ACCESS: CPT | Mod: 26

## 2022-08-02 RX ORDER — NIFEDIPINE 30 MG
1 TABLET, EXTENDED RELEASE 24 HR ORAL
Qty: 0 | Refills: 0 | DISCHARGE

## 2022-08-02 RX ORDER — SODIUM CHLORIDE 9 MG/ML
3 INJECTION INTRAMUSCULAR; INTRAVENOUS; SUBCUTANEOUS EVERY 8 HOURS
Refills: 0 | Status: DISCONTINUED | OUTPATIENT
Start: 2022-08-02 | End: 2022-08-16

## 2022-08-02 RX ORDER — OMEPRAZOLE 10 MG/1
1 CAPSULE, DELAYED RELEASE ORAL
Qty: 0 | Refills: 0 | DISCHARGE

## 2022-08-02 RX ORDER — METOPROLOL TARTRATE 50 MG
1 TABLET ORAL
Qty: 0 | Refills: 0 | DISCHARGE

## 2022-08-02 RX ORDER — SEVELAMER CARBONATE 2400 MG/1
2 POWDER, FOR SUSPENSION ORAL
Qty: 0 | Refills: 0 | DISCHARGE

## 2022-08-02 RX ORDER — DUTASTERIDE 0.5 MG/1
1 CAPSULE, LIQUID FILLED ORAL
Qty: 0 | Refills: 0 | DISCHARGE

## 2022-08-02 NOTE — H&P CARDIOLOGY - NSICDXPASTSURGICALHX_GEN_ALL_CORE_FT
PAST SURGICAL HISTORY:  Acute gastric volvulus s/p laparoscopic reduction, PEG that was later reversed    AVF (arteriovenous fistula)     Hernia, paraesophageal     Prostate Brachytherapy 30y ago at [facility] - had seeds    S/P appendectomy performed at [facility] 10 y ago

## 2022-08-02 NOTE — H&P CARDIOLOGY - HISTORY OF PRESENT ILLNESS
89 year old male with HTN, BPH, ESRD on HD (M/W/F) - last session 8/1/22 with AVF with unsuccessful agioplasty of AVF 4/2022 who continues to have trouble with cannulation during his dialysis sessions. Denies hand numbness, swelling or pain.  Presents for AVF fistulogram with possible angioplasty.    COVID PCR not detected on 7/30/22 89 year old male with HTN, BPH, ESRD on HD (M/W/F) - last session 8/1/22 with AVF with agioplasty of AVF 4/2022 who continues to have trouble with cannulation during his dialysis sessions. Denies hand numbness, swelling or pain.  Presents for AVF fistulogram with possible angioplasty.    COVID PCR not detected on 7/30/22

## 2022-09-12 NOTE — DISCHARGE NOTE ADULT - NS MD DC PLAN IMMU FLU PROVIDE INFO
Triage: Mom reports pt was jumping on bed and fell off, hitting R side of head/face onto carpet 1 hr ago. Denies LOC or vomiting since event.  Pt playful and laughing in triage Risks/benefits discussed with patient or patient surrogate

## 2023-01-01 NOTE — PROGRESS NOTE ADULT - SUBJECTIVE AND OBJECTIVE BOX
[facility] Division of Kidney Diseases & Hypertension  FOLLOW UP NOTE  [phone]--------------------------------------------------------------------------------    HPI : 89 year old male with CKD3, HTN, BPH, hiatal hernia presented to [facility] for abnormal labs. Pt. with chronic diarrhea for past 1 month, Outpatient lab work with Scr of 12.48 and bicarb of < 5, which prompted ER referral. Nephrology following for GÉNESIS and metabolic acidosis. [facility] patient noted to have Scr of 12.64 and serum potassium of 5.7, with bicarb of < 7. Pt. initiated on bicarb gtt on 10/7/21. Bicarb drip was discontinued and now patient on LR. Scr improved to 8.98 with bicarb of 15.     Patient was seen and examined at bedside. Endorses feeling better and that his SOB improved significantly. Pt. also states his diarrhea has now resolved.    PAST HISTORY  --------------------------------------------------------------------------------  No significant changes to PMH, PSH, FHx, SHx, unless otherwise noted    ALLERGIES & MEDICATIONS  --------------------------------------------------------------------------------  Allergies    No Known Allergies    Intolerances    Standing Inpatient Medications  calcitriol   Capsule 0.5 MICROGram(s) Oral daily  calcium acetate 667 milliGRAM(s) Oral three times a day with meals  ergocalciferol 30155 Unit(s) Oral once  finasteride 5 milliGRAM(s) Oral daily  heparin   Injectable 5000 Unit(s) SubCutaneous every 8 hours  influenza  Vaccine (HIGH DOSE) 0.7 milliLiter(s) IntraMuscular once  lactated ringers. 1000 milliLiter(s) IV Continuous <Continuous>  melatonin 3 milliGRAM(s) Oral <User Schedule>  metoprolol tartrate 50 milliGRAM(s) Oral two times a day  NIFEdipine XL 60 milliGRAM(s) Oral daily  pantoprazole    Tablet 40 milliGRAM(s) Oral daily    VITALS/PHYSICAL EXAM  --------------------------------------------------------------------------------  T(C): 36.7 (10-09-21 @ 05:51), Max: 37.1 (10-08-21 @ 15:14)  HR: 81 (10-09-21 @ 05:51) (66 - 81)  BP: 160/80 (10-09-21 @ 05:51) (128/71 - 185/100)  RR: 16 (10-09-21 @ 05:51) (16 - 18)  SpO2: 98% (10-09-21 @ 05:51) (97% - 100%)  Wt(kg): --    10-08-21 @ 07:01  -  10-09-21 @ 07:00  --------------------------------------------------------  IN: 750 mL / OUT: 100 mL / NET: 650 mL    Physical Exam:              Gen: NAD  	HEENT: Anicteric  	Pulm: CTA B/L  	CV: S1S2  	Abd: Soft, +BS   	Ext: No LE edema B/L  	Neuro: Awake  	Skin: Warm and dry    LABS/STUDIES  --------------------------------------------------------------------------------              7.9    5.74  >-----------<  99       [10-09-21 @ 07:17]              23.5     140  |  103  |  111  ----------------------------<  91      [10-09-21 @ 07:17]  3.2   |  15  |  8.98        Ca     7.1     [10-09-21 @ 07:17]      iCa    0.92     [10-09 @ 07:17]      Mg     2.00     [10-09-21 @ 07:17]      Phos  5.9     [10-09-21 @ 07:17]    TPro  5.4  /  Alb  3.1  /  TBili  0.5  /  DBili  x   /  AST  14  /  ALT  6   /  AlkPhos  63  [10-09-21 @ 07:17]    Creatinine Trend:  SCr 8.98 [10-09 @ 07:17]  SCr 9.03 [10-09 @ 00:52]  SCr 9.40 [10-08 @ 17:11]  SCr 9.69 [10-08 @ 08:05]  SCr 10.39 [10-07 @ 15:21]    Urinalysis - [10-07-21 @ 06:45]      Color Yellow / Appearance Turbid / SG 1.013 / pH 8.5      Gluc Negative / Ketone Negative  / Bili Negative / Urobili <2 mg/dL       Blood Trace / Protein 300 mg/dL / Leuk Est Large / Nitrite Negative      RBC 6 / WBC 56 / Hyaline 1 / Gran  / Sq Epi  / Non Sq Epi 7 / Bacteria Many    Urine Creatinine 46      [10-08-21 @ 17:11]  Urine Protein 128      [10-08-21 @ 17:11]  Urine Sodium 103      [10-08-21 @ 17:11]  Urine Urea Nitrogen 334.0      [10-08-21 @ 17:11]  Urine Chloride 91      [10-08-21 @ 17:11]  Urine Osmolality 443      [10-07-21 @ 06:45]    Iron 67, TIBC 134, %sat 50      [10-07-21 @ 07:27]  Ferritin 204      [10-07-21 @ 07:27]  PTH -- (Ca --)      [10-08-21 @ 17:11]   730  Vitamin D (25OH) 5.0      [10-08-21 @ 22:39] picc attempt in RUE, unable to thread past 8cm. Occlusive dressing applied.

## 2023-02-28 NOTE — PHYSICAL THERAPY INITIAL EVALUATION ADULT - IMPAIRMENTS FOUND, PT EVAL
36 y.o male was referred by Dr. Lakeshia Marcial for an evaluation of "swallowing d/o".  A copy of this note will be sent to the referring provider.  Patient reports dysphagia with food and liquids on going for 3 months. Sx intermittent. Occasional nausea. Denies any abd pain, hematemesis, odynophagia. Also reports of occasional post prandial chest tightness, unsure of triggers. Attempted TUMs, for 1week, no relief noted. Likes to eat tomato based products.   Denies any NSAIDs use. No previous smoking hx, occasional EOTH usages (on weekends). No illicit drug use. No fhx of malignancy. Recent labs w/ PCP wnl per pt, no records for review. No recent EKGs.. Report given to ROBYN Sanchez RN.   aerobic capacity/endurance/gait, locomotion, and balance/muscle strength/posture

## 2023-04-11 NOTE — H&P ADULT - HISTORY OF PRESENT ILLNESS
I advised you to stay in the hospital.  You verbally acknowledged risk of death stroke heart attack permanent disability by not staying for further evaluation and treatment    Please take your blood pressure 3 times a day and write them down to discuss with your doctor  Please call your doctor first thing in the morning to be seen by him tomorrow    You will need medication adjustment    I gave you your evening dose of metoprolol tartrate do not take this when you get home        Avoid alcohol as this is 1 thing which may increase your blood pressure  Your doctor may talk with you further about staying in the hospital for further testing to look for reasons why your blood pressure is not improving with medications these would include test such as an ultrasound of the kidneys   84 y/o M w/ PMHx of HTN, CKD stage 3 esophageal rupture secondary to EGD, urinary bladder rupture (6/2017) secondary to cystoscopy which was performed due to recurrent UTIs p/w worsening or chronic groin pain.     In ED,   Vitals- T 97.9, , /74, RR 16, SpO2 100 on RA  Given 1gr Ceftriaxone, 1gr tylenol IV and 2mg morphine 86 y/o M w/ PMHx of HTN, CKD stage 3 esophageal rupture secondary to EGD, urinary bladder rupture (6/2017) secondary to cystoscopy which was performed due to recurrent UTIs p/w worsening or chronic groin pain.   Patient has hx of bladder CA s/p resection over 25 years ago and has had urinary incontinence since then. Patient reports having chronic groin pain since his bladder rupture in June this year. Patient has been following with urology as an outpatient for workup of it. Pt has had a CT scan that showed some inguinal inflammation per the family but no hernias.   Patient was diagnosed with osteitis pubis and was given a steroid injection which relieved the pain for about a week until it returned. Patient was also given a Methyl dose pack which improved his pain about 80% until it finished a little over a week ago. Th pain then returned and has been progressively worsening.   The groin pain feels like a "fist" on both sides of his inguinal region radiating down to the top of his testicles. The pain is better when lying down (5/10 in intensity) and worse with sitting up, standing and walking (9/10 in intensity).     In ED,   Vitals- T 97.9, , /74, RR 16, SpO2 100 on RA  Given 1gr Ceftriaxone, 1gr tylenol IV and 2mg morphine 86 y/o M w/ PMHx of HTN, CKD stage 3 esophageal rupture secondary to EGD, urinary bladder rupture (6/2017) secondary to cystoscopy which was performed due to recurrent UTIs p/w worsening or chronic groin pain.   Patient has hx of bladder CA s/p resection over 25 years ago and has had urinary incontinence since then. Patient reports having chronic groin pain since his bladder rupture in June this year. Patient has been following with urology as an outpatient for workup of it. Pt has had a CT scan that showed some inguinal inflammation per the family but no hernias.   Patient was diagnosed with osteitis pubis and was given a steroid injection which relieved the pain for about a week until it returned. Patient was also given a Methyl dose pack which improved his pain about 80% until it finished a little over a week ago. Th pain then returned and has been progressively worsening.   The groin pain feels like "fists" on both sides of his inguinal region radiating down to the top of his testicles. The pain is better when lying down (5/10 in intensity) and worse with sitting up, standing and walking (9/10 in intensity).     In ED,   Vitals- T 97.9, , /74, RR 16, SpO2 100 on RA  Given 1gr Ceftriaxone, 1gr tylenol IV and 2mg morphine 84 y/o M w/ PMHx of HTN, CKD stage 3 esophageal rupture secondary to EGD, urinary bladder rupture (6/2017) secondary to cystoscopy which was performed due to recurrent UTIs p/w worsening chronic groin pain.   Patient has hx of bladder CA s/p resection over 25 years ago and has had urinary incontinence since then. Patient reports having chronic groin pain since his bladder rupture in June this year. Patient has been following with urology as an outpatient for workup of it. Pt has had a CT scan that showed some inguinal inflammation per the family but no hernias.   Patient was diagnosed with osteitis pubis and was given a steroid injection which relieved the pain for about a week until it returned. Patient was also given a Methyl dose pack which improved his pain about 80% until it finished a little over a week ago. Th pain then returned and has been progressively worsening.   The groin pain feels like "fists" on both sides of his inguinal region radiating down to the top of his testicles. The pain is better when lying down (5/10 in intensity) and worse with sitting up, standing and walking (9/10 in intensity).   Denies any F/C, CP, SOB, N/V, dysuria, constipation or diarrhea.    In ED,   Vitals- T 97.9, , /74, RR 16, SpO2 100 on RA  Given 1gr Ceftriaxone, 1gr tylenol IV and 2mg morphine 86 y/o M w/ PMHx of HTN, CKD stage 3 esophageal rupture secondary to EGD, urinary bladder rupture (6/2017) secondary to cystoscopy which was performed due to recurrent UTIs p/w worsening chronic groin pain.   Patient has hx of bladder CA s/p resection over 25 years ago and has had urinary incontinence since then. Patient reports having chronic groin pain since his bladder rupture in June this year. Patient has been following with urology as an outpatient for workup of it. Pt has had a CT scan that showed some inguinal inflammation per the family but no hernias.   Patient was diagnosed with osteitis pubis and was given a steroid injection which relieved the pain for about a week until it returned. Patient was also given a Methyl dose pack which improved his pain about 80% until it finished a little over a week ago. The pain then returned and has been progressively worsening.   The groin pain feels like "fists" on both sides of his inguinal region radiating down to the top of his testicles. The pain is better when lying down (5/10 in intensity) and worse with sitting up, standing and walking (9/10 in intensity).   Denies any F/C, CP, SOB, N/V, dysuria, constipation or diarrhea.    In ED,   Vitals- T 97.9, , /74, RR 16, SpO2 100 on RA  Given 1gr Ceftriaxone, 1gr tylenol IV and 2mg morphine

## 2023-06-19 NOTE — DISCHARGE NOTE NURSING/CASE MANAGEMENT/SOCIAL WORK - NSSCTYPOFSERV_GEN_ALL_CORE
RN and PT, initial visit a day after discharge Asc Procedure Text (A): After obtaining clear surgical margins the patient was sent to an ASC for surgical repair.  The patient understands they will receive post-surgical care and follow-up from the ASC physician.

## 2023-10-05 NOTE — DIETITIAN INITIAL EVALUATION ADULT. - PROBLEM SELECTOR PROBLEM 1
Addended by: LAZARA FIERRO on: 10/4/2023 08:39 PM     Modules accepted: Orders    
CKD (chronic kidney disease) stage 5, GFR less than 15 ml/min

## 2024-02-22 NOTE — PROGRESS NOTE ADULT - PROBLEM SELECTOR PROBLEM 6
constipation  rectal bleed  abnormal CT    s/p colonoscopy 2/20, large partially obstructing mass noted at approximately 13cm from anal verge  pathology noted, positive for adenocarcinoma  CEA elevated  surgery recs noted and appreciated  d/w pt    I reviewed the overnight course of events on the unit, re-confirming the patient history. I discussed the care with the patient  Differential diagnosis and plan of care discussed with patient after the evaluation  35 minutes spent on total encounter of which more than fifty percent of the encounter was spent counseling and/or coordinating care by the attending physician. Essential hypertension

## 2024-11-12 NOTE — ED ADULT NURSE NOTE - SUICIDE SCREENING QUESTION 3
No
Safety planning/Referral to ACT Team/Family/Other social support involvement/Medications targeting suicidality/non-suicidal self injurious behavior

## 2025-01-21 NOTE — PROGRESS NOTE ADULT - ASSESSMENT
[de-identified] : Right elbow No ecchymosis, swelling or wounds Normal muscle tone/ bulk TTP at the medial epicondyle Full symmetric elbow/ROM Pain reproduced in the medial epicondyle with resisted wrist flexion Able to make a painless composite fist +AIN/ PIN/ Ulnar n SILT throughout fingers wwp + Phalens + Elbow hyperflexion test  3 views of the right elbow are available for review and personally interpreted: No acute fractures or malalignment, no cortical irregularities MRI right elbow (12/28/24): No visualized ligament tear.  Mild tendinopathy of the common flexor tendon origin.  Appearance of the ulnar nerve at the level of the cubital tunnel which could reflect mild neuritis. Systolic CHF   chronic   newly discovered  cont BB   will consider adding ace or arb in future    NSVT  cont BB  keep K within normal limit    HTN  stable    CKD  on HD   plan for AVF    Pre-Operative Cardiac Risk Stratification and Optimization   Based on patient history and physical exam, the patient is considered to have elevated risk   stress test shows mostly infarcted areas with minimal ischemia  pt has no active CV symptoms   can proceed to this low risk procedure with acceptable elevated risks     discussed with family

## 2025-03-06 NOTE — PROGRESS NOTE ADULT - ASSESSMENT
Pt. with ESRD on HD TIW. 
89 y/o male, with a PMHx of ESRD on HD MWF, HTN, GERD, [place], DOMINGA, prostate cancer, CHF presenting for weakness and itching, found to be hyperkalemic secondary to missed HD session
CONFUSION/DISORIENTATION
